# Patient Record
Sex: MALE | Race: WHITE | NOT HISPANIC OR LATINO | Employment: FULL TIME | URBAN - METROPOLITAN AREA
[De-identification: names, ages, dates, MRNs, and addresses within clinical notes are randomized per-mention and may not be internally consistent; named-entity substitution may affect disease eponyms.]

---

## 2020-09-04 ENCOUNTER — OFFICE VISIT (OUTPATIENT)
Dept: INTERNAL MEDICINE | Facility: CLINIC | Age: 50
End: 2020-09-04

## 2020-09-04 VITALS
RESPIRATION RATE: 20 BRPM | OXYGEN SATURATION: 97 % | BODY MASS INDEX: 60.35 KG/M2 | DIASTOLIC BLOOD PRESSURE: 90 MMHG | WEIGHT: 315 LBS | HEART RATE: 71 BPM | TEMPERATURE: 97.8 F | SYSTOLIC BLOOD PRESSURE: 120 MMHG

## 2020-09-04 DIAGNOSIS — Z23 NEED FOR TDAP VACCINATION: ICD-10-CM

## 2020-09-04 DIAGNOSIS — E66.01 CLASS 3 SEVERE OBESITY DUE TO EXCESS CALORIES WITH SERIOUS COMORBIDITY AND BODY MASS INDEX (BMI) OF 60.0 TO 69.9 IN ADULT (HCC): ICD-10-CM

## 2020-09-04 DIAGNOSIS — F33.0 MAJOR DEPRESSIVE DISORDER, RECURRENT, MILD (HCC): Primary | ICD-10-CM

## 2020-09-04 DIAGNOSIS — Z13.220 SCREENING, LIPID: ICD-10-CM

## 2020-09-04 DIAGNOSIS — I10 ESSENTIAL HYPERTENSION: ICD-10-CM

## 2020-09-04 DIAGNOSIS — K21.9 GASTROESOPHAGEAL REFLUX DISEASE, ESOPHAGITIS PRESENCE NOT SPECIFIED: ICD-10-CM

## 2020-09-04 DIAGNOSIS — F41.9 ANXIETY: ICD-10-CM

## 2020-09-04 DIAGNOSIS — Z13.29 THYROID DISORDER SCREEN: ICD-10-CM

## 2020-09-04 DIAGNOSIS — Z23 IMMUNIZATION DUE: ICD-10-CM

## 2020-09-04 DIAGNOSIS — E11.43 TYPE 2 DIABETES MELLITUS WITH DIABETIC AUTONOMIC NEUROPATHY, WITHOUT LONG-TERM CURRENT USE OF INSULIN (HCC): ICD-10-CM

## 2020-09-04 PROCEDURE — 99204 OFFICE O/P NEW MOD 45 MIN: CPT | Performed by: NURSE PRACTITIONER

## 2020-09-04 RX ORDER — FUROSEMIDE 40 MG/1
40 TABLET ORAL 2 TIMES DAILY
Qty: 60 TABLET | Refills: 0 | Status: SHIPPED | OUTPATIENT
Start: 2020-09-04 | End: 2020-10-07 | Stop reason: SDUPTHER

## 2020-09-04 RX ORDER — AMLODIPINE BESYLATE 5 MG/1
5 TABLET ORAL DAILY
Qty: 30 TABLET | Refills: 0 | Status: SHIPPED | OUTPATIENT
Start: 2020-09-04 | End: 2020-09-18 | Stop reason: SDUPTHER

## 2020-09-04 RX ORDER — LISINOPRIL AND HYDROCHLOROTHIAZIDE 20; 12.5 MG/1; MG/1
1 TABLET ORAL DAILY
Qty: 30 TABLET | Refills: 0 | Status: SHIPPED | OUTPATIENT
Start: 2020-09-04 | End: 2020-10-07 | Stop reason: SDUPTHER

## 2020-09-04 RX ORDER — PANTOPRAZOLE SODIUM 40 MG/1
40 TABLET, DELAYED RELEASE ORAL 2 TIMES DAILY
Qty: 60 TABLET | Refills: 0 | Status: SHIPPED | OUTPATIENT
Start: 2020-09-04 | End: 2020-10-15 | Stop reason: SDUPTHER

## 2020-09-04 RX ORDER — VENLAFAXINE HYDROCHLORIDE 75 MG/1
75 CAPSULE, EXTENDED RELEASE ORAL DAILY
Qty: 30 CAPSULE | Refills: 0 | Status: SHIPPED | OUTPATIENT
Start: 2020-09-04 | End: 2020-10-05 | Stop reason: SDUPTHER

## 2020-09-04 NOTE — PROGRESS NOTES
Chief Complaint   Patient presents with   • Anxiety     would like to dicscuss meds pantoprazole, amlodipine, folic acid   • Depression   • Obesity   • Hypertension   • Weakness - Generalized        Subjective     History of Present Illness   Patient is here today for follow-up of chronic problems.  Patient is new to me.  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     History sheet reviewed scanned to chart.    Patient following up on chronic depression with anxiety.  Patient has taken Lexapro and Prozac in the past which did not work.  He has had [facility] in the past plans to be going to [place] for counseling soon.  He would like to try Wellbutrin and Effexor as he has heard good things about both of these.  He has self-doubts no SI HI.  His anxiety seems worse over the last 5 years.  He does not like to leave his house.    Patient would like to consider phentermine for obesity.    Patient has hypertension denies chest pain shortness of breath swelling.  Takes amlodipine and Lasix for mild swelling.  None presently.  Blood pressure mildly elevated today.    Patient has GERD without exacerbation of symptoms.  Takes pantoprazole 30 minutes before a meal daily.    Patient has type 2 diabetes denies polydipsia polyuria polyphagia.  No hypoglycemia.  Checks feet daily.    Patient has severe obesity with serious comorbidities.  No specific diet or exercise.      The following portions of the patient's history were reviewed and updated as appropriate: allergies, current medications, past family history, past medical history, past social history, past surgical history and problem list.      Current Outpatient Medications:   •  amLODIPine (NORVASC) 5 MG tablet, Take 1 tablet by mouth Daily., Disp: 30 tablet, Rfl: 0  •  folic acid (FOLVITE) 1 MG tablet, Take 1 tablet by mouth Daily., Disp: 30 tablet, Rfl: 5  •  furosemide (LASIX) 40 MG tablet, Take 1 tablet by mouth 2 (Two) Times a Day., Disp: 60 tablet, Rfl: 0  •   lisinopril-hydrochlorothiazide (PRINZIDE,ZESTORETIC) 20-12.5 MG per tablet, Take 1 tablet by mouth Daily., Disp: 30 tablet, Rfl: 0  •  metFORMIN (GLUCOPHAGE) 500 MG tablet, Take 1 tablet by mouth 2 (Two) Times a Day With Meals., Disp: 60 tablet, Rfl: 0  •  pantoprazole (PROTONIX) 40 MG EC tablet, Take 1 tablet by mouth 2 (two) times a day., Disp: 60 tablet, Rfl: 0  •  venlafaxine XR (Effexor XR) 75 MG 24 hr capsule, Take 1 capsule by mouth Daily., Disp: 30 capsule, Rfl: 0    Vitals:    09/04/20 1039   BP: 120/90   Pulse: 71   Resp: 20   Temp: 97.8 °F (36.6 °C)   SpO2: 97%       Body mass index is 60.35 kg/m².        Review of Systems   Constitutional: Negative for activity change, appetite change, chills, fatigue and fever.   HENT: Negative for congestion, postnasal drip and sore throat.    Eyes: Negative for visual disturbance.   Respiratory: Negative for cough and shortness of breath.    Cardiovascular: Negative for chest pain, palpitations and leg swelling.   Gastrointestinal: Negative for abdominal pain, constipation, diarrhea, nausea and GERD.   Musculoskeletal: Negative for arthralgias and myalgias.   Skin: Negative for rash.   Allergic/Immunologic: Negative for environmental allergies.   Neurological: Negative for dizziness.   Psychiatric/Behavioral: Negative for sleep disturbance.   All other systems reviewed and are negative.      Objective   Physical Exam   Constitutional: He is oriented to person, place, and time. He appears well-developed and well-nourished.   HENT:   Head: Normocephalic and atraumatic.   Right Ear: External ear normal.   Left Ear: External ear normal.   Nose: Nose normal.   Mouth/Throat: Oropharynx is clear and moist.   Neck: No thyromegaly present.   Cardiovascular: Normal rate and regular rhythm.   Pulmonary/Chest: Effort normal and breath sounds normal.   Abdominal: Soft. Bowel sounds are normal. He exhibits no distension. There is no abdominal tenderness.   Lymphadenopathy:     He  has no cervical adenopathy.   Neurological: He is alert and oriented to person, place, and time.   Skin: Capillary refill takes 2 to 3 seconds.   Psychiatric: His behavior is normal.   Nursing note and vitals reviewed.          No results found for this or any previous visit.     Assessment/Plan   Carlos Eduardo was seen today for anxiety, depression, obesity, hypertension and weakness - generalized.    Diagnoses and all orders for this visit:    Major depressive disorder, recurrent, mild (CMS/HCC)    Anxiety    Essential hypertension  -     Comprehensive Metabolic Panel; Future  -     POC Urinalysis Dipstick, Automated; Future    Gastroesophageal reflux disease, esophagitis presence not specified  -     CBC & Differential; Future    Type 2 diabetes mellitus with diabetic autonomic neuropathy, without long-term current use of insulin (CMS/HCC)  -     Comprehensive Metabolic Panel; Future  -     POC Urinalysis Dipstick, Automated; Future  -     POC Microalbumin; Future  -     POC Glycosylated Hemoglobin (Hb A1C); Future    Screening, lipid  -     Lipid Panel; Future    Thyroid disorder screen  -     TSH; Future    Need for Tdap vaccination  -     Tdap Vaccine Greater Than or Equal To 6yo IM    Immunization due  -     Pneumococcal Polysaccharide Vaccine 23-Valent Greater Than or Equal To 3yo Subcutaneous / IM    Class 3 severe obesity due to excess calories with serious comorbidity and body mass index (BMI) of 60.0 to 69.9 in adult (CMS/HCC)    Other orders  -     metFORMIN (GLUCOPHAGE) 500 MG tablet; Take 1 tablet by mouth 2 (Two) Times a Day With Meals.  -     lisinopril-hydrochlorothiazide (PRINZIDE,ZESTORETIC) 20-12.5 MG per tablet; Take 1 tablet by mouth Daily.  -     pantoprazole (PROTONIX) 40 MG EC tablet; Take 1 tablet by mouth 2 (two) times a day.  -     amLODIPine (NORVASC) 5 MG tablet; Take 1 tablet by mouth Daily.  -     furosemide (LASIX) 40 MG tablet; Take 1 tablet by mouth 2 (Two) Times a Day.  -     venlafaxine  XR (Effexor XR) 75 MG 24 hr capsule; Take 1 capsule by mouth Daily.      The patient will let me know if he develops any SI HI.  Will start Effexor and in 6 weeks if he is doing well will consider adding Wellbutrin.  I have asked that he speak to weight loss center in regards to phentermine possibility.  I did review risk with him in regards to the phentermine use.      Discussed the patient's BMI with him. BMI is above normal parameters. Recommendations include: educational material.    Return in about 3 weeks (around 9/25/2020) for Annual.  RTC/call  If symptoms worsen  Meds MOA and SE's reviewed and pt v/u

## 2020-09-04 NOTE — PATIENT INSTRUCTIONS
MyPlate from USDA    MyPlate is an outline of a general healthy diet based on the 2010 Dietary Guidelines for Americans, from the U.S. Department of Agriculture (USDA). It sets guidelines for how much food you should eat from each food group based on your age, sex, and level of physical activity.  What are tips for following MyPlate?  To follow MyPlate recommendations:  · Eat a wide variety of fruits and vegetables, grains, and protein foods.  · Serve smaller portions and eat less food throughout the day.  · Limit portion sizes to avoid overeating.  · Enjoy your food.  · Get at least 150 minutes of exercise every week. This is about 30 minutes each day, 5 or more days per week.  It can be difficult to have every meal look like MyPlate. Think about MyPlate as eating guidelines for an entire day, rather than each individual meal.  Fruits and vegetables  · Make half of your plate fruits and vegetables.  · Eat many different colors of fruits and vegetables each day.  · For a 2,000 calorie daily food plan, eat:  ? 2½ cups of vegetables every day.  ? 2 cups of fruit every day.  · 1 cup is equal to:  ? 1 cup raw or cooked vegetables.  ? 1 cup raw fruit.  ? 1 medium-sized orange, apple, or banana.  ? 1 cup 100% fruit or vegetable juice.  ? 2 cups raw leafy greens, such as lettuce, spinach, or kale.  ? ½ cup dried fruit.  Grains  · One fourth of your plate should be grains.  · Make at least half of the grains you eat each day whole grains.  · For a 2,000 calorie daily food plan, eat 6 oz of grains every day.  · 1 oz is equal to:  ? 1 slice bread.  ? 1 cup cereal.  ? ½ cup cooked rice, cereal, or pasta.  Protein  · One fourth of your plate should be protein.  · Eat a wide variety of protein foods, including meat, poultry, fish, eggs, beans, nuts, and tofu.  · For a 2,000 calorie daily food plan, eat 5½ oz of protein every day.  · 1 oz is equal to:  ? 1 oz meat, poultry, or fish.  ? ¼ cup cooked beans.  ? 1 egg.  ? ½ oz nuts  or seeds.  ? 1 Tbsp peanut butter.  Dairy  · Drink fat-free or low-fat (1%) milk.  · Eat or drink dairy as a side to meals.  · For a 2,000 calorie daily food plan, eat or drink 3 cups of dairy every day.  · 1 cup is equal to:  ? 1 cup milk, yogurt, cottage cheese, or soy milk (soy beverage).  ? 2 oz processed cheese.  ? 1½ oz natural cheese.  Fats, oils, salt, and sugars  · Only small amounts of oils are recommended.  · Avoid foods that are high in calories and low in nutritional value (empty calories), like foods high in fat or added sugars.  · Choose foods that are low in salt (sodium). Choose foods that have less than 140 milligrams (mg) of sodium per serving.  · Drink water instead of sugary drinks. Drink enough water each day to keep your urine pale yellow.  Where to find support  · Work with your health care provider or a nutrition specialist (dietitian) to develop a customized eating plan that is right for you.  · Download an tina (mobile application) to help you track your daily food intake.  Where to find more information  · Go to ChooseMyPlate.gov for more information.  Summary  · MyPlate is a general guideline for healthy eating from the USDA. It is based on the 2010 Dietary Guidelines for Americans.  · In general, fruits and vegetables should take up ½ of your plate, grains should take up ¼ of your plate, and protein should take up ¼ of your plate.  This information is not intended to replace advice given to you by your health care provider. Make sure you discuss any questions you have with your health care provider.  Document Released: 01/06/2009 Document Revised: 05/21/2020 Document Reviewed: 03/19/2018  Elsevier Patient Education © 2020 Elsevier Inc.

## 2020-09-10 PROBLEM — Z99.89 OSA ON CPAP: Status: ACTIVE | Noted: 2020-09-10

## 2020-09-10 PROBLEM — G47.33 OSA ON CPAP: Status: ACTIVE | Noted: 2020-09-10

## 2020-09-16 ENCOUNTER — LAB (OUTPATIENT)
Dept: LAB | Facility: HOSPITAL | Age: 50
End: 2020-09-16

## 2020-09-16 DIAGNOSIS — E11.43 TYPE 2 DIABETES MELLITUS WITH DIABETIC AUTONOMIC NEUROPATHY, WITHOUT LONG-TERM CURRENT USE OF INSULIN (HCC): ICD-10-CM

## 2020-09-16 DIAGNOSIS — I10 ESSENTIAL HYPERTENSION: ICD-10-CM

## 2020-09-16 DIAGNOSIS — K21.9 GASTROESOPHAGEAL REFLUX DISEASE, ESOPHAGITIS PRESENCE NOT SPECIFIED: ICD-10-CM

## 2020-09-16 DIAGNOSIS — Z13.220 SCREENING, LIPID: ICD-10-CM

## 2020-09-16 DIAGNOSIS — Z13.29 THYROID DISORDER SCREEN: ICD-10-CM

## 2020-09-16 LAB
ALBUMIN SERPL-MCNC: 4.2 G/DL (ref 3.5–5.2)
ALBUMIN/GLOB SERPL: 1.6 G/DL
ALP SERPL-CCNC: 84 U/L (ref 39–117)
ALT SERPL W P-5'-P-CCNC: 39 U/L (ref 1–41)
ANION GAP SERPL CALCULATED.3IONS-SCNC: 13.3 MMOL/L (ref 5–15)
AST SERPL-CCNC: 26 U/L (ref 1–40)
BASOPHILS # BLD AUTO: 0.04 10*3/MM3 (ref 0–0.2)
BASOPHILS NFR BLD AUTO: 0.6 % (ref 0–1.5)
BILIRUB SERPL-MCNC: 0.9 MG/DL (ref 0–1.2)
BUN SERPL-MCNC: 12 MG/DL (ref 6–20)
BUN/CREAT SERPL: 12.6 (ref 7–25)
CALCIUM SPEC-SCNC: 9.4 MG/DL (ref 8.6–10.5)
CHLORIDE SERPL-SCNC: 97 MMOL/L (ref 98–107)
CHOLEST SERPL-MCNC: 202 MG/DL (ref 0–200)
CO2 SERPL-SCNC: 29.7 MMOL/L (ref 22–29)
CREAT SERPL-MCNC: 0.95 MG/DL (ref 0.76–1.27)
DEPRECATED RDW RBC AUTO: 42.7 FL (ref 37–54)
EOSINOPHIL # BLD AUTO: 0.16 10*3/MM3 (ref 0–0.4)
EOSINOPHIL NFR BLD AUTO: 2.5 % (ref 0.3–6.2)
ERYTHROCYTE [DISTWIDTH] IN BLOOD BY AUTOMATED COUNT: 14.5 % (ref 12.3–15.4)
GFR SERPL CREATININE-BSD FRML MDRD: 84 ML/MIN/1.73
GLOBULIN UR ELPH-MCNC: 2.7 GM/DL
GLUCOSE SERPL-MCNC: 157 MG/DL (ref 65–99)
HCT VFR BLD AUTO: 45.5 % (ref 37.5–51)
HDLC SERPL-MCNC: 32 MG/DL (ref 40–60)
HGB BLD-MCNC: 15.8 G/DL (ref 13–17.7)
IMM GRANULOCYTES # BLD AUTO: 0.04 10*3/MM3 (ref 0–0.05)
IMM GRANULOCYTES NFR BLD AUTO: 0.6 % (ref 0–0.5)
LDLC SERPL CALC-MCNC: 103 MG/DL (ref 0–100)
LDLC/HDLC SERPL: 3.21 {RATIO}
LYMPHOCYTES # BLD AUTO: 1.73 10*3/MM3 (ref 0.7–3.1)
LYMPHOCYTES NFR BLD AUTO: 26.9 % (ref 19.6–45.3)
MCH RBC QN AUTO: 28.7 PG (ref 26.6–33)
MCHC RBC AUTO-ENTMCNC: 34.7 G/DL (ref 31.5–35.7)
MCV RBC AUTO: 82.6 FL (ref 79–97)
MONOCYTES # BLD AUTO: 0.41 10*3/MM3 (ref 0.1–0.9)
MONOCYTES NFR BLD AUTO: 6.4 % (ref 5–12)
NEUTROPHILS NFR BLD AUTO: 4.06 10*3/MM3 (ref 1.7–7)
NEUTROPHILS NFR BLD AUTO: 63 % (ref 42.7–76)
NRBC BLD AUTO-RTO: 0 /100 WBC (ref 0–0.2)
PLATELET # BLD AUTO: 174 10*3/MM3 (ref 140–450)
PMV BLD AUTO: 12.3 FL (ref 6–12)
POTASSIUM SERPL-SCNC: 4.2 MMOL/L (ref 3.5–5.2)
PROT SERPL-MCNC: 6.9 G/DL (ref 6–8.5)
RBC # BLD AUTO: 5.51 10*6/MM3 (ref 4.14–5.8)
SODIUM SERPL-SCNC: 140 MMOL/L (ref 136–145)
TRIGL SERPL-MCNC: 336 MG/DL (ref 0–150)
TSH SERPL DL<=0.05 MIU/L-ACNC: 2.51 UIU/ML (ref 0.27–4.2)
VLDLC SERPL-MCNC: 67.2 MG/DL (ref 5–40)
WBC # BLD AUTO: 6.44 10*3/MM3 (ref 3.4–10.8)

## 2020-09-16 PROCEDURE — 80053 COMPREHEN METABOLIC PANEL: CPT | Performed by: NURSE PRACTITIONER

## 2020-09-16 PROCEDURE — 85025 COMPLETE CBC W/AUTO DIFF WBC: CPT | Performed by: NURSE PRACTITIONER

## 2020-09-16 PROCEDURE — 84443 ASSAY THYROID STIM HORMONE: CPT | Performed by: NURSE PRACTITIONER

## 2020-09-16 PROCEDURE — 80061 LIPID PANEL: CPT | Performed by: NURSE PRACTITIONER

## 2020-09-18 ENCOUNTER — OFFICE VISIT (OUTPATIENT)
Dept: INTERNAL MEDICINE | Facility: CLINIC | Age: 50
End: 2020-09-18

## 2020-09-18 VITALS
HEART RATE: 66 BPM | WEIGHT: 315 LBS | DIASTOLIC BLOOD PRESSURE: 90 MMHG | RESPIRATION RATE: 18 BRPM | BODY MASS INDEX: 44.1 KG/M2 | HEIGHT: 71 IN | SYSTOLIC BLOOD PRESSURE: 120 MMHG | TEMPERATURE: 98.2 F | OXYGEN SATURATION: 97 %

## 2020-09-18 DIAGNOSIS — E78.49 OTHER HYPERLIPIDEMIA: ICD-10-CM

## 2020-09-18 DIAGNOSIS — Z23 NEED FOR TDAP VACCINATION: ICD-10-CM

## 2020-09-18 DIAGNOSIS — Z11.59 NEED FOR HEPATITIS C SCREENING TEST: ICD-10-CM

## 2020-09-18 DIAGNOSIS — G47.33 OSA (OBSTRUCTIVE SLEEP APNEA): ICD-10-CM

## 2020-09-18 DIAGNOSIS — Z12.11 SCREEN FOR COLON CANCER: ICD-10-CM

## 2020-09-18 DIAGNOSIS — E11.9 ENCOUNTER FOR DIABETIC FOOT EXAM (HCC): ICD-10-CM

## 2020-09-18 DIAGNOSIS — Z00.00 ANNUAL PHYSICAL EXAM: Primary | ICD-10-CM

## 2020-09-18 DIAGNOSIS — Z23 IMMUNIZATION DUE: ICD-10-CM

## 2020-09-18 DIAGNOSIS — I10 ESSENTIAL HYPERTENSION: ICD-10-CM

## 2020-09-18 DIAGNOSIS — Z23 NEED FOR PNEUMOCOCCAL VACCINE: ICD-10-CM

## 2020-09-18 DIAGNOSIS — F41.9 ANXIETY: ICD-10-CM

## 2020-09-18 DIAGNOSIS — Z23 NEED FOR IMMUNIZATION AGAINST INFLUENZA: ICD-10-CM

## 2020-09-18 DIAGNOSIS — E11.43 TYPE 2 DIABETES MELLITUS WITH DIABETIC AUTONOMIC NEUROPATHY, WITHOUT LONG-TERM CURRENT USE OF INSULIN (HCC): ICD-10-CM

## 2020-09-18 DIAGNOSIS — Z23 NEED FOR HEPATITIS B BOOSTER VACCINATION: ICD-10-CM

## 2020-09-18 LAB
A/C: NORMAL
BILIRUB BLD-MCNC: NEGATIVE MG/DL
CLARITY, POC: CLEAR
COLOR UR: YELLOW
EXPIRATION DATE: NORMAL
GLUCOSE UR STRIP-MCNC: NEGATIVE MG/DL
HBA1C MFR BLD: 7 %
KETONES UR QL: NEGATIVE
LEUKOCYTE EST, POC: NEGATIVE
Lab: 4027
Lab: NORMAL
Lab: NORMAL
NITRITE UR-MCNC: NEGATIVE MG/ML
PH UR: 5 [PH] (ref 5–8)
POC CREATININE URINE: 300
POC MICROALBUMIN URINE: 30
PROT UR STRIP-MCNC: NEGATIVE MG/DL
RBC # UR STRIP: NEGATIVE /UL
SP GR UR: 1.01 (ref 1–1.03)
UROBILINOGEN UR QL: NORMAL

## 2020-09-18 PROCEDURE — 90732 PPSV23 VACC 2 YRS+ SUBQ/IM: CPT | Performed by: NURSE PRACTITIONER

## 2020-09-18 PROCEDURE — 86803 HEPATITIS C AB TEST: CPT | Performed by: NURSE PRACTITIONER

## 2020-09-18 PROCEDURE — 90715 TDAP VACCINE 7 YRS/> IM: CPT | Performed by: NURSE PRACTITIONER

## 2020-09-18 PROCEDURE — 90746 HEPB VACCINE 3 DOSE ADULT IM: CPT | Performed by: NURSE PRACTITIONER

## 2020-09-18 PROCEDURE — 83036 HEMOGLOBIN GLYCOSYLATED A1C: CPT | Performed by: NURSE PRACTITIONER

## 2020-09-18 PROCEDURE — 99396 PREV VISIT EST AGE 40-64: CPT | Performed by: NURSE PRACTITIONER

## 2020-09-18 PROCEDURE — 90472 IMMUNIZATION ADMIN EACH ADD: CPT | Performed by: NURSE PRACTITIONER

## 2020-09-18 PROCEDURE — 81003 URINALYSIS AUTO W/O SCOPE: CPT | Performed by: NURSE PRACTITIONER

## 2020-09-18 PROCEDURE — 93005 ELECTROCARDIOGRAM TRACING: CPT | Performed by: NURSE PRACTITIONER

## 2020-09-18 PROCEDURE — 82044 UR ALBUMIN SEMIQUANTITATIVE: CPT | Performed by: NURSE PRACTITIONER

## 2020-09-18 PROCEDURE — 82306 VITAMIN D 25 HYDROXY: CPT | Performed by: NURSE PRACTITIONER

## 2020-09-18 PROCEDURE — 90471 IMMUNIZATION ADMIN: CPT | Performed by: NURSE PRACTITIONER

## 2020-09-18 RX ORDER — ATORVASTATIN CALCIUM 20 MG/1
20 TABLET, FILM COATED ORAL DAILY
Qty: 30 TABLET | Refills: 2 | Status: SHIPPED | OUTPATIENT
Start: 2020-09-18 | End: 2020-10-20 | Stop reason: SDUPTHER

## 2020-09-18 RX ORDER — BUPROPION HYDROCHLORIDE 150 MG/1
150 TABLET ORAL DAILY
Qty: 30 TABLET | Refills: 2 | Status: SHIPPED | OUTPATIENT
Start: 2020-09-18 | End: 2020-10-26

## 2020-09-18 RX ORDER — AMLODIPINE BESYLATE 10 MG/1
10 TABLET ORAL DAILY
Qty: 20 TABLET | Refills: 2 | Status: SHIPPED | OUTPATIENT
Start: 2020-09-18 | End: 2020-12-21 | Stop reason: SDUPTHER

## 2020-09-19 LAB
25(OH)D3 SERPL-MCNC: 26 NG/ML (ref 30–100)
HCV AB SER DONR QL: NORMAL

## 2020-10-04 PROBLEM — E55.9 VITAMIN D INSUFFICIENCY: Status: ACTIVE | Noted: 2020-10-04

## 2020-10-04 RX ORDER — ERGOCALCIFEROL (VITAMIN D2) 10 MCG
1000 TABLET ORAL DAILY
COMMUNITY
End: 2021-02-23

## 2020-10-05 DIAGNOSIS — F41.9 ANXIETY: Primary | ICD-10-CM

## 2020-10-05 RX ORDER — VENLAFAXINE HYDROCHLORIDE 150 MG/1
150 CAPSULE, EXTENDED RELEASE ORAL DAILY
Qty: 90 CAPSULE | Refills: 1 | Status: SHIPPED | OUTPATIENT
Start: 2020-10-05 | End: 2020-10-06 | Stop reason: SDUPTHER

## 2020-10-06 DIAGNOSIS — F41.9 ANXIETY: ICD-10-CM

## 2020-10-06 RX ORDER — VENLAFAXINE HYDROCHLORIDE 150 MG/1
150 CAPSULE, EXTENDED RELEASE ORAL DAILY
Qty: 90 CAPSULE | Refills: 1 | Status: SHIPPED | OUTPATIENT
Start: 2020-10-06 | End: 2021-04-07 | Stop reason: SDUPTHER

## 2020-10-07 PROBLEM — E11.9 TYPE 2 DIABETES MELLITUS, WITHOUT LONG-TERM CURRENT USE OF INSULIN: Status: ACTIVE | Noted: 2020-10-07

## 2020-10-07 RX ORDER — LISINOPRIL AND HYDROCHLOROTHIAZIDE 20; 12.5 MG/1; MG/1
1 TABLET ORAL DAILY
Qty: 30 TABLET | Refills: 2 | Status: SHIPPED | OUTPATIENT
Start: 2020-10-07 | End: 2021-02-03 | Stop reason: SDUPTHER

## 2020-10-07 RX ORDER — FUROSEMIDE 40 MG/1
40 TABLET ORAL 2 TIMES DAILY
Qty: 60 TABLET | Refills: 2 | Status: SHIPPED | OUTPATIENT
Start: 2020-10-07 | End: 2021-02-03 | Stop reason: SDUPTHER

## 2020-10-07 NOTE — TELEPHONE ENCOUNTER
LOV 09/18/2020  NOV 12/18/20  LRX 09/04/2020 #60 0 refills furosemide  09/04/2020 #30 0 refills lisinopril  09/04/2020 #60 0 refills

## 2020-10-11 PROBLEM — I10 ESSENTIAL HYPERTENSION: Status: ACTIVE | Noted: 2020-10-11

## 2020-10-11 PROBLEM — E78.49 OTHER HYPERLIPIDEMIA: Status: ACTIVE | Noted: 2020-10-11

## 2020-10-11 PROBLEM — E29.1 HYPOGONADISM IN MALE: Status: ACTIVE | Noted: 2020-10-11

## 2020-10-11 PROBLEM — Z86.39 HISTORY OF NON ANEMIC VITAMIN B12 DEFICIENCY: Status: ACTIVE | Noted: 2020-10-11

## 2020-10-11 PROBLEM — F41.9 ANXIETY: Status: ACTIVE | Noted: 2020-10-11

## 2020-10-15 DIAGNOSIS — E78.49 OTHER HYPERLIPIDEMIA: ICD-10-CM

## 2020-10-20 RX ORDER — ATORVASTATIN CALCIUM 20 MG/1
20 TABLET, FILM COATED ORAL DAILY
Qty: 30 TABLET | Refills: 2 | Status: SHIPPED | OUTPATIENT
Start: 2020-10-20 | End: 2020-11-12 | Stop reason: SDUPTHER

## 2020-10-20 RX ORDER — PANTOPRAZOLE SODIUM 40 MG/1
40 TABLET, DELAYED RELEASE ORAL 2 TIMES DAILY
Qty: 60 TABLET | Refills: 5 | Status: ON HOLD | OUTPATIENT
Start: 2020-10-20 | End: 2021-07-08

## 2020-10-20 NOTE — TELEPHONE ENCOUNTER
Caller: Carlos Eduardo Danielle    Relationship: Self    Best call back number:   [phone]    Medication needed:   Requested Prescriptions     Pending Prescriptions Disp Refills   • pantoprazole (PROTONIX) 40 MG EC tablet 60 tablet 0     Sig: Take 1 tablet by mouth 2 (two) times a day.   • atorvastatin (Lipitor) 20 MG tablet 30 tablet 2     Sig: Take 1 tablet by mouth Daily.       When do you need the refill by:   AS SOON AS POSSIBLE    What details did the patient provide when requesting the medication:   PATIENT HAS A 3 DAY SUPPLY LEFT.     Does the patient have less than a 3 day supply:  [x] Yes  [] No    What is the patient's preferred pharmacy:     [facility] - [place]

## 2020-10-26 ENCOUNTER — TELEMEDICINE (OUTPATIENT)
Dept: INTERNAL MEDICINE | Facility: CLINIC | Age: 50
End: 2020-10-26

## 2020-10-26 ENCOUNTER — TELEPHONE (OUTPATIENT)
Dept: INTERNAL MEDICINE | Facility: CLINIC | Age: 50
End: 2020-10-26

## 2020-10-26 VITALS — TEMPERATURE: 96.9 F

## 2020-10-26 DIAGNOSIS — T88.7XXS NON-DOSE-RELATED ADVERSE REACTION TO MEDICATION, SEQUELA: ICD-10-CM

## 2020-10-26 DIAGNOSIS — R42 DIZZINESS: Primary | ICD-10-CM

## 2020-10-26 PROCEDURE — 99213 OFFICE O/P EST LOW 20 MIN: CPT | Performed by: INTERNAL MEDICINE

## 2020-10-26 RX ORDER — VENLAFAXINE HYDROCHLORIDE 150 MG/1
150 CAPSULE, EXTENDED RELEASE ORAL DAILY
Qty: 90 CAPSULE | Refills: 1 | Status: SHIPPED | OUTPATIENT
Start: 2020-10-26 | End: 2020-12-31 | Stop reason: SDUPTHER

## 2020-10-26 NOTE — PROGRESS NOTES
Subjective       Carlos Eduardo Danielle is a 50 y.o. male.     Chief Complaint   Patient presents with   • Dizziness       History obtained from the patient.    You have chosen to receive care through a telehealth visit.  Do you consent to use a video/audio connection for your medical care today? Yes    The patient has an Anxiety Disorder.  On 9/9/2020, Effexor  mg daily was started.  He states it did not seem to be working well enough, so on 9/18/2020 Wellbutrin  mg daily was added.  He states the medications have been helping with his anxiety.      Dizziness  This is a new problem. Episode onset: 3-4 weeks ago. The problem occurs intermittently. The problem has been unchanged. Associated symptoms include diaphoresis, fatigue (chronic), nausea and a visual change (Occasionally sees dark spots., but no blurred or double vision ). Pertinent negatives include no abdominal pain, arthralgias, chest pain, chills, congestion, coughing, fever, headaches, joint swelling, myalgias, neck pain, numbness, rash, sore throat, swollen glands, vertigo, vomiting (but dry heaves) or weakness. Associated symptoms comments: Gets tingling feeling in the back of the neck.  Occasionally sees dark spots.. Exacerbated by: He feels the symptoms started after Wellbutrin XL was added. Treatments tried: Aspirin. The treatment provided mild relief.        The following portions of the patient's history were reviewed and updated as appropriate: allergies, current medications, past family history, past medical history, past social history, past surgical history and problem list.      Review of Systems   Constitutional: Positive for diaphoresis and fatigue (chronic). Negative for chills and fever.   HENT: Negative for congestion, ear pain, postnasal drip, rhinorrhea, sinus pressure, sinus pain and sore throat.    Respiratory: Negative for cough, shortness of breath and wheezing.    Cardiovascular: Negative for chest pain.   Gastrointestinal:  Positive for nausea. Negative for abdominal pain and vomiting (but dry heaves).   Musculoskeletal: Negative for arthralgias, joint swelling, myalgias, neck pain and neck stiffness.   Skin: Negative for rash.   Neurological: Positive for dizziness. Negative for vertigo, speech difficulty, weakness, numbness and headaches.   Hematological: Negative for adenopathy.           Objective     Temperature 96.9 °F (36.1 °C), temperature source Temporal.    Physical Exam  Vitals signs reviewed.   Constitutional:       Appearance: He is obese.   Pulmonary:      Effort: Pulmonary effort is normal. No respiratory distress.   Neurological:      Mental Status: He is alert.   Psychiatric:         Mood and Affect: Mood normal.           Assessment/Plan   Diagnoses and all orders for this visit:    1. Dizziness (Primary)    2. Non-dose-related adverse reaction to medication, sequela     Continue Effexor XR at the current dose and discontinue Wellbutrin XL.      Return if symptoms worsen or fail to improve.

## 2020-10-26 NOTE — TELEPHONE ENCOUNTER
PATIENT HAS BEEN FEELING DIZZY AND HAS BEEN DRY HEAVING.HE BELIEVES IT HAS TO DO WITH venlafaxine XR (EFFEXOR-XR) 150 MG 24 hr capsule AND buPROPion XL (Wellbutrin XL) 150 MG 24 hr tablet. PATIENT STATES WHEN HE TAKES THE MEDICATION TOGETHER IT CAUSES HIM TO FEEL THIS WAY. HE WOULD LIKE CLINICAL ADVISE ON WHAT HE SHOULD DO.     Carlos Eduardo (Self) [phone]

## 2020-10-26 NOTE — TELEPHONE ENCOUNTER
Pt stated he's been getting dizzy a lot more with tingling at the back of the head. Pt stated he is taking medication in the morning with food or right after. Pt stated at night he gets really hot sweating.  Pt is scheduled a 1:30 pm SD with Dr. Varela 10/26/2020.  Please advise if additional information before/ by appointment.

## 2020-10-26 NOTE — TELEPHONE ENCOUNTER
Spoke to Carlos Eduardo. Pt stated at visit today Dr. Varela advised him to stop taking the wellbutrin XL 150MG.

## 2020-10-26 NOTE — TELEPHONE ENCOUNTER
Has he been checking his sugar to see what his blood sugar numbers are doing during this time? Please have the patient hold the Effexor make appointment to be seen if symptoms do not resolve.

## 2020-10-29 ENCOUNTER — TELEPHONE (OUTPATIENT)
Dept: INTERNAL MEDICINE | Facility: CLINIC | Age: 50
End: 2020-10-29

## 2020-11-03 ENCOUNTER — TELEPHONE (OUTPATIENT)
Dept: INTERNAL MEDICINE | Facility: CLINIC | Age: 50
End: 2020-11-03

## 2020-11-03 NOTE — TELEPHONE ENCOUNTER
LOV  09/18/2020  New patient to us and this will be the first time we fill test strips. Attempted to reach patient at [phone] to see how often he is testing lvm to call back. Gave office number

## 2020-11-03 NOTE — TELEPHONE ENCOUNTER
Caller: Carlos Eduardo Danielle    Relationship: Self    Best call back number:   [phone] (H)    Medication needed:   TEST STRIPS FOR ONE TOUCH ULTRA 2    When do you need the refill by:   AS SOON AS POSSIBLE    What details did the patient provide when requesting the medication:   PATIENT USED HIS LAST STRIP TODAY    Does the patient have less than a 3 day supply:  [x] Yes  [] No    What is the patient's preferred pharmacy:    [facility] [address] [phone] [facility] [phone]

## 2020-11-11 DIAGNOSIS — E78.49 OTHER HYPERLIPIDEMIA: ICD-10-CM

## 2020-11-11 DIAGNOSIS — F41.9 ANXIETY: ICD-10-CM

## 2020-11-12 RX ORDER — ATORVASTATIN CALCIUM 20 MG/1
20 TABLET, FILM COATED ORAL DAILY
Qty: 30 TABLET | Refills: 2 | Status: SHIPPED | OUTPATIENT
Start: 2020-11-12 | End: 2021-02-23

## 2020-11-12 RX ORDER — ATORVASTATIN CALCIUM 20 MG/1
20 TABLET, FILM COATED ORAL DAILY
Qty: 30 TABLET | Refills: 2 | OUTPATIENT
Start: 2020-11-12

## 2020-11-12 RX ORDER — BUPROPION HYDROCHLORIDE 150 MG/1
150 TABLET ORAL DAILY
Qty: 30 TABLET | Refills: 2 | OUTPATIENT
Start: 2020-11-12

## 2020-12-16 ENCOUNTER — TELEPHONE (OUTPATIENT)
Dept: INTERNAL MEDICINE | Facility: CLINIC | Age: 50
End: 2020-12-16

## 2020-12-21 DIAGNOSIS — I10 ESSENTIAL HYPERTENSION: ICD-10-CM

## 2020-12-21 RX ORDER — AMLODIPINE BESYLATE 10 MG/1
10 TABLET ORAL DAILY
Qty: 20 TABLET | Refills: 2 | Status: CANCELLED | OUTPATIENT
Start: 2020-12-21

## 2020-12-21 RX ORDER — AMLODIPINE BESYLATE 10 MG/1
10 TABLET ORAL DAILY
Qty: 30 TABLET | Refills: 2 | Status: SHIPPED | OUTPATIENT
Start: 2020-12-21 | End: 2021-04-07 | Stop reason: SDUPTHER

## 2020-12-31 ENCOUNTER — TELEMEDICINE (OUTPATIENT)
Dept: INTERNAL MEDICINE | Facility: CLINIC | Age: 50
End: 2020-12-31

## 2020-12-31 ENCOUNTER — TELEPHONE (OUTPATIENT)
Dept: INTERNAL MEDICINE | Facility: CLINIC | Age: 50
End: 2020-12-31

## 2020-12-31 VITALS — TEMPERATURE: 96.9 F | WEIGHT: 315 LBS | BODY MASS INDEX: 58.58 KG/M2

## 2020-12-31 DIAGNOSIS — M54.50 ACUTE RIGHT-SIDED LOW BACK PAIN WITHOUT SCIATICA: Primary | ICD-10-CM

## 2020-12-31 PROCEDURE — 99214 OFFICE O/P EST MOD 30 MIN: CPT | Performed by: INTERNAL MEDICINE

## 2020-12-31 RX ORDER — TIZANIDINE 4 MG/1
4 TABLET ORAL EVERY 8 HOURS PRN
Qty: 30 TABLET | Refills: 0 | Status: SHIPPED | OUTPATIENT
Start: 2020-12-31 | End: 2021-01-04

## 2020-12-31 RX ORDER — METHYLPREDNISOLONE 4 MG/1
TABLET ORAL
Qty: 21 EACH | Refills: 0 | Status: SHIPPED | OUTPATIENT
Start: 2020-12-31 | End: 2021-02-23

## 2020-12-31 NOTE — PATIENT INSTRUCTIONS
Continue NSAIDS (Diclofenac) for 2 full weeks, then as needed.  Recommend heat, 2-3 times daily.  Please call if no better in 2 weeks.

## 2020-12-31 NOTE — PROGRESS NOTES
Subjective       Carlos Eduardo Danielle is a 50 y.o. male.     Chief Complaint   Patient presents with   • Back Pain       History obtained from the patient.    You have chosen to receive care through a telehealth visit.  Do you consent to use a video/audio connection for your medical care today? Yes      Back Pain  This is a new problem. Episode onset: 4 days ago. The problem has been gradually worsening since onset. The pain is present in the lumbar spine (middle to right lower). The quality of the pain is described as aching and stabbing (Has spasms). The pain does not radiate. The pain is severe. The pain is the same all the time. The symptoms are aggravated by bending, sitting, lying down, standing and twisting. Stiffness is present: None. Pertinent negatives include no abdominal pain, bladder incontinence, bowel incontinence, chest pain, dysuria, fever, headaches, leg pain, numbness, tingling, weakness or weight loss. Risk factors include obesity (Started after moving things at home). Treatments tried: Lidocaine Patch and Icy/Hot. The treatment provided no relief.        The following portions of the patient's history were reviewed and updated as appropriate: allergies, current medications, past family history, past medical history, past social history, past surgical history and problem list.      Review of Systems   Constitutional: Positive for unexpected weight change (lost 20 pounds after starting diabetic medication). Negative for chills, fever and weight loss.   Respiratory: Negative for cough and shortness of breath.    Cardiovascular: Negative for chest pain.   Gastrointestinal: Negative for abdominal pain, blood in stool, bowel incontinence, constipation, diarrhea, nausea and vomiting.        Denies melena.   Genitourinary: Negative for bladder incontinence, dysuria, frequency, hematuria and urgency.   Musculoskeletal: Positive for back pain. Negative for arthralgias, joint swelling, myalgias, neck pain and  neck stiffness.   Skin: Negative for rash.   Neurological: Negative for tingling, weakness, numbness and headaches.   Hematological: Negative for adenopathy.           Objective     Temperature 96.9 °F (36.1 °C), temperature source Temporal, weight (!) 191 kg (420 lb).    Physical Exam  Vitals signs reviewed.   Constitutional:       Comments: Morbidly obese.   Pulmonary:      Effort: Pulmonary effort is normal. No respiratory distress.   Neurological:      Mental Status: He is alert.   Psychiatric:         Mood and Affect: Mood normal.           Assessment/Plan   Diagnoses and all orders for this visit:    1. Acute right-sided low back pain without sciatica (Primary)  -     methylPREDNISolone (MEDROL) 4 MG dose pack; Take as directed on package instructions.  Dispense: 21 each; Refill: 0  -     diclofenac (VOLTAREN) 50 MG EC tablet; Take 1 tablet by mouth 2 (Two) Times a Day As Needed for pain.  Dispense: 60 tablet; Refill: 0  -     tiZANidine (Zanaflex) 4 MG tablet; Take 1 tablet by mouth Every 8 (Eight) Hours As Needed for Muscle Spasms.  Dispense: 30 tablet; Refill: 0     The patient was instructed to continue NSAIDS (Diclofenac) for 2 full weeks, then as needed.  Recommended heat, 2-3 times daily. He agrees to call if no better in 2 weeks.    Return if symptoms worsen or fail to improve.

## 2020-12-31 NOTE — TELEPHONE ENCOUNTER
PT STATED THAT HE HAS LEFT MULTIPLE MESSAGES REGARDING THE PAIN IN BACK. IT FEELS LIKE HE IS HAVING MUSCLE SPASMS. PT STATED IT HURT TO SIT AND MAKE CERTAIN MOVEMENTS. PT STATED HE WOULD LIKE TO REQUEST A MEDICATION TO HELP WITH HIS SYMPTOMS.     CALL BACK:[phone]

## 2021-01-04 RX ORDER — CYCLOBENZAPRINE HCL 10 MG
10 TABLET ORAL 3 TIMES DAILY PRN
Qty: 30 TABLET | Refills: 0 | Status: SHIPPED | OUTPATIENT
Start: 2021-01-04 | End: 2021-02-23

## 2021-01-14 ENCOUNTER — IMMUNIZATION (OUTPATIENT)
Dept: VACCINE CLINIC | Facility: HOSPITAL | Age: 51
End: 2021-01-14

## 2021-01-14 PROCEDURE — 0001A: CPT

## 2021-01-14 PROCEDURE — 91300 HC SARSCOV02 VAC 30MCG/0.3ML IM: CPT

## 2021-02-04 ENCOUNTER — IMMUNIZATION (OUTPATIENT)
Dept: VACCINE CLINIC | Facility: HOSPITAL | Age: 51
End: 2021-02-04

## 2021-02-04 PROCEDURE — 0002A: CPT | Performed by: INTERNAL MEDICINE

## 2021-02-04 PROCEDURE — 91300 HC SARSCOV02 VAC 30MCG/0.3ML IM: CPT | Performed by: INTERNAL MEDICINE

## 2021-02-05 RX ORDER — LISINOPRIL AND HYDROCHLOROTHIAZIDE 20; 12.5 MG/1; MG/1
1 TABLET ORAL DAILY
Qty: 30 TABLET | Refills: 0 | Status: SHIPPED | OUTPATIENT
Start: 2021-02-05 | End: 2021-03-10 | Stop reason: SDUPTHER

## 2021-02-05 RX ORDER — FUROSEMIDE 40 MG/1
40 TABLET ORAL 2 TIMES DAILY
Qty: 60 TABLET | Refills: 0 | Status: SHIPPED | OUTPATIENT
Start: 2021-02-05 | End: 2021-03-10 | Stop reason: SDUPTHER

## 2021-02-23 ENCOUNTER — OFFICE VISIT (OUTPATIENT)
Dept: INTERNAL MEDICINE | Facility: CLINIC | Age: 51
End: 2021-02-23

## 2021-02-23 VITALS
DIASTOLIC BLOOD PRESSURE: 90 MMHG | HEIGHT: 71 IN | BODY MASS INDEX: 44.1 KG/M2 | OXYGEN SATURATION: 98 % | TEMPERATURE: 98.5 F | WEIGHT: 315 LBS | HEART RATE: 75 BPM | SYSTOLIC BLOOD PRESSURE: 120 MMHG | RESPIRATION RATE: 18 BRPM

## 2021-02-23 DIAGNOSIS — I10 ESSENTIAL HYPERTENSION: Primary | ICD-10-CM

## 2021-02-23 DIAGNOSIS — R53.83 FATIGUE, UNSPECIFIED TYPE: ICD-10-CM

## 2021-02-23 DIAGNOSIS — E66.01 MORBID (SEVERE) OBESITY DUE TO EXCESS CALORIES (HCC): ICD-10-CM

## 2021-02-23 DIAGNOSIS — G47.33 OSA (OBSTRUCTIVE SLEEP APNEA): ICD-10-CM

## 2021-02-23 DIAGNOSIS — K29.70 GASTRITIS WITHOUT BLEEDING, UNSPECIFIED CHRONICITY, UNSPECIFIED GASTRITIS TYPE: ICD-10-CM

## 2021-02-23 DIAGNOSIS — Z72.53 HIGH RISK BISEXUAL BEHAVIOR: ICD-10-CM

## 2021-02-23 PROCEDURE — 99214 OFFICE O/P EST MOD 30 MIN: CPT | Performed by: INTERNAL MEDICINE

## 2021-02-23 PROCEDURE — 90471 IMMUNIZATION ADMIN: CPT | Performed by: INTERNAL MEDICINE

## 2021-02-23 PROCEDURE — 90744 HEPB VACC 3 DOSE PED/ADOL IM: CPT | Performed by: INTERNAL MEDICINE

## 2021-02-23 RX ORDER — ORAL SEMAGLUTIDE 3 MG/1
3 TABLET ORAL DAILY
Qty: 30 TABLET | Refills: 11 | Status: SHIPPED | OUTPATIENT
Start: 2021-02-23 | End: 2021-03-23

## 2021-02-23 NOTE — PROGRESS NOTES
Subjective     Patient ID: Carlos Eduardo Danielle is a 50 y.o. male. Patient is here for management of multiple medical problems.     Chief Complaint   Patient presents with   • Hypertension     History of Present Illness       Lives in [place].     PCP Brent NP.    A lot of fatigue.  Davey. alvin't aford the machine.   Had for 35 year.    Wants to loss wt.  Diet he feels in 95% good.    Apple juice.  No coffee   Ice tea with out    Can of ginger ale a day 3 weeks.        Increase gastric acid.      ;lives alone        The following portions of the patient's history were reviewed and updated as appropriate: allergies, current medications, past family history, past medical history, past social history, past surgical history and problem list.    Review of Systems   Constitutional: Positive for fatigue.   Cardiovascular: Negative for chest pain and leg swelling.   Psychiatric/Behavioral: Positive for sleep disturbance.   All other systems reviewed and are negative.      Current Outpatient Medications:   •  amLODIPine (NORVASC) 10 MG tablet, Take 1 tablet by mouth Daily., Disp: 30 tablet, Rfl: 2  •  furosemide (LASIX) 40 MG tablet, Take 1 tablet by mouth 2 (Two) Times a Day., Disp: 60 tablet, Rfl: 0  •  lisinopril-hydrochlorothiazide (PRINZIDE,ZESTORETIC) 20-12.5 MG per tablet, Take 1 tablet by mouth Daily., Disp: 30 tablet, Rfl: 0  •  pantoprazole (PROTONIX) 40 MG EC tablet, Take 1 tablet by mouth 2 (two) times a day., Disp: 60 tablet, Rfl: 5  •  venlafaxine XR (EFFEXOR-XR) 150 MG 24 hr capsule, Take 1 capsule by mouth Daily., Disp: 90 capsule, Rfl: 1  •  Blood Glucose Monitoring Suppl (FreeStyle Freedom Lite) w/Device kit, Use to test blood sugar twice daily., Disp: 1 each, Rfl: 0  •  glucose blood test strip, Use as instructed to check sugars twice daily., Disp: 200 each, Rfl: 12  •  metFORMIN (GLUCOPHAGE) 500 MG tablet, Take 1 tablet by mouth 2 (Two) Times a Day With Meals., Disp: 60 tablet, Rfl: 2  •  Semaglutide  "(Rybelsus) 3 MG tablet, Take 3 mg by mouth Daily., Disp: 30 tablet, Rfl: 11    Objective      Blood pressure 120/90, pulse 75, temperature 98.5 °F (36.9 °C), resp. rate 18, height 180.3 cm (71\"), weight (!) 197 kg (435 lb), SpO2 98 %.    Physical Exam     General Appearance:    Morbid obesity  Alert, cooperative, no distress, appears stated age   Head:    Normocephalic, without obvious abnormality, atraumatic   Eyes:    PERRL, conjunctiva/corneas clear, EOM's intact   Ears:    Normal TM's and external ear canals, both ears   Nose:   Nares normal, septum midline, mucosa normal, no drainage   or sinus tenderness   Throat:   Lips, mucosa, and tongue normal; teeth and gums normal   Neck:   Supple, symmetrical, trachea midline, no adenopathy;        thyroid:  No enlargement/tenderness/nodules; no carotid    bruit or JVD   Back:     Symmetric, no curvature, ROM normal, no CVA tenderness   Lungs:     Clear to auscultation bilaterally, respirations unlabored   Chest wall:    No tenderness or deformity   Heart:    Regular rate and rhythm, S1 and S2 normal, no murmur,        rub or gallop   Abdomen:     Soft, non-tender, bowel sounds active all four quadrants,     no masses, no organomegaly   Extremities:   Extremities normal, atraumatic, no cyanosis, +2  Edema, choninc venous stasis.       Pulses:   2+ and symmetric all extremities   Skin:   tatoo on each wrist.   Skin color, texture, turgor normal, no rashes or lesions   Lymph nodes:   Cervical, supraclavicular, and axillary nodes normal   Neurologic:   CNII-XII intact. Normal strength, sensation and reflexes       throughout      Results for orders placed or performed in visit on 09/18/20   Vitamin D 25 Hydroxy    Specimen: Arm, Left; Blood   Result Value Ref Range    25 Hydroxy, Vitamin D 26.0 (L) 30.0 - 100.0 ng/ml   Hepatitis C Antibody    Specimen: Arm, Left; Blood   Result Value Ref Range    Hepatitis C Ab Non-Reactive Non-Reactive   POC Urinalysis Dipstick, Automated "    Specimen: Urine   Result Value Ref Range    Color Yellow Yellow, Straw, Dark Yellow, Joan    Clarity, UA Clear Clear    Specific Gravity  1.015 1.005 - 1.030    pH, Urine 5.0 5.0 - 8.0    Leukocytes Negative Negative    Nitrite, UA Negative Negative    Protein, POC Negative Negative mg/dL    Glucose, UA Negative Negative, 1000 mg/dL (3+) mg/dL    Ketones, UA Negative Negative    Urobilinogen, UA Normal Normal    Bilirubin Negative Negative    Blood, UA Negative Negative    Lot Number [id number]     Expiration Date 5-31-21    POC Microalbumin    Specimen: Urine   Result Value Ref Range    Microalbumin, Urine 30     Creatinine, Urine 300     A/C <30mg/g NORMAL     Lot Number [id number]     Expiration Date 10-31-21    POC Glycosylated Hemoglobin (Hb A1C)    Specimen: Blood   Result Value Ref Range    Hemoglobin A1C 7.0 %    Lot Number [id number]     Expiration Date 4-22-22          Assessment/Plan     Hx of copd.   Smoked a lot as a kid 8yo. Got worse by age 17    Pt with out a lot fiber. doesn't like veggies. hungry 1 hour after eating.      Will start raybelus    Pt thinks he had covid last year end of year.  Colonoscopy. Kenney Yanez in [place].    Davey.          Diagnoses and all orders for this visit:    1. Essential hypertension (Primary)  -     Lipid Panel  -     CBC & Differential  -     Vitamin B12  -     Comprehensive Metabolic Panel  -     TSH  -     T4, Free  -     Hemoglobin A1c  -     MicroAlbumin, Urine, Random - Urine, Clean Catch  -     Vitamin B6  -     H. Pylori Antibody, IgA  -     H. Pylori Antibody, IgG  -     HIV-1 / O / 2 Ag / Antibody 4th Generation  -     STI/STD PANEL URINE ([facility]) - Urine, Urine, Clean Catch; Future  -     [place] Spotted Fever, IgM  -     [place] Spotted Fever, IgG  -     Lyme Disease, PCR  -     Ehrlichia Antibody Panel    2. Morbid (severe) obesity due to excess calories (CMS/HCC)  -     Lipid Panel  -     CBC & Differential  -     Vitamin B12  -      Comprehensive Metabolic Panel  -     TSH  -     T4, Free  -     Hemoglobin A1c  -     MicroAlbumin, Urine, Random - Urine, Clean Catch  -     Vitamin B6  -     H. Pylori Antibody, IgA  -     H. Pylori Antibody, IgG  -     HIV-1 / O / 2 Ag / Antibody 4th Generation  -     STI[facility]STD PANEL URINE [facility]) - Urine, Urine, Clean Catch; Future  -     [place] Spotted Fever, IgM  -     [place] Spotted Fever, IgG  -     Lyme Disease, PCR  -     Ehrlichia Antibody Panel    3. Gastritis without bleeding, unspecified chronicity, unspecified gastritis type  -     Lipid Panel  -     CBC & Differential  -     Vitamin B12  -     Comprehensive Metabolic Panel  -     TSH  -     T4, Free  -     Hemoglobin A1c  -     MicroAlbumin, Urine, Random - Urine, Clean Catch  -     Vitamin B6  -     H. Pylori Antibody, IgA  -     H. Pylori Antibody, IgG  -     HIV-1 / O / 2 Ag / Antibody 4th Generation  -     STI/STD PANEL URINE ([facility]) - Urine, Urine, Clean Catch; Future  -     [place] Spotted Fever, IgM  -     [place] Spotted Fever, IgG  -     Lyme Disease, PCR  -     Ehrlichia Antibody Panel    4. Fatigue, unspecified type  -     [place] Spotted Fever, IgM  -     [place] Spotted Fever, IgG  -     Lyme Disease, PCR  -     Ehrlichia Antibody Panel    5. AJAY (obstructive sleep apnea)  -     Ambulatory Referral to Neurology    Other orders  -     Hepatitis B Vaccine Pediatric / Adolescent 3-dose IM  -     Semaglutide (Rybelsus) 3 MG tablet; Take 3 mg by mouth Daily.  Dispense: 30 tablet; Refill: 11      Return in about 4 weeks (around 3/23/2021).          There are no Patient Instructions on file for this visit.     Ruslan Issa MD    Assessment/Plan

## 2021-02-25 LAB
A PHAGOCYTOPH IGG TITR SER IF: NEGATIVE {TITER}
A PHAGOCYTOPH IGM TITR SER IF: NEGATIVE {TITER}
B BURGDOR DNA SPEC QL NAA+PROBE: NEGATIVE
E CHAFFEENSIS IGG TITR SER IF: NEGATIVE {TITER}
E CHAFFEENSIS IGM TITR SER IF: NEGATIVE {TITER}
R RICKETTSI IGG SER QL IA: NEGATIVE
R RICKETTSI IGM SER-ACNC: 0.38 INDEX (ref 0–0.89)

## 2021-02-26 LAB
ENDOMYSIAL ANTIBODY TITER IGA: NORMAL TITER
ENDOMYSIAL ANTIBODY TITER IGG: NORMAL TITER
GLIADIN PEPTIDE IGA SER-ACNC: 7 UNITS (ref 0–19)
GLIADIN PEPTIDE IGG SER-ACNC: 2 UNITS (ref 0–19)
TTG IGA SER-ACNC: <2 U/ML (ref 0–3)
TTG IGG SER-ACNC: 4 U/ML (ref 0–5)

## 2021-02-27 LAB
ALBUMIN SERPL-MCNC: 4.2 G/DL (ref 3.5–5.2)
ALBUMIN/GLOB SERPL: 1.6 G/DL
ALP SERPL-CCNC: 104 U/L (ref 39–117)
ALT SERPL-CCNC: 42 U/L (ref 1–41)
AST SERPL-CCNC: 35 U/L (ref 1–40)
BASOPHILS # BLD AUTO: 0.07 10*3/MM3 (ref 0–0.2)
BASOPHILS NFR BLD AUTO: 1 % (ref 0–1.5)
BILIRUB SERPL-MCNC: 1.2 MG/DL (ref 0–1.2)
BUN SERPL-MCNC: 18 MG/DL (ref 6–20)
BUN/CREAT SERPL: 18.8 (ref 7–25)
CALCIUM SERPL-MCNC: 9.9 MG/DL (ref 8.6–10.5)
CHLORIDE SERPL-SCNC: 93 MMOL/L (ref 98–107)
CHOLEST SERPL-MCNC: 253 MG/DL (ref 0–200)
CO2 SERPL-SCNC: 33 MMOL/L (ref 22–29)
CREAT SERPL-MCNC: 0.96 MG/DL (ref 0.76–1.27)
EOSINOPHIL # BLD AUTO: 0.14 10*3/MM3 (ref 0–0.4)
EOSINOPHIL NFR BLD AUTO: 1.9 % (ref 0.3–6.2)
ERYTHROCYTE [DISTWIDTH] IN BLOOD BY AUTOMATED COUNT: 14 % (ref 12.3–15.4)
GLOBULIN SER CALC-MCNC: 2.6 GM/DL
GLUCOSE SERPL-MCNC: 242 MG/DL (ref 65–99)
H PYLORI IGA SER-ACNC: <9 UNITS (ref 0–8.9)
H PYLORI IGG SER IA-ACNC: 0.26 INDEX VALUE (ref 0–0.79)
HBA1C MFR BLD: 8.8 % (ref 4.8–5.6)
HCT VFR BLD AUTO: 46.1 % (ref 37.5–51)
HDLC SERPL-MCNC: 34 MG/DL (ref 40–60)
HGB BLD-MCNC: 15.7 G/DL (ref 13–17.7)
HIV 1+2 AB+HIV1 P24 AG SERPL QL IA: NON REACTIVE
IMM GRANULOCYTES # BLD AUTO: 0.05 10*3/MM3 (ref 0–0.05)
IMM GRANULOCYTES NFR BLD AUTO: 0.7 % (ref 0–0.5)
LDLC SERPL CALC-MCNC: 136 MG/DL (ref 0–100)
LYMPHOCYTES # BLD AUTO: 1.61 10*3/MM3 (ref 0.7–3.1)
LYMPHOCYTES NFR BLD AUTO: 22.4 % (ref 19.6–45.3)
MCH RBC QN AUTO: 28.5 PG (ref 26.6–33)
MCHC RBC AUTO-ENTMCNC: 34.1 G/DL (ref 31.5–35.7)
MCV RBC AUTO: 83.8 FL (ref 79–97)
MICROALBUMIN UR-MCNC: 4.3 UG/ML
MONOCYTES # BLD AUTO: 0.48 10*3/MM3 (ref 0.1–0.9)
MONOCYTES NFR BLD AUTO: 6.7 % (ref 5–12)
NEUTROPHILS # BLD AUTO: 4.84 10*3/MM3 (ref 1.7–7)
NEUTROPHILS NFR BLD AUTO: 67.3 % (ref 42.7–76)
NRBC BLD AUTO-RTO: 0 /100 WBC (ref 0–0.2)
PLATELET # BLD AUTO: 192 10*3/MM3 (ref 140–450)
POTASSIUM SERPL-SCNC: 3.9 MMOL/L (ref 3.5–5.2)
PROT SERPL-MCNC: 6.8 G/DL (ref 6–8.5)
RBC # BLD AUTO: 5.5 10*6/MM3 (ref 4.14–5.8)
SODIUM SERPL-SCNC: 138 MMOL/L (ref 136–145)
T4 FREE SERPL-MCNC: 1.03 NG/DL (ref 0.93–1.7)
TRIGL SERPL-MCNC: 450 MG/DL (ref 0–150)
TSH SERPL DL<=0.005 MIU/L-ACNC: 2.26 UIU/ML (ref 0.27–4.2)
VIT B12 SERPL-MCNC: 510 PG/ML (ref 211–946)
VIT B6 SERPL-MCNC: 7.3 UG/L (ref 5.3–46.7)
VLDLC SERPL CALC-MCNC: 83 MG/DL (ref 5–40)
WBC # BLD AUTO: 7.19 10*3/MM3 (ref 3.4–10.8)

## 2021-03-02 LAB
C TRACH RRNA SPEC QL NAA+PROBE: NEGATIVE
M HOMINIS SPEC QL CULT: NEGATIVE
N GONORRHOEA RRNA SPEC QL NAA+PROBE: NEGATIVE
T VAGINALIS DNA SPEC QL NAA+PROBE: NEGATIVE
U UREALYTICUM SPEC QL CULT: NEGATIVE

## 2021-03-08 ENCOUNTER — TELEPHONE (OUTPATIENT)
Dept: INTERNAL MEDICINE | Facility: CLINIC | Age: 51
End: 2021-03-08

## 2021-03-08 RX ORDER — ORAL SEMAGLUTIDE 7 MG/1
7 TABLET ORAL DAILY
Qty: 30 TABLET | Refills: 11 | Status: SHIPPED | OUTPATIENT
Start: 2021-03-08 | End: 2021-03-23

## 2021-03-08 NOTE — TELEPHONE ENCOUNTER
Caller: Carlos Eduardo    Relationship: self    Best call back number: [phone]    What medication are you requesting: phentermine 37.5    What are your current symptoms: constant eating    How long have you been experiencing symptoms:long time     Have you had these symptoms before:    [x] Yes  [] No    Have you been treated for these symptoms before:   [x] Yes  [] No    If a prescription is needed, what is your preferred pharmacy and phone number:   [facility]  Additional notes:I'd like to be put on something for appetite control... I have taken it before and it has helped.

## 2021-03-08 NOTE — TELEPHONE ENCOUNTER
Called patient with no answer, left a voicemail for patient informing of new prescription dosage to start.

## 2021-03-10 RX ORDER — LISINOPRIL AND HYDROCHLOROTHIAZIDE 20; 12.5 MG/1; MG/1
1 TABLET ORAL DAILY
Qty: 90 TABLET | Refills: 3 | Status: SHIPPED | OUTPATIENT
Start: 2021-03-10 | End: 2021-03-10 | Stop reason: SDUPTHER

## 2021-03-10 RX ORDER — FUROSEMIDE 40 MG/1
40 TABLET ORAL 2 TIMES DAILY
Qty: 60 TABLET | Refills: 5 | Status: SHIPPED | OUTPATIENT
Start: 2021-03-10 | End: 2021-09-28 | Stop reason: SDUPTHER

## 2021-03-10 RX ORDER — FUROSEMIDE 40 MG/1
40 TABLET ORAL 2 TIMES DAILY
Qty: 60 TABLET | Refills: 0 | Status: CANCELLED | OUTPATIENT
Start: 2021-03-10

## 2021-03-10 NOTE — TELEPHONE ENCOUNTER
Caller: Alexanderdion Carlos Eduardo    Relationship: Self    Best call back number: [phone]    Medication needed:   Requested Prescriptions     Pending Prescriptions Disp Refills   • lisinopril-hydrochlorothiazide (PRINZIDE,ZESTORETIC) 20-12.5 MG per tablet 90 tablet 3     Sig: Take 1 tablet by mouth Daily.       When do you need the refill by: 03/13/21    What details did the patient provide when requesting the medication: patient has 4 days left    Does the patient have less than a 3 day supply:  [] Yes  [x] No    What is the patient's preferred pharmacy: [facility]

## 2021-03-11 RX ORDER — LISINOPRIL AND HYDROCHLOROTHIAZIDE 20; 12.5 MG/1; MG/1
1 TABLET ORAL DAILY
Qty: 90 TABLET | Refills: 3 | Status: SHIPPED | OUTPATIENT
Start: 2021-03-11 | End: 2021-09-14

## 2021-03-23 ENCOUNTER — OFFICE VISIT (OUTPATIENT)
Dept: NEUROLOGY | Facility: CLINIC | Age: 51
End: 2021-03-23

## 2021-03-23 ENCOUNTER — OFFICE VISIT (OUTPATIENT)
Dept: INTERNAL MEDICINE | Facility: CLINIC | Age: 51
End: 2021-03-23

## 2021-03-23 VITALS
BODY MASS INDEX: 42.66 KG/M2 | HEART RATE: 107 BPM | TEMPERATURE: 97.3 F | WEIGHT: 315 LBS | SYSTOLIC BLOOD PRESSURE: 160 MMHG | OXYGEN SATURATION: 97 % | DIASTOLIC BLOOD PRESSURE: 80 MMHG | HEIGHT: 72 IN

## 2021-03-23 VITALS
SYSTOLIC BLOOD PRESSURE: 142 MMHG | DIASTOLIC BLOOD PRESSURE: 92 MMHG | OXYGEN SATURATION: 96 % | HEIGHT: 72 IN | TEMPERATURE: 96.6 F | BODY MASS INDEX: 42.66 KG/M2 | WEIGHT: 315 LBS | RESPIRATION RATE: 16 BRPM | HEART RATE: 86 BPM

## 2021-03-23 DIAGNOSIS — G47.33 OSA (OBSTRUCTIVE SLEEP APNEA): ICD-10-CM

## 2021-03-23 DIAGNOSIS — I10 ESSENTIAL HYPERTENSION: ICD-10-CM

## 2021-03-23 DIAGNOSIS — G47.33 OSA (OBSTRUCTIVE SLEEP APNEA): Primary | ICD-10-CM

## 2021-03-23 DIAGNOSIS — E11.9 TYPE 2 DIABETES MELLITUS WITHOUT COMPLICATION, WITHOUT LONG-TERM CURRENT USE OF INSULIN (HCC): Primary | ICD-10-CM

## 2021-03-23 DIAGNOSIS — E11.9 TYPE 2 DIABETES MELLITUS WITHOUT COMPLICATION, WITHOUT LONG-TERM CURRENT USE OF INSULIN (HCC): ICD-10-CM

## 2021-03-23 DIAGNOSIS — E78.49 OTHER HYPERLIPIDEMIA: ICD-10-CM

## 2021-03-23 PROCEDURE — 99214 OFFICE O/P EST MOD 30 MIN: CPT | Performed by: NURSE PRACTITIONER

## 2021-03-23 PROCEDURE — 99213 OFFICE O/P EST LOW 20 MIN: CPT | Performed by: INTERNAL MEDICINE

## 2021-03-23 RX ORDER — ORAL SEMAGLUTIDE 14 MG/1
14 TABLET ORAL DAILY
Qty: 90 TABLET | Refills: 3 | Status: SHIPPED | OUTPATIENT
Start: 2021-03-23 | End: 2021-06-19

## 2021-03-23 NOTE — PROGRESS NOTES
"Subjective     Patient ID: Carlos Eduardo Danielle is a 50 y.o. male. Patient is here for management of multiple medical problems.     Chief Complaint   Patient presents with   • Hypertension   • Sleep Apnea     History of Present Illness     Davey eval pending.     HTN          The following portions of the patient's history were reviewed and updated as appropriate: allergies, current medications, past family history, past medical history, past social history, past surgical history and problem list.    Review of Systems    Current Outpatient Medications:   •  amLODIPine (NORVASC) 10 MG tablet, Take 1 tablet by mouth Daily., Disp: 30 tablet, Rfl: 2  •  Blood Glucose Monitoring Suppl (FreeStyle Freedom Lite) w/Device kit, Use to test blood sugar twice daily., Disp: 1 each, Rfl: 0  •  furosemide (LASIX) 40 MG tablet, Take 1 tablet by mouth 2 (Two) Times a Day., Disp: 60 tablet, Rfl: 5  •  glucose blood test strip, Use as instructed to check sugars twice daily., Disp: 200 each, Rfl: 12  •  lisinopril-hydrochlorothiazide (PRINZIDE,ZESTORETIC) 20-12.5 MG per tablet, Take 1 tablet by mouth Daily., Disp: 90 tablet, Rfl: 3  •  metFORMIN (GLUCOPHAGE) 500 MG tablet, Take 1 tablet by mouth 2 (Two) Times a Day With Meals., Disp: 60 tablet, Rfl: 2  •  pantoprazole (PROTONIX) 40 MG EC tablet, Take 1 tablet by mouth 2 (two) times a day., Disp: 60 tablet, Rfl: 5  •  venlafaxine XR (EFFEXOR-XR) 150 MG 24 hr capsule, Take 1 capsule by mouth Daily., Disp: 90 capsule, Rfl: 1  •  Semaglutide (Rybelsus) 14 MG tablet, Take 1 tablet by mouth Daily., Disp: 90 tablet, Rfl: 3    Objective      Blood pressure 142/92, pulse 86, temperature 96.6 °F (35.9 °C), resp. rate 16, height 182.9 cm (72\"), weight (!) 198 kg (436 lb), SpO2 96 %.    Physical Exam     General Appearance:    Alert, cooperative, no distress, appears stated age   Head:    Normocephalic, without obvious abnormality, atraumatic   Eyes:    PERRL, conjunctiva/corneas clear, EOM's intact "   Ears:    Normal TM's and external ear canals, both ears   Nose:   Nares normal, septum midline, mucosa normal, no drainage   or sinus tenderness   Throat:   Lips, mucosa, and tongue normal; teeth and gums normal   Neck:   Supple, symmetrical, trachea midline, no adenopathy;        thyroid:  No enlargement/tenderness/nodules; no carotid    bruit or JVD   Back:     Symmetric, no curvature, ROM normal, no CVA tenderness   Lungs:     Clear to auscultation bilaterally, respirations unlabored   Chest wall:    No tenderness or deformity   Heart:    Regular rate and rhythm, S1 and S2 normal, no murmur,        rub or gallop   Abdomen:     Soft, non-tender, bowel sounds active all four quadrants,     no masses, no organomegaly   Extremities:   Extremities normal, atraumatic, no cyanosis or edema   Pulses:   2+ and symmetric all extremities   Skin:   Skin color, texture, turgor normal, no rashes or lesions   Lymph nodes:   Cervical, supraclavicular, and axillary nodes normal   Neurologic:   CNII-XII intact. Normal strength, sensation and reflexes       throughout      Results for orders placed or performed in visit on 02/23/21   Chlamydia trachomatis, Neisseria gonorrhoeae, Trichomonas vaginalis, PCR - Urine, Urine, Clean Catch    Specimen: Urine, Clean Catch    UR     CD- [id number]   Result Value Ref Range    Chlamydia trachomatis, RICK Negative Negative    Gonococcus by RICK Negative Negative    Trichomonas vaginosis Negative Negative   Mycoplasma / Ureaplasma Culture - Urine, Urine, Random Void    Specimen: Urine, Random Void    UR     CD- [id number]   Result Value Ref Range    Ureaplasma urealyticum Negative Negative    Mycoplasma hominis Negative Negative   Lipid Panel    Specimen: Blood   Result Value Ref Range    Total Cholesterol 253 (H) 0 - 200 mg/dL    Triglycerides 450 (H) 0 - 150 mg/dL    HDL Cholesterol 34 (L) 40 - 60 mg/dL    VLDL Cholesterol Wil 83 (H) 5 - 40 mg/dL    LDL Chol Calc (NIH) 136 (H) 0 - 100 mg/dL    Vitamin B12    Specimen: Blood   Result Value Ref Range    Vitamin B-12 510 211 - 946 pg/mL   Comprehensive Metabolic Panel    Specimen: Blood   Result Value Ref Range    Glucose 242 (H) 65 - 99 mg/dL    BUN 18 6 - 20 mg/dL    Creatinine 0.96 0.76 - 1.27 mg/dL    eGFR Non African Am 83 >60 mL/min/1.73    eGFR African Am 101 >60 mL/min/1.73    BUN/Creatinine Ratio 18.8 7.0 - 25.0    Sodium 138 136 - 145 mmol/L    Potassium 3.9 3.5 - 5.2 mmol/L    Chloride 93 (L) 98 - 107 mmol/L    Total CO2 33.0 (H) 22.0 - 29.0 mmol/L    Calcium 9.9 8.6 - 10.5 mg/dL    Total Protein 6.8 6.0 - 8.5 g/dL    Albumin 4.20 3.50 - 5.20 g/dL    Globulin 2.6 gm/dL    A/G Ratio 1.6 g/dL    Total Bilirubin 1.2 0.0 - 1.2 mg/dL    Alkaline Phosphatase 104 39 - 117 U/L    AST (SGOT) 35 1 - 40 U/L    ALT (SGPT) 42 (H) 1 - 41 U/L   TSH    Specimen: Blood   Result Value Ref Range    TSH 2.260 0.270 - 4.200 uIU/mL   T4, Free    Specimen: Blood   Result Value Ref Range    Free T4 1.03 0.93 - 1.70 ng/dL   Hemoglobin A1c    Specimen: Blood   Result Value Ref Range    Hemoglobin A1C 8.80 (H) 4.80 - 5.60 %   MicroAlbumin, Urine, Random - Urine, Clean Catch    Specimen: Urine, Clean Catch   Result Value Ref Range    Microalbumin, Urine 4.3 Not Estab. ug/mL   Vitamin B6    Specimen: Blood   Result Value Ref Range    Vitamin B6 7.3 5.3 - 46.7 ug/L   H. Pylori Antibody, IgA    Specimen: Blood   Result Value Ref Range    H. pylori, IgA ABS <9.0 0.0 - 8.9 units   H. Pylori Antibody, IgG    Specimen: Blood   Result Value Ref Range    H. pylori IgG 0.26 0.00 - 0.79 Index Value   HIV-1 / O / 2 Ag / Antibody 4th Generation    Specimen: Blood   Result Value Ref Range    HIV Screen 4th Gen w/RFX (Reference) Non Reactive Non Reactive   [place] Spotted Fever, IgM    Specimen: Blood   Result Value Ref Range    RMSF IgM 0.38 0.00 - 0.89 index   [facility] Spotted Fever, IgG    Specimen: Blood   Result Value Ref Range    RMSF IgG Negative Negative   Lyme Disease,  PCR    Specimen: Lumbar Puncture; Cerebrospinal Fluid   Result Value Ref Range    Lyme Disease(B.burgdorferi)PCR Negative Negative   Ehrlichia Antibody Panel    Specimen: Blood   Result Value Ref Range    E. chaffeensis (HME) IgG Titer Negative Neg:<1:64    E. chaffeensis (HME) IgM Titer Negative Neg:<1:20    HGE IgG Titer Negative Neg:<1:64    HGE IgM Titer Negative Neg:<1:20   Glia(IgA / G) & TTG(IgA / G)    Specimen: Blood   Result Value Ref Range    Gliadin Deamidated Peptide Ab, IgA 7 0 - 19 units    Deaminated Gliadin Ab IgG 2 0 - 19 units    Tissue Transglutaminase IgA <2 0 - 3 U/mL    Tissue Transglutaminase IgG 4 0 - 5 U/mL   Endomysial Antibody, IgA / IGG Titer    Specimen: Blood   Result Value Ref Range    Endomysial Antibody Titer IgA <1:2.5 titer    Endomysial Antibody Titer IgG <1:2.5 titer   CBC & Differential    Specimen: Blood   Result Value Ref Range    WBC 7.19 3.40 - 10.80 10*3/mm3    RBC 5.50 4.14 - 5.80 10*6/mm3    Hemoglobin 15.7 13.0 - 17.7 g/dL    Hematocrit 46.1 37.5 - 51.0 %    MCV 83.8 79.0 - 97.0 fL    MCH 28.5 26.6 - 33.0 pg    MCHC 34.1 31.5 - 35.7 g/dL    RDW 14.0 12.3 - 15.4 %    Platelets 192 140 - 450 10*3/mm3    Neutrophil Rel % 67.3 42.7 - 76.0 %    Lymphocyte Rel % 22.4 19.6 - 45.3 %    Monocyte Rel % 6.7 5.0 - 12.0 %    Eosinophil Rel % 1.9 0.3 - 6.2 %    Basophil Rel % 1.0 0.0 - 1.5 %    Neutrophils Absolute 4.84 1.70 - 7.00 10*3/mm3    Lymphocytes Absolute 1.61 0.70 - 3.10 10*3/mm3    Monocytes Absolute 0.48 0.10 - 0.90 10*3/mm3    Eosinophils Absolute 0.14 0.00 - 0.40 10*3/mm3    Basophils Absolute 0.07 0.00 - 0.20 10*3/mm3    Immature Granulocyte Rel % 0.7 (H) 0.0 - 0.5 %    Immature Grans Absolute 0.05 0.00 - 0.05 10*3/mm3    nRBC 0.0 0.0 - 0.2 /100 WBC         Assessment/Plan   Increase rybelus to achieve wt loss.   Hold on BP med changes as garrett eval.    Elevated co2 levels.  Pt with hard time breating in mask. Need work now.t        Diagnoses and all orders for this  visit:    1. Type 2 diabetes mellitus without complication, without long-term current use of insulin (CMS/[facility]) (Primary)  -     Semaglutide (Rybelsus) 14 MG tablet; Take 1 tablet by mouth Daily.  Dispense: 90 tablet; Refill: 3  -     Hemoglobin A1c  -     Comprehensive Metabolic Panel    2. AJAY (obstructive sleep apnea)  -     Hemoglobin A1c  -     Comprehensive Metabolic Panel    3. Essential hypertension  -     Hemoglobin A1c  -     Comprehensive Metabolic Panel      Return in about 3 months (around 6/23/2021).          There are no Patient Instructions on file for this visit.     Ruslan Issa MD    Assessment/Plan

## 2021-03-23 NOTE — PATIENT INSTRUCTIONS
Sleep Apnea  Sleep apnea affects breathing during sleep. It causes breathing to stop for a short time or to become shallow. It can also increase the risk of:  · Heart attack.  · Stroke.  · Being very overweight (obese).  · Diabetes.  · Heart failure.  · Irregular heartbeat.  The goal of treatment is to help you breathe normally again.  What are the causes?  There are three kinds of sleep apnea:  · Obstructive sleep apnea. This is caused by a blocked or collapsed airway.  · Central sleep apnea. This happens when the brain does not send the right signals to the muscles that control breathing.  · Mixed sleep apnea. This is a combination of obstructive and central sleep apnea.  The most common cause of this condition is a collapsed or blocked airway. This can happen if:  · Your throat muscles are too relaxed.  · Your tongue and tonsils are too large.  · You are overweight.  · Your airway is too small.  What increases the risk?  · Being overweight.  · Smoking.  · Having a small airway.  · Being older.  · Being male.  · Drinking alcohol.  · Taking medicines to calm yourself (sedatives or tranquilizers).  · Having family members with the condition.  What are the signs or symptoms?  · Trouble staying asleep.  · Being sleepy or tired during the day.  · Getting angry a lot.  · Loud snoring.  · Headaches in the morning.  · Not being able to focus your mind (concentrate).  · Forgetting things.  · Less interest in sex.  · Mood swings.  · Personality changes.  · Feelings of sadness (depression).  · Waking up a lot during the night to pee (urinate).  · Dry mouth.  · Sore throat.  How is this diagnosed?  · Your medical history.  · A physical exam.  · A test that is done when you are sleeping (sleep study). The test is most often done in a sleep lab but may also be done at home.  How is this treated?    · Sleeping on your side.  · Using a medicine to get rid of mucus in your nose (decongestant).  · Avoiding the use of alcohol,  medicines to help you relax, or certain pain medicines (narcotics).  · Losing weight, if needed.  · Changing your diet.  · Not smoking.  · Using a machine to open your airway while you sleep, such as:  ? An oral appliance. This is a mouthpiece that shifts your lower jaw forward.  ? A CPAP device. This device blows air through a mask when you breathe out (exhale).  ? An EPAP device. This has valves that you put in each nostril.  ? A BPAP device. This device blows air through a mask when you breathe in (inhale) and breathe out.  · Having surgery if other treatments do not work.  It is important to get treatment for sleep apnea. Without treatment, it can lead to:  · High blood pressure.  · Coronary artery disease.  · In men, not being able to have an erection (impotence).  · Reduced thinking ability.  Follow these instructions at home:  Lifestyle  · Make changes that your doctor recommends.  · Eat a healthy diet.  · Lose weight if needed.  · Avoid alcohol, medicines to help you relax, and some pain medicines.  · Do not use any products that contain nicotine or tobacco, such as cigarettes, e-cigarettes, and chewing tobacco. If you need help quitting, ask your doctor.  General instructions  · Take over-the-counter and prescription medicines only as told by your doctor.  · If you were given a machine to use while you sleep, use it only as told by your doctor.  · If you are having surgery, make sure to tell your doctor you have sleep apnea. You may need to bring your device with you.  · Keep all follow-up visits as told by your doctor. This is important.  Contact a doctor if:  · The machine that you were given to use during sleep bothers you or does not seem to be working.  · You do not get better.  · You get worse.  Get help right away if:  · Your chest hurts.  · You have trouble breathing in enough air.  · You have an uncomfortable feeling in your back, arms, or stomach.  · You have trouble talking.  · One side of your  body feels weak.  · A part of your face is hanging down.  These symptoms may be an emergency. Do not wait to see if the symptoms will go away. Get medical help right away. Call your local emergency services (911 in the [place] Do not drive yourself to the hospital.  Summary  · This condition affects breathing during sleep.  · The most common cause is a collapsed or blocked airway.  · The goal of treatment is to help you breathe normally while you sleep.  This information is not intended to replace advice given to you by your health care provider. Make sure you discuss any questions you have with your health care provider.  Document Revised: 10/04/2019 Document Reviewed: 08/13/2019  [employer] Patient Education © 2021 [employer]

## 2021-03-23 NOTE — PROGRESS NOTES
New Sleep Patient Office Visit      Patient Name: Carlos Eduardo Danielle  : 1970   MRN: [id number]     Referring Physician: Ruslan Issa MD    Chief Complaint:    Chief Complaint   Patient presents with   • Consult     NP, In office to establish care for ajay. Patient c/o snoring.       History of Present Illness: Carlos Eduardo Danielle is a 50 y.o. male who is here today to establish care with Sleep Medicine.  Sleep questionnaire reviewed.  It takes him about 10 minutes to fall asleep, he does not wake up during the night, he sleeps 7 hours per night, he takes naps on 5 days during the week, he snores, he experiences memory loss/decreased concentration/decreased libido/daytime sleepiness, He says he had a sleep study and was diagnosed with AJAY a few years ago, at [facility].  He is not on PAP therapy but is willing to try therapy.  Additional risk factors- BMI 59, HTN, GERD, mood disorder, diabetes.     [place] Score: 3    Subjective      Review of Systems:   Review of Systems   Constitutional: Positive for fatigue. Negative for fever, unexpected weight gain and unexpected weight loss.   HENT: Negative for hearing loss, sore throat, swollen glands, tinnitus and trouble swallowing.    Eyes: Negative for blurred vision, double vision, photophobia and visual disturbance.   Respiratory: Negative for cough, chest tightness and shortness of breath.    Cardiovascular: Negative for chest pain, palpitations and leg swelling.   Gastrointestinal: Negative for constipation, diarrhea and nausea.   Endocrine: Negative for cold intolerance and heat intolerance.   Musculoskeletal: Negative for gait problem, neck pain and neck stiffness.   Skin: Negative for color change and rash.   Allergic/Immunologic: Negative for environmental allergies and food allergies.   Neurological: Negative for dizziness, syncope, facial asymmetry, speech difficulty, weakness, headache, memory problem and confusion.    Psychiatric/Behavioral: Negative for agitation, behavioral problems and depressed mood. The patient is not nervous/anxious.        Past Medical History:   Past Medical History:   Diagnosis Date   • Anxiety    • Borderline diabetes    • Colon polyp    • Depression    • Diabetes mellitus (CMS/HCC)    • GERD (gastroesophageal reflux disease)    • Hyperlipidemia    • Hypertension        Past Surgical History:   Past Surgical History:   Procedure Laterality Date   • EPIDIDYMAL CYST EXCISION     • TONSILLECTOMY         Family History: History reviewed. No pertinent family history.    Social History:   Social History     Socioeconomic History   • Marital status: Single     Spouse name: Not on file   • Number of children: Not on file   • Years of education: Not on file   • Highest education level: Not on file   Tobacco Use   • Smoking status: Former Smoker     Types: Cigarettes     Quit date: 2008     Years since quittin.6   • Smokeless tobacco: Never Used   Substance and Sexual Activity   • Alcohol use: Yes     Comment: occas   • Drug use: Never   • Sexual activity: Defer       Medications:     Current Outpatient Medications:   •  amLODIPine (NORVASC) 10 MG tablet, Take 1 tablet by mouth Daily., Disp: 30 tablet, Rfl: 2  •  furosemide (LASIX) 40 MG tablet, Take 1 tablet by mouth 2 (Two) Times a Day., Disp: 60 tablet, Rfl: 5  •  lisinopril-hydrochlorothiazide (PRINZIDE,ZESTORETIC) 20-12.5 MG per tablet, Take 1 tablet by mouth Daily., Disp: 90 tablet, Rfl: 3  •  pantoprazole (PROTONIX) 40 MG EC tablet, Take 1 tablet by mouth 2 (two) times a day., Disp: 60 tablet, Rfl: 5  •  Semaglutide (Rybelsus) 7 MG tablet, Take 1 tablet by mouth Daily., Disp: 30 tablet, Rfl: 11  •  venlafaxine XR (EFFEXOR-XR) 150 MG 24 hr capsule, Take 1 capsule by mouth Daily., Disp: 90 capsule, Rfl: 1  •  Blood Glucose Monitoring Suppl (FreeStyle Freedom Lite) w/Device kit, Use to test blood sugar twice daily., Disp: 1 each, Rfl: 0  •  glucose  "blood test strip, Use as instructed to check sugars twice daily., Disp: 200 each, Rfl: 12  •  metFORMIN (GLUCOPHAGE) 500 MG tablet, Take 1 tablet by mouth 2 (Two) Times a Day With Meals., Disp: 60 tablet, Rfl: 2    Allergies:   Allergies   Allergen Reactions   • Atorvastatin Headache     Weakness.     • Topamax [Topiramate] Other (See Comments)     Migraine         Objective     Physical Exam:  Vital Signs:   Vitals:    03/23/21 0946   BP: 160/80   BP Location: Left arm   Patient Position: Sitting   Cuff Size: Adult   Pulse: 107   Temp: 97.3 °F (36.3 °C)   SpO2: 97%   Weight: (!) 199 kg (439 lb 3.2 oz)   Height: 182.9 cm (72\")   PainSc:   4   PainLoc: Back     BMI: Body mass index is 59.57 kg/m².  Neck Circumference: 20 1/2    Physical Exam  Vitals and nursing note reviewed.   Constitutional:       General: He is not in acute distress.     Appearance: He is well-developed. He is obese. He is not diaphoretic.   HENT:      Head: Normocephalic and atraumatic.      Comments: Mallampati 4  Eyes:      Conjunctiva/sclera: Conjunctivae normal.      Pupils: Pupils are equal, round, and reactive to light.   Neck:      Thyroid: No thyroid mass or thyromegaly.      Vascular: Normal carotid pulses.      Trachea: Trachea normal.   Cardiovascular:      Rate and Rhythm: Normal rate and regular rhythm.      Heart sounds: Normal heart sounds. No murmur heard.   No friction rub. No gallop.    Pulmonary:      Effort: Pulmonary effort is normal. No respiratory distress.      Breath sounds: Normal breath sounds. No wheezing or rales.   Musculoskeletal:         General: Normal range of motion.   Skin:     General: Skin is warm and dry.      Findings: No rash.   Neurological:      Mental Status: He is alert and oriented to person, place, and time.   Psychiatric:         Mood and Affect: Mood normal.         Behavior: Behavior normal.         Thought Content: Thought content normal.         Judgment: Judgment normal.         Assessment / " Plan      Assessment/Plan:   Diagnoses and all orders for this visit:    1. AJAY (obstructive sleep apnea) (Primary)  -     Polysomnography 4 or More Parameters With CPAP; Future-patient may need Bipap.   - Printed patient information on AJAY provided today.   - Encouraged patient to avoid driving if drowsy.   - Encouraged weight loss with a BMI goal of 24.     2. Other hyperlipidemia  -     Polysomnography 4 or More Parameters With CPAP; Future    3. Essential hypertension  -     Polysomnography 4 or More Parameters With CPAP; Future    4. Type 2 diabetes mellitus without complication, without long-term current use of insulin (CMS/HCC)  -     Polysomnography 4 or More Parameters With CPAP; Future    5. BMI 50.0-59.9, adult (CMS/HCC)  -     Polysomnography 4 or More Parameters With CPAP; Future         Follow Up:   Return in about 3 months (around 6/23/2021) for Recheck.    I have advised the patient the need to continue the use of CPAP.  Gold standard for treatment of sleep apnea includes weight loss, use of cpap and avoidance of alcohol.  Untreated AJAY may increase the risk for development of hypertension, stroke, myocardial infarction, diabetes, cardiovascular disease, work-related issues and driving accidents. I have counseled and advised the patient to avoid driving or operating heavy/dangerous equipment if feeling drowsy.     AGUILAR Lopez, FNP-C  [facility]       Please note that portions of this note may have been completed with a voice recognition program. Efforts were made to edit the dictations, but occasionally words are mistranscribed.

## 2021-03-29 PROCEDURE — U0004 COV-19 TEST NON-CDC HGH THRU: HCPCS | Performed by: NURSE PRACTITIONER

## 2021-04-01 ENCOUNTER — TELEPHONE (OUTPATIENT)
Dept: INTERNAL MEDICINE | Facility: CLINIC | Age: 51
End: 2021-04-01

## 2021-04-01 NOTE — TELEPHONE ENCOUNTER
Called patient and informed I had been trying to fax over paperwork for patient to [facility] but have been unable to successfully send fax. Also sent supervisor Brittni Sanders an email regarding where to send the fax too for the patient. Patient states he will figure out a better number to fax it too and let me know via [employer].

## 2021-04-01 NOTE — TELEPHONE ENCOUNTER
Carlos Eduardo called back to let us know that HR is having fax issues. He is asking to see if possible to mail out the form to him directly and upload via [employer]. I can walk through attaching to message if needed. Thanks.

## 2021-04-02 RX ORDER — MULTIVITAMIN WITH IRON
1 TABLET ORAL DAILY
Qty: 30 TABLET | Refills: 11 | Status: SHIPPED | OUTPATIENT
Start: 2021-04-02 | End: 2022-05-05

## 2021-04-04 ENCOUNTER — APPOINTMENT (OUTPATIENT)
Dept: PREADMISSION TESTING | Facility: HOSPITAL | Age: 51
End: 2021-04-04

## 2021-04-07 ENCOUNTER — APPOINTMENT (OUTPATIENT)
Dept: SLEEP MEDICINE | Facility: HOSPITAL | Age: 51
End: 2021-04-07

## 2021-04-07 DIAGNOSIS — F41.9 ANXIETY: ICD-10-CM

## 2021-04-07 DIAGNOSIS — I10 ESSENTIAL HYPERTENSION: ICD-10-CM

## 2021-04-07 RX ORDER — VENLAFAXINE HYDROCHLORIDE 150 MG/1
150 CAPSULE, EXTENDED RELEASE ORAL DAILY
Qty: 90 CAPSULE | Refills: 3 | Status: SHIPPED | OUTPATIENT
Start: 2021-04-07 | End: 2022-04-18 | Stop reason: SDUPTHER

## 2021-04-07 RX ORDER — AMLODIPINE BESYLATE 10 MG/1
10 TABLET ORAL DAILY
Qty: 90 TABLET | Refills: 3 | Status: SHIPPED | OUTPATIENT
Start: 2021-04-07 | End: 2023-01-29

## 2021-04-16 ENCOUNTER — APPOINTMENT (OUTPATIENT)
Dept: PREADMISSION TESTING | Facility: HOSPITAL | Age: 51
End: 2021-04-16

## 2021-04-16 PROCEDURE — U0004 COV-19 TEST NON-CDC HGH THRU: HCPCS

## 2021-04-16 PROCEDURE — C9803 HOPD COVID-19 SPEC COLLECT: HCPCS

## 2021-04-17 LAB — SARS-COV-2 RNA NOSE QL NAA+PROBE: NOT DETECTED

## 2021-04-19 ENCOUNTER — HOSPITAL ENCOUNTER (OUTPATIENT)
Dept: SLEEP MEDICINE | Facility: HOSPITAL | Age: 51
Discharge: HOME | End: 2021-04-19
Admitting: NURSE PRACTITIONER

## 2021-04-19 DIAGNOSIS — E78.49 OTHER HYPERLIPIDEMIA: ICD-10-CM

## 2021-04-19 DIAGNOSIS — I10 ESSENTIAL HYPERTENSION: ICD-10-CM

## 2021-04-19 DIAGNOSIS — G47.33 OSA (OBSTRUCTIVE SLEEP APNEA): ICD-10-CM

## 2021-04-19 DIAGNOSIS — E11.9 TYPE 2 DIABETES MELLITUS WITHOUT COMPLICATION, WITHOUT LONG-TERM CURRENT USE OF INSULIN (HCC): ICD-10-CM

## 2021-04-19 PROCEDURE — 95811 POLYSOM 6/>YRS CPAP 4/> PARM: CPT

## 2021-04-19 PROCEDURE — 95811 POLYSOM 6/>YRS CPAP 4/> PARM: CPT | Performed by: INTERNAL MEDICINE

## 2021-04-22 ENCOUNTER — TELEPHONE (OUTPATIENT)
Dept: NEUROLOGY | Facility: CLINIC | Age: 51
End: 2021-04-22

## 2021-04-22 NOTE — TELEPHONE ENCOUNTER
Caller: JOEL SHERMAN    Relationship: SELF     Best call back number: [phone]    What is the best time to reach you: ANY TIME     Who are you requesting to speak with (clinical staff, provider,  specific staff member): SPECIFIC STAFF MEMBER    Do you know the name of the person who called: DONTAE    What was the call regarding: POSSIBLE TEST RESULTS FROM SLEEP STUDY    Do you require a callback: YES    THE PT STATED DONTAE CALLED YESTERDAY AT 4:50 PM. I DID NOT SEE WHERE SHE CALLED AT THIS TIME. PLEASE GIVE HIM A CALL BACK WHEN POSSIBLE.

## 2021-04-22 NOTE — TELEPHONE ENCOUNTER
Attempted to contact patient, no answer when calling patient, left voicemail for patient to return call.

## 2021-04-27 ENCOUNTER — TELEPHONE (OUTPATIENT)
Dept: NEUROLOGY | Facility: CLINIC | Age: 51
End: 2021-04-27

## 2021-04-27 NOTE — TELEPHONE ENCOUNTER
"Called and spoke with the patient at his request. He says he does not believe the results of his recent PSG and states \"it seems very unlikely that I could stop breathing every 30 seconds during sleep\". I have advised patient a PSG is very accurate and the severity of his AJAY is why he had a titration done the night of his PSG. He expresses frustration about the [employer] company, [employer], not listening to him about the days he is available for a set up and states \"I am just done at this point\". While on the phone call I received notification that Rojas with [employer] is willing to go to the patient's home to set him up on his Bipap machine today but the patient refuses at this time. He says he now has plans and things to get done today. I have offered to send the Bipap order to another [employer] company and patient is not agreeable to that either. I have advised patient to take some time to calm down, since he is very frustrated at this point, and send me a message letting me know what he wants to do from here. I have advised patient his AJAY is severe and he really needs treatment for it to help prevent a negative cardiovascular event-he states \"I am aware I need treatment but at this point I don't care if something does happen\". I believe once the patient is less frustrated he will contact me to let me know what he wants to do from here. Patient expresses appreciation for the phone call from me.   "

## 2021-04-27 NOTE — TELEPHONE ENCOUNTER
Provider: CLIFF  Caller: PT  Relationship to Patient: SELF  Phone Number: [phone]  Reason for Call: PT WOULD LIKE TO SPEAK W/PROVIDER RE: SLEEP STUDY & BI-PAP; PT DOESN'T BELIEVE HE STOPPED BREATHING 129 TIMES AN HOUR; FEELS THIS CAN'T BE RIGHT; UPSET HE ARRIVED FOR THE APPT AND SAT FOR 2.5 HOURS BEFORE THE STUDY BEGAN. THE COMPANY FOR BI-PAP NEEDS TO SET UP DURING THE WEEK AND PT CANNOT DO DURING THE WEEK; HE IS DONE WITH THE WHOLE THING. A GREAT BIG MISTAKE AND HE FEELS HE SHOULD HAVE NEVER AGREED TO IT.    PLEASE CALL TO DISCUSS.    THANK YOU.

## 2021-04-28 RX ORDER — BUPROPION HYDROCHLORIDE 100 MG/1
100 TABLET, EXTENDED RELEASE ORAL 2 TIMES DAILY
Qty: 60 TABLET | Refills: 11 | Status: SHIPPED | OUTPATIENT
Start: 2021-04-28 | End: 2021-07-12 | Stop reason: HOSPADM

## 2021-04-29 ENCOUNTER — TELEPHONE (OUTPATIENT)
Dept: NEUROLOGY | Facility: CLINIC | Age: 51
End: 2021-04-29

## 2021-04-29 NOTE — TELEPHONE ENCOUNTER
----- Message from AGUILAR Seymour sent at 4/29/2021  4:44 PM EDT -----  Well, Carlos Eduardo has decided he is agreeable to set up on AutoPap. Can you please call Rojas and have him reach out to Carlos Eduardo? Thanks.

## 2021-04-29 NOTE — TELEPHONE ENCOUNTER
Called and spoke with Gin at [facility]. Noah was not in the office at the time I called. She took a message and will have noah contact the patient.

## 2021-05-19 DIAGNOSIS — E66.01 MORBID (SEVERE) OBESITY DUE TO EXCESS CALORIES (HCC): ICD-10-CM

## 2021-05-19 DIAGNOSIS — E11.9 TYPE 2 DIABETES MELLITUS WITHOUT COMPLICATION, WITHOUT LONG-TERM CURRENT USE OF INSULIN (HCC): Primary | ICD-10-CM

## 2021-06-10 ENCOUNTER — TELEPHONE (OUTPATIENT)
Dept: INTERNAL MEDICINE | Facility: CLINIC | Age: 51
End: 2021-06-10

## 2021-06-10 NOTE — TELEPHONE ENCOUNTER
Patient has appt on June 19th. He needs to get lab orders placed so he can have these done prior to appt.

## 2021-06-10 NOTE — TELEPHONE ENCOUNTER
Patient does have a CMP and A1c active in his chart, do you want patient to have any other labs done or just these before his appointment?

## 2021-06-16 ENCOUNTER — LAB (OUTPATIENT)
Dept: LAB | Facility: HOSPITAL | Age: 51
End: 2021-06-16

## 2021-06-16 DIAGNOSIS — I10 ESSENTIAL HYPERTENSION: Primary | ICD-10-CM

## 2021-06-16 DIAGNOSIS — Z13.220 SCREENING, LIPID: ICD-10-CM

## 2021-06-16 LAB
ALBUMIN SERPL-MCNC: 4.2 G/DL (ref 3.5–5.2)
ALBUMIN/GLOB SERPL: 1.6 G/DL
ALP SERPL-CCNC: 93 U/L (ref 39–117)
ALT SERPL W P-5'-P-CCNC: 29 U/L (ref 1–41)
ANION GAP SERPL CALCULATED.3IONS-SCNC: 12.1 MMOL/L (ref 5–15)
AST SERPL-CCNC: 25 U/L (ref 1–40)
BILIRUB SERPL-MCNC: 0.8 MG/DL (ref 0–1.2)
BUN SERPL-MCNC: 14 MG/DL (ref 6–20)
BUN/CREAT SERPL: 13.7 (ref 7–25)
CALCIUM SPEC-SCNC: 9.2 MG/DL (ref 8.6–10.5)
CHLORIDE SERPL-SCNC: 96 MMOL/L (ref 98–107)
CO2 SERPL-SCNC: 30.9 MMOL/L (ref 22–29)
CREAT SERPL-MCNC: 1.02 MG/DL (ref 0.76–1.27)
GFR SERPL CREATININE-BSD FRML MDRD: 77 ML/MIN/1.73
GLOBULIN UR ELPH-MCNC: 2.6 GM/DL
GLUCOSE SERPL-MCNC: 208 MG/DL (ref 65–99)
HBA1C MFR BLD: 8.3 % (ref 4.8–5.6)
POTASSIUM SERPL-SCNC: 4.1 MMOL/L (ref 3.5–5.2)
PROT SERPL-MCNC: 6.8 G/DL (ref 6–8.5)
SODIUM SERPL-SCNC: 139 MMOL/L (ref 136–145)

## 2021-06-16 PROCEDURE — 83036 HEMOGLOBIN GLYCOSYLATED A1C: CPT

## 2021-06-16 PROCEDURE — 36415 COLL VENOUS BLD VENIPUNCTURE: CPT

## 2021-06-16 PROCEDURE — 80053 COMPREHEN METABOLIC PANEL: CPT

## 2021-06-19 ENCOUNTER — TELEMEDICINE (OUTPATIENT)
Dept: INTERNAL MEDICINE | Facility: CLINIC | Age: 51
End: 2021-06-19

## 2021-06-19 DIAGNOSIS — E11.9 TYPE 2 DIABETES MELLITUS WITHOUT COMPLICATION, WITHOUT LONG-TERM CURRENT USE OF INSULIN (HCC): Primary | ICD-10-CM

## 2021-06-19 DIAGNOSIS — I10 ESSENTIAL HYPERTENSION: ICD-10-CM

## 2021-06-19 DIAGNOSIS — E78.49 OTHER HYPERLIPIDEMIA: ICD-10-CM

## 2021-06-19 PROCEDURE — 99442 PR PHYS/QHP TELEPHONE EVALUATION 11-20 MIN: CPT | Performed by: INTERNAL MEDICINE

## 2021-06-19 RX ORDER — ORAL SEMAGLUTIDE 3 MG/1
3 TABLET ORAL DAILY
Qty: 30 TABLET | Refills: 11 | Status: ON HOLD | OUTPATIENT
Start: 2021-06-19 | End: 2021-07-08

## 2021-06-19 NOTE — PROGRESS NOTES
Subjective     Patient ID: Carlos Eduardo Danielle is a 51 y.o. male. Patient is here for management of multiple medical problems.     Chief Complaint   Patient presents with   • Diabetes     Diabetes  He presents for his follow-up diabetic visit. He has type 2 diabetes mellitus. His disease course has been stable. Pertinent negatives for hypoglycemia include no confusion or dizziness. Pertinent negatives for diabetes include no blurred vision and no chest pain. Pertinent negatives for hypoglycemia complications include no blackouts and no hospitalization. Symptoms are stable. Pertinent negatives for diabetic complications include no autonomic neuropathy or CVA.          DM  Pt was constantly sick on Rybelus 3, 7, 14.              The following portions of the patient's history were reviewed and updated as appropriate: allergies, current medications, past family history, past medical history, past social history, past surgical history and problem list.    Review of Systems   Eyes: Negative for blurred vision.   Cardiovascular: Negative for chest pain.   Neurological: Negative for dizziness.   Psychiatric/Behavioral: Negative for confusion.       Current Outpatient Medications:   •  amLODIPine (NORVASC) 10 MG tablet, Take 1 tablet by mouth Daily., Disp: 90 tablet, Rfl: 3  •  B Complex-C (B-complex with vitamin C) tablet, Take 1 tablet by mouth Daily., Disp: 30 tablet, Rfl: 11  •  Blood Glucose Monitoring Suppl (FreeStyle Freedom Lite) w/Device kit, Use to test blood sugar twice daily., Disp: 1 each, Rfl: 0  •  buPROPion SR (Wellbutrin SR) 100 MG 12 hr tablet, Take 1 tablet by mouth 2 (Two) Times a Day., Disp: 60 tablet, Rfl: 11  •  furosemide (LASIX) 40 MG tablet, Take 1 tablet by mouth 2 (Two) Times a Day., Disp: 60 tablet, Rfl: 5  •  glucose blood test strip, Use as instructed to check sugars twice daily., Disp: 200 each, Rfl: 12  •  lisinopril-hydrochlorothiazide (PRINZIDE,ZESTORETIC) 20-12.5 MG per tablet, Take 1 tablet  by mouth Daily., Disp: 90 tablet, Rfl: 3  •  metFORMIN (GLUCOPHAGE) 500 MG tablet, Take 1 tablet by mouth 2 (Two) Times a Day With Meals., Disp: 60 tablet, Rfl: 2  •  ondansetron ODT (ZOFRAN-ODT) 4 MG disintegrating tablet, Place 1 tablet on the tongue Every 8 (Eight) Hours As Needed for Nausea or Vomiting., Disp: 5 tablet, Rfl: 0  •  pantoprazole (PROTONIX) 40 MG EC tablet, Take 1 tablet by mouth 2 (two) times a day., Disp: 60 tablet, Rfl: 5  •  venlafaxine XR (EFFEXOR-XR) 150 MG 24 hr capsule, Take 1 capsule by mouth Daily., Disp: 90 capsule, Rfl: 3  •  Semaglutide (Rybelsus) 3 MG tablet, Take 3 mg by mouth Daily., Disp: 30 tablet, Rfl: 11    Objective      There were no vitals taken for this visit.    Physical Exam     General Appearance:     Head:     Eyes:     Ears:     Nose:    Throat:    Neck:    Back:      Lungs:      Chest wall:     Heart:     Abdomen:      Extremities:    Pulses:    Skin:    Lymph nodes:    Neurologic:       Results for orders placed or performed in visit on 06/16/21   Comprehensive Metabolic Panel    Specimen: Blood   Result Value Ref Range    Glucose 208 (H) 65 - 99 mg/dL    BUN 14 6 - 20 mg/dL    Creatinine 1.02 0.76 - 1.27 mg/dL    Sodium 139 136 - 145 mmol/L    Potassium 4.1 3.5 - 5.2 mmol/L    Chloride 96 (L) 98 - 107 mmol/L    CO2 30.9 (H) 22.0 - 29.0 mmol/L    Calcium 9.2 8.6 - 10.5 mg/dL    Total Protein 6.8 6.0 - 8.5 g/dL    Albumin 4.20 3.50 - 5.20 g/dL    ALT (SGPT) 29 1 - 41 U/L    AST (SGOT) 25 1 - 40 U/L    Alkaline Phosphatase 93 39 - 117 U/L    Total Bilirubin 0.8 0.0 - 1.2 mg/dL    eGFR Non African Amer 77 >60 mL/min/1.73    Globulin 2.6 gm/dL    A/G Ratio 1.6 g/dL    BUN/Creatinine Ratio 13.7 7.0 - 25.0    Anion Gap 12.1 5.0 - 15.0 mmol/L   Hemoglobin A1c    Specimen: Blood   Result Value Ref Range    Hemoglobin A1C 8.30 (H) 4.80 - 5.60 %         Assessment/Plan   Telephone visit 12 min    Will try ryblus 3 mg.          Diagnoses and all orders for this visit:    1. Type  2 diabetes mellitus without complication, without long-term current use of insulin (CMS/[facility]) (Primary)  -     Hemoglobin A1c  -     Comprehensive Metabolic Panel    2. Essential hypertension  -     Hemoglobin A1c  -     Comprehensive Metabolic Panel    3. Other hyperlipidemia    Other orders  -     Semaglutide (Rybelsus) 3 MG tablet; Take 3 mg by mouth Daily.  Dispense: 30 tablet; Refill: 11      No follow-ups on file.        3 month f/u    There are no Patient Instructions on file for this visit.     Ruslan Issa MD    Assessment/Plan       Answers for HPI/ROS submitted by the patient on 6/17/2021  What is the primary reason for your visit?: Other  Please describe your symptoms.: weight loss  Have you had these symptoms before?: Yes  How long have you been having these symptoms?: Greater than 2 weeks

## 2021-06-19 NOTE — PROGRESS NOTES
You have chosen to receive care through a telephone visit. Do you consent to use a telephone visit for your medical care today? Yes  People present for this telephone visit:  Carlos Eduardo Danielle (Patient)  BOLIVAR Espinoza MD  Answers for HPI/ROS submitted by the patient on 6/17/2021  What is the primary reason for your visit?: Other  Please describe your symptoms.: weight loss  Have you had these symptoms before?: Yes  How long have you been having these symptoms?: Greater than 2 weeks

## 2021-06-29 ENCOUNTER — OFFICE VISIT (OUTPATIENT)
Dept: BEHAVIORAL HEALTH | Facility: CLINIC | Age: 51
End: 2021-06-29

## 2021-06-29 VITALS
BODY MASS INDEX: 42.66 KG/M2 | WEIGHT: 315 LBS | HEIGHT: 72 IN | SYSTOLIC BLOOD PRESSURE: 150 MMHG | DIASTOLIC BLOOD PRESSURE: 92 MMHG

## 2021-06-29 DIAGNOSIS — F33.1 MAJOR DEPRESSIVE DISORDER, RECURRENT EPISODE, MODERATE (HCC): Primary | ICD-10-CM

## 2021-06-29 DIAGNOSIS — F41.1 GENERALIZED ANXIETY DISORDER: ICD-10-CM

## 2021-06-29 PROCEDURE — 90792 PSYCH DIAG EVAL W/MED SRVCS: CPT | Performed by: NURSE PRACTITIONER

## 2021-06-29 RX ORDER — LITHIUM CARBONATE 300 MG/1
300 TABLET, FILM COATED, EXTENDED RELEASE ORAL NIGHTLY
Qty: 30 TABLET | Refills: 3 | Status: SHIPPED | OUTPATIENT
Start: 2021-06-29 | End: 2021-07-12 | Stop reason: HOSPADM

## 2021-06-29 RX ORDER — VENLAFAXINE HYDROCHLORIDE 75 MG/1
75 CAPSULE, EXTENDED RELEASE ORAL DAILY
Qty: 30 CAPSULE | Refills: 3 | Status: SHIPPED | OUTPATIENT
Start: 2021-06-29 | End: 2021-07-12 | Stop reason: HOSPADM
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
## 2021-07-01 ENCOUNTER — TELEPHONE (OUTPATIENT)
Dept: INTERNAL MEDICINE | Facility: CLINIC | Age: 51
End: 2021-07-01

## 2021-07-01 NOTE — TELEPHONE ENCOUNTER
Caller: AlexanderCarlos Eduardo ashley    Relationship: Self    Date of last appointment: 6/19/2021    Issues/Supplies requested: N/A    Type of mask (fill or nasal): N/A    Does equipment use a modem or chip: NO    Ordering physician's name: JOSIAH    Patient contact information: [phone]     Fax number where the order should be sent:  PATIENT IS UNSURE.     PATIENT WAS INFORMED OF THE RECENT RECALL [employer] DEVICE FOR CPAP AND BI-LEVEL PAP DEVICES. HE WOULD LIKE TO KNOW IF HE SHOULD BE CONCERNED, WHAT HE SHOULD DO WITH HIS MACHINE, AND HOW HE SHOULD PROCEED WITH THIS INFORMATION.

## 2021-07-07 ENCOUNTER — APPOINTMENT (OUTPATIENT)
Dept: NUTRITION | Facility: HOSPITAL | Age: 51
End: 2021-07-07

## 2021-07-08 ENCOUNTER — HOSPITAL ENCOUNTER (EMERGENCY)
Facility: HOSPITAL | Age: 51
Discharge: PSYCHIATRIC OR SUBSTANCE USE FACILITY | End: 2021-07-08
Attending: EMERGENCY MEDICINE | Admitting: EMERGENCY MEDICINE

## 2021-07-08 ENCOUNTER — NURSE TRIAGE (OUTPATIENT)
Dept: CALL CENTER | Facility: HOSPITAL | Age: 51
End: 2021-07-08

## 2021-07-08 ENCOUNTER — HOSPITAL ENCOUNTER (INPATIENT)
Facility: HOSPITAL | Age: 51
LOS: 4 days | Discharge: HOME | End: 2021-07-12
Attending: PSYCHIATRY & NEUROLOGY | Admitting: PSYCHIATRY & NEUROLOGY

## 2021-07-08 VITALS
HEART RATE: 66 BPM | TEMPERATURE: 98.2 F | SYSTOLIC BLOOD PRESSURE: 151 MMHG | HEIGHT: 72 IN | WEIGHT: 315 LBS | OXYGEN SATURATION: 94 % | DIASTOLIC BLOOD PRESSURE: 78 MMHG | BODY MASS INDEX: 42.66 KG/M2 | RESPIRATION RATE: 20 BRPM

## 2021-07-08 DIAGNOSIS — R45.851 SUICIDAL IDEATION: Primary | ICD-10-CM

## 2021-07-08 DIAGNOSIS — F33.2 SEVERE EPISODE OF RECURRENT MAJOR DEPRESSIVE DISORDER, WITHOUT PSYCHOTIC FEATURES (HCC): ICD-10-CM

## 2021-07-08 LAB
ALBUMIN SERPL-MCNC: 4.3 G/DL (ref 3.5–5.2)
ALBUMIN/GLOB SERPL: 1.5 G/DL
ALP SERPL-CCNC: 103 U/L (ref 39–117)
ALT SERPL W P-5'-P-CCNC: 31 U/L (ref 1–41)
AMPHET+METHAMPHET UR QL: NEGATIVE
AMPHETAMINES UR QL: NEGATIVE
ANION GAP SERPL CALCULATED.3IONS-SCNC: 15 MMOL/L (ref 5–15)
APAP SERPL-MCNC: <5 MCG/ML (ref 0–30)
AST SERPL-CCNC: 23 U/L (ref 1–40)
BARBITURATES UR QL SCN: NEGATIVE
BASOPHILS # BLD AUTO: 0.05 10*3/MM3 (ref 0–0.2)
BASOPHILS NFR BLD AUTO: 0.7 % (ref 0–1.5)
BENZODIAZ UR QL SCN: NEGATIVE
BILIRUB SERPL-MCNC: 0.5 MG/DL (ref 0–1.2)
BILIRUB UR QL STRIP: NEGATIVE
BUN SERPL-MCNC: 17 MG/DL (ref 6–20)
BUN/CREAT SERPL: 19.3 (ref 7–25)
BUPRENORPHINE SERPL-MCNC: NEGATIVE NG/ML
CALCIUM SPEC-SCNC: 9.2 MG/DL (ref 8.6–10.5)
CANNABINOIDS SERPL QL: NEGATIVE
CHLORIDE SERPL-SCNC: 98 MMOL/L (ref 98–107)
CLARITY UR: CLEAR
CO2 SERPL-SCNC: 28 MMOL/L (ref 22–29)
COCAINE UR QL: NEGATIVE
COLOR UR: YELLOW
CREAT SERPL-MCNC: 0.88 MG/DL (ref 0.76–1.27)
DEPRECATED RDW RBC AUTO: 42.3 FL (ref 37–54)
EOSINOPHIL # BLD AUTO: 0.15 10*3/MM3 (ref 0–0.4)
EOSINOPHIL NFR BLD AUTO: 2.1 % (ref 0.3–6.2)
ERYTHROCYTE [DISTWIDTH] IN BLOOD BY AUTOMATED COUNT: 14 % (ref 12.3–15.4)
ETHANOL BLD-MCNC: <10 MG/DL (ref 0–10)
FLUAV RNA RESP QL NAA+PROBE: NOT DETECTED
FLUBV RNA RESP QL NAA+PROBE: NOT DETECTED
GFR SERPL CREATININE-BSD FRML MDRD: 92 ML/MIN/1.73
GLOBULIN UR ELPH-MCNC: 2.8 GM/DL
GLUCOSE SERPL-MCNC: 160 MG/DL (ref 65–99)
GLUCOSE UR STRIP-MCNC: NEGATIVE MG/DL
HBA1C MFR BLD: 8.3 % (ref 4.8–5.6)
HCT VFR BLD AUTO: 45.9 % (ref 37.5–51)
HGB BLD-MCNC: 15.5 G/DL (ref 13–17.7)
HGB UR QL STRIP.AUTO: NEGATIVE
IMM GRANULOCYTES # BLD AUTO: 0.04 10*3/MM3 (ref 0–0.05)
IMM GRANULOCYTES NFR BLD AUTO: 0.6 % (ref 0–0.5)
KETONES UR QL STRIP: NEGATIVE
LEUKOCYTE ESTERASE UR QL STRIP.AUTO: NEGATIVE
LITHIUM SERPL-SCNC: 0.1 MMOL/L (ref 0.6–1.2)
LYMPHOCYTES # BLD AUTO: 1.48 10*3/MM3 (ref 0.7–3.1)
LYMPHOCYTES NFR BLD AUTO: 20.4 % (ref 19.6–45.3)
MCH RBC QN AUTO: 28.5 PG (ref 26.6–33)
MCHC RBC AUTO-ENTMCNC: 33.8 G/DL (ref 31.5–35.7)
MCV RBC AUTO: 84.4 FL (ref 79–97)
METHADONE UR QL SCN: NEGATIVE
MONOCYTES # BLD AUTO: 0.42 10*3/MM3 (ref 0.1–0.9)
MONOCYTES NFR BLD AUTO: 5.8 % (ref 5–12)
NEUTROPHILS NFR BLD AUTO: 5.11 10*3/MM3 (ref 1.7–7)
NEUTROPHILS NFR BLD AUTO: 70.4 % (ref 42.7–76)
NITRITE UR QL STRIP: NEGATIVE
NRBC BLD AUTO-RTO: 0 /100 WBC (ref 0–0.2)
OPIATES UR QL: NEGATIVE
OXYCODONE UR QL SCN: NEGATIVE
PCP UR QL SCN: NEGATIVE
PH UR STRIP.AUTO: 6 [PH] (ref 5–8)
PLATELET # BLD AUTO: 208 10*3/MM3 (ref 140–450)
PMV BLD AUTO: 11.4 FL (ref 6–12)
POTASSIUM SERPL-SCNC: 3.4 MMOL/L (ref 3.5–5.2)
PROPOXYPH UR QL: NEGATIVE
PROT SERPL-MCNC: 7.1 G/DL (ref 6–8.5)
PROT UR QL STRIP: NEGATIVE
RBC # BLD AUTO: 5.44 10*6/MM3 (ref 4.14–5.8)
SALICYLATES SERPL-MCNC: <0.3 MG/DL
SARS-COV-2 RDRP RESP QL NAA+PROBE: NORMAL
SARS-COV-2 RNA RESP QL NAA+PROBE: NOT DETECTED
SODIUM SERPL-SCNC: 141 MMOL/L (ref 136–145)
SP GR UR STRIP: 1.01 (ref 1–1.03)
T4 FREE SERPL-MCNC: 0.91 NG/DL (ref 0.93–1.7)
TRICYCLICS UR QL SCN: NEGATIVE
TSH SERPL DL<=0.05 MIU/L-ACNC: 2.91 UIU/ML (ref 0.27–4.2)
UROBILINOGEN UR QL STRIP: NORMAL
WBC # BLD AUTO: 7.25 10*3/MM3 (ref 3.4–10.8)

## 2021-07-08 PROCEDURE — 80179 DRUG ASSAY SALICYLATE: CPT | Performed by: PHYSICIAN ASSISTANT

## 2021-07-08 PROCEDURE — 93005 ELECTROCARDIOGRAM TRACING: CPT | Performed by: PHYSICIAN ASSISTANT

## 2021-07-08 PROCEDURE — 80053 COMPREHEN METABOLIC PANEL: CPT | Performed by: PHYSICIAN ASSISTANT

## 2021-07-08 PROCEDURE — 83036 HEMOGLOBIN GLYCOSYLATED A1C: CPT | Performed by: PHYSICIAN ASSISTANT

## 2021-07-08 PROCEDURE — 99284 EMERGENCY DEPT VISIT MOD MDM: CPT

## 2021-07-08 PROCEDURE — 80306 DRUG TEST PRSMV INSTRMNT: CPT | Performed by: PHYSICIAN ASSISTANT

## 2021-07-08 PROCEDURE — 81003 URINALYSIS AUTO W/O SCOPE: CPT | Performed by: PHYSICIAN ASSISTANT

## 2021-07-08 PROCEDURE — 80178 ASSAY OF LITHIUM: CPT | Performed by: PHYSICIAN ASSISTANT

## 2021-07-08 PROCEDURE — C9803 HOPD COVID-19 SPEC COLLECT: HCPCS

## 2021-07-08 PROCEDURE — 80143 DRUG ASSAY ACETAMINOPHEN: CPT | Performed by: PHYSICIAN ASSISTANT

## 2021-07-08 PROCEDURE — 87635 SARS-COV-2 COVID-19 AMP PRB: CPT | Performed by: PHYSICIAN ASSISTANT

## 2021-07-08 PROCEDURE — 84443 ASSAY THYROID STIM HORMONE: CPT | Performed by: PHYSICIAN ASSISTANT

## 2021-07-08 PROCEDURE — 85025 COMPLETE CBC W/AUTO DIFF WBC: CPT | Performed by: PHYSICIAN ASSISTANT

## 2021-07-08 PROCEDURE — 82077 ASSAY SPEC XCP UR&BREATH IA: CPT | Performed by: PHYSICIAN ASSISTANT

## 2021-07-08 PROCEDURE — 84439 ASSAY OF FREE THYROXINE: CPT | Performed by: PHYSICIAN ASSISTANT

## 2021-07-08 PROCEDURE — 87636 SARSCOV2 & INF A&B AMP PRB: CPT | Performed by: PSYCHIATRY & NEUROLOGY

## 2021-07-08 RX ORDER — NICOTINE 21 MG/24HR
1 PATCH, TRANSDERMAL 24 HOURS TRANSDERMAL
Status: DISCONTINUED | OUTPATIENT
Start: 2021-07-09 | End: 2021-07-09

## 2021-07-08 RX ORDER — IBUPROFEN 400 MG/1
400 TABLET ORAL EVERY 6 HOURS PRN
Status: DISCONTINUED | OUTPATIENT
Start: 2021-07-08 | End: 2021-07-12 | Stop reason: HOSPADM

## 2021-07-08 RX ORDER — ECHINACEA PURPUREA EXTRACT 125 MG
2 TABLET ORAL AS NEEDED
Status: DISCONTINUED | OUTPATIENT
Start: 2021-07-08 | End: 2021-07-12 | Stop reason: HOSPADM

## 2021-07-08 RX ORDER — HYDROXYZINE 50 MG/1
50 TABLET, FILM COATED ORAL EVERY 6 HOURS PRN
Status: DISCONTINUED | OUTPATIENT
Start: 2021-07-08 | End: 2021-07-09

## 2021-07-08 RX ORDER — ACETAMINOPHEN 325 MG/1
650 TABLET ORAL EVERY 6 HOURS PRN
Status: DISCONTINUED | OUTPATIENT
Start: 2021-07-08 | End: 2021-07-12 | Stop reason: HOSPADM

## 2021-07-08 RX ORDER — LITHIUM CARBONATE 300 MG/1
300 TABLET, FILM COATED, EXTENDED RELEASE ORAL NIGHTLY
Status: DISCONTINUED | OUTPATIENT
Start: 2021-07-09 | End: 2021-07-09

## 2021-07-08 RX ORDER — ONDANSETRON 4 MG/1
4 TABLET, FILM COATED ORAL EVERY 6 HOURS PRN
Status: DISCONTINUED | OUTPATIENT
Start: 2021-07-08 | End: 2021-07-09

## 2021-07-08 RX ORDER — TRAZODONE HYDROCHLORIDE 50 MG/1
50 TABLET ORAL NIGHTLY PRN
Status: DISCONTINUED | OUTPATIENT
Start: 2021-07-08 | End: 2021-07-09

## 2021-07-08 RX ORDER — ALUMINA, MAGNESIA, AND SIMETHICONE 2400; 2400; 240 MG/30ML; MG/30ML; MG/30ML
15 SUSPENSION ORAL EVERY 6 HOURS PRN
Status: DISCONTINUED | OUTPATIENT
Start: 2021-07-08 | End: 2021-07-12 | Stop reason: HOSPADM

## 2021-07-08 RX ORDER — NICOTINE POLACRILEX 4 MG
15 LOZENGE BUCCAL
Status: DISCONTINUED | OUTPATIENT
Start: 2021-07-08 | End: 2021-07-12 | Stop reason: HOSPADM

## 2021-07-08 RX ORDER — FAMOTIDINE 20 MG/1
20 TABLET, FILM COATED ORAL 2 TIMES DAILY PRN
Status: DISCONTINUED | OUTPATIENT
Start: 2021-07-08 | End: 2021-07-12 | Stop reason: HOSPADM

## 2021-07-08 RX ORDER — MULTIVITAMIN WITH IRON
1 TABLET ORAL DAILY
Status: DISCONTINUED | OUTPATIENT
Start: 2021-07-09 | End: 2021-07-12 | Stop reason: HOSPADM

## 2021-07-08 RX ORDER — BUPROPION HYDROCHLORIDE 100 MG/1
100 TABLET, EXTENDED RELEASE ORAL 2 TIMES DAILY
Status: DISCONTINUED | OUTPATIENT
Start: 2021-07-09 | End: 2021-07-09

## 2021-07-08 RX ORDER — DEXTROSE MONOHYDRATE 25 G/50ML
25 INJECTION, SOLUTION INTRAVENOUS
Status: DISCONTINUED | OUTPATIENT
Start: 2021-07-08 | End: 2021-07-12 | Stop reason: HOSPADM

## 2021-07-08 RX ORDER — BENZONATATE 100 MG/1
100 CAPSULE ORAL 3 TIMES DAILY PRN
Status: DISCONTINUED | OUTPATIENT
Start: 2021-07-08 | End: 2021-07-12 | Stop reason: HOSPADM

## 2021-07-08 RX ORDER — FUROSEMIDE 40 MG/1
40 TABLET ORAL 2 TIMES DAILY
Status: DISCONTINUED | OUTPATIENT
Start: 2021-07-09 | End: 2021-07-12 | Stop reason: HOSPADM

## 2021-07-08 RX ORDER — BENZTROPINE MESYLATE 1 MG/1
2 TABLET ORAL ONCE AS NEEDED
Status: DISCONTINUED | OUTPATIENT
Start: 2021-07-08 | End: 2021-07-12 | Stop reason: HOSPADM

## 2021-07-08 RX ORDER — LOPERAMIDE HYDROCHLORIDE 2 MG/1
2 CAPSULE ORAL
Status: DISCONTINUED | OUTPATIENT
Start: 2021-07-08 | End: 2021-07-12 | Stop reason: HOSPADM

## 2021-07-08 RX ORDER — VENLAFAXINE HYDROCHLORIDE 75 MG/1
75 CAPSULE, EXTENDED RELEASE ORAL DAILY
Status: DISCONTINUED | OUTPATIENT
Start: 2021-07-09 | End: 2021-07-09

## 2021-07-08 RX ORDER — BENZTROPINE MESYLATE 1 MG/ML
1 INJECTION INTRAMUSCULAR; INTRAVENOUS ONCE AS NEEDED
Status: DISCONTINUED | OUTPATIENT
Start: 2021-07-08 | End: 2021-07-12 | Stop reason: HOSPADM

## 2021-07-08 RX ORDER — VENLAFAXINE HYDROCHLORIDE 150 MG/1
150 CAPSULE, EXTENDED RELEASE ORAL DAILY
Status: DISCONTINUED | OUTPATIENT
Start: 2021-07-09 | End: 2021-07-12 | Stop reason: HOSPADM

## 2021-07-08 RX ORDER — AMLODIPINE BESYLATE 10 MG/1
10 TABLET ORAL DAILY
Status: DISCONTINUED | OUTPATIENT
Start: 2021-07-09 | End: 2021-07-12 | Stop reason: HOSPADM

## 2021-07-08 NOTE — TELEPHONE ENCOUNTER
"HUB call, Supervisor, Michael , Supervisor of [facility],says this Employee is having  Personal issues and needs to speak to a nurse. He is having suicidal Thoughts, He states he wants to get himself together, but is having ideas of suicide and does have a plan just not the means at this time. Triage  Done and he is willing to go to ER to be treated, he wants to take the first step for treatment of this situation.\" I will  go to ER and be treated\", is his statement, \"if that is the first step\", I do have a counselor Elizabeth Santoyo and new one Joan, not sure of her name, he stated.\"  Triage Nurse told him, he does not need to be alone, someone needs to be with him to ER, he does not want to go by ambulance. Michael, his supervisor was spoken with, told her someone needs to accompany him there, she is in agreement to supervise him.. He does not want to call EMS. He states the thoughts keeping popping into his head. He even stays at home by himself all the time if not working,cancels appts. and functions to stay by himself. He stays away from a friend of his that has firearms,because of the thoughts in his head, he states.     Reason for Disposition  • Patient is threatening suicide now    Additional Information  • Negative: Patient attempted suicide  • Negative: Violent behavior, or threatening to physically hurt or kill someone  • Negative: [1] Patient is very confused (disoriented, slurred speech) AND [2] no other adult (e.g., friend or family member) available  • Negative: [1] Difficult to awaken or acting very confused (disoriented, slurred speech) AND [2] new-onset  • Negative: Sounds like a life-threatening emergency to the triager  • Negative: Depression is main symptom and is not threatening suicide  • Negative: [1] Depression symptoms (sadness, hopelessness, decreased energy) AND [2] unable to do any normal activities (e.g., self care, school, work; in comparison to baseline).    Answer Assessment - Initial " "Assessment Questions  1. MAIN CONCERN: \"What happened that made you call today?\"     Past few days ideas popping in an out I need help, Michael Supervisor  sent to Nurse  2. RISK OF HARM - SUICIDAL IDEATION:  \"Do you ever have thoughts of hurting or killing yourself?\"  (e.g., yes, no, no but preoccupation with thoughts about death) yes    - WISH TO BE DEAD:  \"Have you wished you were dead or wished you could go to sleep and not wake up?\"    - INTENT:  \"Have you had any thoughts of hurting or killing yourself?\" (e.g., yes, no, N/A) If Yes, ask: \"Are you having these thoughts about killing yourself right NOW?\" yes has thoughts,    - PLAN: \"Have you thought about how you might do this?\" \"Do you have a specific plan for how you would do this?\" (e.g., gun, knife, overdose, no plan, N/A) Plan made he said, friend has firearms, he does not he says    - ACCESS: If yes to PLAN, \"Do you have access to  Planned item?\" (e.g., pills, gun in house, knife in kitchen)      He is at work now  3. RISK OF HARM - SUICIDE ATTEMPT: \"Have you tried to harm yourself recently?\" If Yes, ask: When was this?\"        Has not tried to  hurt himself since he was in his 20's  4. RISK OF HARM - SUICIDAL BEHAVIOR: \"Have you ever done anything, started to do anything, or prepared to do anything to end your life?\" (e.g., collected pills, bought a gun, wrote a suicide note, cut yourself, started but changed your mind)      In my 20' tried, not since then but I do have a plan in my head  5. EVENTS AND STRESSORS: \"Has there been any new stress or recent changes in your life?\" (e.g., recent loss of loved one, negative event, homelessness)      Turned 51 this year, lost a child had custodyof 2 years ago  6. FUNCTIONAL IMPAIRMENT: \"How have things been going for you overall? Have you had more difficulty than usual doing your normal daily activities?\"  (e.g., better, same, worse; self-care, school, work, interactions)      Jobs is going ok, likes my job, " "but he isolates himself to home if not working  7. SUPPORT: \"Who is with you now?\" \"Who do you live with?\" \"Do you have family or friends who you can talk to?\"       Lives by himself,he is at work now, Michael is close by his supervisor  8. THERAPIST: \"Do you have a counselor or therapist? Name?\"      Elizabeth Santoyo and Joan   9. ALCOHOL USE OR SUBSTANCE USE (DRUG USE): \"Do you drink alcohol or use any illegal drugs?\"      no  10. OTHER: \"Do you have any other physical symptoms right now?\" (e.g., fever)        no  11. PREGNANCY or POSTPARTUM: \"Is there any chance you are pregnant?\" \"When was your last menstrual period?\" \"Were you recently pregnant?\" \"When did you give birth?\"        no    Protocols used: SUICIDE CONCERNS-ADULT-AH      "
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
## 2021-07-09 PROBLEM — F33.2 SEVERE RECURRENT MAJOR DEPRESSION WITHOUT PSYCHOTIC FEATURES: Status: ACTIVE | Noted: 2021-06-29

## 2021-07-09 LAB
GLUCOSE BLDC GLUCOMTR-MCNC: 152 MG/DL (ref 70–130)
GLUCOSE BLDC GLUCOMTR-MCNC: 159 MG/DL (ref 70–130)
GLUCOSE BLDC GLUCOMTR-MCNC: 167 MG/DL (ref 70–130)
GLUCOSE BLDC GLUCOMTR-MCNC: 186 MG/DL (ref 70–130)
QT INTERVAL: 466 MS
QTC INTERVAL: 495 MS

## 2021-07-09 PROCEDURE — 82962 GLUCOSE BLOOD TEST: CPT

## 2021-07-09 PROCEDURE — 63710000001 INSULIN ASPART PER 5 UNITS: Performed by: PSYCHIATRY & NEUROLOGY

## 2021-07-09 PROCEDURE — 99223 1ST HOSP IP/OBS HIGH 75: CPT | Performed by: PSYCHIATRY & NEUROLOGY

## 2021-07-09 RX ADMIN — BUPROPION HYDROCHLORIDE 100 MG: 100 TABLET, EXTENDED RELEASE ORAL at 01:16

## 2021-07-09 RX ADMIN — FUROSEMIDE 40 MG: 40 TABLET ORAL at 20:42

## 2021-07-09 RX ADMIN — METFORMIN HYDROCHLORIDE 500 MG: 500 TABLET ORAL at 17:05

## 2021-07-09 RX ADMIN — TRAZODONE HYDROCHLORIDE 50 MG: 50 TABLET ORAL at 01:16

## 2021-07-09 RX ADMIN — Medication 1 TABLET: at 09:14

## 2021-07-09 RX ADMIN — BUPROPION HYDROCHLORIDE 100 MG: 100 TABLET, EXTENDED RELEASE ORAL at 09:14

## 2021-07-09 RX ADMIN — METFORMIN HYDROCHLORIDE 500 MG: 500 TABLET ORAL at 09:14

## 2021-07-09 RX ADMIN — LITHIUM CARBONATE 300 MG: 300 TABLET, FILM COATED, EXTENDED RELEASE ORAL at 01:16

## 2021-07-09 RX ADMIN — AMLODIPINE BESYLATE 10 MG: 10 TABLET ORAL at 09:14

## 2021-07-09 RX ADMIN — VENLAFAXINE HYDROCHLORIDE 150 MG: 150 CAPSULE, EXTENDED RELEASE ORAL at 09:14

## 2021-07-09 RX ADMIN — FUROSEMIDE 40 MG: 40 TABLET ORAL at 09:14

## 2021-07-09 RX ADMIN — INSULIN ASPART 2 UNITS: 100 INJECTION, SOLUTION INTRAVENOUS; SUBCUTANEOUS at 11:04

## 2021-07-09 RX ADMIN — HYDROCHLOROTHIAZIDE: 12.5 TABLET ORAL at 09:14

## 2021-07-09 RX ADMIN — FUROSEMIDE 40 MG: 40 TABLET ORAL at 01:16

## 2021-07-09 RX ADMIN — VENLAFAXINE HYDROCHLORIDE 75 MG: 75 CAPSULE, EXTENDED RELEASE ORAL at 09:13
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
## 2021-07-09 NOTE — PLAN OF CARE
Goal Outcome Evaluation:  Plan of Care Reviewed With: patient  Patient Agreement with Plan of Care: agrees     Progress: no change  Outcome Summary: Patient was new admit this shift, oriented to unit and unit rules, he has rested well since arrival, no issues noted.

## 2021-07-09 NOTE — PLAN OF CARE
Goal Outcome Evaluation:  Plan of Care Reviewed With: patient  Patient Agreement with Plan of Care: agrees     Progress: no change  Outcome Summary: Patient calm and cooperative. Patient is withdrawn to room and avoids social interaction. Rates anxiety and depression both a 9. Denies SI/HI or AVH.
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
## 2021-07-10 LAB
GLUCOSE BLDC GLUCOMTR-MCNC: 135 MG/DL (ref 70–130)
GLUCOSE BLDC GLUCOMTR-MCNC: 168 MG/DL (ref 70–130)
GLUCOSE BLDC GLUCOMTR-MCNC: 172 MG/DL (ref 70–130)
GLUCOSE BLDC GLUCOMTR-MCNC: 181 MG/DL (ref 70–130)

## 2021-07-10 PROCEDURE — 82962 GLUCOSE BLOOD TEST: CPT

## 2021-07-10 PROCEDURE — 93005 ELECTROCARDIOGRAM TRACING: CPT | Performed by: PSYCHIATRY & NEUROLOGY

## 2021-07-10 PROCEDURE — 99232 SBSQ HOSP IP/OBS MODERATE 35: CPT | Performed by: PSYCHIATRY & NEUROLOGY

## 2021-07-10 PROCEDURE — 93010 ELECTROCARDIOGRAM REPORT: CPT | Performed by: INTERNAL MEDICINE

## 2021-07-10 PROCEDURE — 63710000001 INSULIN ASPART PER 5 UNITS: Performed by: PSYCHIATRY & NEUROLOGY

## 2021-07-10 RX ADMIN — Medication 1 TABLET: at 08:34

## 2021-07-10 RX ADMIN — VENLAFAXINE HYDROCHLORIDE 150 MG: 150 CAPSULE, EXTENDED RELEASE ORAL at 08:35

## 2021-07-10 RX ADMIN — HYDROCHLOROTHIAZIDE: 12.5 TABLET ORAL at 08:34

## 2021-07-10 RX ADMIN — FUROSEMIDE 40 MG: 40 TABLET ORAL at 20:27

## 2021-07-10 RX ADMIN — FUROSEMIDE 40 MG: 40 TABLET ORAL at 08:34

## 2021-07-10 RX ADMIN — METFORMIN HYDROCHLORIDE 500 MG: 500 TABLET ORAL at 17:50

## 2021-07-10 RX ADMIN — INSULIN ASPART 2 UNITS: 100 INJECTION, SOLUTION INTRAVENOUS; SUBCUTANEOUS at 07:18

## 2021-07-10 RX ADMIN — METFORMIN HYDROCHLORIDE 500 MG: 500 TABLET ORAL at 07:18

## 2021-07-10 RX ADMIN — AMLODIPINE BESYLATE 10 MG: 10 TABLET ORAL at 08:34

## 2021-07-10 RX ADMIN — INSULIN ASPART 2 UNITS: 100 INJECTION, SOLUTION INTRAVENOUS; SUBCUTANEOUS at 11:53

## 2021-07-10 NOTE — PLAN OF CARE
Goal Outcome Evaluation:  Plan of Care Reviewed With: patient  Patient Agreement with Plan of Care: agrees     Progress: improving  Outcome Summary: Patient calm and cooperative. Patient reports feeling much better today. Denies anxiety, depression, SI/HI or AVH.

## 2021-07-10 NOTE — PROGRESS NOTES
"INPATIENT PSYCHIATRIC PROGRESS NOTE    Name:  Carlos Eduardo Danielle  :  1970  MRN:  [id number]  Visit Number:  [id number]  Length of stay:  2    SUBJECTIVE  CC/Focus of Exam: depression    INTERVAL HISTORY:  The patient reports he has been able to think about his expectations and is trying to bring them down to a more realistic level, and is trying to let go and is realizing it is not his responsibility to fix everything around.  Depression rating 1/10  Anxiety rating 1/10  Sleep: good  Withdrawal sx: denies  Cravin/10    Review of Systems   Respiratory: Negative.    Cardiovascular: Negative.    Gastrointestinal: Negative.        OBJECTIVE    Temp:  [97.6 °F (36.4 °C)-97.9 °F (36.6 °C)] 97.7 °F (36.5 °C)  Heart Rate:  [62-89] 89  Resp:  [17-20] 17  BP: (132-167)/(77-93) 167/93    MENTAL STATUS EXAM:  Appearance:Casually dressed, good hygeine.   Cooperation:Cooperative  Psychomotor: No psychomotor agitation/retardation, No EPS, No motor tics  Speech-normal rate, amount.  Mood \"better\"   Affect-congruent, appropriate, stable  Thought Content-goal directed, no delusional material present  Thought process-linear, organized.  Suicidality: No SI  Homicidality: No HI  Perception: No AH/VH  Insight-fair   Judgement-fair    Lab Results (last 24 hours)     Procedure Component Value Units Date/Time    POC Glucose Once [[id number]]  (Abnormal) Collected: 07/10/21 1134    Specimen: Blood Updated: 07/10/21 1144     Glucose 168 mg/dL      Comment: Meter: [id number] : [id number] Dylan Carpenter       POC Glucose Once [[id number]]  (Abnormal) Collected: 07/10/21 0629    Specimen: Blood Updated: 07/10/21 0636     Glucose 172 mg/dL      Comment: Meter: [id number] : [id number] gil blunt       POC Glucose Once [[id number]]  (Abnormal) Collected: 21 204    Specimen: Blood Updated: 21 2053     Glucose 167 mg/dL      Comment: Meter: [id number] : [id number] gil blunt                Imaging Results (Last 24 " Hours)     ** No results found for the last 24 hours. **             ECG/EMG Results (most recent)     Procedure Component Value Units Date/Time    ECG 12 Lead [[id number]] Collected: 07/10/21 0759     Updated: 07/10/21 0808     QT Interval 474 ms      QTC Interval 465 ms     Narrative:      Test Reason : QTc prolongation  Blood Pressure :   */*   mmHG  Vent. Rate :  58 BPM     Atrial Rate :  58 BPM     P-R Int : 170 ms          QRS Dur : 112 ms      QT Int : 474 ms       P-R-T Axes :  64  22 108 degrees     QTc Int : 465 ms    Sinus bradycardia  Cannot rule out Anterior infarct , age undetermined  Abnormal ECG  When compared with ECG of 08-JUL-2021 17:29,  No significant change was found    Referred By: KIMBERLYN           Confirmed By:            ALLERGIES: Atorvastatin and Topamax [topiramate]      Current Facility-Administered Medications:   •  acetaminophen (TYLENOL) tablet 650 mg, 650 mg, Oral, Q6H PRN, Parker Mccann MD  •  aluminum-magnesium hydroxide-simethicone (MAALOX MAX) 400-400-40 MG/5ML suspension 15 mL, 15 mL, Oral, Q6H PRN, Parker Mccann MD  •  amLODIPine (NORVASC) tablet 10 mg, 10 mg, Oral, Daily, Parker Mccann MD, 10 mg at 07/10/21 0834  •  B-complex with vitamin C tablet 1 tablet, 1 tablet, Oral, Daily, Parker Mccann MD, 1 tablet at 07/10/21 0834  •  benzonatate (TESSALON) capsule 100 mg, 100 mg, Oral, TID PRN, Parker Mccann MD  •  benztropine (COGENTIN) tablet 2 mg, 2 mg, Oral, Once PRN **OR** benztropine (COGENTIN) injection 1 mg, 1 mg, Intramuscular, Once PRN, Parker Mccann MD  •  dextrose (D50W) 25 g/ 50mL Intravenous Solution 25 g, 25 g, Intravenous, Q15 Min PRN, Parker Mccann MD  •  dextrose (GLUTOSE) oral gel 15 g, 15 g, Oral, Q15 Min PRN, Parker Mccann MD  •  famotidine (PEPCID) tablet 20 mg, 20 mg, Oral, BID PRN, Parker Mccann MD  •  furosemide (LASIX) tablet 40 mg, 40 mg, Oral, BID, Parker Mccann MD, 40 mg at 07/10/21 0834  •  glucagon (human  recombinant) (GLUCAGEN DIAGNOSTIC) injection 1 mg, 1 mg, Subcutaneous, Q15 Min PRN, Parker Mccann MD  •  ibuprofen (ADVIL,MOTRIN) tablet 400 mg, 400 mg, Oral, Q6H PRN, Parker Mccann MD  •  insulin aspart (novoLOG) injection 0-7 Units, 0-7 Units, Subcutaneous, TID AC, Parker Mccann MD, 2 Units at 07/10/21 1153  •  lisinopril (PRINIVIL,ZESTRIL) 20 mg, hydroCHLOROthiazide (HYDRODIURIL) 12.5 mg for ZESTORETIC 20-12.5, , Oral, Q24H, Parker Mccann MD, Given at 07/10/21 0834  •  loperamide (IMODIUM) capsule 2 mg, 2 mg, Oral, Q2H PRN, Parker Mccann MD  •  magnesium hydroxide (MILK OF MAGNESIA) suspension 2400 mg/10mL 10 mL, 10 mL, Oral, Daily PRN, Parker Mccann MD  •  metFORMIN (GLUCOPHAGE) tablet 500 mg, 500 mg, Oral, BID With Meals, Parker Mccann MD, 500 mg at 07/10/21 0718  •  sodium chloride nasal spray 2 spray, 2 spray, Each Nare, PRN, Parker Mccann MD  •  venlafaxine XR (EFFEXOR-XR) 24 hr capsule 150 mg, 150 mg, Oral, Daily, Parker Mccann MD, 150 mg at 07/10/21 0835    ASSESSMENT & PLAN:      Severe recurrent major depression without psychotic features (CMS/HCC)  - Continue Effexor  mg       Generalized anxiety disorder  - Effexor XR       Suicidal ideation  - SP3       Type 2 diabetes mellitus, without long-term current use of insulin (CMS/HCC)  - Glucophage  - Sliding scale coverage       Essential hypertension  - Zestoretic  - Norvasc  - Lasix       QTc prolongation  - Improved    Special precautions: Special Precautions Level 3 (q15 min checks) .    Behavioral Health Treatment Plan and Problem List: I have reviewed and approved the Behavioral Health Treatment Plan and Problem list.  The patient has had a chance to review and agrees with the treatment plan.     Clinician:  Lindy Christensen MD  07/10/21  13:13 EDT

## 2021-07-10 NOTE — PLAN OF CARE
Goal Outcome Evaluation:  Plan of Care Reviewed With: patient  Patient Agreement with Plan of Care: agrees     Progress: improving  Outcome Summary: Rates anxiety and depression as 3. Denies S.I., paranoia, hallucinations. Out of room during evening shift, but remained quiet and without complaints.

## 2021-07-11 LAB
GLUCOSE BLDC GLUCOMTR-MCNC: 153 MG/DL (ref 70–130)
GLUCOSE BLDC GLUCOMTR-MCNC: 158 MG/DL (ref 70–130)
GLUCOSE BLDC GLUCOMTR-MCNC: 161 MG/DL (ref 70–130)
GLUCOSE BLDC GLUCOMTR-MCNC: 203 MG/DL (ref 70–130)
QT INTERVAL: 474 MS
QTC INTERVAL: 465 MS

## 2021-07-11 PROCEDURE — 82962 GLUCOSE BLOOD TEST: CPT

## 2021-07-11 PROCEDURE — 99232 SBSQ HOSP IP/OBS MODERATE 35: CPT | Performed by: PSYCHIATRY & NEUROLOGY

## 2021-07-11 PROCEDURE — 63710000001 INSULIN ASPART PER 5 UNITS: Performed by: PSYCHIATRY & NEUROLOGY

## 2021-07-11 RX ADMIN — INSULIN ASPART 3 UNITS: 100 INJECTION, SOLUTION INTRAVENOUS; SUBCUTANEOUS at 11:52

## 2021-07-11 RX ADMIN — VENLAFAXINE HYDROCHLORIDE 150 MG: 150 CAPSULE, EXTENDED RELEASE ORAL at 09:27

## 2021-07-11 RX ADMIN — HYDROCHLOROTHIAZIDE: 12.5 TABLET ORAL at 09:27

## 2021-07-11 RX ADMIN — AMLODIPINE BESYLATE 10 MG: 10 TABLET ORAL at 09:27

## 2021-07-11 RX ADMIN — Medication 1 TABLET: at 09:27

## 2021-07-11 RX ADMIN — INSULIN ASPART 2 UNITS: 100 INJECTION, SOLUTION INTRAVENOUS; SUBCUTANEOUS at 16:53

## 2021-07-11 RX ADMIN — METFORMIN HYDROCHLORIDE 500 MG: 500 TABLET ORAL at 09:27

## 2021-07-11 RX ADMIN — METFORMIN HYDROCHLORIDE 500 MG: 500 TABLET ORAL at 17:29

## 2021-07-11 RX ADMIN — FUROSEMIDE 40 MG: 40 TABLET ORAL at 09:27

## 2021-07-11 RX ADMIN — FUROSEMIDE 40 MG: 40 TABLET ORAL at 20:25

## 2021-07-11 RX ADMIN — INSULIN ASPART 2 UNITS: 100 INJECTION, SOLUTION INTRAVENOUS; SUBCUTANEOUS at 07:31

## 2021-07-11 NOTE — PROGRESS NOTES
"INPATIENT PSYCHIATRIC PROGRESS NOTE    Name:  Carlos Eduardo Danielle  :  1970  MRN:  [id number]  Visit Number:  [id number]  Length of stay:  3    SUBJECTIVE  CC/Focus of Exam: depression    INTERVAL HISTORY:  The patient states he is feeling better and mood has been good. Feels ready to go home and agreed to go tomorrow.   Depression rating 1/10  Anxiety rating 1/10  Sleep: good  Withdrawal sx: denies  Cravin/10    Review of Systems   Respiratory: Negative.    Cardiovascular: Negative.    Gastrointestinal: Negative.        OBJECTIVE    Temp:  [97.1 °F (36.2 °C)-97.4 °F (36.3 °C)] 97.4 °F (36.3 °C)  Heart Rate:  [65-73] 65  Resp:  [18] 18  BP: (150-168)/(89-94) 168/94    MENTAL STATUS EXAM:  Appearance:Casually dressed, good hygeine.   Cooperation:Cooperative  Psychomotor: No psychomotor agitation/retardation, No EPS, No motor tics  Speech-normal rate, amount.  Mood \"better\"   Affect-congruent, appropriate, stable  Thought Content-goal directed, no delusional material present  Thought process-linear, organized.  Suicidality: No SI  Homicidality: No HI  Perception: No AH/VH  Insight-fair   Judgement-fair    Lab Results (last 24 hours)     Procedure Component Value Units Date/Time    POC Glucose Once [[id number]]  (Abnormal) Collected: 21 1057    Specimen: Blood Updated: 21 1104     Glucose 203 mg/dL      Comment: Meter: [id number] : [id number] SELINA RAHMAN       POC Glucose Once [[id number]]  (Abnormal) Collected: 21 0625    Specimen: Blood Updated: 21 0631     Glucose 161 mg/dL      Comment: Meter: [id number] : [id number] gil blunt                Imaging Results (Last 24 Hours)     ** No results found for the last 24 hours. **             ECG/EMG Results (most recent)     Procedure Component Value Units Date/Time    ECG 12 Lead [[id number]] Collected: 07/10/21 0759     Updated: 21 1530     QT Interval 474 ms      QTC Interval 465 ms     Narrative:      Test Reason : QTc " prolongation  Blood Pressure :   */*   mmHG  Vent. Rate :  58 BPM     Atrial Rate :  58 BPM     P-R Int : 170 ms          QRS Dur : 112 ms      QT Int : 474 ms       P-R-T Axes :  64  22 108 degrees     QTc Int : 465 ms    Sinus bradycardia  Cannot rule out Anterior infarct , age undetermined  Abnormal ECG  When compared with ECG of 08-JUL-2021 17:29,  No significant change was found  Confirmed by Ruben Bustamante ([id number]) on 7/11/2021 3:29:55 PM    Referred By: KIMBERLYN           Confirmed By: Ruben Bustamante           ALLERGIES: Atorvastatin and Topamax [topiramate]      Current Facility-Administered Medications:   •  acetaminophen (TYLENOL) tablet 650 mg, 650 mg, Oral, Q6H PRN, Parker Mccann MD  •  aluminum-magnesium hydroxide-simethicone (MAALOX MAX) 400-400-40 MG/5ML suspension 15 mL, 15 mL, Oral, Q6H PRN, Parker Mccann MD  •  amLODIPine (NORVASC) tablet 10 mg, 10 mg, Oral, Daily, Parker Mccann MD, 10 mg at 07/11/21 0927  •  B-complex with vitamin C tablet 1 tablet, 1 tablet, Oral, Daily, Parker Mccann MD, 1 tablet at 07/11/21 0927  •  benzonatate (TESSALON) capsule 100 mg, 100 mg, Oral, TID PRN, Parker Mccann MD  •  benztropine (COGENTIN) tablet 2 mg, 2 mg, Oral, Once PRN **OR** benztropine (COGENTIN) injection 1 mg, 1 mg, Intramuscular, Once PRN, Parker Mccann MD  •  dextrose (D50W) 25 g/ 50mL Intravenous Solution 25 g, 25 g, Intravenous, Q15 Min PRN, Parker Mccann MD  •  dextrose (GLUTOSE) oral gel 15 g, 15 g, Oral, Q15 Min PRN, Parker Mccann MD  •  famotidine (PEPCID) tablet 20 mg, 20 mg, Oral, BID PRN, Parker Mccann MD  •  furosemide (LASIX) tablet 40 mg, 40 mg, Oral, BID, Parker Mccann MD, 40 mg at 07/11/21 0927  •  glucagon (human recombinant) (GLUCAGEN DIAGNOSTIC) injection 1 mg, 1 mg, Subcutaneous, Q15 Min PRN, Parker Mccann MD  •  ibuprofen (ADVIL,MOTRIN) tablet 400 mg, 400 mg, Oral, Q6H PRN, Parker Mccann MD  •  insulin aspart (novoLOG) injection 0-7 Units, 0-7  Units, Subcutaneous, TID AC, Parker Mccann MD, 3 Units at 07/11/21 1152  •  lisinopril (PRINIVIL,ZESTRIL) 20 mg, hydroCHLOROthiazide (HYDRODIURIL) 12.5 mg for ZESTORETIC 20-12.5, , Oral, Q24H, Parker Mccann MD, Given at 07/11/21 0927  •  loperamide (IMODIUM) capsule 2 mg, 2 mg, Oral, Q2H PRN, Parker Mccann MD  •  magnesium hydroxide (MILK OF MAGNESIA) suspension 2400 mg/10mL 10 mL, 10 mL, Oral, Daily PRN, Parker Mccann MD  •  metFORMIN (GLUCOPHAGE) tablet 500 mg, 500 mg, Oral, BID With Meals, Parker Mccann MD, 500 mg at 07/11/21 0927  •  sodium chloride nasal spray 2 spray, 2 spray, Each Nare, PRN, Parker Mccann MD  •  venlafaxine XR (EFFEXOR-XR) 24 hr capsule 150 mg, 150 mg, Oral, Daily, Parker Mccann MD, 150 mg at 07/11/21 0927    ASSESSMENT & PLAN:      Severe recurrent major depression without psychotic features (CMS/HCC)  - Continue Effexor  mg       Generalized anxiety disorder  - Effexor XR       Suicidal ideation  - SP3       Type 2 diabetes mellitus, without long-term current use of insulin (CMS/HCC)  - Glucophage  - Sliding scale coverage       Essential hypertension  - Zestoretic  - Norvasc  - Lasix       QTc prolongation  - Improved    Special precautions: Special Precautions Level 3 (q15 min checks) .    Behavioral Health Treatment Plan and Problem List: I have reviewed and approved the Behavioral Health Treatment Plan and Problem list.  The patient has had a chance to review and agrees with the treatment plan.     Clinician:  Lindy Christensen MD  07/11/21  16:16 EDT

## 2021-07-11 NOTE — PLAN OF CARE
Goal Outcome Evaluation:  Plan of Care Reviewed With: patient  Patient Agreement with Plan of Care: agrees     Progress: improving  Outcome Summary: Patient calm and cooperative this shift. Denies any anxiety, depression, SI/HI or AVH. Patient is being discharged tomorrow.

## 2021-07-11 NOTE — PLAN OF CARE
Goal Outcome Evaluation:  Plan of Care Reviewed With: patient  Patient Agreement with Plan of Care: agrees     Progress: improving  Outcome Summary: Denies anxiety, depression, S.I. Out of room during evening shift and socialized with his peers. Plans to ask for discharge today.

## 2021-07-12 VITALS
HEART RATE: 81 BPM | TEMPERATURE: 98.6 F | SYSTOLIC BLOOD PRESSURE: 157 MMHG | WEIGHT: 315 LBS | HEIGHT: 72 IN | BODY MASS INDEX: 42.66 KG/M2 | DIASTOLIC BLOOD PRESSURE: 93 MMHG | RESPIRATION RATE: 20 BRPM | OXYGEN SATURATION: 95 %

## 2021-07-12 LAB
GLUCOSE BLDC GLUCOMTR-MCNC: 149 MG/DL (ref 70–130)
GLUCOSE BLDC GLUCOMTR-MCNC: 200 MG/DL (ref 70–130)

## 2021-07-12 PROCEDURE — 99238 HOSP IP/OBS DSCHRG MGMT 30/<: CPT | Performed by: PSYCHIATRY & NEUROLOGY

## 2021-07-12 PROCEDURE — 82962 GLUCOSE BLOOD TEST: CPT

## 2021-07-12 PROCEDURE — 63710000001 INSULIN ASPART PER 5 UNITS: Performed by: PSYCHIATRY & NEUROLOGY

## 2021-07-12 RX ADMIN — VENLAFAXINE HYDROCHLORIDE 150 MG: 150 CAPSULE, EXTENDED RELEASE ORAL at 08:48

## 2021-07-12 RX ADMIN — FUROSEMIDE 40 MG: 40 TABLET ORAL at 08:48

## 2021-07-12 RX ADMIN — AMLODIPINE BESYLATE 10 MG: 10 TABLET ORAL at 08:48

## 2021-07-12 RX ADMIN — METFORMIN HYDROCHLORIDE 500 MG: 500 TABLET ORAL at 08:48

## 2021-07-12 RX ADMIN — INSULIN ASPART 3 UNITS: 100 INJECTION, SOLUTION INTRAVENOUS; SUBCUTANEOUS at 11:47

## 2021-07-12 RX ADMIN — Medication 1 TABLET: at 08:48

## 2021-07-12 RX ADMIN — HYDROCHLOROTHIAZIDE: 12.5 TABLET ORAL at 10:41

## 2021-07-12 NOTE — PLAN OF CARE
Goal Outcome Evaluation:  Plan of Care Reviewed With: patient  Patient Agreement with Plan of Care: agrees     Progress: improving  Outcome Summary: Denied anxiety, depression, S.I., hallucinations and paranoia. Out of room during evening shift and socialized with peers. Anticipates being discharged today.

## 2021-07-12 NOTE — DISCHARGE SUMMARY
:  1970  MRN:  [id number]  Visit Number:  [id number]      Date of Admission:2021   Date of Discharge:  2021    Discharge Diagnosis:  Principal Problem:    Severe recurrent major depression without psychotic features (CMS/HCC)  Active Problems:    Type 2 diabetes mellitus, without long-term current use of insulin (CMS/HCC)    Essential hypertension    Generalized anxiety disorder    Suicidal ideation        Admission Diagnosis:  Suicidal ideation [R45.851]     REJI Danielle is a 51 y.o. male who was admitted on 2021 with complaints of worsening depression and suicidal ideations. For details please see H&P dated 21      Hospital Course  Patient is a 51 y.o. male presented with worsening depression and suicidal ideations. The patient was admitted directly from [facility] ED to the [facility] AE1 unit for safety, further evaluation and treatment.  The patient reported ongoing struggles with feelings of inadequacy and no matter what he tried, people around him didn't seem to care, and he felt overwhelmed and exhausted and suicide would be the only way out. The patient medications doses were steadily increased but without much effect. His QTc was prolonged and Wellbutrin was stopped and Effexor dose was reduced. Lithium was also discontinued to avoid weight gain as a side effect. The patient was provided CBT and supportive therapy and was encouraged to challenge his assumptions and expectations from people and situations. He was able to process some of his feelings and was receptive to the idea that he was not responsible for everything bad around him. He appeared visibly relieved to realize that he could live without constant fear of something going wrong or trying to fix real or perceived wrongs around him.   The patient was also able to take part in individual and group counseling sessions and work on appropriate coping skills.  The patient made steady improvement in his mood and  "expressed feeling more positive and hopeful about future. Sleep and appetite were improved.  The day of discharge the patient was calm, cooperative and pleasant. Mood was reported to be good, and denied SI/HI/AVH. Also reported no medication side effects.  .      Mental Status Exam upon discharge:   Mood \"good\"   Affect-congruent, appropriate, stable  Thought Content-goal directed, no delusional material present  Thought process-linear, organized.  Suicidality: No SI  Homicidality: No HI  Perception: No AH/VH    Procedures Performed         Consults:   Consults     No orders found from 6/9/2021 to 7/9/2021.          Pertinent Test Results:   Admission on 07/08/2021   Component Date Value Ref Range Status   • COVID19 07/08/2021 Not Detected  Not Detected - Ref. Range Final   • Influenza A PCR 07/08/2021 Not Detected  Not Detected Final   • Influenza B PCR 07/08/2021 Not Detected  Not Detected Final   • Glucose 07/09/2021 159* 70 - 130 mg/dL Final    Meter: [id number] : [id number] blunt   • Glucose 07/09/2021 186* 70 - 130 mg/dL Final    Meter: [id number] : [id number] Ward   • Glucose 07/09/2021 152* 70 - 130 mg/dL Final    Meter: [id number] : [id number] Ward   • Glucose 07/09/2021 167* 70 - 130 mg/dL Final    Meter: [id number] : [id number] blunt   • QT Interval 07/10/2021 474  ms Final   • QTC Interval 07/10/2021 465  ms Final   • Glucose 07/10/2021 172* 70 - 130 mg/dL Final    Meter: [id number] : [id number] blunt   • Glucose 07/10/2021 168* 70 - 130 mg/dL Final    Meter: [id number] : [id number] Dylan Carpenter   • Glucose 07/10/2021 135* 70 - 130 mg/dL Final    Meter: [id number] : [id number] Dylan Carpenter   • Glucose 07/10/2021 181* 70 - 130 mg/dL Final    Meter: [id number] : [id number] blunt   • Glucose 07/11/2021 161* 70 - 130 mg/dL Final    Meter: [id number] : [id number] gil blunt   • Glucose 07/11/2021 203* 70 - 130 mg/dL " Final    Meter: [id number] : [id number] SELINA RAHMAN   • Glucose 07/11/2021 158* 70 - 130 mg/dL Final    Meter: [id number] : [id number] SELINA LACEY   • Glucose 07/11/2021 153* 70 - 130 mg/dL Final    Meter: [id number] : [id number] gil blunt   • Glucose 07/12/2021 149* 70 - 130 mg/dL Final    Meter: [id number] : [id number] gil blunt   • Glucose 07/12/2021 200* 70 - 130 mg/dL Final    Meter: [id number] : [id number] Brent Ivonne   Admission on 07/08/2021, Discharged on 07/08/2021   Component Date Value Ref Range Status   • Glucose 07/08/2021 160* 65 - 99 mg/dL Final   • BUN 07/08/2021 17  6 - 20 mg/dL Final   • Creatinine 07/08/2021 0.88  0.76 - 1.27 mg/dL Final   • Sodium 07/08/2021 141  136 - 145 mmol/L Final   • Potassium 07/08/2021 3.4* 3.5 - 5.2 mmol/L Final    Slight hemolysis detected by analyzer. Results may be affected.   • Chloride 07/08/2021 98  98 - 107 mmol/L Final   • CO2 07/08/2021 28.0  22.0 - 29.0 mmol/L Final   • Calcium 07/08/2021 9.2  8.6 - 10.5 mg/dL Final   • Total Protein 07/08/2021 7.1  6.0 - 8.5 g/dL Final   • Albumin 07/08/2021 4.30  3.50 - 5.20 g/dL Final   • ALT (SGPT) 07/08/2021 31  1 - 41 U/L Final   • AST (SGOT) 07/08/2021 23  1 - 40 U/L Final   • Alkaline Phosphatase 07/08/2021 103  39 - 117 U/L Final   • Total Bilirubin 07/08/2021 0.5  0.0 - 1.2 mg/dL Final   • eGFR Non  Amer 07/08/2021 92  >60 mL/min/1.73 Final   • Globulin 07/08/2021 2.8  gm/dL Final   • A/G Ratio 07/08/2021 1.5  g/dL Final   • BUN/Creatinine Ratio 07/08/2021 19.3  7.0 - 25.0 Final   • Anion Gap 07/08/2021 15.0  5.0 - 15.0 mmol/L Final   • Lithium 07/08/2021 0.1* 0.6 - 1.2 mmol/L Final   • TSH 07/08/2021 2.910  0.270 - 4.200 uIU/mL Final   • Free T4 07/08/2021 0.91* 0.93 - 1.70 ng/dL Final   • Color, UA 07/08/2021 Yellow  Yellow, Straw Final   • Appearance, UA 07/08/2021 Clear  Clear Final   • pH, UA 07/08/2021 6.0  5.0 - 8.0 Final   • Specific Gravity, UA 07/08/2021 1.014  1.001 -  1.030 Final   • Glucose, UA 07/08/2021 Negative  Negative Final   • Ketones, UA 07/08/2021 Negative  Negative Final   • Bilirubin, UA 07/08/2021 Negative  Negative Final   • Blood, UA 07/08/2021 Negative  Negative Final   • Protein, UA 07/08/2021 Negative  Negative Final   • Leuk Esterase, UA 07/08/2021 Negative  Negative Final   • Nitrite, UA 07/08/2021 Negative  Negative Final   • Urobilinogen, UA 07/08/2021 0.2 E.U./dL  0.2 - 1.0 E.U./dL Final   • THC, Screen, Urine 07/08/2021 Negative  Negative Final   • Phencyclidine (PCP), Urine 07/08/2021 Negative  Negative Final   • Cocaine Screen, Urine 07/08/2021 Negative  Negative Final   • Methamphetamine, Ur 07/08/2021 Negative  Negative Final   • Opiate Screen 07/08/2021 Negative  Negative Final   • Amphetamine Screen, Urine 07/08/2021 Negative  Negative Final   • Benzodiazepine Screen, Urine 07/08/2021 Negative  Negative Final   • Tricyclic Antidepressants Screen 07/08/2021 Negative  Negative Final   • Methadone Screen, Urine 07/08/2021 Negative  Negative Final   • Barbiturates Screen, Urine 07/08/2021 Negative  Negative Final   • Oxycodone Screen, Urine 07/08/2021 Negative  Negative Final   • Propoxyphene Screen 07/08/2021 Negative  Negative Final   • Buprenorphine, Screen, Urine 07/08/2021 Negative  Negative Final   • Ethanol 07/08/2021 <10  0 - 10 mg/dL Final   • Acetaminophen 07/08/2021 <5.0  0.0 - 30.0 mcg/mL Final   • Salicylate 07/08/2021 <0.3  <=30.0 mg/dL Final   • WBC 07/08/2021 7.25  3.40 - 10.80 10*3/mm3 Final   • RBC 07/08/2021 5.44  4.14 - 5.80 10*6/mm3 Final   • Hemoglobin 07/08/2021 15.5  13.0 - 17.7 g/dL Final   • Hematocrit 07/08/2021 45.9  37.5 - 51.0 % Final   • MCV 07/08/2021 84.4  79.0 - 97.0 fL Final   • MCH 07/08/2021 28.5  26.6 - 33.0 pg Final   • MCHC 07/08/2021 33.8  31.5 - 35.7 g/dL Final   • RDW 07/08/2021 14.0  12.3 - 15.4 % Final   • RDW-SD 07/08/2021 42.3  37.0 - 54.0 fl Final   • MPV 07/08/2021 11.4  6.0 - 12.0 fL Final   • Platelets  07/08/2021 208  140 - 450 10*3/mm3 Final   • Neutrophil % 07/08/2021 70.4  42.7 - 76.0 % Final   • Lymphocyte % 07/08/2021 20.4  19.6 - 45.3 % Final   • Monocyte % 07/08/2021 5.8  5.0 - 12.0 % Final   • Eosinophil % 07/08/2021 2.1  0.3 - 6.2 % Final   • Basophil % 07/08/2021 0.7  0.0 - 1.5 % Final   • Immature Grans % 07/08/2021 0.6* 0.0 - 0.5 % Final   • Neutrophils, Absolute 07/08/2021 5.11  1.70 - 7.00 10*3/mm3 Final   • Lymphocytes, Absolute 07/08/2021 1.48  0.70 - 3.10 10*3/mm3 Final   • Monocytes, Absolute 07/08/2021 0.42  0.10 - 0.90 10*3/mm3 Final   • Eosinophils, Absolute 07/08/2021 0.15  0.00 - 0.40 10*3/mm3 Final   • Basophils, Absolute 07/08/2021 0.05  0.00 - 0.20 10*3/mm3 Final   • Immature Grans, Absolute 07/08/2021 0.04  0.00 - 0.05 10*3/mm3 Final   • nRBC 07/08/2021 0.0  0.0 - 0.2 /100 WBC Final   • Hemoglobin A1C 07/08/2021 8.30* 4.80 - 5.60 % Final   • COVID19 07/08/2021 Presumptive Negative  Presumptive Negative - Ref. Range Final   • QT Interval 07/08/2021 466  ms Final   • QTC Interval 07/08/2021 495  ms Final   Lab on 06/16/2021   Component Date Value Ref Range Status   • Glucose 06/16/2021 208* 65 - 99 mg/dL Final   • BUN 06/16/2021 14  6 - 20 mg/dL Final   • Creatinine 06/16/2021 1.02  0.76 - 1.27 mg/dL Final   • Sodium 06/16/2021 139  136 - 145 mmol/L Final   • Potassium 06/16/2021 4.1  3.5 - 5.2 mmol/L Final   • Chloride 06/16/2021 96* 98 - 107 mmol/L Final   • CO2 06/16/2021 30.9* 22.0 - 29.0 mmol/L Final   • Calcium 06/16/2021 9.2  8.6 - 10.5 mg/dL Final   • Total Protein 06/16/2021 6.8  6.0 - 8.5 g/dL Final   • Albumin 06/16/2021 4.20  3.50 - 5.20 g/dL Final   • ALT (SGPT) 06/16/2021 29  1 - 41 U/L Final   • AST (SGOT) 06/16/2021 25  1 - 40 U/L Final   • Alkaline Phosphatase 06/16/2021 93  39 - 117 U/L Final   • Total Bilirubin 06/16/2021 0.8  0.0 - 1.2 mg/dL Final   • eGFR Non  Amer 06/16/2021 77  >60 mL/min/1.73 Final   • Globulin 06/16/2021 2.6  gm/dL Final   • A/G Ratio  06/16/2021 1.6  g/dL Final   • BUN/Creatinine Ratio 06/16/2021 13.7  7.0 - 25.0 Final   • Anion Gap 06/16/2021 12.1  5.0 - 15.0 mmol/L Final   • Hemoglobin A1C 06/16/2021 8.30* 4.80 - 5.60 % Final        Condition on Discharge:  improved    Vital Signs  Temp:  [97.2 °F (36.2 °C)-98.6 °F (37 °C)] 97.2 °F (36.2 °C)  Heart Rate:  [68-70] 68  Resp:  [18] 18  BP: (167-182)/() 182/100      Discharge Disposition:  Home or Self Care    Discharge Medications:     Discharge Medications      Changes to Medications      Instructions Start Date   venlafaxine  MG 24 hr capsule  Commonly known as: EFFEXOR-XR  What changed: Another medication with the same name was removed. Continue taking this medication, and follow the directions you see here.   150 mg, Oral, Daily         Continue These Medications      Instructions Start Date   amLODIPine 10 MG tablet  Commonly known as: NORVASC   10 mg, Oral, Daily      B-complex with vitamin C tablet   1 tablet, Oral, Daily      furosemide 40 MG tablet  Commonly known as: LASIX   40 mg, Oral, 2 Times Daily      lisinopril-hydrochlorothiazide 20-12.5 MG per tablet  Commonly known as: PRINZIDE,ZESTORETIC   1 tablet, Oral, Daily      metFORMIN 500 MG tablet  Commonly known as: GLUCOPHAGE   500 mg, Oral, 2 Times Daily With Meals         Stop These Medications    buPROPion  MG 12 hr tablet  Commonly known as: Wellbutrin SR     lithium 300 MG CR tablet  Commonly known as: LITHOBID            Discharge Diet: Consistent carbohydrate     Activity at Discharge: As tolerated     Follow-up Appointments  Future Appointments   Date Time Provider Department Center   8/5/2021 12:30 PM Joan Gaspar LCSW [facility]   8/5/2021  3:45 PM Elizabeth Santoyo APRN [facility]   8/18/2021  9:00 AM [facility] NUTR WALK IN SCHEDULE [facility]   9/1/2021  8:30 AM Ruslan Issa MD [facility]   9/7/2021  2:00 PM [facility]         Test  Results Pending at Discharge      Time spent in discharge: < 30 min    Clinician:   Lindy Christensen MD  07/12/21  12:21 EDT

## 2021-07-22 ENCOUNTER — TELEPHONE (OUTPATIENT)
Dept: INTERNAL MEDICINE | Facility: CLINIC | Age: 51
End: 2021-07-22

## 2021-07-22 DIAGNOSIS — F33.1 MAJOR DEPRESSIVE DISORDER, RECURRENT EPISODE, MODERATE (HCC): ICD-10-CM

## 2021-07-22 RX ORDER — VENLAFAXINE HYDROCHLORIDE 75 MG/1
75 CAPSULE, EXTENDED RELEASE ORAL DAILY
Qty: 30 CAPSULE | Refills: 3 | Status: CANCELLED | OUTPATIENT
Start: 2021-07-22

## 2021-07-22 NOTE — TELEPHONE ENCOUNTER
Patient called and states he has been taking rybelsus 3mg and patient is still having nausea issues and he has taken this for 30 days now. Patient has taken two containers of shamir to help with nausea. He wants to stop taking this medication and if he can get phentermine 37.5mg to help with appetite.

## 2021-07-26 NOTE — TELEPHONE ENCOUNTER
Pt explains nothing is helping his nausea, pt is requesting to be taken off rybelsus and is willing to try anything else.

## 2021-07-26 NOTE — TELEPHONE ENCOUNTER
Pt has appointment 09/14/21 and cant make an earlier appt. Unless he can get squeezed in around 2pm on Aug 5th.

## 2021-08-05 ENCOUNTER — OFFICE VISIT (OUTPATIENT)
Dept: PSYCHIATRY | Facility: CLINIC | Age: 51
End: 2021-08-05

## 2021-08-05 DIAGNOSIS — F33.2 SEVERE EPISODE OF RECURRENT MAJOR DEPRESSIVE DISORDER, WITHOUT PSYCHOTIC FEATURES (HCC): Primary | ICD-10-CM

## 2021-08-05 PROCEDURE — 90791 PSYCH DIAGNOSTIC EVALUATION: CPT | Performed by: SOCIAL WORKER

## 2021-08-17 DIAGNOSIS — E11.9 TYPE 2 DIABETES MELLITUS WITHOUT COMPLICATION, WITHOUT LONG-TERM CURRENT USE OF INSULIN (HCC): Primary | ICD-10-CM

## 2021-08-18 ENCOUNTER — APPOINTMENT (OUTPATIENT)
Dept: NUTRITION | Facility: HOSPITAL | Age: 51
End: 2021-08-18

## 2021-08-20 ENCOUNTER — TELEPHONE (OUTPATIENT)
Dept: INTERNAL MEDICINE | Facility: CLINIC | Age: 51
End: 2021-08-20

## 2021-08-20 NOTE — TELEPHONE ENCOUNTER
Patient is needing a note from Dr. Issa that states with his BP medication it may cause frequent trips to the restroom.  He states that can be sent to his [employer].  Please advise.  Phone number verified.

## 2021-08-24 NOTE — TELEPHONE ENCOUNTER
Attempted to contact pt and was unable to talk to him or leave a vm. [employer] message sent

## 2021-09-08 ENCOUNTER — HOSPITAL ENCOUNTER (OUTPATIENT)
Facility: HOSPITAL | Age: 51
Discharge: HOME | End: 2021-09-09
Attending: EMERGENCY MEDICINE | Admitting: SURGERY

## 2021-09-08 ENCOUNTER — TELEPHONE (OUTPATIENT)
Dept: INTERNAL MEDICINE | Facility: CLINIC | Age: 51
End: 2021-09-08

## 2021-09-08 ENCOUNTER — ANESTHESIA (OUTPATIENT)
Dept: PERIOP | Facility: HOSPITAL | Age: 51
End: 2021-09-08

## 2021-09-08 ENCOUNTER — ANESTHESIA EVENT (OUTPATIENT)
Dept: PERIOP | Facility: HOSPITAL | Age: 51
End: 2021-09-08

## 2021-09-08 ENCOUNTER — APPOINTMENT (OUTPATIENT)
Dept: CT IMAGING | Facility: HOSPITAL | Age: 51
End: 2021-09-08

## 2021-09-08 DIAGNOSIS — E66.9 DIABETES MELLITUS TYPE 2 IN OBESE (HCC): ICD-10-CM

## 2021-09-08 DIAGNOSIS — I10 ELEVATED BLOOD PRESSURE READING WITH DIAGNOSIS OF HYPERTENSION: ICD-10-CM

## 2021-09-08 DIAGNOSIS — E11.69 DIABETES MELLITUS TYPE 2 IN OBESE (HCC): ICD-10-CM

## 2021-09-08 DIAGNOSIS — K35.30 ACUTE APPENDICITIS WITH LOCALIZED PERITONITIS, WITHOUT PERFORATION, ABSCESS, OR GANGRENE: Primary | ICD-10-CM

## 2021-09-08 DIAGNOSIS — K35.80 APPENDICITIS, ACUTE: ICD-10-CM

## 2021-09-08 DIAGNOSIS — E66.01 CLASS 3 SEVERE OBESITY DUE TO EXCESS CALORIES WITH BODY MASS INDEX (BMI) OF 50.0 TO 59.9 IN ADULT, UNSPECIFIED WHETHER SERIOUS COMORBIDITY PRESENT (HCC): ICD-10-CM

## 2021-09-08 LAB
ALBUMIN SERPL-MCNC: 4.5 G/DL (ref 3.5–5.2)
ALBUMIN/GLOB SERPL: 1.8 G/DL
ALP SERPL-CCNC: 102 U/L (ref 39–117)
ALT SERPL W P-5'-P-CCNC: 37 U/L (ref 1–41)
ANION GAP SERPL CALCULATED.3IONS-SCNC: 16 MMOL/L (ref 5–15)
AST SERPL-CCNC: 26 U/L (ref 1–40)
BASOPHILS # BLD AUTO: 0.05 10*3/MM3 (ref 0–0.2)
BASOPHILS NFR BLD AUTO: 0.6 % (ref 0–1.5)
BILIRUB SERPL-MCNC: 0.8 MG/DL (ref 0–1.2)
BILIRUB UR QL STRIP: NEGATIVE
BUN SERPL-MCNC: 14 MG/DL (ref 6–20)
BUN/CREAT SERPL: 16.5 (ref 7–25)
CALCIUM SPEC-SCNC: 9.6 MG/DL (ref 8.6–10.5)
CHLORIDE SERPL-SCNC: 95 MMOL/L (ref 98–107)
CLARITY UR: CLEAR
CO2 SERPL-SCNC: 26 MMOL/L (ref 22–29)
COLOR UR: YELLOW
CREAT SERPL-MCNC: 0.85 MG/DL (ref 0.76–1.27)
D-LACTATE SERPL-SCNC: 2 MMOL/L (ref 0.5–2)
D-LACTATE SERPL-SCNC: 2.1 MMOL/L (ref 0.5–2)
DEPRECATED RDW RBC AUTO: 42.2 FL (ref 37–54)
EOSINOPHIL # BLD AUTO: 0.11 10*3/MM3 (ref 0–0.4)
EOSINOPHIL NFR BLD AUTO: 1.2 % (ref 0.3–6.2)
ERYTHROCYTE [DISTWIDTH] IN BLOOD BY AUTOMATED COUNT: 14.1 % (ref 12.3–15.4)
GFR SERPL CREATININE-BSD FRML MDRD: 95 ML/MIN/1.73
GLOBULIN UR ELPH-MCNC: 2.5 GM/DL
GLUCOSE BLDC GLUCOMTR-MCNC: 177 MG/DL (ref 70–130)
GLUCOSE BLDC GLUCOMTR-MCNC: 262 MG/DL (ref 70–130)
GLUCOSE SERPL-MCNC: 221 MG/DL (ref 65–99)
GLUCOSE UR STRIP-MCNC: ABNORMAL MG/DL
HBA1C MFR BLD: 8.7 % (ref 4.8–5.6)
HCT VFR BLD AUTO: 44.9 % (ref 37.5–51)
HGB BLD-MCNC: 15.6 G/DL (ref 13–17.7)
HGB UR QL STRIP.AUTO: NEGATIVE
HOLD SPECIMEN: NORMAL
IMM GRANULOCYTES # BLD AUTO: 0.04 10*3/MM3 (ref 0–0.05)
IMM GRANULOCYTES NFR BLD AUTO: 0.4 % (ref 0–0.5)
KETONES UR QL STRIP: NEGATIVE
LEUKOCYTE ESTERASE UR QL STRIP.AUTO: NEGATIVE
LIPASE SERPL-CCNC: 31 U/L (ref 13–60)
LYMPHOCYTES # BLD AUTO: 1.92 10*3/MM3 (ref 0.7–3.1)
LYMPHOCYTES NFR BLD AUTO: 21.2 % (ref 19.6–45.3)
MCH RBC QN AUTO: 28.9 PG (ref 26.6–33)
MCHC RBC AUTO-ENTMCNC: 34.7 G/DL (ref 31.5–35.7)
MCV RBC AUTO: 83.1 FL (ref 79–97)
MONOCYTES # BLD AUTO: 0.52 10*3/MM3 (ref 0.1–0.9)
MONOCYTES NFR BLD AUTO: 5.8 % (ref 5–12)
NEUTROPHILS NFR BLD AUTO: 6.4 10*3/MM3 (ref 1.7–7)
NEUTROPHILS NFR BLD AUTO: 70.8 % (ref 42.7–76)
NITRITE UR QL STRIP: NEGATIVE
NRBC BLD AUTO-RTO: 0 /100 WBC (ref 0–0.2)
PH UR STRIP.AUTO: 6.5 [PH] (ref 5–8)
PLAT MORPH BLD: NORMAL
PLATELET # BLD AUTO: 184 10*3/MM3 (ref 140–450)
PMV BLD AUTO: 11.8 FL (ref 6–12)
POTASSIUM SERPL-SCNC: 3.6 MMOL/L (ref 3.5–5.2)
PROT SERPL-MCNC: 7 G/DL (ref 6–8.5)
PROT UR QL STRIP: NEGATIVE
RBC # BLD AUTO: 5.4 10*6/MM3 (ref 4.14–5.8)
RBC MORPH BLD: NORMAL
SARS-COV-2 RDRP RESP QL NAA+PROBE: NORMAL
SODIUM SERPL-SCNC: 137 MMOL/L (ref 136–145)
SP GR UR STRIP: 1.01 (ref 1–1.03)
UROBILINOGEN UR QL STRIP: ABNORMAL
WBC # BLD AUTO: 9.04 10*3/MM3 (ref 3.4–10.8)
WBC MORPH BLD: NORMAL
WHOLE BLOOD HOLD SPECIMEN: NORMAL
WHOLE BLOOD HOLD SPECIMEN: NORMAL

## 2021-09-08 PROCEDURE — 25010000002 PROPOFOL 10 MG/ML EMULSION: Performed by: NURSE ANESTHETIST, CERTIFIED REGISTERED

## 2021-09-08 PROCEDURE — 25010000002 ONDANSETRON PER 1 MG: Performed by: EMERGENCY MEDICINE

## 2021-09-08 PROCEDURE — 25010000002 DROPERIDOL PER 5 MG: Performed by: NURSE ANESTHETIST, CERTIFIED REGISTERED

## 2021-09-08 PROCEDURE — 85007 BL SMEAR W/DIFF WBC COUNT: CPT | Performed by: EMERGENCY MEDICINE

## 2021-09-08 PROCEDURE — 85025 COMPLETE CBC W/AUTO DIFF WBC: CPT | Performed by: EMERGENCY MEDICINE

## 2021-09-08 PROCEDURE — 87635 SARS-COV-2 COVID-19 AMP PRB: CPT | Performed by: NURSE PRACTITIONER

## 2021-09-08 PROCEDURE — 83036 HEMOGLOBIN GLYCOSYLATED A1C: CPT | Performed by: SURGERY

## 2021-09-08 PROCEDURE — 25010000002 DEXAMETHASONE PER 1 MG: Performed by: NURSE ANESTHETIST, CERTIFIED REGISTERED

## 2021-09-08 PROCEDURE — 96374 THER/PROPH/DIAG INJ IV PUSH: CPT

## 2021-09-08 PROCEDURE — 88304 TISSUE EXAM BY PATHOLOGIST: CPT | Performed by: SURGERY

## 2021-09-08 PROCEDURE — 25010000002 HEPARIN (PORCINE) PER 1000 UNITS: Performed by: SURGERY

## 2021-09-08 PROCEDURE — G0378 HOSPITAL OBSERVATION PER HR: HCPCS

## 2021-09-08 PROCEDURE — 96375 TX/PRO/DX INJ NEW DRUG ADDON: CPT

## 2021-09-08 PROCEDURE — 82962 GLUCOSE BLOOD TEST: CPT

## 2021-09-08 PROCEDURE — 74176 CT ABD & PELVIS W/O CONTRAST: CPT

## 2021-09-08 PROCEDURE — 25010000002 ONDANSETRON PER 1 MG: Performed by: NURSE ANESTHETIST, CERTIFIED REGISTERED

## 2021-09-08 PROCEDURE — 25010000002 SUCCINYLCHOLINE PER 20 MG: Performed by: NURSE ANESTHETIST, CERTIFIED REGISTERED

## 2021-09-08 PROCEDURE — 63710000001 INSULIN REGULAR HUMAN PER 5 UNITS: Performed by: SURGERY

## 2021-09-08 PROCEDURE — 25010000002 PIPERACILLIN SOD-TAZOBACTAM PER 1 G: Performed by: NURSE ANESTHETIST, CERTIFIED REGISTERED

## 2021-09-08 PROCEDURE — 83605 ASSAY OF LACTIC ACID: CPT | Performed by: EMERGENCY MEDICINE

## 2021-09-08 PROCEDURE — 83690 ASSAY OF LIPASE: CPT | Performed by: EMERGENCY MEDICINE

## 2021-09-08 PROCEDURE — 25010000002 FENTANYL CITRATE (PF) 50 MCG/ML SOLUTION: Performed by: NURSE ANESTHETIST, CERTIFIED REGISTERED

## 2021-09-08 PROCEDURE — 25010000002 KETOROLAC TROMETHAMINE PER 15 MG: Performed by: EMERGENCY MEDICINE

## 2021-09-08 PROCEDURE — 80053 COMPREHEN METABOLIC PANEL: CPT | Performed by: EMERGENCY MEDICINE

## 2021-09-08 PROCEDURE — 99283 EMERGENCY DEPT VISIT LOW MDM: CPT

## 2021-09-08 PROCEDURE — 81003 URINALYSIS AUTO W/O SCOPE: CPT | Performed by: EMERGENCY MEDICINE

## 2021-09-08 PROCEDURE — C1889 IMPLANT/INSERT DEVICE, NOC: HCPCS | Performed by: SURGERY

## 2021-09-08 PROCEDURE — C9803 HOPD COVID-19 SPEC COLLECT: HCPCS

## 2021-09-08 DEVICE — THE ECHELON, ECHELON ENDOPATH™ AND ECHELON FLEX™ FAMILIES OF ENDOSCOPIC LINEAR CUTTERS AND RELOADS ARE STERILE, SINGLE PATIENT USE INSTRUMENTS THAT SIMULTANEOUSLY CUT AND STAPLE TISSUE. THERE ARE SIX STAGGERED ROWS OF STAPLES, THREE ON EITHER SIDE OF THE CUT LINE. THE 45 MM INSTRUMENTS HAVE A STAPLE LINE THATIS APPROXIMATELY 45 MM LONG AND A CUT LINE THAT IS APPROXIMATELY 42 MM LONG. THE SHAFT CAN ROTATE FREELY IN BOTH DIRECTIONS AND AN ARTICULATION MECHANISM ON ARTICULATING INSTRUMENTS ENABLES BENDING THE DISTAL PORTIONOF THE SHAFT TO FACILITATE LATERAL ACCESS OF THE OPERATIVE SITE.THE INSTRUMENTS ARE SHIPPED WITHOUT A RELOAD AND MUST BE LOADED PRIOR TO USE. A STAPLE RETAINING CAP ON THE RELOAD PROTECTS THE STAPLE LEG POINTS DURING SHIPPING AND TRANSPORTATION. THE INSTRUMENTS’ LOCK-OUT FEATURE IS DESIGNED TO PREVENT A USED RELOAD FROM BEING REFIRED.
Type: IMPLANTABLE DEVICE | Site: ABDOMEN | Status: FUNCTIONAL
Brand: ECHELON ENDOPATH

## 2021-09-08 RX ORDER — HEPARIN SODIUM 5000 [USP'U]/ML
INJECTION, SOLUTION INTRAVENOUS; SUBCUTANEOUS AS NEEDED
Status: DISCONTINUED | OUTPATIENT
Start: 2021-09-08 | End: 2021-09-08 | Stop reason: HOSPADM

## 2021-09-08 RX ORDER — CARISOPRODOL 350 MG/1
350 TABLET ORAL EVERY 8 HOURS PRN
Status: DISCONTINUED | OUTPATIENT
Start: 2021-09-08 | End: 2021-09-09 | Stop reason: HOSPADM

## 2021-09-08 RX ORDER — BUPIVACAINE HYDROCHLORIDE 5 MG/ML
INJECTION, SOLUTION PERINEURAL AS NEEDED
Status: DISCONTINUED | OUTPATIENT
Start: 2021-09-08 | End: 2021-09-08 | Stop reason: HOSPADM

## 2021-09-08 RX ORDER — DEXTROSE MONOHYDRATE 25 G/50ML
25 INJECTION, SOLUTION INTRAVENOUS
Status: DISCONTINUED | OUTPATIENT
Start: 2021-09-08 | End: 2021-09-09 | Stop reason: HOSPADM

## 2021-09-08 RX ORDER — SODIUM CHLORIDE 9 MG/ML
10 INJECTION INTRAVENOUS AS NEEDED
Status: DISCONTINUED | OUTPATIENT
Start: 2021-09-08 | End: 2021-09-08 | Stop reason: HOSPADM

## 2021-09-08 RX ORDER — KETOROLAC TROMETHAMINE 15 MG/ML
15 INJECTION, SOLUTION INTRAMUSCULAR; INTRAVENOUS ONCE
Status: COMPLETED | OUTPATIENT
Start: 2021-09-08 | End: 2021-09-08

## 2021-09-08 RX ORDER — MAGNESIUM HYDROXIDE 1200 MG/15ML
LIQUID ORAL AS NEEDED
Status: DISCONTINUED | OUTPATIENT
Start: 2021-09-08 | End: 2021-09-08 | Stop reason: HOSPADM

## 2021-09-08 RX ORDER — FAMOTIDINE 20 MG/1
20 TABLET, FILM COATED ORAL 2 TIMES DAILY
Status: DISCONTINUED | OUTPATIENT
Start: 2021-09-08 | End: 2021-09-09 | Stop reason: HOSPADM

## 2021-09-08 RX ORDER — FENTANYL CITRATE 50 UG/ML
50 INJECTION, SOLUTION INTRAMUSCULAR; INTRAVENOUS
Status: DISCONTINUED | OUTPATIENT
Start: 2021-09-08 | End: 2021-09-08

## 2021-09-08 RX ORDER — ONDANSETRON 2 MG/ML
4 INJECTION INTRAMUSCULAR; INTRAVENOUS EVERY 6 HOURS PRN
Status: DISCONTINUED | OUTPATIENT
Start: 2021-09-08 | End: 2021-09-09

## 2021-09-08 RX ORDER — ONDANSETRON 2 MG/ML
INJECTION INTRAMUSCULAR; INTRAVENOUS AS NEEDED
Status: DISCONTINUED | OUTPATIENT
Start: 2021-09-08 | End: 2021-09-08 | Stop reason: SURG

## 2021-09-08 RX ORDER — LIDOCAINE HYDROCHLORIDE 10 MG/ML
INJECTION, SOLUTION EPIDURAL; INFILTRATION; INTRACAUDAL; PERINEURAL AS NEEDED
Status: DISCONTINUED | OUTPATIENT
Start: 2021-09-08 | End: 2021-09-08 | Stop reason: SURG

## 2021-09-08 RX ORDER — MORPHINE SULFATE 4 MG/ML
4 INJECTION, SOLUTION INTRAMUSCULAR; INTRAVENOUS
Status: DISCONTINUED | OUTPATIENT
Start: 2021-09-08 | End: 2021-09-09

## 2021-09-08 RX ORDER — HYDROCODONE BITARTRATE AND ACETAMINOPHEN 5; 325 MG/1; MG/1
1 TABLET ORAL EVERY 4 HOURS PRN
Status: DISCONTINUED | OUTPATIENT
Start: 2021-09-08 | End: 2021-09-09 | Stop reason: HOSPADM

## 2021-09-08 RX ORDER — IBUPROFEN 600 MG/1
600 TABLET ORAL EVERY 6 HOURS PRN
Status: DISCONTINUED | OUTPATIENT
Start: 2021-09-08 | End: 2021-09-09 | Stop reason: HOSPADM

## 2021-09-08 RX ORDER — DROPERIDOL 2.5 MG/ML
0.62 INJECTION, SOLUTION INTRAMUSCULAR; INTRAVENOUS ONCE AS NEEDED
Status: COMPLETED | OUTPATIENT
Start: 2021-09-08 | End: 2021-09-08

## 2021-09-08 RX ORDER — AMLODIPINE BESYLATE 10 MG/1
10 TABLET ORAL DAILY
Status: DISCONTINUED | OUTPATIENT
Start: 2021-09-09 | End: 2021-09-09 | Stop reason: HOSPADM

## 2021-09-08 RX ORDER — SODIUM CHLORIDE, SODIUM LACTATE, POTASSIUM CHLORIDE, CALCIUM CHLORIDE 600; 310; 30; 20 MG/100ML; MG/100ML; MG/100ML; MG/100ML
9 INJECTION, SOLUTION INTRAVENOUS CONTINUOUS
Status: DISCONTINUED | OUTPATIENT
Start: 2021-09-08 | End: 2021-09-08 | Stop reason: HOSPADM

## 2021-09-08 RX ORDER — HEPARIN SODIUM 5000 [USP'U]/ML
5000 INJECTION, SOLUTION INTRAVENOUS; SUBCUTANEOUS EVERY 8 HOURS SCHEDULED
Status: DISCONTINUED | OUTPATIENT
Start: 2021-09-09 | End: 2021-09-09 | Stop reason: HOSPADM

## 2021-09-08 RX ORDER — ACETAMINOPHEN 650 MG/1
650 SUPPOSITORY RECTAL EVERY 4 HOURS PRN
Status: DISCONTINUED | OUTPATIENT
Start: 2021-09-08 | End: 2021-09-09

## 2021-09-08 RX ORDER — FAMOTIDINE 10 MG/ML
20 INJECTION, SOLUTION INTRAVENOUS ONCE
Status: CANCELLED | OUTPATIENT
Start: 2021-09-08 | End: 2021-09-08

## 2021-09-08 RX ORDER — LIDOCAINE HYDROCHLORIDE 10 MG/ML
0.5 INJECTION, SOLUTION EPIDURAL; INFILTRATION; INTRACAUDAL; PERINEURAL ONCE AS NEEDED
Status: DISCONTINUED | OUTPATIENT
Start: 2021-09-08 | End: 2021-09-08

## 2021-09-08 RX ORDER — VENLAFAXINE HYDROCHLORIDE 75 MG/1
150 CAPSULE, EXTENDED RELEASE ORAL DAILY
Status: DISCONTINUED | OUTPATIENT
Start: 2021-09-09 | End: 2021-09-09 | Stop reason: HOSPADM

## 2021-09-08 RX ORDER — PROMETHAZINE HYDROCHLORIDE 12.5 MG/1
12.5 SUPPOSITORY RECTAL EVERY 6 HOURS PRN
Status: DISCONTINUED | OUTPATIENT
Start: 2021-09-08 | End: 2021-09-09

## 2021-09-08 RX ORDER — ONDANSETRON 2 MG/ML
4 INJECTION INTRAMUSCULAR; INTRAVENOUS ONCE AS NEEDED
Status: DISCONTINUED | OUTPATIENT
Start: 2021-09-08 | End: 2021-09-08

## 2021-09-08 RX ORDER — PROPOFOL 10 MG/ML
VIAL (ML) INTRAVENOUS AS NEEDED
Status: DISCONTINUED | OUTPATIENT
Start: 2021-09-08 | End: 2021-09-08 | Stop reason: SURG

## 2021-09-08 RX ORDER — ONDANSETRON 2 MG/ML
4 INJECTION INTRAMUSCULAR; INTRAVENOUS ONCE
Status: COMPLETED | OUTPATIENT
Start: 2021-09-08 | End: 2021-09-08

## 2021-09-08 RX ORDER — SUCCINYLCHOLINE CHLORIDE 20 MG/ML
INJECTION INTRAMUSCULAR; INTRAVENOUS AS NEEDED
Status: DISCONTINUED | OUTPATIENT
Start: 2021-09-08 | End: 2021-09-08 | Stop reason: SURG

## 2021-09-08 RX ORDER — HYDROMORPHONE HYDROCHLORIDE 1 MG/ML
0.5 INJECTION, SOLUTION INTRAMUSCULAR; INTRAVENOUS; SUBCUTANEOUS
Status: DISCONTINUED | OUTPATIENT
Start: 2021-09-08 | End: 2021-09-08

## 2021-09-08 RX ORDER — SODIUM CHLORIDE 9 MG/ML
INJECTION, SOLUTION INTRAVENOUS AS NEEDED
Status: DISCONTINUED | OUTPATIENT
Start: 2021-09-08 | End: 2021-09-08 | Stop reason: HOSPADM

## 2021-09-08 RX ORDER — SODIUM CHLORIDE 0.9 % (FLUSH) 0.9 %
10 SYRINGE (ML) INJECTION EVERY 12 HOURS SCHEDULED
Status: DISCONTINUED | OUTPATIENT
Start: 2021-09-08 | End: 2021-09-08

## 2021-09-08 RX ORDER — ACETAMINOPHEN 325 MG/1
650 TABLET ORAL EVERY 4 HOURS PRN
Status: DISCONTINUED | OUTPATIENT
Start: 2021-09-08 | End: 2021-09-09 | Stop reason: HOSPADM

## 2021-09-08 RX ORDER — GLYCOPYRROLATE 0.2 MG/ML
INJECTION INTRAMUSCULAR; INTRAVENOUS AS NEEDED
Status: DISCONTINUED | OUTPATIENT
Start: 2021-09-08 | End: 2021-09-08 | Stop reason: SURG

## 2021-09-08 RX ORDER — NICOTINE POLACRILEX 4 MG
15 LOZENGE BUCCAL
Status: DISCONTINUED | OUTPATIENT
Start: 2021-09-08 | End: 2021-09-09 | Stop reason: HOSPADM

## 2021-09-08 RX ORDER — NALOXONE HCL 0.4 MG/ML
0.4 VIAL (ML) INJECTION
Status: DISCONTINUED | OUTPATIENT
Start: 2021-09-08 | End: 2021-09-09 | Stop reason: HOSPADM

## 2021-09-08 RX ORDER — DOCUSATE SODIUM 100 MG/1
100 CAPSULE, LIQUID FILLED ORAL 2 TIMES DAILY
Status: DISCONTINUED | OUTPATIENT
Start: 2021-09-08 | End: 2021-09-09 | Stop reason: HOSPADM

## 2021-09-08 RX ORDER — SODIUM CHLORIDE, SODIUM LACTATE, POTASSIUM CHLORIDE, CALCIUM CHLORIDE 600; 310; 30; 20 MG/100ML; MG/100ML; MG/100ML; MG/100ML
50 INJECTION, SOLUTION INTRAVENOUS CONTINUOUS
Status: DISCONTINUED | OUTPATIENT
Start: 2021-09-08 | End: 2021-09-09 | Stop reason: HOSPADM

## 2021-09-08 RX ORDER — HEPARIN SODIUM 5000 [USP'U]/ML
5000 INJECTION, SOLUTION INTRAVENOUS; SUBCUTANEOUS EVERY 8 HOURS SCHEDULED
Status: DISCONTINUED | OUTPATIENT
Start: 2021-09-08 | End: 2021-09-08 | Stop reason: HOSPADM

## 2021-09-08 RX ORDER — EPHEDRINE SULFATE 50 MG/ML
INJECTION, SOLUTION INTRAVENOUS AS NEEDED
Status: DISCONTINUED | OUTPATIENT
Start: 2021-09-08 | End: 2021-09-08 | Stop reason: SURG

## 2021-09-08 RX ORDER — MIDAZOLAM HYDROCHLORIDE 1 MG/ML
1 INJECTION INTRAMUSCULAR; INTRAVENOUS
Status: DISCONTINUED | OUTPATIENT
Start: 2021-09-08 | End: 2021-09-08 | Stop reason: HOSPADM

## 2021-09-08 RX ORDER — SODIUM CHLORIDE 0.9 % (FLUSH) 0.9 %
10 SYRINGE (ML) INJECTION AS NEEDED
Status: DISCONTINUED | OUTPATIENT
Start: 2021-09-08 | End: 2021-09-08

## 2021-09-08 RX ORDER — DEXAMETHASONE SODIUM PHOSPHATE 4 MG/ML
INJECTION, SOLUTION INTRA-ARTICULAR; INTRALESIONAL; INTRAMUSCULAR; INTRAVENOUS; SOFT TISSUE AS NEEDED
Status: DISCONTINUED | OUTPATIENT
Start: 2021-09-08 | End: 2021-09-08 | Stop reason: SURG

## 2021-09-08 RX ORDER — ROCURONIUM BROMIDE 10 MG/ML
INJECTION, SOLUTION INTRAVENOUS AS NEEDED
Status: DISCONTINUED | OUTPATIENT
Start: 2021-09-08 | End: 2021-09-08 | Stop reason: SURG

## 2021-09-08 RX ORDER — ONDANSETRON 4 MG/1
4 TABLET, FILM COATED ORAL EVERY 6 HOURS PRN
Status: DISCONTINUED | OUTPATIENT
Start: 2021-09-08 | End: 2021-09-09 | Stop reason: HOSPADM

## 2021-09-08 RX ORDER — FAMOTIDINE 20 MG/1
20 TABLET, FILM COATED ORAL ONCE
Status: COMPLETED | OUTPATIENT
Start: 2021-09-08 | End: 2021-09-08

## 2021-09-08 RX ORDER — FENTANYL CITRATE 50 UG/ML
INJECTION, SOLUTION INTRAMUSCULAR; INTRAVENOUS AS NEEDED
Status: DISCONTINUED | OUTPATIENT
Start: 2021-09-08 | End: 2021-09-08 | Stop reason: SURG

## 2021-09-08 RX ORDER — PROMETHAZINE HYDROCHLORIDE 12.5 MG/1
12.5 TABLET ORAL EVERY 6 HOURS PRN
Status: DISCONTINUED | OUTPATIENT
Start: 2021-09-08 | End: 2021-09-09 | Stop reason: HOSPADM

## 2021-09-08 RX ADMIN — SODIUM CHLORIDE, POTASSIUM CHLORIDE, SODIUM LACTATE AND CALCIUM CHLORIDE 125 ML/HR: 600; 310; 30; 20 INJECTION, SOLUTION INTRAVENOUS at 20:04

## 2021-09-08 RX ADMIN — TAZOBACTAM SODIUM AND PIPERACILLIN SODIUM 4.5 G: 375; 3 INJECTION, SOLUTION INTRAVENOUS at 15:20

## 2021-09-08 RX ADMIN — SUCCINYLCHOLINE CHLORIDE 200 MG: 20 INJECTION, SOLUTION INTRAMUSCULAR; INTRAVENOUS at 15:17

## 2021-09-08 RX ADMIN — FAMOTIDINE 20 MG: 20 TABLET, FILM COATED ORAL at 20:05

## 2021-09-08 RX ADMIN — TAZOBACTAM SODIUM AND PIPERACILLIN SODIUM 3.38 G: 375; 3 INJECTION, SOLUTION INTRAVENOUS at 23:55

## 2021-09-08 RX ADMIN — ROCURONIUM BROMIDE 40 MG: 10 INJECTION, SOLUTION INTRAVENOUS at 15:25

## 2021-09-08 RX ADMIN — GLYCOPYRROLATE 0.2 MG: 0.2 INJECTION INTRAMUSCULAR; INTRAVENOUS at 15:31

## 2021-09-08 RX ADMIN — SODIUM CHLORIDE, POTASSIUM CHLORIDE, SODIUM LACTATE AND CALCIUM CHLORIDE 9 ML/HR: 600; 310; 30; 20 INJECTION, SOLUTION INTRAVENOUS at 15:04

## 2021-09-08 RX ADMIN — ROCURONIUM BROMIDE 30 MG: 10 INJECTION, SOLUTION INTRAVENOUS at 15:28

## 2021-09-08 RX ADMIN — ONDANSETRON 4 MG: 2 INJECTION INTRAMUSCULAR; INTRAVENOUS at 16:07

## 2021-09-08 RX ADMIN — FENTANYL CITRATE 100 MCG: 50 INJECTION, SOLUTION INTRAMUSCULAR; INTRAVENOUS at 15:17

## 2021-09-08 RX ADMIN — INSULIN HUMAN 6 UNITS: 100 INJECTION, SOLUTION PARENTERAL at 21:51

## 2021-09-08 RX ADMIN — DOCUSATE SODIUM 100 MG: 100 CAPSULE, LIQUID FILLED ORAL at 20:05

## 2021-09-08 RX ADMIN — KETOROLAC TROMETHAMINE 15 MG: 15 INJECTION, SOLUTION INTRAMUSCULAR; INTRAVENOUS at 11:52

## 2021-09-08 RX ADMIN — DEXAMETHASONE SODIUM PHOSPHATE 8 MG: 4 INJECTION, SOLUTION INTRA-ARTICULAR; INTRALESIONAL; INTRAMUSCULAR; INTRAVENOUS; SOFT TISSUE at 15:20

## 2021-09-08 RX ADMIN — EPHEDRINE SULFATE 10 MG: 50 INJECTION INTRAVENOUS at 15:35

## 2021-09-08 RX ADMIN — ROCURONIUM BROMIDE 10 MG: 10 INJECTION, SOLUTION INTRAVENOUS at 15:17

## 2021-09-08 RX ADMIN — SODIUM CHLORIDE, POTASSIUM CHLORIDE, SODIUM LACTATE AND CALCIUM CHLORIDE 1000 ML: 600; 310; 30; 20 INJECTION, SOLUTION INTRAVENOUS at 11:51

## 2021-09-08 RX ADMIN — DROPERIDOL 0.62 MG: 2.5 INJECTION, SOLUTION INTRAMUSCULAR; INTRAVENOUS at 16:49

## 2021-09-08 RX ADMIN — ONDANSETRON 4 MG: 2 INJECTION INTRAMUSCULAR; INTRAVENOUS at 11:51

## 2021-09-08 RX ADMIN — FAMOTIDINE 20 MG: 20 TABLET ORAL at 15:04

## 2021-09-08 RX ADMIN — PROPOFOL 300 MG: 10 INJECTION, EMULSION INTRAVENOUS at 15:17

## 2021-09-08 RX ADMIN — METFORMIN HYDROCHLORIDE 500 MG: 500 TABLET ORAL at 20:05

## 2021-09-08 RX ADMIN — LIDOCAINE HYDROCHLORIDE 100 MG: 10 INJECTION, SOLUTION EPIDURAL; INFILTRATION; INTRACAUDAL; PERINEURAL at 15:17

## 2021-09-08 RX ADMIN — SUGAMMADEX 500 MG: 100 INJECTION, SOLUTION INTRAVENOUS at 16:25

## 2021-09-08 NOTE — ED PROVIDER NOTES
Subjective   The patient presents to the emergency department with onset of right lower quadrant abdominal pain yesterday which is now radiating across the lower abdomen down into the left lower quadrant.  Patient also reports bilateral flank pain.  He describes it as a stabbing pain.  Pain is currently a 4 out of 10.  Symptoms began last night around 9:45 PM.  He advises he did have an episode of vomiting at this time.  He denies any diarrhea.  Reports his last bowel movement was at 645 this morning.  No fever or chills.  Patient denies any chest pain or shortness of breath.   Patient has a past history of diabetes, hyperlipidemia, hypertension, and obesity.      History provided by:  Patient   used: No    Abdominal Pain  Pain location:  LLQ, RLQ, R flank and L flank  Pain quality: sharp and stabbing    Duration:  1 day  Timing:  Constant  Associated symptoms: chills and nausea    Associated symptoms: no chest pain, no cough, no diarrhea, no fever, no shortness of breath and no vomiting        Review of Systems   Constitutional: Positive for chills. Negative for fever.   Respiratory: Negative for cough and shortness of breath.    Cardiovascular: Negative for chest pain.   Gastrointestinal: Positive for abdominal pain and nausea. Negative for diarrhea and vomiting.   Genitourinary: Positive for flank pain.   Neurological: Negative for dizziness and weakness.   All other systems reviewed and are negative.      Past Medical History:   Diagnosis Date   • Anxiety    • Borderline diabetes    • Colon polyp    • Depression    • Diabetes mellitus (CMS/[facility])    • GERD (gastroesophageal reflux disease)    • Hyperlipidemia    • Hypertension    • Sleep apnea    • Suicide attempt (CMS/[facility])     reports hx of suicide attempts three times in 30 years ago   • Venous stasis     bilateral lower ext       Allergies   Allergen Reactions   • Atorvastatin Headache     Weakness.     • Topamax [Topiramate] Other (See  Comments)     Migraine         Past Surgical History:   Procedure Laterality Date   • EPIDIDYMAL CYST EXCISION     • TONSILLECTOMY         Family History   Problem Relation Age of Onset   • Diabetes Mother    • Hypertension Mother    • Schizophrenia Mother    • Depression Mother        Social History     Socioeconomic History   • Marital status: Single     Spouse name: Not on file   • Number of children: Not on file   • Years of education: Not on file   • Highest education level: Not on file   Tobacco Use   • Smoking status: Former Smoker     Types: Cigarettes     Quit date: 2008     Years since quittin.1   • Smokeless tobacco: Never Used   Vaping Use   • Vaping Use: Never used   Substance and Sexual Activity   • Alcohol use: Yes     Comment: occas   • Drug use: Never   • Sexual activity: Not Currently           Objective   Physical Exam  Vitals and nursing note reviewed.   Constitutional:       General: He is not in acute distress.     Appearance: He is well-developed. He is obese. He is not toxic-appearing.   HENT:      Head: Normocephalic and atraumatic.      Mouth/Throat:      Mouth: Mucous membranes are moist.   Eyes:      Extraocular Movements: Extraocular movements intact.   Cardiovascular:      Rate and Rhythm: Normal rate and regular rhythm.      Heart sounds: Normal heart sounds.   Pulmonary:      Effort: Pulmonary effort is normal. No respiratory distress.      Breath sounds: Normal breath sounds.   Abdominal:      General: Bowel sounds are normal.      Tenderness: There is abdominal tenderness. There is right CVA tenderness and left CVA tenderness.   Skin:     General: Skin is warm and dry.   Neurological:      General: No focal deficit present.      Mental Status: He is alert and oriented to person, place, and time.   Psychiatric:         Mood and Affect: Mood normal.         Behavior: Behavior normal.         Procedures           ED Course  ED Course as of Sep 08 1337   Wed Sep 08, 2021   3618  Glucose(!): 221 [KG]   1159 Lactate(!!): 2.1 [KG]   1319 I reviewed the CT scan images as well as the report with the radiologist.  Findings consistent with acute appendicitis.   CT Abdomen Pelvis Without Contrast [RS]   1321 General surgery paged.    [RS]   1321 Last food or drink was 8 AM this morning.    [RS]   1324 Case discussed with Dr. Arauz who will arrange for surgical intervention.    [RS]   1325 I personally updated the patient on the findings and the plan for surgical intervention.  He voiced understanding and agree.    [RS]      ED Course User Index  [KG] Shanita Herrera, AGUILAR  [RS] Hood Dickerson MD           Recent Results (from the past 24 hour(s))   Urinalysis With Microscopic If Indicated (No Culture) - Urine, Clean Catch    Collection Time: 09/08/21 11:40 AM    Specimen: Urine, Clean Catch   Result Value Ref Range    Color, UA Yellow Yellow, Straw    Appearance, UA Clear Clear    pH, UA 6.5 5.0 - 8.0    Specific Gravity, UA 1.008 1.001 - 1.030    Glucose,  mg/dL (Trace) (A) Negative    Ketones, UA Negative Negative    Bilirubin, UA Negative Negative    Blood, UA Negative Negative    Protein, UA Negative Negative    Leuk Esterase, UA Negative Negative    Nitrite, UA Negative Negative    Urobilinogen, UA 0.2 E.U./dL 0.2 - 1.0 E.U./dL   Comprehensive Metabolic Panel    Collection Time: 09/08/21 11:46 AM    Specimen: Blood   Result Value Ref Range    Glucose 221 (H) 65 - 99 mg/dL    BUN 14 6 - 20 mg/dL    Creatinine 0.85 0.76 - 1.27 mg/dL    Sodium 137 136 - 145 mmol/L    Potassium 3.6 3.5 - 5.2 mmol/L    Chloride 95 (L) 98 - 107 mmol/L    CO2 26.0 22.0 - 29.0 mmol/L    Calcium 9.6 8.6 - 10.5 mg/dL    Total Protein 7.0 6.0 - 8.5 g/dL    Albumin 4.50 3.50 - 5.20 g/dL    ALT (SGPT) 37 1 - 41 U/L    AST (SGOT) 26 1 - 40 U/L    Alkaline Phosphatase 102 39 - 117 U/L    Total Bilirubin 0.8 0.0 - 1.2 mg/dL    eGFR Non African Amer 95 >60 mL/min/1.73    Globulin 2.5 gm/dL    A/G Ratio 1.8  g/dL    BUN/Creatinine Ratio 16.5 7.0 - 25.0    Anion Gap 16.0 (H) 5.0 - 15.0 mmol/L   Lipase    Collection Time: 09/08/21 11:46 AM    Specimen: Blood   Result Value Ref Range    Lipase 31 13 - 60 U/L   Lactic Acid, Plasma    Collection Time: 09/08/21 11:46 AM    Specimen: Blood   Result Value Ref Range    Lactate 2.1 (C) 0.5 - 2.0 mmol/L   Green Top (Gel)    Collection Time: 09/08/21 11:46 AM   Result Value Ref Range    Extra Tube Hold for add-ons.    Lavender Top    Collection Time: 09/08/21 11:46 AM   Result Value Ref Range    Extra Tube hold for add-on    Gold Top - SST    Collection Time: 09/08/21 11:46 AM   Result Value Ref Range    Extra Tube Hold for add-ons.    Light Blue Top    Collection Time: 09/08/21 11:46 AM   Result Value Ref Range    Extra Tube hold for add-on    CBC Auto Differential    Collection Time: 09/08/21 11:46 AM    Specimen: Blood   Result Value Ref Range    WBC 9.04 3.40 - 10.80 10*3/mm3    RBC 5.40 4.14 - 5.80 10*6/mm3    Hemoglobin 15.6 13.0 - 17.7 g/dL    Hematocrit 44.9 37.5 - 51.0 %    MCV 83.1 79.0 - 97.0 fL    MCH 28.9 26.6 - 33.0 pg    MCHC 34.7 31.5 - 35.7 g/dL    RDW 14.1 12.3 - 15.4 %    RDW-SD 42.2 37.0 - 54.0 fl    MPV 11.8 6.0 - 12.0 fL    Platelets 184 140 - 450 10*3/mm3    Neutrophil % 70.8 42.7 - 76.0 %    Lymphocyte % 21.2 19.6 - 45.3 %    Monocyte % 5.8 5.0 - 12.0 %    Eosinophil % 1.2 0.3 - 6.2 %    Basophil % 0.6 0.0 - 1.5 %    Immature Grans % 0.4 0.0 - 0.5 %    Neutrophils, Absolute 6.40 1.70 - 7.00 10*3/mm3    Lymphocytes, Absolute 1.92 0.70 - 3.10 10*3/mm3    Monocytes, Absolute 0.52 0.10 - 0.90 10*3/mm3    Eosinophils, Absolute 0.11 0.00 - 0.40 10*3/mm3    Basophils, Absolute 0.05 0.00 - 0.20 10*3/mm3    Immature Grans, Absolute 0.04 0.00 - 0.05 10*3/mm3    nRBC 0.0 0.0 - 0.2 /100 WBC   Scan Slide    Collection Time: 09/08/21 11:46 AM    Specimen: Blood   Result Value Ref Range    RBC Morphology Normal Normal    WBC Morphology Normal Normal    Platelet Morphology  "Normal Normal     Note: In addition to lab results from this visit, the labs listed above may include labs taken at another facility or during a different encounter within the last 24 hours. Please correlate lab times with ED admission and discharge times for further clarification of the services performed during this visit.    CT Abdomen Pelvis Without Contrast   Final Result   15 mm dilated and edematous appendix compatible with acute   appendicitis. No evidence of perforation or abscess.       Findings discussed with Dr. Dickerson of the emergency Department by   Kael Palm via phone at 1:21 PM 9/8/2021       This report was finalized on 9/8/2021 1:17 PM by Kael Palm.            Vitals:    09/08/21 1032   BP: 172/92   BP Location: Left arm   Patient Position: Sitting   Pulse: 72   Resp: 20   Temp: 97.7 °F (36.5 °C)   TempSrc: Oral   SpO2: 95%   Weight: (!) 197 kg (434 lb)   Height: 182.9 cm (72\")     Medications   Sodium Chloride (PF) 0.9 % 10 mL (has no administration in time range)   piperacillin-tazobactam (ZOSYN) 4.5 g in iso-osmotic dextrose 100 mL IVPB (premix) (has no administration in time range)   ketorolac (TORADOL) injection 15 mg (15 mg Intravenous Given 9/8/21 1152)   ondansetron (ZOFRAN) injection 4 mg (4 mg Intravenous Given 9/8/21 1151)   lactated ringers bolus 1,000 mL (1,000 mL Intravenous New Bag 9/8/21 1151)     ECG/EMG Results (last 24 hours)     ** No results found for the last 24 hours. **        No orders to display                                       MDM    Final diagnoses:   Acute appendicitis with localized peritonitis, without perforation, abscess, or gangrene   Class 3 severe obesity due to excess calories with body mass index (BMI) of 50.0 to 59.9 in adult, unspecified whether serious comorbidity present (CMS/[facility])   Diabetes mellitus type 2 in obese (CMS/[facility])   Elevated blood pressure reading with diagnosis of hypertension       ED Disposition  ED Disposition     ED " Disposition Condition Comment    Send to OR            No follow-up provider specified.       Medication List      No changes were made to your prescriptions during this visit.          Shanita Herrera, APRN  09/08/21 2408

## 2021-09-08 NOTE — TELEPHONE ENCOUNTER
Patient called stating that he was having right side stabbing pain and threw up bile yesterday evening. He was up all night with pain and awake every 20 minutes or so. Spoke with Valentina and she advised me to tell patient to go to either the [facility] or ED with those symptoms. Informed patient and he voiced understanding.

## 2021-09-08 NOTE — ANESTHESIA POSTPROCEDURE EVALUATION
Patient: Carlos Eduadro Danielle    Procedure Summary     Date: 09/08/21 Room / Location:  [facility] / [facility]    Anesthesia Start: 1512 Anesthesia Stop: 1637    Procedure: APPENDECTOMY LAPAROSCOPIC (N/A Abdomen) Diagnosis:     Surgeons: Radames Arauz MD Provider: Erika Jerry DO    Anesthesia Type: general ASA Status: 3          Anesthesia Type: general    Vitals  No vitals data found for the desired time range.          Post Anesthesia Care and Evaluation    Patient location during evaluation: PACU  Patient participation: complete - patient participated  Level of consciousness: awake and alert  Pain management: adequate  Airway patency: patent  Anesthetic complications: No anesthetic complications  PONV Status: none  Cardiovascular status: hemodynamically stable and acceptable  Respiratory status: nonlabored ventilation, acceptable and nasal cannula  Hydration status: acceptable

## 2021-09-08 NOTE — OP NOTE
General Surgery Operative Note    Carlos Eduardo Danielle  [id number]  1970    Date of Surgery:  9/8/2021 16:28 EDT    Pre-op Diagnosis: Acute appendicitis    Post-op Diagnosis: Acute appendicitis, nonruptured       Procedure: Laparoscopic appendectomy    Surgeon: Radames Arauz MD    Anesthesia: General    Fluids: 1 L of crystalloid    Estimated Blood Loss: Less than 25 mL    Urine Voided: Not recorded    Specimens:  Appendix                  Drains: None    Findings: Acute purulent appendicitis without rupture    Complications: None apparent    History: 51-year-old gentleman presents with right lower quadrant abdominal pain.  Normal white blood cell count.  CT evidence consistent with his history of acute appendicitis.       I rehashed the risks and benefits of appendectomy, including but not limited to: bleeding, infection, injury to adjacent viscera (small bowel, large bowel, the right ureter, the bladder, etc.), cecal stump leak, postoperative abscess formation, an open operation in general, need for re-intervention, and medical issues from a cardiopulmonary and deep venous thrombosis standpoint.  They understood these risks and wished to proceed.    Procedure:      After informed consent the patient was taken to the operating room.   They were placed in the supine position on the operating table, general anesthesia administered, a Villalba catheter was placed, and the abdomen was then prepped and draped in the standard sterile fashion.  The patient had received IV Zosyn in the emergency room.  He was given 5000 units of subcutaneous heparin for VTE prophylaxis.  A timeout was performed.       The abdomen was entered, 10 cm from the xiphoid along the left costal margin, with a 5 mm Optiview trocar.  The abdomen was insufflated and the patient positioned in the head down position and rolled slightly onto the left-hand side. Suprapubic and left lower quadrant 5 mm trocars were placed under direct visualization.   I then placed an 11 trocar at the level of the umbilicus through the right rectus muscle under direct visualization.  The cecum was medially rotated demonstrating an acutely inflamed appendix without rupture or abscess.  A window in the mesoappendix was created with the [place] dissector.  A 45 vascular load was taken across the appendiceal base and subsequently the mesoappendix.  The appendix was then placed in an Endo Catch bag and withdrawn from the abdomen.  I then reinspected the cecal stump staple line and mesoappendiceal staple line, they were both in good order.  All trocars were removed under direct visualization.  The 12 mm fascial defect was closed with 0 Vicryl with use of the Johnnie Mustafa.  All skin incisions were closed with 4-0 Monocryl, Mastisol, Steri-Strips, Telfa, and Tegaderm.       All lap and needle counts were correct at the end of the procedure ×2.  The patient was then transferred to the recovery room in stable condition.    Radames Arauz MD     Date: 9/8/2021  Time: 16:28 EDT

## 2021-09-08 NOTE — CONSULTS
General Surgery Consultation Note    Date of Service: 9/8/2021  Carlos Eduardo Danielle  [id number]  1970      Referring Provider: Hood Dickerson MD    Location of Consult: ER      Reason for Consultation: Acute appendicitis       History of Present Illness:  I am seeing, Carlos Eduardo Alexanderdion, in consultation for Hood Dickerson MD regarding acute appendicitis.  51-year-old gentleman with past medical history significant for morbid obesity, diabetes mellitus, hypertension, obstructive sleep apnea presents with approximately 24 hours worth of abdominal pain.  This began last evening prior to leaving work and has worsened ultimately bringing him to the emergency room.  His pain is in the right lower quadrant and is sharp and persistent.  This is been associated with nausea and vomitus though his vomitus has been nonbloody and nonbilious.  He denies any significant change in bowel habits.  He has had no fevers or chills or recent sick contacts.   Otherwise there are no other associated symptoms or modifying factors    Problems Addressed this Visit     None      Visit Diagnoses     Acute appendicitis with localized peritonitis, without perforation, abscess, or gangrene    -  Primary    Class 3 severe obesity due to excess calories with body mass index (BMI) of 50.0 to 59.9 in adult, unspecified whether serious comorbidity present (CMS/[facility])        Diabetes mellitus type 2 in obese (CMS/[facility])        Elevated blood pressure reading with diagnosis of hypertension          Diagnoses       Codes Comments    Acute appendicitis with localized peritonitis, without perforation, abscess, or gangrene    -  Primary ICD-10-CM: K35.30  ICD-9-CM: 540.9     Class 3 severe obesity due to excess calories with body mass index (BMI) of 50.0 to 59.9 in adult, unspecified whether serious comorbidity present (CMS/[facility])     ICD-10-CM: E66.01, Z68.43  ICD-9-CM: 278.01, V85.43     Diabetes mellitus type 2 in obese (CMS/[facility])     ICD-10-CM:  E11.69, E66.9  ICD-9-CM: 250.00, 278.00     Elevated blood pressure reading with diagnosis of hypertension     ICD-10-CM: I10  ICD-9-CM: 401.9           Past Medical History:   Diagnosis Date   • Anxiety    • Borderline diabetes    • Colon polyp    • Depression    • Diabetes mellitus (CMS/[facility])    • GERD (gastroesophageal reflux disease)    • Hyperlipidemia    • Hypertension    • Sleep apnea    • Suicide attempt (CMS/[facility])     reports hx of suicide attempts three times in 30 years ago   • Venous stasis     bilateral lower ext       Past Surgical History:   • EPIDIDYMAL CYST EXCISION   • TONSILLECTOMY       Allergies   Allergen Reactions   • Atorvastatin Headache     Weakness.     • Topamax [Topiramate] Other (See Comments)     Migraine         No current facility-administered medications on file prior to encounter.     Current Outpatient Medications on File Prior to Encounter   Medication Sig Dispense Refill   • amLODIPine (NORVASC) 10 MG tablet Take 1 tablet by mouth Daily. 90 tablet 3   • B Complex-C (B-complex with vitamin C) tablet Take 1 tablet by mouth Daily. 30 tablet 11   • furosemide (LASIX) 40 MG tablet Take 1 tablet by mouth 2 (Two) Times a Day. 60 tablet 5   • lisinopril-hydrochlorothiazide (PRINZIDE,ZESTORETIC) 20-12.5 MG per tablet Take 1 tablet by mouth Daily. 90 tablet 3   • metFORMIN (GLUCOPHAGE) 500 MG tablet Take 1 tablet by mouth 2 (Two) Times a Day With Meals. 60 tablet 2   • venlafaxine XR (EFFEXOR-XR) 150 MG 24 hr capsule Take 1 capsule by mouth Daily with 75 mg capsule 90 capsule 3         Current Facility-Administered Medications:   •  piperacillin-tazobactam (ZOSYN) 4.5 g in iso-osmotic dextrose 100 mL IVPB (premix), 4.5 g, Intravenous, Once, Hood Dickerson MD  •  Sodium Chloride (PF) 0.9 % 10 mL, 10 mL, Intravenous, PRN, Hood Dickerson MD    Current Outpatient Medications:   •  amLODIPine (NORVASC) 10 MG tablet, Take 1 tablet by mouth Daily., Disp: 90 tablet, Rfl: 3  •  B  Complex-C (B-complex with vitamin C) tablet, Take 1 tablet by mouth Daily., Disp: 30 tablet, Rfl: 11  •  furosemide (LASIX) 40 MG tablet, Take 1 tablet by mouth 2 (Two) Times a Day., Disp: 60 tablet, Rfl: 5  •  lisinopril-hydrochlorothiazide (PRINZIDE,ZESTORETIC) 20-12.5 MG per tablet, Take 1 tablet by mouth Daily., Disp: 90 tablet, Rfl: 3  •  metFORMIN (GLUCOPHAGE) 500 MG tablet, Take 1 tablet by mouth 2 (Two) Times a Day With Meals., Disp: 60 tablet, Rfl: 2  •  venlafaxine XR (EFFEXOR-XR) 150 MG 24 hr capsule, Take 1 capsule by mouth Daily with 75 mg capsule, Disp: 90 capsule, Rfl: 3    Family History   Problem Relation Age of Onset   • Diabetes Mother    • Hypertension Mother    • Schizophrenia Mother    • Depression Mother      Social History     Socioeconomic History   • Marital status: Single     Spouse name: Not on file   • Number of children: Not on file   • Years of education: Not on file   • Highest education level: Not on file   Tobacco Use   • Smoking status: Former Smoker     Types: Cigarettes     Quit date: 2008     Years since quittin.1   • Smokeless tobacco: Never Used   Vaping Use   • Vaping Use: Never used   Substance and Sexual Activity   • Alcohol use: Yes     Comment: occas   • Drug use: Never   • Sexual activity: Not Currently       Review of Systems:  Review of Systems   Constitutional: Negative for chills and fatigue.   HENT: Negative for dental problem, ear pain and nosebleeds.    Eyes: Negative for photophobia and visual disturbance.   Respiratory: Negative for cough, chest tightness and stridor.    Cardiovascular: Positive for leg swelling. Negative for chest pain.   Gastrointestinal: Positive for abdominal pain, nausea and vomiting. Negative for diarrhea.   Endocrine: Negative for polyphagia and polyuria.   Genitourinary: Negative for dysuria, genital sores and penile pain.   Musculoskeletal: Negative for arthralgias, gait problem and myalgias.   Skin: Negative for pallor and  "rash.   Allergic/Immunologic: Negative for immunocompromised state.   Neurological: Negative for tremors, seizures and headaches.   Hematological: Negative for adenopathy.   Psychiatric/Behavioral: Negative for agitation, hallucinations and sleep disturbance.     Otherwise the 12 point review of systems is negative.    /92 (BP Location: Left arm, Patient Position: Sitting)   Pulse 72   Temp 97.7 °F (36.5 °C) (Oral)   Resp 20   Ht 182.9 cm (72\")   Wt (!) 197 kg (434 lb)   SpO2 95%   BMI 58.86 kg/m²   Body mass index is 58.86 kg/m².    General: Obese, moderate distress   HEENT: PER, no icterus, normal sclerae  Cardiac: regular rhythm,  no audible rubs  Pulmonary: bilateral breath sounds, non labored  Abdominal: obese, tender in the right lower abdomen  Neurologic: awake, alert, no obvious focal deficits  Extremities: warm, + edema  Skin: no obvious rashes nor worrisome lesions seen    CBC  Results from last 7 days   Lab Units 09/08/21  1146   WBC 10*3/mm3 9.04   HEMOGLOBIN g/dL 15.6   HEMATOCRIT % 44.9   PLATELETS 10*3/mm3 184       CMP  Results from last 7 days   Lab Units 09/08/21  1146   SODIUM mmol/L 137   POTASSIUM mmol/L 3.6   CHLORIDE mmol/L 95*   CO2 mmol/L 26.0   BUN mg/dL 14   CREATININE mg/dL 0.85   CALCIUM mg/dL 9.6   BILIRUBIN mg/dL 0.8   ALK PHOS U/L 102   ALT (SGPT) U/L 37   AST (SGOT) U/L 26   GLUCOSE mg/dL 221*       Radiology  Imaging Results (Last 72 Hours)     Procedure Component Value Units Date/Time    CT Abdomen Pelvis Without Contrast [[id number]] Collected: 09/08/21 1313     Updated: 09/08/21 1320    Narrative:      EXAMINATION: CT ABDOMEN PELVIS WO CONTRAST-      INDICATION: RLQ abdominal pain     TECHNIQUE: Axial noncontrast CT of the abdomen and pelvis with  multiplanar reconstruction     The radiation dose reduction device was turned on for each scan per the  ALARA (As Low as Reasonably Achievable) protocol.     COMPARISON: NONE     FINDINGS: The lung bases are grossly clear. " Evaluation of the body wall  soft tissues demonstrates no acute finding. The osseous structures  demonstrate no evidence of acute fracture or aggressive osseous lesion.  The liver, spleen, pancreas and bilateral adrenal glands demonstrate  homogeneous attenuation without evidence of suspicious focal lesion or  the gallbladder is normal. Small and large bowel loops are nondilated.  There is no suspicious focal bowel wall thickening. No free fluid or  pneumoperitoneum. The appendix is edematous and dilated, with some  adjacent right lower quadrant mesenteric inflammatory change, measuring  up to 15 mm. There is no pneumoperitoneum or focal fluid collection  concerning for abscess. Nonaneurysmal abdominal aorta. No bulky  retroperitoneal adenopathy. The pelvic viscera are unremarkable.       Impression:      15 mm dilated and edematous appendix compatible with acute  appendicitis. No evidence of perforation or abscess.     Findings discussed with Dr. Dickerson of the emergency Department by  Kael Palm via phone at 1:21 PM 9/8/2021     This report was finalized on 9/8/2021 1:17 PM by Kael Palm.               Assessment:  Acute appendicitis  Morbid obesity, BMI 57  Diabetes mellitus type 2  Hypertension  Obstructive sleep apnea    Plan:  NPO, IV abx (zosyn), IV fluid, and appendectomy.  I reviewed the CT a/p along with his laboratory examinations. The risks and benefits of appendectomy were discussed with the patient. Our discussion included, but was not limited to: bleeding, infection, injury to adjacent viscera (colon, small bowel, right ureter), chronic pain, cecal/appendiceal stump leak, intra-abdominal abscess formation, need for percutaneous drainage, need for an open operation, and medical issues from a cardiopulmonary and deep venous thrombosis standpoint.  They understand and wish to proceed with surgical intervention.    Radames Arauz MD  09/08/21  14:29 EDT

## 2021-09-08 NOTE — ANESTHESIA PROCEDURE NOTES
Airway  Urgency: elective    Date/Time: 9/8/2021 3:18 PM  Airway not difficult    General Information and Staff    Patient location during procedure: OR  CRNA: Teja Leroy CRNA    Indications and Patient Condition  Indications for airway management: airway protection    Preoxygenated: yes  MILS not maintained throughout  Mask difficulty assessment: 0 - not attempted    Final Airway Details  Final airway type: endotracheal airway      Successful airway: ETT  Cuffed: yes   Successful intubation technique: RSI and video laryngoscopy  Facilitating devices/methods: cricoid pressure and intubating stylet  Endotracheal tube insertion site: oral  Blade: Glidescope  Blade size: 4  ETT size (mm): 8.0  Cormack-Lehane Classification: grade I - full view of glottis  Placement verified by: chest auscultation and capnometry   Cuff volume (mL): 10  Measured from: lips  ETT/EBT  to lips (cm): 23  Number of attempts at approach: 1  Assessment: lips, teeth, and gum same as pre-op and atraumatic intubation    Additional Comments  Negative epigastric sounds, Breath sound equal bilaterally with symmetric chest rise and fall. RSI with cricoid pressure until tube confirmation

## 2021-09-08 NOTE — ANESTHESIA PREPROCEDURE EVALUATION
Anesthesia Evaluation     Patient summary reviewed and Nursing notes reviewed                Airway   Mallampati: III  TM distance: >3 FB  Neck ROM: full  Possible difficult intubation  Dental - normal exam     Pulmonary - normal exam   (+) a smoker Former, sleep apnea,   Cardiovascular - normal exam    (+) hypertension, hyperlipidemia,       Neuro/Psych- negative ROS  GI/Hepatic/Renal/Endo    (+) morbid obesity, GERD,  diabetes mellitus,     Musculoskeletal (-) negative ROS    Abdominal  - normal exam    Bowel sounds: normal.   Substance History - negative use     OB/GYN negative ob/gyn ROS         Other                        Anesthesia Plan    ASA 3     general   Rapid sequence(Glidescope)  intravenous induction     Anesthetic plan, all risks, benefits, and alternatives have been provided, discussed and informed consent has been obtained with: patient.    Plan discussed with CRNA.

## 2021-09-09 ENCOUNTER — READMISSION MANAGEMENT (OUTPATIENT)
Dept: CALL CENTER | Facility: HOSPITAL | Age: 51
End: 2021-09-09

## 2021-09-09 VITALS
RESPIRATION RATE: 16 BRPM | TEMPERATURE: 97.9 F | DIASTOLIC BLOOD PRESSURE: 87 MMHG | HEART RATE: 63 BPM | WEIGHT: 315 LBS | HEIGHT: 72 IN | BODY MASS INDEX: 42.66 KG/M2 | OXYGEN SATURATION: 96 % | SYSTOLIC BLOOD PRESSURE: 167 MMHG

## 2021-09-09 LAB
ALBUMIN SERPL-MCNC: 3.8 G/DL (ref 3.5–5.2)
ALBUMIN/GLOB SERPL: 1.6 G/DL
ALP SERPL-CCNC: 81 U/L (ref 39–117)
ALT SERPL W P-5'-P-CCNC: 30 U/L (ref 1–41)
ANION GAP SERPL CALCULATED.3IONS-SCNC: 13 MMOL/L (ref 5–15)
AST SERPL-CCNC: 20 U/L (ref 1–40)
BASOPHILS # BLD AUTO: 0.01 10*3/MM3 (ref 0–0.2)
BASOPHILS NFR BLD AUTO: 0.2 % (ref 0–1.5)
BILIRUB SERPL-MCNC: 0.7 MG/DL (ref 0–1.2)
BUN SERPL-MCNC: 12 MG/DL (ref 6–20)
BUN/CREAT SERPL: 15.2 (ref 7–25)
CALCIUM SPEC-SCNC: 8.5 MG/DL (ref 8.6–10.5)
CHLORIDE SERPL-SCNC: 97 MMOL/L (ref 98–107)
CO2 SERPL-SCNC: 26 MMOL/L (ref 22–29)
CREAT SERPL-MCNC: 0.79 MG/DL (ref 0.76–1.27)
DEPRECATED RDW RBC AUTO: 41.2 FL (ref 37–54)
EOSINOPHIL # BLD AUTO: 0.01 10*3/MM3 (ref 0–0.4)
EOSINOPHIL NFR BLD AUTO: 0.2 % (ref 0.3–6.2)
ERYTHROCYTE [DISTWIDTH] IN BLOOD BY AUTOMATED COUNT: 13.7 % (ref 12.3–15.4)
GFR SERPL CREATININE-BSD FRML MDRD: 104 ML/MIN/1.73
GLOBULIN UR ELPH-MCNC: 2.4 GM/DL
GLUCOSE BLDC GLUCOMTR-MCNC: 234 MG/DL (ref 70–130)
GLUCOSE BLDC GLUCOMTR-MCNC: 247 MG/DL (ref 70–130)
GLUCOSE SERPL-MCNC: 255 MG/DL (ref 65–99)
HCT VFR BLD AUTO: 39.6 % (ref 37.5–51)
HGB BLD-MCNC: 13.6 G/DL (ref 13–17.7)
IMM GRANULOCYTES # BLD AUTO: 0.03 10*3/MM3 (ref 0–0.05)
IMM GRANULOCYTES NFR BLD AUTO: 0.5 % (ref 0–0.5)
LYMPHOCYTES # BLD AUTO: 0.86 10*3/MM3 (ref 0.7–3.1)
LYMPHOCYTES NFR BLD AUTO: 14.9 % (ref 19.6–45.3)
MAGNESIUM SERPL-MCNC: 1.6 MG/DL (ref 1.6–2.6)
MCH RBC QN AUTO: 28.6 PG (ref 26.6–33)
MCHC RBC AUTO-ENTMCNC: 34.3 G/DL (ref 31.5–35.7)
MCV RBC AUTO: 83.4 FL (ref 79–97)
MONOCYTES # BLD AUTO: 0.25 10*3/MM3 (ref 0.1–0.9)
MONOCYTES NFR BLD AUTO: 4.3 % (ref 5–12)
NEUTROPHILS NFR BLD AUTO: 4.62 10*3/MM3 (ref 1.7–7)
NEUTROPHILS NFR BLD AUTO: 79.9 % (ref 42.7–76)
NRBC BLD AUTO-RTO: 0 /100 WBC (ref 0–0.2)
PHOSPHATE SERPL-MCNC: 3.5 MG/DL (ref 2.5–4.5)
PLATELET # BLD AUTO: 149 10*3/MM3 (ref 140–450)
PMV BLD AUTO: 11.6 FL (ref 6–12)
POTASSIUM SERPL-SCNC: 3.6 MMOL/L (ref 3.5–5.2)
PROT SERPL-MCNC: 6.2 G/DL (ref 6–8.5)
RBC # BLD AUTO: 4.75 10*6/MM3 (ref 4.14–5.8)
SODIUM SERPL-SCNC: 136 MMOL/L (ref 136–145)
WBC # BLD AUTO: 5.78 10*3/MM3 (ref 3.4–10.8)

## 2021-09-09 PROCEDURE — 83735 ASSAY OF MAGNESIUM: CPT | Performed by: SURGERY

## 2021-09-09 PROCEDURE — 84100 ASSAY OF PHOSPHORUS: CPT | Performed by: SURGERY

## 2021-09-09 PROCEDURE — 25010000002 HEPARIN (PORCINE) PER 1000 UNITS: Performed by: SURGERY

## 2021-09-09 PROCEDURE — 82962 GLUCOSE BLOOD TEST: CPT

## 2021-09-09 PROCEDURE — G0378 HOSPITAL OBSERVATION PER HR: HCPCS

## 2021-09-09 PROCEDURE — 25010000002 PIPERACILLIN SOD-TAZOBACTAM PER 1 G: Performed by: SURGERY

## 2021-09-09 PROCEDURE — 80053 COMPREHEN METABOLIC PANEL: CPT | Performed by: SURGERY

## 2021-09-09 PROCEDURE — 63710000001 INSULIN REGULAR HUMAN PER 5 UNITS: Performed by: SURGERY

## 2021-09-09 PROCEDURE — 85025 COMPLETE CBC W/AUTO DIFF WBC: CPT | Performed by: SURGERY

## 2021-09-09 RX ORDER — HYDROCODONE BITARTRATE AND ACETAMINOPHEN 5; 325 MG/1; MG/1
1 TABLET ORAL EVERY 6 HOURS PRN
Qty: 10 TABLET | Refills: 0 | Status: SHIPPED | OUTPATIENT
Start: 2021-09-09 | End: 2021-10-26

## 2021-09-09 RX ADMIN — VENLAFAXINE HYDROCHLORIDE 150 MG: 75 CAPSULE, EXTENDED RELEASE ORAL at 09:04

## 2021-09-09 RX ADMIN — DOCUSATE SODIUM 100 MG: 100 CAPSULE, LIQUID FILLED ORAL at 09:05

## 2021-09-09 RX ADMIN — HYDROCHLOROTHIAZIDE: 12.5 CAPSULE ORAL at 09:05

## 2021-09-09 RX ADMIN — TAZOBACTAM SODIUM AND PIPERACILLIN SODIUM 3.38 G: 375; 3 INJECTION, SOLUTION INTRAVENOUS at 09:07

## 2021-09-09 RX ADMIN — FAMOTIDINE 20 MG: 20 TABLET, FILM COATED ORAL at 09:04

## 2021-09-09 RX ADMIN — HEPARIN SODIUM 5000 UNITS: 5000 INJECTION, SOLUTION INTRAVENOUS; SUBCUTANEOUS at 14:05

## 2021-09-09 RX ADMIN — AMLODIPINE BESYLATE 10 MG: 10 TABLET ORAL at 09:06

## 2021-09-09 RX ADMIN — METFORMIN HYDROCHLORIDE 500 MG: 500 TABLET ORAL at 09:04

## 2021-09-09 RX ADMIN — INSULIN HUMAN 4 UNITS: 100 INJECTION, SOLUTION PARENTERAL at 12:17

## 2021-09-09 RX ADMIN — HEPARIN SODIUM 5000 UNITS: 5000 INJECTION, SOLUTION INTRAVENOUS; SUBCUTANEOUS at 06:14

## 2021-09-09 NOTE — CASE MANAGEMENT/SOCIAL WORK
Discharge Planning Assessment  [facility]     Patient Name: Carlos Eduardo Danielle  MRN: [id number]  Today's Date: 9/9/2021    Admit Date: 9/8/2021    Discharge Needs Assessment     Row Name 09/09/21 1132       Living Environment    Lives With  alone    Current Living Arrangements  home/apartment/condo    Primary Care Provided by  self    Provides Primary Care For  no one    Family Caregiver if Needed  none    Quality of Family Relationships  unable to assess    Able to Return to Prior Arrangements  yes       Resource/Environmental Concerns    Resource/Environmental Concerns  none       Transition Planning    Patient/Family Anticipates Transition to  home    Patient/Family Anticipated Services at Transition      Transportation Anticipated  car, drives self       Discharge Needs Assessment    Readmission Within the Last 30 Days  no previous admission in last 30 days    Equipment Currently Used at Home  bipap    Concerns to be Addressed  discharge planning    Anticipated Changes Related to Illness  none    Equipment Needed After Discharge  none        Discharge Plan     Row Name 09/09/21 1133       Plan    Plan  Home    Patient/Family in Agreement with Plan  yes    Plan Comments  Spoke with patient in room to initiate discharge planning.  He lives alone in [place].  He is independent with ADL's.  He has a BiPAP at home.  He is not current with home health.  His PCP is Ruslan Issa.  He does not have an advanced directive.  Mr. Danielle has RX coverage and has his scripts filled at [facility].  His plan is to return home at discharge.  He denies any needs at this time.  CM will continue to follow.  Roxanna Mcmahon RN [phone]    Final Discharge Disposition Code  01 - home or self-care        Continued Care and Services - Admitted Since 9/8/2021    Coordination has not been started for this encounter.     Selected Continued Care - Episodes Includes selections from active Coordinated Care  Management episodes    Rising Risk Care Management Episode start date: 9/9/2021   There are no active outsourced providers for this episode.               Expected Discharge Date and Time     Expected Discharge Date Expected Discharge Time    Sep 11, 2021         Demographic Summary     Row Name 09/09/21 1132       General Information    Admission Type  observation    Arrived From  emergency department    Referral Source  admission list    Reason for Consult  discharge planning    Preferred Language  English     Used During This Interaction  no        Functional Status     Row Name 09/09/21 1132       Functional Status    Usual Activity Tolerance  good    Current Activity Tolerance  good       Functional Status, IADL    Medications  independent    Meal Preparation  independent    Housekeeping  independent    Laundry  independent    Shopping  independent        Psychosocial    No documentation.       Abuse/Neglect    No documentation.       Legal    No documentation.       Substance Abuse    No documentation.       Patient Forms    No documentation.           Roxanna Mcmahon RN

## 2021-09-09 NOTE — PLAN OF CARE
Goal Outcome Evaluation:           Progress: improving  Outcome Summary: Patient has had a decent shift, sleeping on and off tonight. VSS, and patient has been alert and oriented times four. No complaints of pain tonight. Nursing staff will continue to monitor and assess the patient.

## 2021-09-09 NOTE — OUTREACH NOTE
Prep Survey      Responses   [facility] facility patient discharged from?  [place]   Is LACE score < 7 ?  Yes   Emergency Room discharge w/ pulse ox?  No   Eligibility  [facility]   Date of Admission  09/08/21   Date of Discharge  09/09/21   Discharge Disposition  Home or Self Care   Discharge diagnosis  lap appy for appendicitis with peritonitis   Does the patient have one of the following disease processes/diagnoses(primary or secondary)?  General Surgery   Does the patient have Home health ordered?  No   Is there a DME ordered?  No   Prep survey completed?  Yes          Pascale Natarajan RN

## 2021-09-09 NOTE — H&P
Radames Arauz MD   Physician   Surgery   Consults      Signed   Date of Service:  09/08/21 1429   Creation Time:  09/08/21 1429            Signed        Expand AllCollapse All      Show:Clear all  [x]Manual[x]Template[]Copied    Added by:  [x]Radames Arauz MD    []Freeman for details  General Surgery H & P Note     Date of Service: 9/8/2021  Carlos Eduardo Danielle  [id number]  1970      Referring Provider: oHod Dickerson MD    Chief Complaint: Acute appendicitis        History of Present Illness:  I am seeing, Carlos Eduardo Lolis, in consultation for Hood Dickerson MD regarding acute appendicitis.  51-year-old gentleman with past medical history significant for morbid obesity, diabetes mellitus, hypertension, obstructive sleep apnea presents with approximately 24 hours worth of abdominal pain.  This began last evening prior to leaving work and has worsened ultimately bringing him to the emergency room.  His pain is in the right lower quadrant and is sharp and persistent.  This is been associated with nausea and vomitus though his vomitus has been nonbloody and nonbilious.  He denies any significant change in bowel habits.  He has had no fevers or chills or recent sick contacts.   Otherwise there are no other associated symptoms or modifying factors            Problems Addressed this Visit             None              Visit Diagnoses             Acute appendicitis with localized peritonitis, without perforation, abscess, or gangrene    -  Primary     Class 3 severe obesity due to excess calories with body mass index (BMI) of 50.0 to 59.9 in adult, unspecified whether serious comorbidity present (CMS/[facility])         Diabetes mellitus type 2 in obese (CMS/[facility])         Elevated blood pressure reading with diagnosis of hypertension                         Diagnoses        Codes Comments     Acute appendicitis with localized peritonitis, without perforation, abscess, or gangrene    -  Primary  ICD-10-CM: K35.30  ICD-9-CM: 540.9       Class 3 severe obesity due to excess calories with body mass index (BMI) of 50.0 to 59.9 in adult, unspecified whether serious comorbidity present (CMS/[facility])     ICD-10-CM: E66.01, Z68.43  ICD-9-CM: 278.01, V85.43       Diabetes mellitus type 2 in obese (CMS/[facility])     ICD-10-CM: E11.69, E66.9  ICD-9-CM: 250.00, 278.00       Elevated blood pressure reading with diagnosis of hypertension     ICD-10-CM: I10  ICD-9-CM: 401.9               Medical History        Past Medical History:   Diagnosis Date   • Anxiety     • Borderline diabetes     • Colon polyp     • Depression     • Diabetes mellitus (CMS/[facility])     • GERD (gastroesophageal reflux disease)     • Hyperlipidemia     • Hypertension     • Sleep apnea     • Suicide attempt (CMS/[facility])       reports hx of suicide attempts three times in 30 years ago   • Venous stasis       bilateral lower ext            Past Surgical History       Past Surgical History:   • EPIDIDYMAL CYST EXCISION   • TONSILLECTOMY                  Allergies   Allergen Reactions   • Atorvastatin Headache       Weakness.      • Topamax [Topiramate] Other (See Comments)       Migraine            No current facility-administered medications on file prior to encounter.             Current Outpatient Medications on File Prior to Encounter   Medication Sig Dispense Refill   • amLODIPine (NORVASC) 10 MG tablet Take 1 tablet by mouth Daily. 90 tablet 3   • B Complex-C (B-complex with vitamin C) tablet Take 1 tablet by mouth Daily. 30 tablet 11   • furosemide (LASIX) 40 MG tablet Take 1 tablet by mouth 2 (Two) Times a Day. 60 tablet 5   • lisinopril-hydrochlorothiazide (PRINZIDE,ZESTORETIC) 20-12.5 MG per tablet Take 1 tablet by mouth Daily. 90 tablet 3   • metFORMIN (GLUCOPHAGE) 500 MG tablet Take 1 tablet by mouth 2 (Two) Times a Day With Meals. 60 tablet 2   • venlafaxine XR (EFFEXOR-XR) 150 MG 24 hr capsule Take 1 capsule by mouth Daily with 75 mg capsule 90 capsule 3             Current Facility-Administered Medications:   •  piperacillin-tazobactam (ZOSYN) 4.5 g in iso-osmotic dextrose 100 mL IVPB (premix), 4.5 g, Intravenous, Once, Hood Dickerson MD  •  Sodium Chloride (PF) 0.9 % 10 mL, 10 mL, Intravenous, PRN, Hood Dickerson MD     Current Outpatient Medications:   •  amLODIPine (NORVASC) 10 MG tablet, Take 1 tablet by mouth Daily., Disp: 90 tablet, Rfl: 3  •  B Complex-C (B-complex with vitamin C) tablet, Take 1 tablet by mouth Daily., Disp: 30 tablet, Rfl: 11  •  furosemide (LASIX) 40 MG tablet, Take 1 tablet by mouth 2 (Two) Times a Day., Disp: 60 tablet, Rfl: 5  •  lisinopril-hydrochlorothiazide (PRINZIDE,ZESTORETIC) 20-12.5 MG per tablet, Take 1 tablet by mouth Daily., Disp: 90 tablet, Rfl: 3  •  metFORMIN (GLUCOPHAGE) 500 MG tablet, Take 1 tablet by mouth 2 (Two) Times a Day With Meals., Disp: 60 tablet, Rfl: 2  •  venlafaxine XR (EFFEXOR-XR) 150 MG 24 hr capsule, Take 1 capsule by mouth Daily with 75 mg capsule, Disp: 90 capsule, Rfl: 3           Family History   Problem Relation Age of Onset   • Diabetes Mother     • Hypertension Mother     • Schizophrenia Mother     • Depression Mother        Social History   Social History            Socioeconomic History   • Marital status: Single       Spouse name: Not on file   • Number of children: Not on file   • Years of education: Not on file   • Highest education level: Not on file   Tobacco Use   • Smoking status: Former Smoker       Types: Cigarettes       Quit date: 2008       Years since quittin.1   • Smokeless tobacco: Never Used   Vaping Use   • Vaping Use: Never used   Substance and Sexual Activity   • Alcohol use: Yes       Comment: occas   • Drug use: Never   • Sexual activity: Not Currently            Review of Systems:  Review of Systems   Constitutional: Negative for chills and fatigue.   HENT: Negative for dental problem, ear pain and nosebleeds.    Eyes: Negative for photophobia and visual  "disturbance.   Respiratory: Negative for cough, chest tightness and stridor.    Cardiovascular: Positive for leg swelling. Negative for chest pain.   Gastrointestinal: Positive for abdominal pain, nausea and vomiting. Negative for diarrhea.   Endocrine: Negative for polyphagia and polyuria.   Genitourinary: Negative for dysuria, genital sores and penile pain.   Musculoskeletal: Negative for arthralgias, gait problem and myalgias.   Skin: Negative for pallor and rash.   Allergic/Immunologic: Negative for immunocompromised state.   Neurological: Negative for tremors, seizures and headaches.   Hematological: Negative for adenopathy.   Psychiatric/Behavioral: Negative for agitation, hallucinations and sleep disturbance.      Otherwise the 12 point review of systems is negative.     /92 (BP Location: Left arm, Patient Position: Sitting)   Pulse 72   Temp 97.7 °F (36.5 °C) (Oral)   Resp 20   Ht 182.9 cm (72\")   Wt (!) 197 kg (434 lb)   SpO2 95%   BMI 58.86 kg/m²   Body mass index is 58.86 kg/m².     General: Obese, moderate distress   HEENT: PER, no icterus, normal sclerae  Cardiac: regular rhythm,  no audible rubs  Pulmonary: bilateral breath sounds, non labored  Abdominal: obese, tender in the right lower abdomen  Neurologic: awake, alert, no obvious focal deficits  Extremities: warm, + edema  Skin: no obvious rashes nor worrisome lesions seen     CBC       Results from last 7 days   Lab Units 09/08/21  1146   WBC 10*3/mm3 9.04   HEMOGLOBIN g/dL 15.6   HEMATOCRIT % 44.9   PLATELETS 10*3/mm3 184         CMP       Results from last 7 days   Lab Units 09/08/21  1146   SODIUM mmol/L 137   POTASSIUM mmol/L 3.6   CHLORIDE mmol/L 95*   CO2 mmol/L 26.0   BUN mg/dL 14   CREATININE mg/dL 0.85   CALCIUM mg/dL 9.6   BILIRUBIN mg/dL 0.8   ALK PHOS U/L 102   ALT (SGPT) U/L 37   AST (SGOT) U/L 26   GLUCOSE mg/dL 221*         Radiology           Imaging Results (Last 72 Hours)      Procedure Component Value Units Date/Time "     CT Abdomen Pelvis Without Contrast [[id number]] Collected: 09/08/21 1313       Updated: 09/08/21 1320     Narrative:       EXAMINATION: CT ABDOMEN PELVIS WO CONTRAST-      INDICATION: RLQ abdominal pain     TECHNIQUE: Axial noncontrast CT of the abdomen and pelvis with  multiplanar reconstruction     The radiation dose reduction device was turned on for each scan per the  ALARA (As Low as Reasonably Achievable) protocol.     COMPARISON: NONE     FINDINGS: The lung bases are grossly clear. Evaluation of the body wall  soft tissues demonstrates no acute finding. The osseous structures  demonstrate no evidence of acute fracture or aggressive osseous lesion.  The liver, spleen, pancreas and bilateral adrenal glands demonstrate  homogeneous attenuation without evidence of suspicious focal lesion or  the gallbladder is normal. Small and large bowel loops are nondilated.  There is no suspicious focal bowel wall thickening. No free fluid or  pneumoperitoneum. The appendix is edematous and dilated, with some  adjacent right lower quadrant mesenteric inflammatory change, measuring  up to 15 mm. There is no pneumoperitoneum or focal fluid collection  concerning for abscess. Nonaneurysmal abdominal aorta. No bulky  retroperitoneal adenopathy. The pelvic viscera are unremarkable.        Impression:       15 mm dilated and edematous appendix compatible with acute  appendicitis. No evidence of perforation or abscess.     Findings discussed with Dr. Dickerson of the emergency Department by  Kael Palm via phone at 1:21 PM 9/8/2021     This report was finalized on 9/8/2021 1:17 PM by Kael Palm.                   Assessment:  Acute appendicitis  Morbid obesity, BMI 57  Diabetes mellitus type 2  Hypertension  Obstructive sleep apnea     Plan:  NPO, IV abx (zosyn), IV fluid, and appendectomy.  I reviewed the CT a/p along with his laboratory examinations. The risks and benefits of appendectomy were discussed with the  patient. Our discussion included, but was not limited to: bleeding, infection, injury to adjacent viscera (colon, small bowel, right ureter), chronic pain, cecal/appendiceal stump leak, intra-abdominal abscess formation, need for percutaneous drainage, need for an open operation, and medical issues from a cardiopulmonary and deep venous thrombosis standpoint.  They understand and wish to proceed with surgical intervention.     Radames Arauz MD  09/08/21  14:29 EDT

## 2021-09-09 NOTE — NURSING NOTE
notified that pt. Wants to drive himself home and hasn't had narcotics today. Dr Arauz okay with donna at this time.

## 2021-09-09 NOTE — PROGRESS NOTES
"General Surgery Daily Progress Note    Subjective:  Feeling much better.    Objective:  /100 (BP Location: Left arm, Patient Position: Lying)   Pulse 61   Temp 97.9 °F (36.6 °C) (Axillary)   Resp 15   Ht 182.9 cm (72\")   Wt (!) 197 kg (434 lb)   SpO2 96%   BMI 58.86 kg/m²       General Appearance: No apparent distress  Eyes: Anicteric  Neck: Trachea midline   Cardiovascular:  RRR without murmur nor rub  Lungs:  Bilateral respirations unlabored   Abdomen:  Soft, appropriately tender, incisions dressed  Extremities:  No cyanosis or edema   Skin:  No obvious rashes   Neurologic: awake and conversant       Imaging Results (Last 24 Hours)     Procedure Component Value Units Date/Time    CT Abdomen Pelvis Without Contrast [[id number]] Collected: 09/08/21 1313     Updated: 09/08/21 1320    Narrative:      EXAMINATION: CT ABDOMEN PELVIS WO CONTRAST-      INDICATION: RLQ abdominal pain     TECHNIQUE: Axial noncontrast CT of the abdomen and pelvis with  multiplanar reconstruction     The radiation dose reduction device was turned on for each scan per the  ALARA (As Low as Reasonably Achievable) protocol.     COMPARISON: NONE     FINDINGS: The lung bases are grossly clear. Evaluation of the body wall  soft tissues demonstrates no acute finding. The osseous structures  demonstrate no evidence of acute fracture or aggressive osseous lesion.  The liver, spleen, pancreas and bilateral adrenal glands demonstrate  homogeneous attenuation without evidence of suspicious focal lesion or  the gallbladder is normal. Small and large bowel loops are nondilated.  There is no suspicious focal bowel wall thickening. No free fluid or  pneumoperitoneum. The appendix is edematous and dilated, with some  adjacent right lower quadrant mesenteric inflammatory change, measuring  up to 15 mm. There is no pneumoperitoneum or focal fluid collection  concerning for abscess. Nonaneurysmal abdominal aorta. No bulky  retroperitoneal adenopathy. " The pelvic viscera are unremarkable.       Impression:      15 mm dilated and edematous appendix compatible with acute  appendicitis. No evidence of perforation or abscess.     Findings discussed with Dr. Dickerson of the emergency Department by  Kael Palm via phone at 1:21 PM 9/8/2021     This report was finalized on 9/8/2021 1:17 PM by Kael Palm.             CBC:  Results from last 7 days   Lab Units 09/09/21  0513   WBC 10*3/mm3 5.78   HEMOGLOBIN g/dL 13.6   HEMATOCRIT % 39.6   PLATELETS 10*3/mm3 149       CMP:  Results from last 7 days   Lab Units 09/09/21  0513   SODIUM mmol/L 136   POTASSIUM mmol/L 3.6   CHLORIDE mmol/L 97*   CO2 mmol/L 26.0   BUN mg/dL 12   CREATININE mg/dL 0.79   CALCIUM mg/dL 8.5*   BILIRUBIN mg/dL 0.7   ALK PHOS U/L 81   ALT (SGPT) U/L 30   AST (SGOT) U/L 20   GLUCOSE mg/dL 255*         Assessment/Plan:  Status post laparoscopic appendectomy, acute uncomplicated appendicitis    Advance to a regular diabetic diet.  Oral pain medications.  Ambulate.    Radaems Arauz MD - 9/9/2021, 08:33 EDT

## 2021-09-10 ENCOUNTER — TRANSITIONAL CARE MANAGEMENT TELEPHONE ENCOUNTER (OUTPATIENT)
Dept: CALL CENTER | Facility: HOSPITAL | Age: 51
End: 2021-09-10

## 2021-09-10 LAB
CYTO UR: NORMAL
LAB AP CASE REPORT: NORMAL
LAB AP CLINICAL INFORMATION: NORMAL
PATH REPORT.FINAL DX SPEC: NORMAL
PATH REPORT.GROSS SPEC: NORMAL

## 2021-09-10 NOTE — OUTREACH NOTE
Call Center TCM Note      Responses   [facility], [place], operated by [facility] patient discharged from?  [place]   Does the patient have one of the following disease processes/diagnoses(primary or secondary)?  General Surgery   TCM attempt successful?  Yes   Call start time  1000   Call end time  1003   Discharge diagnosis  lap appy for appendicitis with peritonitis   Meds reviewed with patient/caregiver?  Yes   Is the patient having any side effects they believe may be caused by any medication additions or changes?  No   Does the patient have all medications related to this admission filled (includes all antibiotics, pain medications, etc.)  Yes   Is the patient taking all medications as directed (includes completed medication regime)?  Yes   Does the patient have a follow up appointment scheduled with their surgeon?  Yes   Has the patient kept scheduled appointments due by today?  Yes   Comments  Has a hospital followup scheduled with PCP office on Sept 14 2021   Has home health visited the patient within 72 hours of discharge?  N/A   Psychosocial issues?  No   Did the patient receive a copy of their discharge instructions?  Yes   Nursing interventions  Reviewed instructions with patient   What is the patient's perception of their health status since discharge?  Improving   Nursing interventions  Nurse provided patient education   Is the patient /caregiver able to teach back basic post-op care?  Keep incision areas clean,dry and protected, No tub bath, swimming, or hot tub until instructed by MD, Take showers only when approved by MD-sponge bathe until then, Drive as instructed by MD in discharge instructions   Is the patient/caregiver able to teach back signs and symptoms of incisional infection?  Increased redness, swelling or pain at the incisonal site, Incisional warmth, Pus or odor from incision, Fever, Increased drainage or bleeding   Is the patient/caregiver able to teach back steps to recovery at home?  Set small, achievable  goals for return to baseline health, Rest and rebuild strength, gradually increase activity, Make a list of questions for surgeon's appointment   If the patient is a current smoker, are they able to teach back resources for cessation?  Not a smoker   Is the patient/caregiver able to teach back the hierarchy of who to call/visit for symptoms/problems? PCP, Specialist, Home health nurse, Urgent Care, ED, 911  Yes   TCM call completed?  Yes          Ralph Addison RN    9/10/2021, 10:03 EDT

## 2021-09-13 NOTE — DISCHARGE SUMMARY
General Surgery Discharge Note (Dr. BELKIS Arauz)    Ruslan Issa MD    Patient Name:  Carlos Eduardo Danielle  Admission Date:  9/8/2021  Discharge Date:  9/9/2021    Diagnosis/Problems:  Problems Addressed this Visit        Gastrointestinal Abdominal     Acute appendicitis with localized peritonitis, without perforation, abscess, or gangrene - Primary    Relevant Medications    HYDROcodone-acetaminophen (NORCO) 5-325 MG per tablet      Other Visit Diagnoses     Class 3 severe obesity due to excess calories with body mass index (BMI) of 50.0 to 59.9 in adult, unspecified whether serious comorbidity present (CMS/[facility])        Diabetes mellitus type 2 in obese (CMS/[facility])        Elevated blood pressure reading with diagnosis of hypertension        Appendicitis, acute        Relevant Orders    Tissue Pathology Exam (Completed)      Diagnoses       Codes Comments    Acute appendicitis with localized peritonitis, without perforation, abscess, or gangrene    -  Primary ICD-10-CM: K35.30  ICD-9-CM: 540.9     Class 3 severe obesity due to excess calories with body mass index (BMI) of 50.0 to 59.9 in adult, unspecified whether serious comorbidity present (CMS/[facility])     ICD-10-CM: E66.01, Z68.43  ICD-9-CM: 278.01, V85.43     Diabetes mellitus type 2 in obese (CMS/[facility])     ICD-10-CM: E11.69, E66.9  ICD-9-CM: 250.00, 278.00     Elevated blood pressure reading with diagnosis of hypertension     ICD-10-CM: I10  ICD-9-CM: 401.9     Appendicitis, acute     ICD-10-CM: K35.80  ICD-9-CM: 540.9           History & Hospital Course: 51-year-old gentleman presented to the emergency room with right lower quadrant abdominal pain.  His work-up was consistent with acute appendicitis.  He underwent an uneventful laparoscopic appendectomy on 9/8/2021.  His postoperative convalescence has been uneventful. The patient is tolerating an appropriate diet and is ready to be discharged.  According to the nurse she has not received any narcotic pain  medicine today, thus I will allow him to drive himself home.      Physical: appropriate post operative examination.      Assessment:  Doing well may DC home.    Plan:    DC Home  Diet: Regular diet as tolerated  Restrictions: No heavy lifting greater than 10 lbs.  May shower as needed, no tub bathes.  Do not submerge the incisions underwater.  No driving on narcotic pain medicine within 24 hours.    Prescriptions: Norco 5 mg tablets #10..  May resume all prior home medications.  No current facility-administered medications on file prior to encounter.     Current Outpatient Medications on File Prior to Encounter   Medication Sig Dispense Refill   • amLODIPine (NORVASC) 10 MG tablet Take 1 tablet by mouth Daily. 90 tablet 3   • B Complex-C (B-complex with vitamin C) tablet Take 1 tablet by mouth Daily. 30 tablet 11   • furosemide (LASIX) 40 MG tablet Take 1 tablet by mouth 2 (Two) Times a Day. 60 tablet 5   • lisinopril-hydrochlorothiazide (PRINZIDE,ZESTORETIC) 20-12.5 MG per tablet Take 1 tablet by mouth Daily. 90 tablet 3   • metFORMIN (GLUCOPHAGE) 500 MG tablet Take 1 tablet by mouth 2 (Two) Times a Day With Meals. 60 tablet 2   • venlafaxine XR (EFFEXOR-XR) 150 MG 24 hr capsule Take 1 capsule by mouth Daily with 75 mg capsule 90 capsule 3        Follow up in 2 weeks at [facility], [phone].    Radames Arauz MD,  9/13/2021 - 13:37 EDT

## 2021-09-14 ENCOUNTER — OFFICE VISIT (OUTPATIENT)
Dept: INTERNAL MEDICINE | Facility: CLINIC | Age: 51
End: 2021-09-14

## 2021-09-14 VITALS
BODY MASS INDEX: 42.66 KG/M2 | DIASTOLIC BLOOD PRESSURE: 93 MMHG | HEIGHT: 72 IN | SYSTOLIC BLOOD PRESSURE: 158 MMHG | WEIGHT: 315 LBS | HEART RATE: 68 BPM | OXYGEN SATURATION: 96 % | RESPIRATION RATE: 16 BRPM | TEMPERATURE: 97.8 F

## 2021-09-14 DIAGNOSIS — Z90.49 S/P APPENDECTOMY: ICD-10-CM

## 2021-09-14 DIAGNOSIS — I10 ESSENTIAL HYPERTENSION: ICD-10-CM

## 2021-09-14 DIAGNOSIS — K35.30 ACUTE APPENDICITIS WITH LOCALIZED PERITONITIS, WITHOUT PERFORATION, ABSCESS, OR GANGRENE: Primary | ICD-10-CM

## 2021-09-14 PROCEDURE — 99496 TRANSJ CARE MGMT HIGH F2F 7D: CPT | Performed by: NURSE PRACTITIONER

## 2021-09-14 RX ORDER — LISINOPRIL 40 MG/1
40 TABLET ORAL DAILY
Qty: 90 TABLET | Refills: 1 | Status: SHIPPED | OUTPATIENT
Start: 2021-09-14 | End: 2022-03-21 | Stop reason: SDUPTHER

## 2021-09-14 RX ORDER — HYDROCHLOROTHIAZIDE 12.5 MG/1
12.5 TABLET ORAL DAILY
Qty: 90 TABLET | Refills: 1 | Status: SHIPPED | OUTPATIENT
Start: 2021-09-14 | End: 2022-03-21 | Stop reason: SDUPTHER

## 2021-09-14 NOTE — PROGRESS NOTES
Transitional Care Follow Up Visit  Subjective     Carlos Eduardo Danielle is a 51 y.o. male who presents for a transitional care management visit.    Within 48 business hours after discharge our office contacted him via telephone to coordinate his care and needs.      I reviewed and discussed the details of that call along with the discharge summary, hospital problems, inpatient lab results, inpatient diagnostic studies, and consultation reports with Carlos Eduardo.     Current outpatient and discharge medications have been reconciled for the patient.  Reviewed by: AGUILAR Jennings      Date of TCM Phone Call 9/9/2021   [facility]   Date of Admission 9/8/2021   Date of Discharge 9/9/2021   Discharge Disposition Home or Self Care     Risk for Readmission (LACE) Score: 5 (9/9/2021  6:01 AM)      History of Present Illness   Course During Hospital Stay: Presented to [facility] with acute complaints of RLQ pain on 8/9. Workup was consistent with acute appendicitis. He was taken to the OR by Dr. Arauz for laparoscopic appendectomy without complication. He had an uneventful postoperative course and pain was controlled without narcotic analgesia. He was able to tolerate a diet and discharged home in stable condition on 9/9. He was given instruction to avoid heavy lifting x 6 weeks and follow-up with general surgery in 2 weeks.     He has been doing well at home post hospitalization. He has not needed any narcotic pain medication in 2 days, prior needed it at night to help with sleep due to discomfort. 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 He is tolerating a diet well.   Denies fever, nausea, vomiting, abdominal pain, and diarrhea.   His blood pressure is elevated during today's visit, after review has been uncontrolled. He does not monitor his blood pressure at home. Current medication regimen includes lasix 40mg, lisinopril 20-HCTZ 12.5, and amlodipine 10mg. He states  compliance with his medication. He is morbidly obese and has difficulty losing weight. He has thought about bariatric surgery in the past but after this episode he does not want anymore surgeries. Tries to follow DASH diet. No structured exercise and denies ETOH and tobacco use.     The following portions of the patient's history were reviewed and updated as appropriate: allergies, current medications, past family history, past medical history, past social history, past surgical history and problem list.    Review of Systems   Constitutional: Positive for fatigue. Negative for appetite change and fever.   HENT: Negative for sore throat and trouble swallowing.    Eyes: Negative for visual disturbance.   Respiratory: Negative for cough, shortness of breath and wheezing.    Cardiovascular: Negative for chest pain, palpitations and leg swelling.   Gastrointestinal: Negative for abdominal pain, blood in stool, constipation, diarrhea, nausea and vomiting.   Endocrine: Negative for polydipsia, polyphagia and polyuria.   Genitourinary: Negative for dysuria.   Musculoskeletal: Negative for arthralgias, back pain and myalgias.   Skin: Negative for rash.   Neurological: Negative for dizziness, weakness and headaches.   Psychiatric/Behavioral: Negative for sleep disturbance and suicidal ideas. The patient is not nervous/anxious.        Objective   Physical Exam  Vitals and nursing note reviewed.   Constitutional:       General: He is not in acute distress.     Appearance: Normal appearance. He is not toxic-appearing.   Eyes:      Pupils: Pupils are equal, round, and reactive to light.   Neck:      Vascular: No carotid bruit.   Cardiovascular:      Rate and Rhythm: Normal rate and regular rhythm.      Heart sounds: Normal heart sounds. No murmur heard.     Pulmonary:      Effort: Pulmonary effort is normal. No respiratory distress.      Breath sounds: Normal breath sounds. No wheezing.   Abdominal:      General: Bowel sounds are  normal. There is no distension.      Palpations: Abdomen is soft.      Tenderness: There is no abdominal tenderness.   Musculoskeletal:         General: Normal range of motion.      Cervical back: Neck supple. No tenderness.   Skin:     General: Skin is warm and dry.      Findings: No rash.   Neurological:      General: No focal deficit present.      Mental Status: He is alert.   Psychiatric:         Mood and Affect: Mood normal.         Behavior: Behavior normal.         Assessment/Plan   Diagnoses and all orders for this visit:    1. Acute appendicitis with localized peritonitis, without perforation, abscess, or gangrene (Primary)  2. S/P appendectomy  Doing well post operatively. Pain managed with non-narcotic medications. He can return to his desk job with restrictions on lifting (no more than 10 pounds). Advised to follow-up with general surgery in 2 weeks. Labs reviewed.   Admission on 09/08/2021, Discharged on 09/09/2021   Component Date Value Ref Range Status   • Glucose 09/08/2021 221* 65 - 99 mg/dL Final   • BUN 09/08/2021 14  6 - 20 mg/dL Final   • Creatinine 09/08/2021 0.85  0.76 - 1.27 mg/dL Final   • Sodium 09/08/2021 137  136 - 145 mmol/L Final   • Potassium 09/08/2021 3.6  3.5 - 5.2 mmol/L Final    Slight hemolysis detected by analyzer. Results may be affected.   • Chloride 09/08/2021 95* 98 - 107 mmol/L Final   • CO2 09/08/2021 26.0  22.0 - 29.0 mmol/L Final   • Calcium 09/08/2021 9.6  8.6 - 10.5 mg/dL Final   • Total Protein 09/08/2021 7.0  6.0 - 8.5 g/dL Final   • Albumin 09/08/2021 4.50  3.50 - 5.20 g/dL Final   • ALT (SGPT) 09/08/2021 37  1 - 41 U/L Final   • AST (SGOT) 09/08/2021 26  1 - 40 U/L Final   • Alkaline Phosphatase 09/08/2021 102  39 - 117 U/L Final   • Total Bilirubin 09/08/2021 0.8  0.0 - 1.2 mg/dL Final   • eGFR Non African Amer 09/08/2021 95  >60 mL/min/1.73 Final   • Globulin 09/08/2021 2.5  gm/dL Final   • A/G Ratio 09/08/2021 1.8  g/dL Final   • BUN/Creatinine Ratio 09/08/2021  16.5  7.0 - 25.0 Final   • Anion Gap 09/08/2021 16.0* 5.0 - 15.0 mmol/L Final   • Lipase 09/08/2021 31  13 - 60 U/L Final   • Color, UA 09/08/2021 Yellow  Yellow, Straw Final   • Appearance, UA 09/08/2021 Clear  Clear Final   • pH, UA 09/08/2021 6.5  5.0 - 8.0 Final   • Specific Gravity, UA 09/08/2021 1.008  1.001 - 1.030 Final   • Glucose, UA 09/08/2021 100 mg/dL (Trace)* Negative Final   • Ketones, UA 09/08/2021 Negative  Negative Final   • Bilirubin, UA 09/08/2021 Negative  Negative Final   • Blood, UA 09/08/2021 Negative  Negative Final   • Protein, UA 09/08/2021 Negative  Negative Final   • Leuk Esterase, UA 09/08/2021 Negative  Negative Final   • Nitrite, UA 09/08/2021 Negative  Negative Final   • Urobilinogen, UA 09/08/2021 0.2 E.U./dL  0.2 - 1.0 E.U./dL Final   • Lactate 09/08/2021 2.1* 0.5 - 2.0 mmol/L Final    Falsely depressed results may occur on samples drawn from patients receiving N-Acetylcysteine (NAC) or Metamizole.   • Extra Tube 09/08/2021 Hold for add-ons.   Final    Auto resulted.   • Extra Tube 09/08/2021 hold for add-on   Final    Auto resulted   • Extra Tube 09/08/2021 Hold for add-ons.   Final    Auto resulted.   • Extra Tube 09/08/2021 Hold for add-ons.   Final    Auto resulted.   • Extra Tube 09/08/2021 hold for add-on   Final    Auto resulted   • WBC 09/08/2021 9.04  3.40 - 10.80 10*3/mm3 Final   • RBC 09/08/2021 5.40  4.14 - 5.80 10*6/mm3 Final   • Hemoglobin 09/08/2021 15.6  13.0 - 17.7 g/dL Final   • Hematocrit 09/08/2021 44.9  37.5 - 51.0 % Final   • MCV 09/08/2021 83.1  79.0 - 97.0 fL Final   • MCH 09/08/2021 28.9  26.6 - 33.0 pg Final   • MCHC 09/08/2021 34.7  31.5 - 35.7 g/dL Final   • RDW 09/08/2021 14.1  12.3 - 15.4 % Final   • RDW-SD 09/08/2021 42.2  37.0 - 54.0 fl Final   • MPV 09/08/2021 11.8  6.0 - 12.0 fL Final   • Platelets 09/08/2021 184  140 - 450 10*3/mm3 Final   • Neutrophil % 09/08/2021 70.8  42.7 - 76.0 % Final   • Lymphocyte % 09/08/2021 21.2  19.6 - 45.3 % Final   •  Monocyte % 09/08/2021 5.8  5.0 - 12.0 % Final   • Eosinophil % 09/08/2021 1.2  0.3 - 6.2 % Final   • Basophil % 09/08/2021 0.6  0.0 - 1.5 % Final   • Immature Grans % 09/08/2021 0.4  0.0 - 0.5 % Final   • Neutrophils, Absolute 09/08/2021 6.40  1.70 - 7.00 10*3/mm3 Final   • Lymphocytes, Absolute 09/08/2021 1.92  0.70 - 3.10 10*3/mm3 Final   • Monocytes, Absolute 09/08/2021 0.52  0.10 - 0.90 10*3/mm3 Final   • Eosinophils, Absolute 09/08/2021 0.11  0.00 - 0.40 10*3/mm3 Final   • Basophils, Absolute 09/08/2021 0.05  0.00 - 0.20 10*3/mm3 Final   • Immature Grans, Absolute 09/08/2021 0.04  0.00 - 0.05 10*3/mm3 Final   • nRBC 09/08/2021 0.0  0.0 - 0.2 /100 WBC Final   • RBC Morphology 09/08/2021 Normal  Normal Final   • WBC Morphology 09/08/2021 Normal  Normal Final   • Platelet Morphology 09/08/2021 Normal  Normal Final   • Lactate 09/08/2021 2.0  0.5 - 2.0 mmol/L Final    Falsely depressed results may occur on samples drawn from patients receiving N-Acetylcysteine (NAC) or Metamizole.   • COVID19 09/08/2021 Presumptive Negative  Presumptive Negative - Ref. Range Final   • Case Report 09/08/2021    Final                    Value:Surgical Pathology Report                         Case: [id number]                                  Authorizing Provider:  Radames Arauz MD Collected:           09/08/2021 04:04 PM          Ordering Location:     [facility]   Received:            09/09/2021 07:30 AM                                 OR                                                                           Pathologist:           Jaun Mae MD                                                        Specimen:    Large Intestine, Appendix, appendix only                                                  • Clinical Information 09/08/2021    Final                    Value:This result contains rich text formatting which cannot be displayed here.   • Final Diagnosis 09/08/2021    Final                     Value:This result contains rich text formatting which cannot be displayed here.   • Gross Description 09/08/2021    Final                    Value:This result contains rich text formatting which cannot be displayed here.   • Microscopic Description 09/08/2021    Final                    Value:This result contains rich text formatting which cannot be displayed here.   • Hemoglobin A1C 09/08/2021 8.70* 4.80 - 5.60 % Final   • Glucose 09/08/2021 177* 70 - 130 mg/dL Final    Meter: [id number] : [id number] Nafisa SIMMONS   • Glucose 09/08/2021 262* 70 - 130 mg/dL Final    Meter: [id number] : [id number] Bishop Aburto   • Glucose 09/09/2021 255* 65 - 99 mg/dL Final   • BUN 09/09/2021 12  6 - 20 mg/dL Final   • Creatinine 09/09/2021 0.79  0.76 - 1.27 mg/dL Final   • Sodium 09/09/2021 136  136 - 145 mmol/L Final   • Potassium 09/09/2021 3.6  3.5 - 5.2 mmol/L Final   • Chloride 09/09/2021 97* 98 - 107 mmol/L Final   • CO2 09/09/2021 26.0  22.0 - 29.0 mmol/L Final   • Calcium 09/09/2021 8.5* 8.6 - 10.5 mg/dL Final   • Total Protein 09/09/2021 6.2  6.0 - 8.5 g/dL Final   • Albumin 09/09/2021 3.80  3.50 - 5.20 g/dL Final   • ALT (SGPT) 09/09/2021 30  1 - 41 U/L Final   • AST (SGOT) 09/09/2021 20  1 - 40 U/L Final   • Alkaline Phosphatase 09/09/2021 81  39 - 117 U/L Final   • Total Bilirubin 09/09/2021 0.7  0.0 - 1.2 mg/dL Final   • eGFR Non  Amer 09/09/2021 104  >60 mL/min/1.73 Final   • Globulin 09/09/2021 2.4  gm/dL Final   • A/G Ratio 09/09/2021 1.6  g/dL Final   • BUN/Creatinine Ratio 09/09/2021 15.2  7.0 - 25.0 Final   • Anion Gap 09/09/2021 13.0  5.0 - 15.0 mmol/L Final   • Magnesium 09/09/2021 1.6  1.6 - 2.6 mg/dL Final   • Phosphorus 09/09/2021 3.5  2.5 - 4.5 mg/dL Final   • WBC 09/09/2021 5.78  3.40 - 10.80 10*3/mm3 Final   • RBC 09/09/2021 4.75  4.14 - 5.80 10*6/mm3 Final   • Hemoglobin 09/09/2021 13.6  13.0 - 17.7 g/dL Final   • Hematocrit 09/09/2021 39.6  37.5 - 51.0 % Final   • MCV 09/09/2021 83.4   79.0 - 97.0 fL Final   • MCH 09/09/2021 28.6  26.6 - 33.0 pg Final   • MCHC 09/09/2021 34.3  31.5 - 35.7 g/dL Final   • RDW 09/09/2021 13.7  12.3 - 15.4 % Final   • RDW-SD 09/09/2021 41.2  37.0 - 54.0 fl Final   • MPV 09/09/2021 11.6  6.0 - 12.0 fL Final   • Platelets 09/09/2021 149  140 - 450 10*3/mm3 Final   • Neutrophil % 09/09/2021 79.9* 42.7 - 76.0 % Final   • Lymphocyte % 09/09/2021 14.9* 19.6 - 45.3 % Final   • Monocyte % 09/09/2021 4.3* 5.0 - 12.0 % Final   • Eosinophil % 09/09/2021 0.2* 0.3 - 6.2 % Final   • Basophil % 09/09/2021 0.2  0.0 - 1.5 % Final   • Immature Grans % 09/09/2021 0.5  0.0 - 0.5 % Final   • Neutrophils, Absolute 09/09/2021 4.62  1.70 - 7.00 10*3/mm3 Final   • Lymphocytes, Absolute 09/09/2021 0.86  0.70 - 3.10 10*3/mm3 Final   • Monocytes, Absolute 09/09/2021 0.25  0.10 - 0.90 10*3/mm3 Final   • Eosinophils, Absolute 09/09/2021 0.01  0.00 - 0.40 10*3/mm3 Final   • Basophils, Absolute 09/09/2021 0.01  0.00 - 0.20 10*3/mm3 Final   • Immature Grans, Absolute 09/09/2021 0.03  0.00 - 0.05 10*3/mm3 Final   • nRBC 09/09/2021 0.0  0.0 - 0.2 /100 WBC Final   • Glucose 09/09/2021 234* 70 - 130 mg/dL Final    Meter: [id number] : [id number] Anuja Singh   • Glucose 09/09/2021 247* 70 - 130 mg/dL Final    Meter: [id number] : [id number] Anuja Singh       3. Essential hypertension  Uncontrolled. Increase lisinopril to 40mg and will individually order HCTZ. Continue other medications as prescribed. Advised to monitor blood pressure at home and bring readings to next office visit. DASH diet, moderate-intensity exercise 4-5 times per week, and medication compliance urged. Discussed risks of uncontrolled blood pressure. Follow-up in 1 month.   Other orders  -     lisinopril (PRINIVIL,ZESTRIL) 40 MG tablet; Take 1 tablet by mouth Daily.  Dispense: 90 tablet; Refill: 1  -     hydroCHLOROthiazide (HYDRODIURIL) 12.5 MG tablet; Take 1 tablet by mouth Daily.  Dispense: 90 tablet; Refill: 1

## 2021-09-27 DIAGNOSIS — F32.A DEPRESSION, UNSPECIFIED DEPRESSION TYPE: Primary | ICD-10-CM

## 2021-09-28 DIAGNOSIS — E11.9 TYPE 2 DIABETES MELLITUS WITHOUT COMPLICATION, WITHOUT LONG-TERM CURRENT USE OF INSULIN (HCC): Primary | ICD-10-CM

## 2021-09-28 RX ORDER — FUROSEMIDE 40 MG/1
40 TABLET ORAL 2 TIMES DAILY
Qty: 60 TABLET | Refills: 5 | Status: SHIPPED | OUTPATIENT
Start: 2021-09-28 | End: 2022-02-02 | Stop reason: SDUPTHER

## 2021-09-28 NOTE — TELEPHONE ENCOUNTER
Rx Refill Note  Requested Prescriptions     Pending Prescriptions Disp Refills   • furosemide (LASIX) 40 MG tablet 60 tablet 5     Sig: Take 1 tablet by mouth 2 (Two) Times a Day.      Last office visit with prescribing clinician: 6/19/2021 via telemedicine  Next office visit with prescribing clinician: 10/26/2021            ANGELA VILLEDA MA  09/28/21, 13:54 EDT

## 2021-09-28 NOTE — TELEPHONE ENCOUNTER
Rx Refill Note  Requested Prescriptions     Pending Prescriptions Disp Refills   • metFORMIN (GLUCOPHAGE) 500 MG tablet 60 tablet 2     Sig: Take 1 tablet by mouth 2 (Two) Times a Day With Meals.      Last office visit with prescribing clinician: 6/19/2021 via telemedicine      Next office visit with prescribing clinician: 10/26/2021            ANGELA VILLEDA MA  09/28/21, 13:52 EDT

## 2021-10-04 ENCOUNTER — TELEPHONE (OUTPATIENT)
Dept: INTERNAL MEDICINE | Facility: CLINIC | Age: 51
End: 2021-10-04

## 2021-10-04 RX ORDER — VENLAFAXINE 75 MG/1
75 TABLET ORAL DAILY
Qty: 90 TABLET | Refills: 3 | Status: SHIPPED | OUTPATIENT
Start: 2021-10-04 | End: 2022-07-21

## 2021-10-04 NOTE — TELEPHONE ENCOUNTER
Patient called in regards to his (EFFEXOR-XR) 150 MG 24 hr capsule.     He states that per the instructions, it says he should be taking 1 capsule with an additional 75 mg capsule. Patient does not have a 75 mg prescription but wondering if he is supposed to have one. Please advise and contact patient to discuss.

## 2021-10-25 ENCOUNTER — TELEPHONE (OUTPATIENT)
Dept: INTERNAL MEDICINE | Facility: CLINIC | Age: 51
End: 2021-10-25

## 2021-10-25 DIAGNOSIS — E11.9 TYPE 2 DIABETES MELLITUS WITHOUT COMPLICATION, WITHOUT LONG-TERM CURRENT USE OF INSULIN (HCC): Primary | ICD-10-CM

## 2021-10-25 DIAGNOSIS — F41.9 ANXIETY: ICD-10-CM

## 2021-10-25 DIAGNOSIS — R53.83 FATIGUE, UNSPECIFIED TYPE: ICD-10-CM

## 2021-10-25 NOTE — TELEPHONE ENCOUNTER
PT CALLED AND ASKED IF DR RAHMAN WAS GOING TO DO LABWORK FOR HIS APPOINTMENT TOMORROW AND IF HE WOULD NEED TO FAST. PT ASKED FOR A CALL BACK SO HE WOULD KNOW TO FAST

## 2021-10-26 ENCOUNTER — TELEPHONE (OUTPATIENT)
Dept: INTERNAL MEDICINE | Facility: CLINIC | Age: 51
End: 2021-10-26

## 2021-10-26 ENCOUNTER — OFFICE VISIT (OUTPATIENT)
Dept: INTERNAL MEDICINE | Facility: CLINIC | Age: 51
End: 2021-10-26

## 2021-10-26 VITALS
DIASTOLIC BLOOD PRESSURE: 108 MMHG | TEMPERATURE: 97.1 F | OXYGEN SATURATION: 98 % | RESPIRATION RATE: 16 BRPM | HEART RATE: 78 BPM | WEIGHT: 315 LBS | HEIGHT: 72 IN | BODY MASS INDEX: 42.66 KG/M2 | SYSTOLIC BLOOD PRESSURE: 168 MMHG

## 2021-10-26 DIAGNOSIS — Z99.89 BIPAP (BIPHASIC POSITIVE AIRWAY PRESSURE) DEPENDENCE: ICD-10-CM

## 2021-10-26 DIAGNOSIS — F32.4 MAJOR DEPRESSIVE DISORDER IN PARTIAL REMISSION, UNSPECIFIED WHETHER RECURRENT (HCC): ICD-10-CM

## 2021-10-26 DIAGNOSIS — I10 PRIMARY HYPERTENSION: ICD-10-CM

## 2021-10-26 DIAGNOSIS — E66.01 MORBID (SEVERE) OBESITY DUE TO EXCESS CALORIES (HCC): ICD-10-CM

## 2021-10-26 DIAGNOSIS — E11.65 TYPE 2 DIABETES MELLITUS WITH HYPERGLYCEMIA, WITHOUT LONG-TERM CURRENT USE OF INSULIN (HCC): Primary | ICD-10-CM

## 2021-10-26 PROCEDURE — 99214 OFFICE O/P EST MOD 30 MIN: CPT | Performed by: INTERNAL MEDICINE

## 2021-10-26 RX ORDER — CARVEDILOL 3.12 MG/1
3.12 TABLET ORAL 2 TIMES DAILY WITH MEALS
Qty: 60 TABLET | Refills: 5 | Status: SHIPPED | OUTPATIENT
Start: 2021-10-26 | End: 2021-11-23

## 2021-10-26 NOTE — PROGRESS NOTES
Subjective     Patient ID: Carlos Eduardo Danielle is a 51 y.o. male. Patient is here for management of multiple medical problems.     Chief Complaint   Patient presents with   • Diabetes     History of Present Illness     DM  BS running ok    Checking daily. Qid          Tried and failed neutra symptoms.   Pt eating a very low kiel tid diet.               The following portions of the patient's history were reviewed and updated as appropriate: allergies, current medications, past family history, past medical history, past social history, past surgical history and problem list.    Review of Systems    Current Outpatient Medications:   •  amLODIPine (NORVASC) 10 MG tablet, Take 1 tablet by mouth Daily., Disp: 90 tablet, Rfl: 3  •  B Complex-C (B-complex with vitamin C) tablet, Take 1 tablet by mouth Daily., Disp: 30 tablet, Rfl: 11  •  furosemide (LASIX) 40 MG tablet, Take 1 tablet by mouth 2 (Two) Times a Day., Disp: 60 tablet, Rfl: 5  •  hydroCHLOROthiazide (HYDRODIURIL) 12.5 MG tablet, Take 1 tablet by mouth Daily., Disp: 90 tablet, Rfl: 1  •  lisinopril (PRINIVIL,ZESTRIL) 40 MG tablet, Take 1 tablet by mouth Daily., Disp: 90 tablet, Rfl: 1  •  metFORMIN (GLUCOPHAGE) 500 MG tablet, Take 1 tablet by mouth 2 (Two) Times a Day With Meals., Disp: 60 tablet, Rfl: 2  •  venlafaxine (EFFEXOR) 75 MG tablet, Take 1 tablet by mouth Daily. For a total of 225 mg, Disp: 90 tablet, Rfl: 3  •  venlafaxine XR (EFFEXOR-XR) 150 MG 24 hr capsule, Take 1 capsule by mouth Daily with 75 mg capsule, Disp: 90 capsule, Rfl: 3  •  carvedilol (Coreg) 3.125 MG tablet, Take 1 tablet by mouth 2 (Two) Times a Day With Meals., Disp: 60 tablet, Rfl: 5  •  Liraglutide (SAXENDA) 18 MG/3ML injection pen, Inject 0.6mg under skin daily for week one, THEN 1.2mg daily for week two, THEN 1.8mg daily for week three, then 2.4mg daily for week four., Disp: 3 pen, Rfl: 0  •  Liraglutide (SAXENDA) 18 MG/3ML injection pen, Inject 3 mg under the skin into the  "appropriate area as directed Daily., Disp: 5 pen, Rfl: 0    Objective      Blood pressure (!) 168/108, pulse 78, temperature 97.1 °F (36.2 °C), resp. rate 16, height 182.9 cm (72\"), weight (!) 200 kg (442 lb), SpO2 98 %.    Physical Exam     General Appearance:    Alert, cooperative, no distress, appears stated age   Head:    Normocephalic, without obvious abnormality, atraumatic   Eyes:    PERRL, conjunctiva/corneas clear, EOM's intact   Ears:    Normal TM's and external ear canals, both ears   Nose:   Nares normal, septum midline, mucosa normal, no drainage   or sinus tenderness   Throat:   Lips, mucosa, and tongue normal; teeth and gums normal   Neck:   Supple, symmetrical, trachea midline, no adenopathy;        thyroid:  No enlargement/tenderness/nodules; no carotid    bruit or JVD   Back:     Symmetric, no curvature, ROM normal, no CVA tenderness   Lungs:     Clear to auscultation bilaterally, respirations unlabored   Chest wall:    No tenderness or deformity   Heart:    Regular rate and rhythm, S1 and S2 normal, no murmur,        rub or gallop   Abdomen:     Soft, non-tender, bowel sounds active all four quadrants,     no masses, no organomegaly   Extremities:   Extremities normal, atraumatic, no cyanosis or edema   Pulses:   2+ and symmetric all extremities   Skin:   Skin color, texture, turgor normal, no rashes or lesions   Lymph nodes:   Cervical, supraclavicular, and axillary nodes normal   Neurologic:   CNII-XII intact. Normal strength, sensation and reflexes       throughout      Results for orders placed or performed during the hospital encounter of 09/08/21   COVID-19, [facility] Swab (NO TRANSPORT MEDIA) 2 HR TAT - Swab, Nasopharynx    Specimen: Nasopharynx; Swab   Result Value Ref Range    COVID19 Presumptive Negative Presumptive Negative - Ref. Range   Comprehensive Metabolic Panel    Specimen: Blood   Result Value Ref Range    Glucose 221 (H) 65 - 99 mg/dL    BUN 14 6 - 20 mg/dL    " Creatinine 0.85 0.76 - 1.27 mg/dL    Sodium 137 136 - 145 mmol/L    Potassium 3.6 3.5 - 5.2 mmol/L    Chloride 95 (L) 98 - 107 mmol/L    CO2 26.0 22.0 - 29.0 mmol/L    Calcium 9.6 8.6 - 10.5 mg/dL    Total Protein 7.0 6.0 - 8.5 g/dL    Albumin 4.50 3.50 - 5.20 g/dL    ALT (SGPT) 37 1 - 41 U/L    AST (SGOT) 26 1 - 40 U/L    Alkaline Phosphatase 102 39 - 117 U/L    Total Bilirubin 0.8 0.0 - 1.2 mg/dL    eGFR Non African Amer 95 >60 mL/min/1.73    Globulin 2.5 gm/dL    A/G Ratio 1.8 g/dL    BUN/Creatinine Ratio 16.5 7.0 - 25.0    Anion Gap 16.0 (H) 5.0 - 15.0 mmol/L   Lipase    Specimen: Blood   Result Value Ref Range    Lipase 31 13 - 60 U/L   Urinalysis With Microscopic If Indicated (No Culture) - Urine, Clean Catch    Specimen: Urine, Clean Catch   Result Value Ref Range    Color, UA Yellow Yellow, Straw    Appearance, UA Clear Clear    pH, UA 6.5 5.0 - 8.0    Specific Gravity, UA 1.008 1.001 - 1.030    Glucose,  mg/dL (Trace) (A) Negative    Ketones, UA Negative Negative    Bilirubin, UA Negative Negative    Blood, UA Negative Negative    Protein, UA Negative Negative    Leuk Esterase, UA Negative Negative    Nitrite, UA Negative Negative    Urobilinogen, UA 0.2 E.U./dL 0.2 - 1.0 E.U./dL   Lactic Acid, Plasma    Specimen: Blood   Result Value Ref Range    Lactate 2.1 (C) 0.5 - 2.0 mmol/L   CBC Auto Differential    Specimen: Blood   Result Value Ref Range    WBC 9.04 3.40 - 10.80 10*3/mm3    RBC 5.40 4.14 - 5.80 10*6/mm3    Hemoglobin 15.6 13.0 - 17.7 g/dL    Hematocrit 44.9 37.5 - 51.0 %    MCV 83.1 79.0 - 97.0 fL    MCH 28.9 26.6 - 33.0 pg    MCHC 34.7 31.5 - 35.7 g/dL    RDW 14.1 12.3 - 15.4 %    RDW-SD 42.2 37.0 - 54.0 fl    MPV 11.8 6.0 - 12.0 fL    Platelets 184 140 - 450 10*3/mm3    Neutrophil % 70.8 42.7 - 76.0 %    Lymphocyte % 21.2 19.6 - 45.3 %    Monocyte % 5.8 5.0 - 12.0 %    Eosinophil % 1.2 0.3 - 6.2 %    Basophil % 0.6 0.0 - 1.5 %    Immature Grans % 0.4 0.0 - 0.5 %    Neutrophils, Absolute  6.40 1.70 - 7.00 10*3/mm3    Lymphocytes, Absolute 1.92 0.70 - 3.10 10*3/mm3    Monocytes, Absolute 0.52 0.10 - 0.90 10*3/mm3    Eosinophils, Absolute 0.11 0.00 - 0.40 10*3/mm3    Basophils, Absolute 0.05 0.00 - 0.20 10*3/mm3    Immature Grans, Absolute 0.04 0.00 - 0.05 10*3/mm3    nRBC 0.0 0.0 - 0.2 /100 WBC   Scan Slide    Specimen: Blood   Result Value Ref Range    RBC Morphology Normal Normal    WBC Morphology Normal Normal    Platelet Morphology Normal Normal   STAT Lactic Acid, Reflex    Specimen: Blood   Result Value Ref Range    Lactate 2.0 0.5 - 2.0 mmol/L   Hemoglobin A1c    Specimen: Blood   Result Value Ref Range    Hemoglobin A1C 8.70 (H) 4.80 - 5.60 %   Comprehensive Metabolic Panel    Specimen: Blood   Result Value Ref Range    Glucose 255 (H) 65 - 99 mg/dL    BUN 12 6 - 20 mg/dL    Creatinine 0.79 0.76 - 1.27 mg/dL    Sodium 136 136 - 145 mmol/L    Potassium 3.6 3.5 - 5.2 mmol/L    Chloride 97 (L) 98 - 107 mmol/L    CO2 26.0 22.0 - 29.0 mmol/L    Calcium 8.5 (L) 8.6 - 10.5 mg/dL    Total Protein 6.2 6.0 - 8.5 g/dL    Albumin 3.80 3.50 - 5.20 g/dL    ALT (SGPT) 30 1 - 41 U/L    AST (SGOT) 20 1 - 40 U/L    Alkaline Phosphatase 81 39 - 117 U/L    Total Bilirubin 0.7 0.0 - 1.2 mg/dL    eGFR Non African Amer 104 >60 mL/min/1.73    Globulin 2.4 gm/dL    A/G Ratio 1.6 g/dL    BUN/Creatinine Ratio 15.2 7.0 - 25.0    Anion Gap 13.0 5.0 - 15.0 mmol/L   Magnesium    Specimen: Blood   Result Value Ref Range    Magnesium 1.6 1.6 - 2.6 mg/dL   Phosphorus    Specimen: Blood   Result Value Ref Range    Phosphorus 3.5 2.5 - 4.5 mg/dL   CBC Auto Differential    Specimen: Blood   Result Value Ref Range    WBC 5.78 3.40 - 10.80 10*3/mm3    RBC 4.75 4.14 - 5.80 10*6/mm3    Hemoglobin 13.6 13.0 - 17.7 g/dL    Hematocrit 39.6 37.5 - 51.0 %    MCV 83.4 79.0 - 97.0 fL    MCH 28.6 26.6 - 33.0 pg    MCHC 34.3 31.5 - 35.7 g/dL    RDW 13.7 12.3 - 15.4 %    RDW-SD 41.2 37.0 - 54.0 fl    MPV 11.6 6.0 - 12.0 fL    Platelets 149 140  - 450 10*3/mm3    Neutrophil % 79.9 (H) 42.7 - 76.0 %    Lymphocyte % 14.9 (L) 19.6 - 45.3 %    Monocyte % 4.3 (L) 5.0 - 12.0 %    Eosinophil % 0.2 (L) 0.3 - 6.2 %    Basophil % 0.2 0.0 - 1.5 %    Immature Grans % 0.5 0.0 - 0.5 %    Neutrophils, Absolute 4.62 1.70 - 7.00 10*3/mm3    Lymphocytes, Absolute 0.86 0.70 - 3.10 10*3/mm3    Monocytes, Absolute 0.25 0.10 - 0.90 10*3/mm3    Eosinophils, Absolute 0.01 0.00 - 0.40 10*3/mm3    Basophils, Absolute 0.01 0.00 - 0.20 10*3/mm3    Immature Grans, Absolute 0.03 0.00 - 0.05 10*3/mm3    nRBC 0.0 0.0 - 0.2 /100 WBC   POC Glucose Once    Specimen: Blood   Result Value Ref Range    Glucose 177 (H) 70 - 130 mg/dL   POC Glucose Once    Specimen: Blood   Result Value Ref Range    Glucose 262 (H) 70 - 130 mg/dL   POC Glucose Once    Specimen: Blood   Result Value Ref Range    Glucose 234 (H) 70 - 130 mg/dL   POC Glucose Once    Specimen: Blood   Result Value Ref Range    Glucose 247 (H) 70 - 130 mg/dL   Tissue Pathology Exam    Specimen: Large Intestine, Appendix; Tissue   Result Value Ref Range    Case Report       Surgical Pathology Report                         Case: [id number]                                  Authorizing Provider:  Radames Arauz MD Collected:           09/08/2021 04:04 PM          Ordering Location:     [facility]   Received:            09/09/2021 07:30 AM                                 OR                                                                           Pathologist:           Jaun Mae MD                                                        Specimen:    Large Intestine, Appendix, appendix only                                                   Clinical Information       Acute appendicitis with localized peritonitis, without perforation, abscess, or gangrene.      Final Diagnosis       APPENDIX, APPENDECTOMY:  Acute appendicitis and serositis.      Gross Description       Specimen 1 is received in formalin labeled  appendix and consists of a 6.5 cm in length by 1.1 cm in diameter grossly intact appendix with a moderate amount of attached mesoappendix.  The serosa is tan, finely vascularized, and displays moderate hemorrhagic and fibrinopurulent adhesions scattered throughout the specimen.  No perforations are identified and the proximal margin is inked blue.  Sectioning reveals an average wall thickness of 0.3 cm, an average luminal diameter of 0.4 cm, and a lumen containing a moderate amount of slightly hemorrhagic fecal material.  The mucosa is tan, glistening and minimally hemorrhagic without mass forming lesions or fecaliths.  Representative sections to include the en face proximal margin, half of the bivalved tip, and central cross-sections are submitted in 1A.  CLB      Microscopic Description       The slides are reviewed and demonstrate histopathologic features supporting the above rendered diagnosis.       Green Top (Gel)   Result Value Ref Range    Extra Tube Hold for add-ons.    Lavender Top   Result Value Ref Range    Extra Tube hold for add-on    Gold Top - SST   Result Value Ref Range    Extra Tube Hold for add-ons.    Gray Top   Result Value Ref Range    Extra Tube Hold for add-ons.    Light Blue Top   Result Value Ref Range    Extra Tube hold for add-on          Assessment/Plan   HTN. On Effexor    Wants an emotional support animal.   Had depression and sig problems with this.       Pt report a minimal amount of callories a day. 1300 kiel a day. With his current wt the daily calorie intake to just maintain would be 3500 to 4000 kiel just to maintain current wt.    Pt will use my fitness pal to help recognized the calories.      Pt on bipap.            Diagnoses and all orders for this visit:    1. Type 2 diabetes mellitus with hyperglycemia, without long-term current use of insulin (HCC) (Primary)  -     Liraglutide (SAXENDA) 18 MG/3ML injection pen; Inject 0.6mg under skin daily for week one, THEN 1.2mg daily  for week two, THEN 1.8mg daily for week three, then 2.4mg daily for week four.  Dispense: 3 pen; Refill: 0  -     Liraglutide (SAXENDA) 18 MG/3ML injection pen; Inject 3 mg under the skin into the appropriate area as directed Daily.  Dispense: 5 pen; Refill: 0    2. Major depressive disorder in partial remission, unspecified whether recurrent (HCC)    3. Adult BMI 50.0-59.9 kg/sq m (HCC)  -     Liraglutide (SAXENDA) 18 MG/3ML injection pen; Inject 0.6mg under skin daily for week one, THEN 1.2mg daily for week two, THEN 1.8mg daily for week three, then 2.4mg daily for week four.  Dispense: 3 pen; Refill: 0  -     Liraglutide (SAXENDA) 18 MG/3ML injection pen; Inject 3 mg under the skin into the appropriate area as directed Daily.  Dispense: 5 pen; Refill: 0    4. Morbid (severe) obesity due to excess calories (HCC)  -     Liraglutide (SAXENDA) 18 MG/3ML injection pen; Inject 0.6mg under skin daily for week one, THEN 1.2mg daily for week two, THEN 1.8mg daily for week three, then 2.4mg daily for week four.  Dispense: 3 pen; Refill: 0  -     Liraglutide (SAXENDA) 18 MG/3ML injection pen; Inject 3 mg under the skin into the appropriate area as directed Daily.  Dispense: 5 pen; Refill: 0    5. BiPAP (biphasic positive airway pressure) dependence    6. Primary hypertension  -     carvedilol (Coreg) 3.125 MG tablet; Take 1 tablet by mouth 2 (Two) Times a Day With Meals.  Dispense: 60 tablet; Refill: 5      Return in about 6 weeks (around 12/7/2021).          There are no Patient Instructions on file for this visit.     Ruslan Issa MD    Assessment/Plan

## 2021-10-29 ENCOUNTER — LAB (OUTPATIENT)
Dept: LAB | Facility: HOSPITAL | Age: 51
End: 2021-10-29

## 2021-10-29 DIAGNOSIS — R53.83 FATIGUE, UNSPECIFIED TYPE: ICD-10-CM

## 2021-10-29 DIAGNOSIS — F41.9 ANXIETY: ICD-10-CM

## 2021-10-29 DIAGNOSIS — E11.9 TYPE 2 DIABETES MELLITUS WITHOUT COMPLICATION, WITHOUT LONG-TERM CURRENT USE OF INSULIN (HCC): Primary | ICD-10-CM

## 2021-10-29 LAB
ALBUMIN SERPL-MCNC: 4.3 G/DL (ref 3.5–5.2)
ALBUMIN/GLOB SERPL: 2.2 G/DL
ALP SERPL-CCNC: 101 U/L (ref 39–117)
ALT SERPL W P-5'-P-CCNC: 40 U/L (ref 1–41)
ANION GAP SERPL CALCULATED.3IONS-SCNC: 12 MMOL/L (ref 5–15)
AST SERPL-CCNC: 25 U/L (ref 1–40)
BASOPHILS # BLD AUTO: 0.04 10*3/MM3 (ref 0–0.2)
BASOPHILS NFR BLD AUTO: 0.7 % (ref 0–1.5)
BILIRUB SERPL-MCNC: 0.5 MG/DL (ref 0–1.2)
BUN SERPL-MCNC: 14 MG/DL (ref 6–20)
BUN/CREAT SERPL: 14.4 (ref 7–25)
CALCIUM SPEC-SCNC: 8.2 MG/DL (ref 8.6–10.5)
CHLORIDE SERPL-SCNC: 97 MMOL/L (ref 98–107)
CO2 SERPL-SCNC: 31 MMOL/L (ref 22–29)
CREAT SERPL-MCNC: 0.97 MG/DL (ref 0.76–1.27)
DEPRECATED RDW RBC AUTO: 42.6 FL (ref 37–54)
EOSINOPHIL # BLD AUTO: 0.18 10*3/MM3 (ref 0–0.4)
EOSINOPHIL NFR BLD AUTO: 3 % (ref 0.3–6.2)
ERYTHROCYTE [DISTWIDTH] IN BLOOD BY AUTOMATED COUNT: 14 % (ref 12.3–15.4)
GFR SERPL CREATININE-BSD FRML MDRD: 82 ML/MIN/1.73
GLOBULIN UR ELPH-MCNC: 2 GM/DL
GLUCOSE SERPL-MCNC: 249 MG/DL (ref 65–99)
HBA1C MFR BLD: 9 % (ref 4.8–5.6)
HCT VFR BLD AUTO: 44.1 % (ref 37.5–51)
HGB BLD-MCNC: 15.3 G/DL (ref 13–17.7)
IMM GRANULOCYTES # BLD AUTO: 0.03 10*3/MM3 (ref 0–0.05)
IMM GRANULOCYTES NFR BLD AUTO: 0.5 % (ref 0–0.5)
LYMPHOCYTES # BLD AUTO: 1.66 10*3/MM3 (ref 0.7–3.1)
LYMPHOCYTES NFR BLD AUTO: 28 % (ref 19.6–45.3)
MCH RBC QN AUTO: 29.1 PG (ref 26.6–33)
MCHC RBC AUTO-ENTMCNC: 34.7 G/DL (ref 31.5–35.7)
MCV RBC AUTO: 83.8 FL (ref 79–97)
MONOCYTES # BLD AUTO: 0.4 10*3/MM3 (ref 0.1–0.9)
MONOCYTES NFR BLD AUTO: 6.7 % (ref 5–12)
NEUTROPHILS NFR BLD AUTO: 3.62 10*3/MM3 (ref 1.7–7)
NEUTROPHILS NFR BLD AUTO: 61.1 % (ref 42.7–76)
NRBC BLD AUTO-RTO: 0 /100 WBC (ref 0–0.2)
PLATELET # BLD AUTO: 211 10*3/MM3 (ref 140–450)
PMV BLD AUTO: 12.6 FL (ref 6–12)
POTASSIUM SERPL-SCNC: 3.8 MMOL/L (ref 3.5–5.2)
PROT SERPL-MCNC: 6.3 G/DL (ref 6–8.5)
RBC # BLD AUTO: 5.26 10*6/MM3 (ref 4.14–5.8)
SODIUM SERPL-SCNC: 140 MMOL/L (ref 136–145)
T4 FREE SERPL-MCNC: 1.06 NG/DL (ref 0.93–1.7)
TSH SERPL DL<=0.05 MIU/L-ACNC: 2.61 UIU/ML (ref 0.27–4.2)
VIT B12 BLD-MCNC: 497 PG/ML (ref 211–946)
WBC # BLD AUTO: 5.93 10*3/MM3 (ref 3.4–10.8)

## 2021-10-29 PROCEDURE — 84207 ASSAY OF VITAMIN B-6: CPT | Performed by: INTERNAL MEDICINE

## 2021-10-29 PROCEDURE — 82607 VITAMIN B-12: CPT

## 2021-10-29 PROCEDURE — 84439 ASSAY OF FREE THYROXINE: CPT

## 2021-10-29 PROCEDURE — 36415 COLL VENOUS BLD VENIPUNCTURE: CPT | Performed by: INTERNAL MEDICINE

## 2021-10-29 PROCEDURE — 80053 COMPREHEN METABOLIC PANEL: CPT

## 2021-10-29 PROCEDURE — 85025 COMPLETE CBC W/AUTO DIFF WBC: CPT

## 2021-10-29 PROCEDURE — 83036 HEMOGLOBIN GLYCOSYLATED A1C: CPT

## 2021-10-29 PROCEDURE — 84443 ASSAY THYROID STIM HORMONE: CPT

## 2021-11-03 ENCOUNTER — TELEPHONE (OUTPATIENT)
Dept: INTERNAL MEDICINE | Facility: CLINIC | Age: 51
End: 2021-11-03

## 2021-11-03 LAB — VIT B6 SERPL-MCNC: NORMAL NG/ML

## 2021-11-03 NOTE — TELEPHONE ENCOUNTER
Caller: [facility]    Relationship: Pharmacy    Best call back number: [phone]    What medication are you requesting: NEEDLES TO APPLY INSULIN    What are your current symptoms: N/A    How long have you been experiencing symptoms:   Have you had these symptoms before:    [x] Yes  [] No    Have you been treated for these symptoms before:   [x] Yes  [] No    If a prescription is needed, what is your preferred pharmacy and phone number: [facility]     Additional notes: PATIENT WOULD NEED NEEDLES TO APPLY MEDICATION

## 2021-11-12 ENCOUNTER — TELEPHONE (OUTPATIENT)
Dept: INTERNAL MEDICINE | Facility: CLINIC | Age: 51
End: 2021-11-12

## 2021-11-12 PROCEDURE — U0004 COV-19 TEST NON-CDC HGH THRU: HCPCS | Performed by: FAMILY MEDICINE

## 2021-11-12 NOTE — TELEPHONE ENCOUNTER
"Patient contacted office and reported having a dizzy spell today and a \"hot\" sensation. Patient stated he is experiencing excessive sweating since \"episode\" and he reports 3 new medications (Saxenda and Carvedilol).    I advised patient to go to [facility] for further evaluation and to rule out any hypoglycemic/cardiac events; patient expressed understanding and stated he would follow up with clinic as directed.   "

## 2021-11-23 ENCOUNTER — OFFICE VISIT (OUTPATIENT)
Dept: ENDOCRINOLOGY | Facility: CLINIC | Age: 51
End: 2021-11-23

## 2021-11-23 VITALS
DIASTOLIC BLOOD PRESSURE: 86 MMHG | SYSTOLIC BLOOD PRESSURE: 140 MMHG | OXYGEN SATURATION: 98 % | HEIGHT: 72 IN | HEART RATE: 73 BPM | BODY MASS INDEX: 42.66 KG/M2 | WEIGHT: 315 LBS

## 2021-11-23 DIAGNOSIS — E78.2 MIXED HYPERLIPIDEMIA: ICD-10-CM

## 2021-11-23 DIAGNOSIS — E11.65 TYPE 2 DIABETES MELLITUS WITH HYPERGLYCEMIA, WITHOUT LONG-TERM CURRENT USE OF INSULIN (HCC): Primary | ICD-10-CM

## 2021-11-23 DIAGNOSIS — E66.01 MORBID (SEVERE) OBESITY DUE TO EXCESS CALORIES (HCC): ICD-10-CM

## 2021-11-23 LAB
EXPIRATION DATE: NORMAL
GLUCOSE BLDC GLUCOMTR-MCNC: 123 MG/DL (ref 70–130)
HBA1C MFR BLD: 8.4 %
Lab: NORMAL

## 2021-11-23 PROCEDURE — 83036 HEMOGLOBIN GLYCOSYLATED A1C: CPT | Performed by: INTERNAL MEDICINE

## 2021-11-23 PROCEDURE — 99204 OFFICE O/P NEW MOD 45 MIN: CPT | Performed by: INTERNAL MEDICINE

## 2021-11-23 PROCEDURE — 82947 ASSAY GLUCOSE BLOOD QUANT: CPT | Performed by: INTERNAL MEDICINE

## 2021-11-23 RX ORDER — METFORMIN HYDROCHLORIDE 500 MG/1
1000 TABLET, EXTENDED RELEASE ORAL 2 TIMES DAILY WITH MEALS
Qty: 120 TABLET | Refills: 5 | Status: SHIPPED | OUTPATIENT
Start: 2021-11-23 | End: 2022-06-14 | Stop reason: SDUPTHER

## 2021-11-23 RX ORDER — METFORMIN HYDROCHLORIDE 500 MG/1
500 TABLET, EXTENDED RELEASE ORAL 2 TIMES DAILY WITH MEALS
Qty: 180 TABLET | Refills: 1 | Status: SHIPPED | OUTPATIENT
Start: 2021-11-23 | End: 2021-11-23 | Stop reason: SDUPTHER

## 2021-11-30 ENCOUNTER — OFFICE VISIT (OUTPATIENT)
Dept: PSYCHIATRY | Facility: CLINIC | Age: 51
End: 2021-11-30

## 2021-11-30 DIAGNOSIS — F33.2 SEVERE RECURRENT MAJOR DEPRESSION WITHOUT PSYCHOTIC FEATURES (HCC): Primary | ICD-10-CM

## 2021-11-30 PROCEDURE — 90834 PSYTX W PT 45 MINUTES: CPT | Performed by: SOCIAL WORKER

## 2021-12-22 RX ORDER — PANTOPRAZOLE SODIUM 40 MG/1
40 TABLET, DELAYED RELEASE ORAL DAILY
Qty: 90 TABLET | Refills: 3 | Status: SHIPPED | OUTPATIENT
Start: 2021-12-22 | End: 2023-03-10 | Stop reason: SDUPTHER

## 2021-12-26 PROBLEM — E78.2 MIXED HYPERLIPIDEMIA: Status: ACTIVE | Noted: 2021-12-26

## 2022-01-06 RX ORDER — DOXYCYCLINE 100 MG/1
100 CAPSULE ORAL EVERY 12 HOURS SCHEDULED
Qty: 20 CAPSULE | Refills: 0 | Status: SHIPPED | OUTPATIENT
Start: 2022-01-06 | End: 2022-02-08

## 2022-01-10 NOTE — TELEPHONE ENCOUNTER
Faxed form today and received a fax back stating PA not required. See scanned fax. Called [facility] and they stated no PA required.

## 2022-02-02 ENCOUNTER — TELEPHONE (OUTPATIENT)
Dept: INTERNAL MEDICINE | Facility: CLINIC | Age: 52
End: 2022-02-02

## 2022-02-02 RX ORDER — FUROSEMIDE 40 MG/1
40 TABLET ORAL 2 TIMES DAILY
Qty: 60 TABLET | Refills: 11 | Status: SHIPPED | OUTPATIENT
Start: 2022-02-02 | End: 2023-03-10 | Stop reason: SDUPTHER

## 2022-02-02 RX ORDER — FUROSEMIDE 40 MG/1
40 TABLET ORAL 2 TIMES DAILY
Qty: 60 TABLET | Refills: 0 | Status: SHIPPED | OUTPATIENT
Start: 2022-02-02 | End: 2022-02-02 | Stop reason: SDUPTHER

## 2022-02-02 RX ORDER — FUROSEMIDE 40 MG/1
40 TABLET ORAL 2 TIMES DAILY
Qty: 60 TABLET | Refills: 5 | Status: CANCELLED | OUTPATIENT
Start: 2022-02-02

## 2022-02-02 NOTE — TELEPHONE ENCOUNTER
PATIENT HAS CALLED AND STATED DUE TO A DEATH IN THE FAMILY HE IS NEEDING A REFILL ON HIS furosemide (LASIX) 40 MG tablet.  PATIENT HAS LOST PREVIOUS REFILL AND IS REQUESTING IF HE CAN GET A NEW REFILL CALLED INTO [facility].    PATIENT USES [facility]    CALL BACK NUMBER [phone]

## 2022-02-08 ENCOUNTER — OFFICE VISIT (OUTPATIENT)
Dept: INTERNAL MEDICINE | Facility: CLINIC | Age: 52
End: 2022-02-08

## 2022-02-08 VITALS
SYSTOLIC BLOOD PRESSURE: 136 MMHG | OXYGEN SATURATION: 96 % | TEMPERATURE: 98 F | DIASTOLIC BLOOD PRESSURE: 86 MMHG | RESPIRATION RATE: 16 BRPM | HEIGHT: 72 IN | WEIGHT: 315 LBS | BODY MASS INDEX: 42.66 KG/M2 | HEART RATE: 74 BPM

## 2022-02-08 DIAGNOSIS — R11.0 NAUSEA: Primary | ICD-10-CM

## 2022-02-08 DIAGNOSIS — K52.9 GASTROENTERITIS: ICD-10-CM

## 2022-02-08 DIAGNOSIS — R19.7 DIARRHEA, UNSPECIFIED TYPE: ICD-10-CM

## 2022-02-08 PROCEDURE — 99214 OFFICE O/P EST MOD 30 MIN: CPT | Performed by: INTERNAL MEDICINE

## 2022-02-08 PROCEDURE — U0004 COV-19 TEST NON-CDC HGH THRU: HCPCS | Performed by: INTERNAL MEDICINE

## 2022-02-08 NOTE — PROGRESS NOTES
Subjective     Patient ID: Carlos Eduardo Danielle is a 51 y.o. male. Patient is here for management of multiple medical problems.     Chief Complaint   Patient presents with   • Diarrhea   • Nausea   • Chills     History of Present Illness       Diarrhea, Nausesa and chills.      Walking more. Wt down some.     3 x a day. 1/2 mile a trip.        The following portions of the patient's history were reviewed and updated as appropriate: allergies, current medications, past family history, past medical history, past social history, past surgical history and problem list.    Review of Systems   Constitutional: Negative for diaphoresis and fatigue.   Psychiatric/Behavioral: Negative for self-injury and sleep disturbance. The patient is not nervous/anxious.        Current Outpatient Medications:   •  amLODIPine (NORVASC) 10 MG tablet, Take 1 tablet by mouth Daily., Disp: 90 tablet, Rfl: 3  •  B Complex-C (B-complex with vitamin C) tablet, Take 1 tablet by mouth Daily., Disp: 30 tablet, Rfl: 11  •  furosemide (LASIX) 40 MG tablet, Take 1 tablet by mouth 2 (Two) Times a Day., Disp: 60 tablet, Rfl: 11  •  hydroCHLOROthiazide (HYDRODIURIL) 12.5 MG tablet, Take 1 tablet by mouth Daily., Disp: 90 tablet, Rfl: 1  •  Insulin Pen Needle 32G X 6 MM misc, Use as directed to inject saxenda, Disp: 100 each, Rfl: 3  •  Liraglutide (SAXENDA) 18 MG/3ML injection pen, Inject 3 mg under the skin into the appropriate area as directed Daily., Disp: 15 mL, Rfl: 5  •  lisinopril (PRINIVIL,ZESTRIL) 40 MG tablet, Take 1 tablet by mouth Daily., Disp: 90 tablet, Rfl: 1  •  metFORMIN ER (GLUCOPHAGE-XR) 500 MG 24 hr tablet, Take 2 tablets by mouth 2 (Two) Times a Day With Meals., Disp: 120 tablet, Rfl: 5  •  pantoprazole (PROTONIX) 40 MG EC tablet, Take 1 tablet by mouth Daily., Disp: 90 tablet, Rfl: 3  •  venlafaxine (EFFEXOR) 75 MG tablet, Take 1 tablet by mouth Daily. For a total of 225 mg, Disp: 90 tablet, Rfl: 3  •  venlafaxine XR (EFFEXOR-XR) 150 MG  "24 hr capsule, Take 1 capsule by mouth Daily with 75 mg capsule, Disp: 90 capsule, Rfl: 3    Objective      Blood pressure 136/86, pulse 74, temperature 98 °F (36.7 °C), resp. rate 16, height 182.9 cm (72\"), weight (!) 194 kg (427 lb), SpO2 96 %.    Physical Exam     General Appearance:    Alert, cooperative, no distress, appears stated age   Head:    Normocephalic, without obvious abnormality, atraumatic   Eyes:    PERRL, conjunctiva/corneas clear, EOM's intact   Ears:    Normal TM's and external ear canals, both ears   Nose:   Nares normal, septum midline, mucosa normal, no drainage   or sinus tenderness   Throat:   Lips, mucosa, and tongue normal; teeth and gums normal   Neck:   Supple, symmetrical, trachea midline, no adenopathy;        thyroid:  No enlargement/tenderness/nodules; no carotid    bruit or JVD   Back:     Symmetric, no curvature, ROM normal, no CVA tenderness   Lungs:     Clear to auscultation bilaterally, respirations unlabored   Chest wall:    No tenderness or deformity   Heart:    Regular rate and rhythm, S1 and S2 normal, no murmur,        rub or gallop   Abdomen:     Soft, non-tender, bowel sounds active all four quadrants,     no masses, no organomegaly   Extremities:   Extremities normal, atraumatic, no cyanosis or edema   Pulses:   2+ and symmetric all extremities   Skin:   Skin color, texture, turgor normal, no rashes or lesions   Lymph nodes:   Cervical, supraclavicular, and axillary nodes normal   Neurologic:   CNII-XII intact. Normal strength, sensation and reflexes       throughout      Results for orders placed or performed in visit on 11/23/21   POC Glucose, Blood    Specimen: Blood   Result Value Ref Range    Glucose 123 70 - 130 mg/dL   POC Glycosylated Hemoglobin (Hb A1C)    Specimen: Blood   Result Value Ref Range    Hemoglobin A1C 8.4 %    Lot Number [id number]     Expiration Date 6/28/23          Assessment/Plan   Chills nausea, sweating.     Now food since " yesterday.        Diagnoses and all orders for this visit:    1. Nausea (Primary)  -     COVID-19 PCR, [facility], NP SWAB IN [facility] TRANSPORT MEDIA/ORAL SWISH 24-30 HR TAT - Swab, Nasopharynx; Future  -     Lipid Panel  -     CBC & Differential  -     Vitamin B12  -     Comprehensive Metabolic Panel  -     TSH  -     T4, Free  -     Hemoglobin A1c  -     Vitamin B6  -     Lipase    2. Diarrhea, unspecified type  -     COVID-19 PCR, [facility], NP SWAB IN [facility] TRANSPORT MEDIA/ORAL SWISH 24-30 HR TAT - Swab, Nasopharynx; Future  -     Lipid Panel  -     CBC & Differential  -     Vitamin B12  -     Comprehensive Metabolic Panel  -     TSH  -     T4, Free  -     Hemoglobin A1c  -     Vitamin B6  -     Lipase    3. Gastroenteritis      Return in about 3 months (around 5/8/2022).     diet going better.         There are no Patient Instructions on file for this visit.     Ruslan Issa MD    Assessment/Plan

## 2022-02-09 ENCOUNTER — TELEPHONE (OUTPATIENT)
Dept: INTERNAL MEDICINE | Facility: CLINIC | Age: 52
End: 2022-02-09

## 2022-02-09 DIAGNOSIS — E11.65 TYPE 2 DIABETES MELLITUS WITH HYPERGLYCEMIA, WITHOUT LONG-TERM CURRENT USE OF INSULIN: Primary | ICD-10-CM

## 2022-02-09 LAB — SARS-COV-2 RNA NOSE QL NAA+PROBE: NOT DETECTED

## 2022-02-09 NOTE — TELEPHONE ENCOUNTER
Caller: Carlos Eduardo Danielle    Relationship: Self    Best call back number: [phone]    What is the best time to reach you: ANY    Who are you requesting to speak with (clinical staff, provider,  specific staff member): CLINICAL STAFF    Do you know the name of the person who called: PATIENT    What was the call regarding: PATIENT IS CALLING RE-GUARDING HIS COVID TEST RESULT THAT HE WAS TO HEAR BACK ABOUT ALREADY    Do you require a callback: PLEASE CALL BACK

## 2022-02-09 NOTE — PROGRESS NOTES
Please let them know the ha1c and lft elevated. Wt loss as already discussed. Please repeat labs prior to rtc.orders in.

## 2022-02-11 DIAGNOSIS — E11.65 TYPE 2 DIABETES MELLITUS WITH HYPERGLYCEMIA, WITHOUT LONG-TERM CURRENT USE OF INSULIN: Primary | ICD-10-CM

## 2022-02-11 RX ORDER — SEMAGLUTIDE 1.34 MG/ML
1 INJECTION, SOLUTION SUBCUTANEOUS WEEKLY
Qty: 3 ML | Refills: 0
Start: 2022-02-11 | End: 2022-05-05 | Stop reason: SDUPTHER

## 2022-02-16 LAB
ALBUMIN SERPL-MCNC: 4.3 G/DL (ref 3.5–5.2)
ALBUMIN/GLOB SERPL: 1.8 G/DL
ALP SERPL-CCNC: 99 U/L (ref 39–117)
ALT SERPL-CCNC: 59 U/L (ref 1–41)
AST SERPL-CCNC: 45 U/L (ref 1–40)
BASOPHILS # BLD AUTO: 0.06 10*3/MM3 (ref 0–0.2)
BASOPHILS NFR BLD AUTO: 0.8 % (ref 0–1.5)
BILIRUB SERPL-MCNC: 1.2 MG/DL (ref 0–1.2)
BUN SERPL-MCNC: 10 MG/DL (ref 6–20)
BUN/CREAT SERPL: 10.6 (ref 7–25)
CALCIUM SERPL-MCNC: 9.3 MG/DL (ref 8.6–10.5)
CHLORIDE SERPL-SCNC: 92 MMOL/L (ref 98–107)
CHOLEST SERPL-MCNC: 228 MG/DL (ref 0–200)
CO2 SERPL-SCNC: 34.5 MMOL/L (ref 22–29)
CREAT SERPL-MCNC: 0.94 MG/DL (ref 0.76–1.27)
EOSINOPHIL # BLD AUTO: 0.16 10*3/MM3 (ref 0–0.4)
EOSINOPHIL NFR BLD AUTO: 2.2 % (ref 0.3–6.2)
ERYTHROCYTE [DISTWIDTH] IN BLOOD BY AUTOMATED COUNT: 14.7 % (ref 12.3–15.4)
GLOBULIN SER CALC-MCNC: 2.4 GM/DL
GLUCOSE SERPL-MCNC: 219 MG/DL (ref 65–99)
HBA1C MFR BLD: 9.4 % (ref 4.8–5.6)
HCT VFR BLD AUTO: 49 % (ref 37.5–51)
HDLC SERPL-MCNC: 30 MG/DL (ref 40–60)
HGB BLD-MCNC: 16.4 G/DL (ref 13–17.7)
IMM GRANULOCYTES # BLD AUTO: 0.02 10*3/MM3 (ref 0–0.05)
IMM GRANULOCYTES NFR BLD AUTO: 0.3 % (ref 0–0.5)
LDLC SERPL CALC-MCNC: 112 MG/DL (ref 0–100)
LIPASE SERPL-CCNC: 29 U/L (ref 13–60)
LYMPHOCYTES # BLD AUTO: 1.37 10*3/MM3 (ref 0.7–3.1)
LYMPHOCYTES NFR BLD AUTO: 18.5 % (ref 19.6–45.3)
MCH RBC QN AUTO: 28.6 PG (ref 26.6–33)
MCHC RBC AUTO-ENTMCNC: 33.5 G/DL (ref 31.5–35.7)
MCV RBC AUTO: 85.5 FL (ref 79–97)
MONOCYTES # BLD AUTO: 0.44 10*3/MM3 (ref 0.1–0.9)
MONOCYTES NFR BLD AUTO: 5.9 % (ref 5–12)
NEUTROPHILS # BLD AUTO: 5.37 10*3/MM3 (ref 1.7–7)
NEUTROPHILS NFR BLD AUTO: 72.3 % (ref 42.7–76)
NRBC BLD AUTO-RTO: 0 /100 WBC (ref 0–0.2)
PLATELET # BLD AUTO: 229 10*3/MM3 (ref 140–450)
POTASSIUM SERPL-SCNC: 3.6 MMOL/L (ref 3.5–5.2)
PROT SERPL-MCNC: 6.7 G/DL (ref 6–8.5)
RBC # BLD AUTO: 5.73 10*6/MM3 (ref 4.14–5.8)
SODIUM SERPL-SCNC: 138 MMOL/L (ref 136–145)
T4 FREE SERPL-MCNC: 1.16 NG/DL (ref 0.93–1.7)
TRIGL SERPL-MCNC: 494 MG/DL (ref 0–150)
TSH SERPL DL<=0.005 MIU/L-ACNC: 2.18 UIU/ML (ref 0.27–4.2)
VIT B12 SERPL-MCNC: 598 PG/ML (ref 211–946)
VIT B6 SERPL-MCNC: 3.3 UG/L (ref 5.3–46.7)
VLDLC SERPL CALC-MCNC: 86 MG/DL (ref 5–40)
WBC # BLD AUTO: 7.42 10*3/MM3 (ref 3.4–10.8)

## 2022-02-16 NOTE — PROGRESS NOTES
Please let them know the vit b6 is low see if taking b complex with c daily. If not please restart. This will cause anxiety and depression.

## 2022-02-22 ENCOUNTER — TELEMEDICINE (OUTPATIENT)
Dept: INTERNAL MEDICINE | Facility: CLINIC | Age: 52
End: 2022-02-22

## 2022-02-22 ENCOUNTER — TELEPHONE (OUTPATIENT)
Dept: INTERNAL MEDICINE | Facility: CLINIC | Age: 52
End: 2022-02-22

## 2022-02-22 DIAGNOSIS — K52.9 GASTROENTERITIS: Primary | ICD-10-CM

## 2022-02-22 PROCEDURE — 99213 OFFICE O/P EST LOW 20 MIN: CPT | Performed by: FAMILY MEDICINE

## 2022-02-22 RX ORDER — ONDANSETRON HYDROCHLORIDE 8 MG/1
8 TABLET, FILM COATED ORAL EVERY 8 HOURS PRN
Qty: 12 TABLET | Refills: 0 | Status: SHIPPED | OUTPATIENT
Start: 2022-02-22 | End: 2022-03-16 | Stop reason: SDUPTHER

## 2022-02-22 RX ORDER — FAMOTIDINE 20 MG/1
20 TABLET, FILM COATED ORAL 2 TIMES DAILY
Qty: 60 TABLET | Refills: 0 | Status: SHIPPED | OUTPATIENT
Start: 2022-02-22 | End: 2022-03-16 | Stop reason: SDUPTHER

## 2022-02-22 NOTE — PROGRESS NOTES
Carlos Eduardo Danielle is a 51 y.o. male.    Chief Complaint   Patient presents with   • Vomiting   • Nausea     During today's visit, I reviewed the documented allergies, medications, chief complaint, and pertinent vitals.  I have confirmed with the patient that there have been no changes since this information was discussed with my clinical team member.    HPI   Patient reports nausea and vomiting for the past 4 days.  Tried clear liquids and saltines without any improvement.  He reports yesterday he was not able to keep anything down.  Denies any diarrhea.  Admits to abdominal pain around the umbilicus.  He denies eating anything out of the ordinary and has not eaten out.  He has taken a home COVID test and was negative.      The following portions of the patient's history were reviewed and updated as appropriate: allergies, current medications, past family history, past medical history, past social history, past surgical history and problem list.     Allergies   Allergen Reactions   • Atorvastatin Headache     Weakness.     • Topamax [Topiramate] Other (See Comments)     Migraine           Current Outpatient Medications:   •  amLODIPine (NORVASC) 10 MG tablet, Take 1 tablet by mouth Daily., Disp: 90 tablet, Rfl: 3  •  B Complex-C (B-complex with vitamin C) tablet, Take 1 tablet by mouth Daily., Disp: 30 tablet, Rfl: 11  •  furosemide (LASIX) 40 MG tablet, Take 1 tablet by mouth 2 (Two) Times a Day., Disp: 60 tablet, Rfl: 11  •  hydroCHLOROthiazide (HYDRODIURIL) 12.5 MG tablet, Take 1 tablet by mouth Daily., Disp: 90 tablet, Rfl: 1  •  Insulin Pen Needle 32G X 6 MM misc, Use as directed to inject saxenda, Disp: 100 each, Rfl: 3  •  lisinopril (PRINIVIL,ZESTRIL) 40 MG tablet, Take 1 tablet by mouth Daily., Disp: 90 tablet, Rfl: 1  •  metFORMIN ER (GLUCOPHAGE-XR) 500 MG 24 hr tablet, Take 2 tablets by mouth 2 (Two) Times a Day With Meals., Disp: 120 tablet, Rfl: 5  •  pantoprazole (PROTONIX) 40 MG EC tablet, Take 1 tablet  by mouth Daily., Disp: 90 tablet, Rfl: 3  •  Semaglutide, 1 MG/DOSE, (Ozempic, 1 MG/DOSE,) 4 MG/3ML solution pen-injector, Inject 1 mg under the skin into the appropriate area as directed 1 (One) Time Per Week., Disp: 3 mL, Rfl: 0  •  venlafaxine (EFFEXOR) 75 MG tablet, Take 1 tablet by mouth Daily. For a total of 225 mg, Disp: 90 tablet, Rfl: 3  •  venlafaxine XR (EFFEXOR-XR) 150 MG 24 hr capsule, Take 1 capsule by mouth Daily with 75 mg capsule, Disp: 90 capsule, Rfl: 3  •  famotidine (Pepcid) 20 MG tablet, Take 1 tablet by mouth 2 (Two) Times a Day., Disp: 60 tablet, Rfl: 0  •  ondansetron (Zofran) 8 MG tablet, Take 1 tablet by mouth Every 8 (Eight) Hours As Needed for Nausea or Vomiting., Disp: 12 tablet, Rfl: 0    ROS    Review of Systems   Constitutional: Negative for fever.   Gastrointestinal: Positive for abdominal pain, nausea and vomiting. Negative for diarrhea.       There were no vitals filed for this visit.  There is no height or weight on file to calculate BMI.    Physical Exam     Physical Exam  Constitutional:       General: He is not in acute distress.     Appearance: He is well-developed.      Comments: Patient appears to be laying in bed   HENT:      Head: Normocephalic and atraumatic.   Eyes:      Conjunctiva/sclera: Conjunctivae normal.   Pulmonary:      Effort: Pulmonary effort is normal. No respiratory distress.   Skin:     Coloration: Skin is not pale.   Neurological:      Mental Status: He is alert and oriented to person, place, and time.      Cranial Nerves: No cranial nerve deficit.   Psychiatric:         Mood and Affect: Mood normal.         Behavior: Behavior normal.         Assessment/Plan    Problems Addressed this Visit     None      Visit Diagnoses     Gastroenteritis    -  Primary    Relevant Medications    famotidine (Pepcid) 20 MG tablet        Will send in zofran and pepcid for symptomatic relief.  Encouraged clear liquids: popsicles, jello, gatorade, etc.  May advance to soft,  bland diet if tolerating clear liquids.  If no improvement, patient advised to proceed to ER due to concern for dehydration.       New Medications Ordered This Visit   Medications   • ondansetron (Zofran) 8 MG tablet     Sig: Take 1 tablet by mouth Every 8 (Eight) Hours As Needed for Nausea or Vomiting.     Dispense:  12 tablet     Refill:  0   • famotidine (Pepcid) 20 MG tablet     Sig: Take 1 tablet by mouth 2 (Two) Times a Day.     Dispense:  60 tablet     Refill:  0       No orders of the defined types were placed in this encounter.      Return if symptoms worsen or fail to improve.    Alyson Littlejohn, DO

## 2022-02-22 NOTE — PROGRESS NOTES
You have chosen to receive care through a telehealth visit.  Do you consent to use a video/audio connection for your medical care today? YES

## 2022-03-03 ENCOUNTER — DOCUMENTATION (OUTPATIENT)
Dept: NUTRITION | Facility: HOSPITAL | Age: 52
End: 2022-03-03

## 2022-03-03 NOTE — PROGRESS NOTES
RD previously attempted to contact pt regarding nutrition appt. RD to close referral at this time. Thank you for the initial referral. RD available PRN.

## 2022-03-11 ENCOUNTER — OFFICE VISIT (OUTPATIENT)
Dept: PSYCHIATRY | Facility: CLINIC | Age: 52
End: 2022-03-11

## 2022-03-11 ENCOUNTER — IMMUNIZATION (OUTPATIENT)
Dept: INTERNAL MEDICINE | Facility: CLINIC | Age: 52
End: 2022-03-11

## 2022-03-11 DIAGNOSIS — F33.2 SEVERE EPISODE OF RECURRENT MAJOR DEPRESSIVE DISORDER, WITHOUT PSYCHOTIC FEATURES: Primary | ICD-10-CM

## 2022-03-11 DIAGNOSIS — Z23 IMMUNIZATION DUE: Primary | ICD-10-CM

## 2022-03-11 PROCEDURE — 90837 PSYTX W PT 60 MINUTES: CPT | Performed by: SOCIAL WORKER

## 2022-03-11 PROCEDURE — 0054A COVID-19 (PFIZER) 12+ YRS: CPT | Performed by: INTERNAL MEDICINE

## 2022-03-11 PROCEDURE — 91305 COVID-19 (PFIZER) 12+ YRS: CPT | Performed by: INTERNAL MEDICINE

## 2022-03-11 NOTE — PROGRESS NOTES
"Date: 03/11/2022   Time In: 800  Time Out: 856    PROGRESS NOTE  Data:  Carlos Eduardo Danielle is a 51 y.o. male who presents today for individual therapy session at [facility].     ICD-10-CM ICD-9-CM   1. Severe episode of recurrent major depressive disorder, without psychotic features ([facility])  F33.2 296.33     Patient discussed the death of his mother and reports it was difficult. Patient discussed making amends towards younger brother after his mother's passing. Patient discussed getting his dog and reports she is helping with mood. Patient discussed feeling proud and reports she has helped him to socialize while at the dog park. Patient reports having a good visit with friends. Patient reports having a \"couple\" passive suicidal thoughts since his previous visit with no plan or intent. Patient reports dog has been helpful for managing negative thoughts.  Patient denies suicidal ideations.      Clinical Maneuvering/Intervention:    (Scales based on 0 - 10 with 10 being the worst)  Depression:   na Anxiety:  na       Assisted patient in processing above session content; acknowledged and normalized patient’s thoughts, feelings, and concerns.   Discussed triggers associated with patient's depression.  Also discussed coping skills for patient to implement such as participating in activities and spending time with friends.    Allowed patient to freely discuss issues without interruption or judgment. Provided safe, confidential environment to facilitate the development of positive therapeutic relationship and encourage open, honest communication. Assisted patient in identifying risk factors which would indicate the need for higher level of care including thoughts to harm self or others and/or self-harming behavior and encouraged patient to contact this office, call 911, or present to the nearest emergency room should any of these events occur. Discussed crisis intervention services and means to access. " Patient adamantly and convincingly denies current suicidal or homicidal ideation or perceptual disturbance.    Assessment   Patient appears to maintain relative stability as compared to their baseline.  However, patient continues to struggle with depression which continues to cause impairment in important areas of functioning.  As a result, they can be reasonably expected to continue to benefit from treatment and would likely be at increased risk for decompensation otherwise.    Mental Status Exam:   Hygiene:   good  Cooperation:  Cooperative  Eye Contact:  Good  Psychomotor Behavior:  Appropriate  Affect:  Appropriate  Mood: normal  Speech:  Normal  Thought Process:  Goal directed  Thought Content:  Normal  Suicidal:  None  Homicidal:  None  Hallucinations:  None  Delusion:  None  Memory:  Intact  Orientation:  Grossly intact  Reliability:  good  Insight:  Good  Judgement:  Good  Impulse Control:  Good  Physical/Medical Issues:  No      Patient's Support Network Includes:  friends    Functional Status: Moderate impairment     Progress toward goal: Not at goal    Prognosis: Good with Ongoing Treatment          Plan     Patient will continue in individual outpatient therapy with focus on improved functioning and coping skills, maintaining stability, and avoiding decompensation and the need for higher level of care.    Patient will adhere to medication regimen as prescribed and report any side effects. Patient will contact this office, call 911 or present to the nearest emergency room should suicidal or homicidal ideations occur. Provide Cognitive Behavioral Therapy and Solution Focused Therapy to improve functioning, maintain stability, and avoid decompensation and the need for higher level of care.     Return in about Return in about 2 weeks (around 3/25/2022). or earlier if symptoms worsen or fail to improve.            This document has been electronically signed by Joan Gaspar LCSW  March 11, 2022 07:58  EST      Part of this note may be an electronic transcription/translation of spoken language to printed text using the Dragon Dictation System.

## 2022-03-16 RX ORDER — ONDANSETRON HYDROCHLORIDE 8 MG/1
8 TABLET, FILM COATED ORAL EVERY 8 HOURS PRN
Qty: 12 TABLET | Refills: 0 | Status: SHIPPED | OUTPATIENT
Start: 2022-03-16 | End: 2022-05-13 | Stop reason: SDUPTHER

## 2022-03-16 RX ORDER — FAMOTIDINE 20 MG/1
20 TABLET, FILM COATED ORAL 2 TIMES DAILY
Qty: 60 TABLET | Refills: 0 | Status: SHIPPED | OUTPATIENT
Start: 2022-03-16 | End: 2022-06-08 | Stop reason: SDUPTHER

## 2022-03-16 NOTE — TELEPHONE ENCOUNTER
Rx Refill Note  Requested Prescriptions     Pending Prescriptions Disp Refills   • ondansetron (Zofran) 8 MG tablet 12 tablet 0     Sig: Take 1 tablet by mouth Every 8 (Eight) Hours As Needed for Nausea or Vomiting.   • famotidine (Pepcid) 20 MG tablet 60 tablet 0     Sig: Take 1 tablet by mouth 2 (Two) Times a Day.      Last office visit with prescribing clinician: Visit date not found      Next office visit with prescribing clinician: Visit date not found            ANGELA VILLEDA MA  03/16/22, 11:24 EDT

## 2022-03-21 RX ORDER — LISINOPRIL 40 MG/1
40 TABLET ORAL DAILY
Qty: 90 TABLET | Refills: 1 | Status: SHIPPED | OUTPATIENT
Start: 2022-03-21 | End: 2022-12-10 | Stop reason: SDUPTHER

## 2022-03-21 RX ORDER — HYDROCHLOROTHIAZIDE 12.5 MG/1
12.5 TABLET ORAL DAILY
Qty: 90 TABLET | Refills: 1 | Status: SHIPPED | OUTPATIENT
Start: 2022-03-21 | End: 2023-02-22 | Stop reason: ALTCHOICE

## 2022-03-29 ENCOUNTER — OFFICE VISIT (OUTPATIENT)
Dept: PSYCHIATRY | Facility: CLINIC | Age: 52
End: 2022-03-29

## 2022-03-29 DIAGNOSIS — F33.2 SEVERE RECURRENT MAJOR DEPRESSION WITHOUT PSYCHOTIC FEATURES: Primary | ICD-10-CM

## 2022-03-29 PROCEDURE — 90837 PSYTX W PT 60 MINUTES: CPT | Performed by: SOCIAL WORKER

## 2022-03-29 NOTE — PROGRESS NOTES
"Date: 2022   Time In: 1000  Time Out: 1057    PROGRESS NOTE  Data:  Carlos Eduardo Danielle is a 51 y.o. male who presents today for individual therapy session at [facility].     ICD-10-CM ICD-9-CM   1. Severe recurrent major depression without psychotic features ([facility])  F33.2 296.33     Patient discussed having thoughts of \"id be better off if I wasn't around\" since his last session. Patient discussed concerns about the burial of his mother  in May and worries about what family will think about the decisions he made for the . Patient reports feeling like \"I don't belong there with them or here\". Patient reports he is not doing anything to cope with these thoughts. Patient discussed being in a depressive episode at least once per week where he stays in bed and is not productive. Patient reports he continues to struggle with making dietary changes and reports engaging in binge eating. Patient discussed using food as a coping skill.  Patient goal for next session\" challenging negative thought of worthlessness\". Patient denies suicidal ideations and self harm.          Clinical Maneuvering/Intervention:    (Scales based on 0 - 10 with 10 being the worst)  Depression:   na Anxiety:  na       Assisted patient in processing above session content; acknowledged and normalized patient’s thoughts, feelings, and concerns. Discussed automatic negative thoughts and how they negatively impact our mood. Provided questions for patient to consider when challenging negative thoughts such as \"is this logical? Is there evidence? And does it matter?\" Discussed negative core beliefs and how they can influence how we view ourselves and the world around us. Prompted patient to identify 3 pieces of evidence that contradict the negative belief or \"worthlessness\".  Discussed triggers associated with patient's depression.  Also discussed coping skills for patient to implement such as participating in activities and " "spending time with friends. Provided patient with copy of \"Stomping the ANTS\" worksheet from survival packet workbook.    Allowed patient to freely discuss issues without interruption or judgment. Provided safe, confidential environment to facilitate the development of positive therapeutic relationship and encourage open, honest communication. Assisted patient in identifying risk factors which would indicate the need for higher level of care including thoughts to harm self or others and/or self-harming behavior and encouraged patient to contact this office, call 911, or present to the nearest emergency room should any of these events occur. Discussed crisis intervention services and means to access. Patient adamantly and convincingly denies current suicidal or homicidal ideation or perceptual disturbance.    Assessment   Patient appears to maintain relative stability as compared to their baseline.  However, patient continues to struggle with depression which continues to cause impairment in important areas of functioning.  As a result, they can be reasonably expected to continue to benefit from treatment and would likely be at increased risk for decompensation otherwise.    Mental Status Exam:   Hygiene:   good  Cooperation:  Cooperative  Eye Contact:  Good  Psychomotor Behavior:  Appropriate  Affect:  Appropriate  Mood: sad  Speech:  Normal  Thought Process:  Goal directed  Thought Content:  Normal  Suicidal:  None  Homicidal:  None  Hallucinations:  None  Delusion:  None  Memory:  Intact  Orientation:  Grossly intact  Reliability:  good  Insight:  Good  Judgement:  Good  Impulse Control:  Good  Physical/Medical Issues:  No      Patient's Support Network Includes:  friends    Functional Status: Moderate impairment     Progress toward goal: Not at goal    Prognosis: Good with Ongoing Treatment          Plan     Patient will continue in individual outpatient therapy with focus on improved functioning and coping " skills, maintaining stability, and avoiding decompensation and the need for higher level of care.    Patient will adhere to medication regimen as prescribed and report any side effects. Patient will contact this office, call 911 or present to the nearest emergency room should suicidal or homicidal ideations occur. Provide Cognitive Behavioral Therapy and Solution Focused Therapy to improve functioning, maintain stability, and avoid decompensation and the need for higher level of care.     Return in about Return in about 2 weeks (around 4/12/2022). or earlier if symptoms worsen or fail to improve.            This document has been electronically signed by Joan Gaspar LCSW  March 29, 2022 10:01 EDT      Part of this note may be an electronic transcription/translation of spoken language to printed text using the Dragon Dictation System.

## 2022-04-05 ENCOUNTER — TELEPHONE (OUTPATIENT)
Dept: INTERNAL MEDICINE | Facility: CLINIC | Age: 52
End: 2022-04-05

## 2022-04-05 NOTE — TELEPHONE ENCOUNTER
Patient states he needs something else along with the EFFEXOR.  He states he is at the max dose but it's not helping the way it should.  Please advise.  Phone number verified.

## 2022-04-06 NOTE — TELEPHONE ENCOUNTER
Left a message informing patient that Dr. Issa is out of the office this week and to get another prescription he would have to see someone in the office.

## 2022-04-18 ENCOUNTER — TELEPHONE (OUTPATIENT)
Dept: INTERNAL MEDICINE | Facility: CLINIC | Age: 52
End: 2022-04-18

## 2022-04-18 DIAGNOSIS — F41.9 ANXIETY: ICD-10-CM

## 2022-04-18 NOTE — TELEPHONE ENCOUNTER
Caller: Carlos Eduardo Danielle    Relationship: Self    Best call back number: [phone] (H)    What medication are you requesting: PROVIDER PREFERENCE     What are your current symptoms: COUGHING UP PHLEGM, PAIN IN CHEST DUE TO COUGHING, VOMITTING, DIARRHEA, AND SOME LIGHTHEADEDNESS     How long have you been experiencing symptoms: 2 DAYS, ONSET SINCE 4.16.2022      If a prescription is needed, what is your preferred pharmacy and phone number:    [facility]  P: [phone]  F: [phone]      Additional notes:  PATIENT CALLED TO REQUEST MEDICATION BE SENT TO [facility] IN [place] DUE TO COUGHING UP PHLEGM, PAIN IN CHEST DUE TO COUGHING, VOMITTING, DIARRHEA, AND SOME LIGHTHEADEDNESS     PATIENT HAS BEEN TRANSFERRED TO OFFICE FOR CLINICAL DIRECTION AND/OR SCHEDULING NEEDS

## 2022-04-19 ENCOUNTER — TELEMEDICINE (OUTPATIENT)
Dept: INTERNAL MEDICINE | Facility: CLINIC | Age: 52
End: 2022-04-19

## 2022-04-19 ENCOUNTER — TELEPHONE (OUTPATIENT)
Dept: INTERNAL MEDICINE | Facility: CLINIC | Age: 52
End: 2022-04-19

## 2022-04-19 ENCOUNTER — CLINICAL SUPPORT (OUTPATIENT)
Dept: INTERNAL MEDICINE | Facility: CLINIC | Age: 52
End: 2022-04-19

## 2022-04-19 VITALS — HEIGHT: 72 IN | BODY MASS INDEX: 42.66 KG/M2 | WEIGHT: 315 LBS

## 2022-04-19 DIAGNOSIS — R05.9 COUGH: Primary | ICD-10-CM

## 2022-04-19 DIAGNOSIS — J40 BRONCHITIS: ICD-10-CM

## 2022-04-19 LAB
EXPIRATION DATE: NORMAL
FLUAV AG UPPER RESP QL IA.RAPID: NOT DETECTED
FLUBV AG UPPER RESP QL IA.RAPID: NOT DETECTED
INTERNAL CONTROL: NORMAL
Lab: NORMAL
SARS-COV-2 AG UPPER RESP QL IA.RAPID: NOT DETECTED

## 2022-04-19 PROCEDURE — 87428 SARSCOV & INF VIR A&B AG IA: CPT | Performed by: INTERNAL MEDICINE

## 2022-04-19 PROCEDURE — 99213 OFFICE O/P EST LOW 20 MIN: CPT | Performed by: INTERNAL MEDICINE

## 2022-04-19 RX ORDER — VENLAFAXINE HYDROCHLORIDE 150 MG/1
150 CAPSULE, EXTENDED RELEASE ORAL DAILY
Qty: 90 CAPSULE | Refills: 3 | Status: SHIPPED | OUTPATIENT
Start: 2022-04-19 | End: 2022-07-21 | Stop reason: SDUPTHER

## 2022-04-19 RX ORDER — DOXYCYCLINE HYCLATE 100 MG/1
100 CAPSULE ORAL 2 TIMES DAILY WITH MEALS
Qty: 20 CAPSULE | Refills: 0 | Status: SHIPPED | OUTPATIENT
Start: 2022-04-19 | End: 2022-05-06

## 2022-04-19 NOTE — PROGRESS NOTES
"Chief Complaint   Patient presents with   • Abstract     Pt's had cough, yellowish phlegm, head congestion, chest congestion, headaches, nausea, vomiting, diarrhea, light headedness, and fatigue X Friday.      Subjective   Carlos Eduardo Danielle is a 51 y.o. male.     Telemedicine video visit with patient from his home today.  All COVID vaccines and booster up-to-date.  Patient states he had seasonal flu vaccine at [facility] while working there last fall although it is not documented in chart.  No ill contacts at home or work.      Cough  This is a new problem. The current episode started in the past 7 days. The problem has been waxing and waning. The problem occurs every few minutes. The cough is productive of sputum. Associated symptoms include chills, headaches, nasal congestion and postnasal drip. Pertinent negatives include no ear pain, fever, myalgias, rhinorrhea, sore throat, shortness of breath or wheezing. Associated symptoms comments: Nausea, vomiting, diarrhea  Lightheadedness and fatigue. The symptoms are aggravated by lying down. He has tried rest (Increased fluids) for the symptoms. The treatment provided no relief.        The following portions of the patient's history were reviewed and updated as appropriate: allergies, current medications, past family history, past medical history, past social history, past surgical history and problem list.    Review of Systems   Constitutional: Positive for chills. Negative for appetite change and fever.   HENT: Positive for congestion and postnasal drip. Negative for ear pain, rhinorrhea, sinus pressure and sore throat.         Fullness of ears   Respiratory: Positive for cough. Negative for chest tightness, shortness of breath and wheezing.    Gastrointestinal: Positive for diarrhea, nausea and vomiting.   Musculoskeletal: Negative for myalgias.   Neurological: Positive for headaches.       Objective   Ht 182.9 cm (72\")   Wt (!) 194 kg (428 lb)   BMI 58.05 kg/m²   Body " mass index is 58.05 kg/m².  Patient did not obtain any vital signs for this visit other than weight  Physical Exam  Nursing note reviewed.   Constitutional:       General: He is not in acute distress.     Appearance: Normal appearance. He is obese. He is not ill-appearing.      Comments: Kind and pleasant man, lying in bed, speaks in full sentences and is in no respiratory distress   HENT:      Right Ear: External ear normal.      Left Ear: External ear normal.   Eyes:      General:         Right eye: No discharge.         Left eye: No discharge.      Extraocular Movements: Extraocular movements intact.   Pulmonary:      Effort: Pulmonary effort is normal. No respiratory distress.      Comments: Patient is able to talk in full sentences, no respiratory distress throughout interview, coughed only once throughout our interview, sounds slightly congested  Neurological:      Mental Status: He is alert and oriented to person, place, and time.   Psychiatric:         Mood and Affect: Mood normal.         Behavior: Behavior normal.         Thought Content: Thought content normal.         Judgment: Judgment normal.         Assessment/Plan   Carlos Eduardo Danielle is here today and the following problems have been addressed:      Diagnoses and all orders for this visit:    1. Cough (Primary)  -     POCT SARS-CoV-2 Antigen HAFSA + Flu    2. Bronchitis    COVID, flu A and B are negative  Recommend Mucinex expectorant twice daily  Doxycycline 100 mg twice daily for 10 days provided for bronchitis  Increase fluids and rest    Time devoted to visit: 16 minutes including time to review previous record, with 75% of time devoted to direct discussion.  Patient presents during COVID-19 pandemic/federally declared state of public health emergency.  This service was conducted via telemedicine video visit via [employer]    Please note that portions of this note were completed with a voice recognition program.  Efforts were made to edit dictation,  but occasionally words are mistranscribed.You have chosen to receive care through a telehealth visit.  Do you consent to use a video/audio connection for your medical care today? Yes  Active Parties: Marilyn Vermeesch (PCP), Marium Flannery (CMA), and Pt.  Video visit done via [email or web address]

## 2022-04-19 NOTE — TELEPHONE ENCOUNTER
Caller: Carlos Eduardo Danielle    Relationship to patient: Self    Best call back number: [phone]    Patient is needing: PATIENT WAS RETURNING A PHONE CALL

## 2022-05-04 ENCOUNTER — TELEPHONE (OUTPATIENT)
Dept: INTERNAL MEDICINE | Facility: CLINIC | Age: 52
End: 2022-05-04

## 2022-05-04 NOTE — TELEPHONE ENCOUNTER
Patient called and states he has been having issues with profuse sweating on the back of his neck and forehead for the past 3 days. He states this happens even when he has been sitting for a long time and cant seem to get the sweating to stop. He has check his temp multiples times, and no fevers. He is not sure what is causing this. His blood pressure is good and drinking plenty of water. He is feeling nauseated.

## 2022-05-04 NOTE — TELEPHONE ENCOUNTER
Spoke with patient he denies any chest pain. Denied feeling faint. Scheduled pt a appt Friday with Daisy. Informed pt if sx worsen go to ED

## 2022-05-05 DIAGNOSIS — E11.65 TYPE 2 DIABETES MELLITUS WITH HYPERGLYCEMIA, WITHOUT LONG-TERM CURRENT USE OF INSULIN: ICD-10-CM

## 2022-05-05 RX ORDER — GLIMEPIRIDE 4 MG/1
4 TABLET ORAL
Qty: 90 TABLET | Refills: 3 | Status: SHIPPED | OUTPATIENT
Start: 2022-05-05 | End: 2022-05-19 | Stop reason: SDUPTHER

## 2022-05-05 RX ORDER — SEMAGLUTIDE 1.34 MG/ML
1 INJECTION, SOLUTION SUBCUTANEOUS WEEKLY
Qty: 3 ML | Refills: 5 | Status: SHIPPED | OUTPATIENT
Start: 2022-05-05 | End: 2022-05-19 | Stop reason: SINTOL

## 2022-05-05 RX ORDER — SEMAGLUTIDE 1.34 MG/ML
1 INJECTION, SOLUTION SUBCUTANEOUS WEEKLY
Qty: 3 ML | Refills: 5 | Status: SHIPPED | OUTPATIENT
Start: 2022-05-05 | End: 2022-05-05

## 2022-05-06 ENCOUNTER — OFFICE VISIT (OUTPATIENT)
Dept: INTERNAL MEDICINE | Facility: CLINIC | Age: 52
End: 2022-05-06

## 2022-05-06 ENCOUNTER — TELEPHONE (OUTPATIENT)
Dept: INTERNAL MEDICINE | Facility: CLINIC | Age: 52
End: 2022-05-06

## 2022-05-06 VITALS
HEART RATE: 87 BPM | DIASTOLIC BLOOD PRESSURE: 82 MMHG | BODY MASS INDEX: 42.66 KG/M2 | SYSTOLIC BLOOD PRESSURE: 140 MMHG | WEIGHT: 315 LBS | TEMPERATURE: 97.5 F | HEIGHT: 72 IN | OXYGEN SATURATION: 97 %

## 2022-05-06 DIAGNOSIS — R94.31 ABNORMAL EKG: ICD-10-CM

## 2022-05-06 DIAGNOSIS — E66.01 CLASS 3 SEVERE OBESITY DUE TO EXCESS CALORIES WITH SERIOUS COMORBIDITY AND BODY MASS INDEX (BMI) OF 50.0 TO 59.9 IN ADULT: ICD-10-CM

## 2022-05-06 DIAGNOSIS — R61 DIAPHORESIS: ICD-10-CM

## 2022-05-06 DIAGNOSIS — R11.0 NAUSEA: ICD-10-CM

## 2022-05-06 DIAGNOSIS — E11.65 TYPE 2 DIABETES MELLITUS WITH HYPERGLYCEMIA, WITHOUT LONG-TERM CURRENT USE OF INSULIN: Primary | ICD-10-CM

## 2022-05-06 DIAGNOSIS — I10 ESSENTIAL HYPERTENSION: ICD-10-CM

## 2022-05-06 LAB
ALBUMIN SERPL-MCNC: 4.2 G/DL (ref 3.5–5.2)
ALBUMIN/GLOB SERPL: 1.5 G/DL
ALP SERPL-CCNC: 114 U/L (ref 39–117)
ALT SERPL-CCNC: 28 U/L (ref 1–41)
AMYLASE SERPL-CCNC: 21 U/L (ref 28–100)
AST SERPL-CCNC: 22 U/L (ref 1–40)
BILIRUB SERPL-MCNC: 0.9 MG/DL (ref 0–1.2)
BUN SERPL-MCNC: 17 MG/DL (ref 6–20)
BUN/CREAT SERPL: 15.7 (ref 7–25)
CALCIUM SERPL-MCNC: 10.1 MG/DL (ref 8.6–10.5)
CHLORIDE SERPL-SCNC: 85 MMOL/L (ref 98–107)
CO2 SERPL-SCNC: 35.1 MMOL/L (ref 22–29)
CREAT SERPL-MCNC: 1.08 MG/DL (ref 0.76–1.27)
EGFRCR SERPLBLD CKD-EPI 2021: 83.1 ML/MIN/1.73
ERYTHROCYTE [DISTWIDTH] IN BLOOD BY AUTOMATED COUNT: 14.4 % (ref 12.3–15.4)
GLOBULIN SER CALC-MCNC: 2.8 GM/DL
GLUCOSE SERPL-MCNC: 450 MG/DL (ref 65–99)
HCT VFR BLD AUTO: 50.9 % (ref 37.5–51)
HGB BLD-MCNC: 17.4 G/DL (ref 13–17.7)
LIPASE SERPL-CCNC: 45 U/L (ref 13–60)
MCH RBC QN AUTO: 28.8 PG (ref 26.6–33)
MCHC RBC AUTO-ENTMCNC: 34.2 G/DL (ref 31.5–35.7)
MCV RBC AUTO: 84.1 FL (ref 79–97)
PLATELET # BLD AUTO: 222 10*3/MM3 (ref 140–450)
POTASSIUM SERPL-SCNC: 4 MMOL/L (ref 3.5–5.2)
PROT SERPL-MCNC: 7 G/DL (ref 6–8.5)
RBC # BLD AUTO: 6.05 10*6/MM3 (ref 4.14–5.8)
SODIUM SERPL-SCNC: 136 MMOL/L (ref 136–145)
WBC # BLD AUTO: 7.83 10*3/MM3 (ref 3.4–10.8)

## 2022-05-06 PROCEDURE — 99215 OFFICE O/P EST HI 40 MIN: CPT | Performed by: NURSE PRACTITIONER

## 2022-05-06 PROCEDURE — 93000 ELECTROCARDIOGRAM COMPLETE: CPT | Performed by: NURSE PRACTITIONER

## 2022-05-06 NOTE — PROGRESS NOTES
Office Visit      Patient Name: Carlos Eduardo Danielle  : 1970   MRN: [id number]   Care Team: Patient Care Team:  Ruslan Issa MD as PCP - General (Internal Medicine)  Nayeli Guajardo MD as Consulting Physician (Endocrinology)    Chief Complaint  Nausea (Sweating, blood sugar is extremely high, has already spoke to his Endocrinologist )    Subjective     Subjective      Carlos Eduardo Danielle presents to [facility] for excess sweating and nausea.   Symptoms started about 5 days ago.   Endorses excess sweating, nausea, fatigue, decreased appetite, hyperglycemia, polydipsia, and polyuria.  Denies chest pain, shortness of breath, fever, chills, abdominal pain, and vomiting.   Blood sugar has been running in the 400-500's prior to this episode it was averaging 200.  He is trying to follow a diabetic diet and has not made any changes in diet recently.   He was ill with URI a few weeks ago and was treated with antibiotics, symptoms have since resolved.   He spoke with endocrinology yesterday who started him on glimepiride yesterday, he is also waiting on semaglutide to be mailed to him.   He has not been on corticosteroids recently. Denies use of OTC decongestants . Has not changed his diabetes regimen recently and is compliant with his medication.   Has severe sleep apnea, does not wear a CPAP.      ECG 12 Lead    Date/Time: 2022 9:08 AM  Performed by: Daisy Webber APRN  Authorized by: Daisy Webber APRN   Comparison: compared with previous ECG from 2021  Comparison to previous ECG: Sinus tachycardia has replaced normal sinus rhythm, new left axis deviation, pulmonary disease pattern, and nonspecific ST abnormality.   Rhythm: sinus tachycardia  Rate: tachycardic  BPM: 111  QRS axis: normal  Other findings: non-specific ST-T wave changes    Clinical impression: abnormal EKG            Review of Systems   Constitutional: Positive for fatigue. Negative for appetite change.  "  Respiratory: Negative for cough, shortness of breath and wheezing.    Cardiovascular: Negative for chest pain, palpitations and leg swelling.   Gastrointestinal: Positive for diarrhea and nausea. Negative for abdominal pain and vomiting.   Endocrine: Positive for polydipsia.   Skin: Negative for rash.   Neurological: Negative for dizziness, weakness, headache and confusion.       Objective     Objective   Vital Signs:   /82   Pulse 87   Temp 97.5 °F (36.4 °C) (Temporal)   Ht 182.9 cm (72\")   Wt (!) 187 kg (412 lb)   SpO2 97%   BMI 55.88 kg/m²     Physical Exam  Vitals and nursing note reviewed.   Constitutional:       General: He is not in acute distress.     Appearance: Normal appearance. He is obese. He is not toxic-appearing.   Eyes:      Pupils: Pupils are equal, round, and reactive to light.   Neck:      Vascular: No carotid bruit.   Cardiovascular:      Rate and Rhythm: Normal rate and regular rhythm.      Heart sounds: Normal heart sounds. No murmur heard.  Pulmonary:      Effort: Pulmonary effort is normal. No respiratory distress.      Breath sounds: Normal breath sounds. No wheezing.   Abdominal:      General: Bowel sounds are normal. There is no distension.      Palpations: Abdomen is soft.      Tenderness: There is no abdominal tenderness.   Musculoskeletal:      Cervical back: Neck supple. No tenderness.   Skin:     General: Skin is warm and dry.      Findings: No rash.   Neurological:      General: No focal deficit present.      Mental Status: He is alert and oriented to person, place, and time.      Motor: No weakness.   Psychiatric:         Mood and Affect: Mood normal.         Behavior: Behavior normal.          Assessment / Plan      Assessment/Plan   Problem List Items Addressed This Visit        Cardiac and Vasculature    Essential hypertension    Relevant Orders    Ambulatory Referral to Cardiology    New left axis deviation on EKG, recommend evaluation by cardiology and referral " placed. Urged to seek treatment in ER with chest pain or shortness of breath. Really needs to consider CPAP.       Endocrine and Metabolic    Type 2 diabetes mellitus, without long-term current use of insulin (HCC) - Primary    Relevant Orders    ECG 12 Lead    CBC No Differential    Comprehensive metabolic panel    Lipase    Amylase    Uncontrolled. Managed by endocrinology. Recommend diabetic diet, push fluids, and compliance with medication regimen. Medications sent in by Dr. Guajardo. Keep follow-up.       Other Visit Diagnoses     Nausea        Relevant Orders    ECG 12 Lead    CBC No Differential    Comprehensive metabolic panel    Lipase    Amylase    Diaphoresis        Relevant Orders    CBC No Differential    Comprehensive metabolic panel    Lipase    Amylase    Unclear etiology for acute hyperglycemia. Recommend labs as above. Advised rest, push fluids, diabetic diet, and medication compliance. ER with any acute worsening. If not improving needs to re-evaluated next week.     Abnormal EKG        Relevant Orders    Ambulatory Referral to Cardiology    Class 3 severe obesity due to excess calories with serious comorbidity and body mass index (BMI) of 50.0 to 59.9 in adult (HCC)        Relevant Orders    Ambulatory Referral to Cardiology        I spent 45 minutes caring for Carlos Eduardo on this date of service. This time includes time spent by me in the following activities:preparing for the visit, reviewing tests, obtaining and/or reviewing a separately obtained history, performing a medically appropriate examination and/or evaluation , counseling and educating the patient/family/caregiver, ordering medications, tests, or procedures, documenting information in the medical record and independently interpreting results and communicating that information with the patient/family/caregiver         Follow Up   Return if symptoms worsen or fail to improve.  Patient was given instructions and counseling regarding his  condition or for health maintenance advice. Please see specific information pulled into the AVS if appropriate.     AGUILAR Chan  [facility]    Answers for HPI/ROS submitted by the patient on 5/5/2022  What is the primary reason for your visit?: Diabetes  Diabetes type: type 2  MedicAlert ID: No  Disease duration: 2.5 years  Symptom course: worsening  sweats: Yes  CAD risks: family history  Current treatments: oral agent (dual therapy)  Treatment compliance: most of the time  Dose schedule: pre-breakfast  Given by: patient  Injection sites: arms  Blood glucose trend: increasing steadily  Weight trend: decreasing steadily  Current diet: generally healthy  Meal planning: avoidance of concentrated sweets  Exercise: daily  Dietitian visit: No  Eye exam current: No  Sees podiatrist: No

## 2022-05-06 NOTE — TELEPHONE ENCOUNTER
Patient saw aretha today and was told cardiology referral would be placed. He would like to see dr nguyen in [place].

## 2022-05-09 ENCOUNTER — PATIENT MESSAGE (OUTPATIENT)
Dept: INTERNAL MEDICINE | Facility: CLINIC | Age: 52
End: 2022-05-09

## 2022-05-09 ENCOUNTER — TELEPHONE (OUTPATIENT)
Dept: INTERNAL MEDICINE | Facility: CLINIC | Age: 52
End: 2022-05-09

## 2022-05-09 NOTE — TELEPHONE ENCOUNTER
Pt stated he was very concerned about the lab results he had seen in my chart.   Informed pt that Daisy was out today as its her day off   Pt asked if it was possible for his PCP dr kong to review and release the labs as he was very concerned about his pancreas function and his blood sugar.

## 2022-05-13 RX ORDER — ONDANSETRON HYDROCHLORIDE 8 MG/1
8 TABLET, FILM COATED ORAL EVERY 8 HOURS PRN
Qty: 12 TABLET | Refills: 0 | OUTPATIENT
Start: 2022-05-13

## 2022-05-13 RX ORDER — ONDANSETRON HYDROCHLORIDE 8 MG/1
8 TABLET, FILM COATED ORAL EVERY 8 HOURS PRN
Qty: 12 TABLET | Refills: 0 | Status: SHIPPED | OUTPATIENT
Start: 2022-05-13 | End: 2022-06-27 | Stop reason: SDUPTHER

## 2022-05-13 NOTE — TELEPHONE ENCOUNTER
Patient states he has an appointment with Zuleika Laguerre on 6/7/2022, due to his work schedule he will have to wait until this appointment.  Thank you.

## 2022-05-13 NOTE — TELEPHONE ENCOUNTER
Rx Refill Note  Requested Prescriptions     Pending Prescriptions Disp Refills   • ondansetron (Zofran) 8 MG tablet 12 tablet 0     Sig: Take 1 tablet by mouth Every 8 (Eight) Hours As Needed for Nausea or Vomiting.      Last office visit with prescribing clinician: 5/6/2022     Next office visit with prescribing clinician: Visit date not found            ANGELA VILLEDA MA  05/13/22, 09:58 EDT

## 2022-05-18 DIAGNOSIS — F41.9 ANXIETY: ICD-10-CM

## 2022-05-18 DIAGNOSIS — E11.65 TYPE 2 DIABETES MELLITUS WITH HYPERGLYCEMIA, WITHOUT LONG-TERM CURRENT USE OF INSULIN: Primary | ICD-10-CM

## 2022-05-18 DIAGNOSIS — R11.0 NAUSEA: ICD-10-CM

## 2022-05-19 ENCOUNTER — TELEPHONE (OUTPATIENT)
Dept: ENDOCRINOLOGY | Facility: CLINIC | Age: 52
End: 2022-05-19

## 2022-05-19 DIAGNOSIS — E11.65 TYPE 2 DIABETES MELLITUS WITH HYPERGLYCEMIA, WITHOUT LONG-TERM CURRENT USE OF INSULIN: ICD-10-CM

## 2022-05-19 DIAGNOSIS — R11.2 NAUSEA AND VOMITING, UNSPECIFIED VOMITING TYPE: Primary | ICD-10-CM

## 2022-05-19 RX ORDER — GLIMEPIRIDE 4 MG/1
4 TABLET ORAL 2 TIMES DAILY
Qty: 180 TABLET | Refills: 5 | Status: SHIPPED | OUTPATIENT
Start: 2022-05-19

## 2022-05-19 NOTE — TELEPHONE ENCOUNTER
"Patient called this evening regarding episodes of nausea w/ vomiting for the past 2 weeks. He denies diarrhea, but has had an episode of \"black stool\". On further review, he states his glucoses have improved since the addition of glimepiride on 05/05/2022.    On further review with patient, he has had intermittent nausea and vomiting since starting GLP-1 antagonists prescribed by his PCP in 03/2021.   He was initially prescribed Rybelsus in 03/2021. The dose was titrated from 3 mg to 14 mg; however, the patient reports he had persistent nausea on this medication.   He was then prescribed Saxenda (liraglutide) by his PCP and the dose was increased to 3.0 mg daily in 10/2021. Pt reported intermittent nausea and vomiting on this medication, but less often.    Initial visit with me in Endocrinology was in 11/2021, Pt reported some mild nausea on the 3 mg dose, but had just increased to that dose the week prior. Plan was to continue the Saxenda at that time.     In January 2022, pt contacted me that Saxenda was no longer covered by his insurance. I reviewed his options and changed his Rx to Ozempic 1 mg weekly. At that time, pt was not having nausea.    In early May 2022, pt reported having hyperglycemia in the 400's and episodes of diaphoreses. Pt was taking metformin ER 1000 mg BID and Ozempic 1mg daily. He did report having nausea intermittently at that time. When I asked if he thought this was from the Ozempic, pt was not sure. Ozempic 1 mg was continued and glimepiride 4 mg daily was added. Metformin ER 1000 mg BID was also continued.     Patient called this evening (05/19/2022) regarding episodes of nausea w/ vomiting for the past 2 weeks. He denies diarrhea, but has had an episode of \"black stool\". On further review, he states his glucoses have improved since the addition of glimepiride on 05/05/2022. Discussed with patient my concern that his GI symptoms may be in part due to the OZempic. Reviewed the timeline of " his symptoms and there appears to be a correlation with when the GI symptoms began and when the semaglutide was first prescribed and continued with liraglutide.     Advised pt to stop the Ozempic at this time. Will increase his glimepiride to 4 mg BID for now. Reviewed risk for hypoglycemia on this higher dosing. Continue the metformin ER 1000 mg BID. Pt advised to call me with BG readings in 2 weeks. Pt requested referral to GI for further evaluation as well. Order for referral sent.     Signed: Nayeli Guajardo MD

## 2022-05-20 ENCOUNTER — LAB (OUTPATIENT)
Dept: LAB | Facility: HOSPITAL | Age: 52
End: 2022-05-20

## 2022-05-20 DIAGNOSIS — R19.7 DIARRHEA OF PRESUMED INFECTIOUS ORIGIN: ICD-10-CM

## 2022-05-20 DIAGNOSIS — R11.0 NAUSEA: Primary | ICD-10-CM

## 2022-05-20 LAB
ALBUMIN SERPL-MCNC: 4.5 G/DL (ref 3.5–5.2)
ALBUMIN/GLOB SERPL: 2.5 G/DL
ALP SERPL-CCNC: 105 U/L (ref 39–117)
ALT SERPL W P-5'-P-CCNC: 51 U/L (ref 1–41)
ANION GAP SERPL CALCULATED.3IONS-SCNC: 18 MMOL/L (ref 5–15)
AST SERPL-CCNC: 37 U/L (ref 1–40)
BASOPHILS # BLD AUTO: 0.06 10*3/MM3 (ref 0–0.2)
BASOPHILS NFR BLD AUTO: 0.7 % (ref 0–1.5)
BILIRUB SERPL-MCNC: 0.6 MG/DL (ref 0–1.2)
BUN SERPL-MCNC: 14 MG/DL (ref 6–20)
BUN/CREAT SERPL: 14.4 (ref 7–25)
CALCIUM SPEC-SCNC: 9.4 MG/DL (ref 8.6–10.5)
CHLORIDE SERPL-SCNC: 91 MMOL/L (ref 98–107)
CO2 SERPL-SCNC: 31 MMOL/L (ref 22–29)
CREAT SERPL-MCNC: 0.97 MG/DL (ref 0.76–1.27)
DEPRECATED RDW RBC AUTO: 43.9 FL (ref 37–54)
EGFRCR SERPLBLD CKD-EPI 2021: 94.5 ML/MIN/1.73
EOSINOPHIL # BLD AUTO: 0.14 10*3/MM3 (ref 0–0.4)
EOSINOPHIL NFR BLD AUTO: 1.6 % (ref 0.3–6.2)
ERYTHROCYTE [DISTWIDTH] IN BLOOD BY AUTOMATED COUNT: 14.7 % (ref 12.3–15.4)
GLOBULIN UR ELPH-MCNC: 1.8 GM/DL
GLUCOSE SERPL-MCNC: 115 MG/DL (ref 65–99)
HBA1C MFR BLD: 11.1 % (ref 4.8–5.6)
HCT VFR BLD AUTO: 49 % (ref 37.5–51)
HGB BLD-MCNC: 16.7 G/DL (ref 13–17.7)
IGG1 SER-MCNC: 653 MG/DL (ref 700–1600)
IMM GRANULOCYTES # BLD AUTO: 0.03 10*3/MM3 (ref 0–0.05)
IMM GRANULOCYTES NFR BLD AUTO: 0.3 % (ref 0–0.5)
LIPASE SERPL-CCNC: 44 U/L (ref 13–60)
LYMPHOCYTES # BLD AUTO: 2.68 10*3/MM3 (ref 0.7–3.1)
LYMPHOCYTES NFR BLD AUTO: 31.1 % (ref 19.6–45.3)
MCH RBC QN AUTO: 28.7 PG (ref 26.6–33)
MCHC RBC AUTO-ENTMCNC: 34.1 G/DL (ref 31.5–35.7)
MCV RBC AUTO: 84.3 FL (ref 79–97)
MONOCYTES # BLD AUTO: 0.55 10*3/MM3 (ref 0.1–0.9)
MONOCYTES NFR BLD AUTO: 6.4 % (ref 5–12)
NEUTROPHILS NFR BLD AUTO: 5.16 10*3/MM3 (ref 1.7–7)
NEUTROPHILS NFR BLD AUTO: 59.9 % (ref 42.7–76)
NRBC BLD AUTO-RTO: 0 /100 WBC (ref 0–0.2)
PLATELET # BLD AUTO: 249 10*3/MM3 (ref 140–450)
PMV BLD AUTO: 12.2 FL (ref 6–12)
POTASSIUM SERPL-SCNC: 2.9 MMOL/L (ref 3.5–5.2)
PROT SERPL-MCNC: 6.3 G/DL (ref 6–8.5)
RBC # BLD AUTO: 5.81 10*6/MM3 (ref 4.14–5.8)
SODIUM SERPL-SCNC: 140 MMOL/L (ref 136–145)
WBC NRBC COR # BLD: 8.62 10*3/MM3 (ref 3.4–10.8)

## 2022-05-20 PROCEDURE — 85025 COMPLETE CBC W/AUTO DIFF WBC: CPT

## 2022-05-20 PROCEDURE — 83690 ASSAY OF LIPASE: CPT

## 2022-05-20 PROCEDURE — 83036 HEMOGLOBIN GLYCOSYLATED A1C: CPT

## 2022-05-20 PROCEDURE — 80053 COMPREHEN METABOLIC PANEL: CPT

## 2022-05-20 PROCEDURE — 36415 COLL VENOUS BLD VENIPUNCTURE: CPT

## 2022-05-20 PROCEDURE — 82784 ASSAY IGA/IGD/IGG/IGM EACH: CPT

## 2022-05-20 PROCEDURE — 86677 HELICOBACTER PYLORI ANTIBODY: CPT

## 2022-05-21 DIAGNOSIS — E87.6 HYPOKALEMIA: Primary | ICD-10-CM

## 2022-05-21 RX ORDER — POTASSIUM CHLORIDE 750 MG/1
TABLET, FILM COATED, EXTENDED RELEASE ORAL
Qty: 12 TABLET | Refills: 0 | Status: SHIPPED | OUTPATIENT
Start: 2022-05-21 | End: 2022-05-25

## 2022-05-21 NOTE — PROGRESS NOTES
Please let them know the potassium is very low. Will call in potassium.  Take 2 pill 2 x a day for the first 2 days then one 2 x a day. Repeat BMP this Monday.  Meds and labs sent

## 2022-05-23 LAB
H PYLORI IGA SER-ACNC: <9 UNITS (ref 0–8.9)
H PYLORI IGG SER IA-ACNC: 0.24 INDEX VALUE (ref 0–0.79)
H PYLORI IGM SER-ACNC: <9 UNITS (ref 0–8.9)

## 2022-06-08 RX ORDER — FAMOTIDINE 20 MG/1
20 TABLET, FILM COATED ORAL 2 TIMES DAILY
Qty: 60 TABLET | Refills: 0 | Status: SHIPPED | OUTPATIENT
Start: 2022-06-08 | End: 2023-03-02

## 2022-06-14 DIAGNOSIS — E11.65 TYPE 2 DIABETES MELLITUS WITH HYPERGLYCEMIA, WITHOUT LONG-TERM CURRENT USE OF INSULIN: ICD-10-CM

## 2022-06-15 RX ORDER — METFORMIN HYDROCHLORIDE 500 MG/1
1000 TABLET, EXTENDED RELEASE ORAL 2 TIMES DAILY WITH MEALS
Qty: 120 TABLET | Refills: 5 | Status: SHIPPED | OUTPATIENT
Start: 2022-06-15

## 2022-06-27 ENCOUNTER — OFFICE VISIT (OUTPATIENT)
Dept: ENDOCRINOLOGY | Facility: CLINIC | Age: 52
End: 2022-06-27

## 2022-06-27 VITALS
BODY MASS INDEX: 42.66 KG/M2 | HEART RATE: 65 BPM | SYSTOLIC BLOOD PRESSURE: 140 MMHG | DIASTOLIC BLOOD PRESSURE: 84 MMHG | WEIGHT: 315 LBS | OXYGEN SATURATION: 98 % | HEIGHT: 72 IN

## 2022-06-27 DIAGNOSIS — E11.65 TYPE 2 DIABETES MELLITUS WITH HYPERGLYCEMIA, WITHOUT LONG-TERM CURRENT USE OF INSULIN: Primary | ICD-10-CM

## 2022-06-27 DIAGNOSIS — E78.2 MIXED HYPERLIPIDEMIA: ICD-10-CM

## 2022-06-27 LAB
EXPIRATION DATE: NORMAL
GLUCOSE BLDC GLUCOMTR-MCNC: 96 MG/DL (ref 70–130)
HBA1C MFR BLD: 8.6 %
Lab: NORMAL

## 2022-06-27 PROCEDURE — 99213 OFFICE O/P EST LOW 20 MIN: CPT | Performed by: INTERNAL MEDICINE

## 2022-06-27 PROCEDURE — 82947 ASSAY GLUCOSE BLOOD QUANT: CPT | Performed by: INTERNAL MEDICINE

## 2022-06-27 PROCEDURE — 83036 HEMOGLOBIN GLYCOSYLATED A1C: CPT | Performed by: INTERNAL MEDICINE

## 2022-06-27 RX ORDER — ONDANSETRON HYDROCHLORIDE 8 MG/1
8 TABLET, FILM COATED ORAL EVERY 8 HOURS PRN
Qty: 12 TABLET | Refills: 0 | Status: SHIPPED | OUTPATIENT
Start: 2022-06-27

## 2022-07-21 ENCOUNTER — PATIENT ROUNDING (BHMG ONLY) (OUTPATIENT)
Dept: FAMILY MEDICINE CLINIC | Facility: CLINIC | Age: 52
End: 2022-07-21

## 2022-07-21 ENCOUNTER — LAB (OUTPATIENT)
Dept: LAB | Facility: HOSPITAL | Age: 52
End: 2022-07-21

## 2022-07-21 ENCOUNTER — OFFICE VISIT (OUTPATIENT)
Dept: FAMILY MEDICINE CLINIC | Facility: CLINIC | Age: 52
End: 2022-07-21

## 2022-07-21 VITALS
WEIGHT: 315 LBS | SYSTOLIC BLOOD PRESSURE: 140 MMHG | DIASTOLIC BLOOD PRESSURE: 92 MMHG | TEMPERATURE: 96.6 F | BODY MASS INDEX: 42.66 KG/M2 | RESPIRATION RATE: 16 BRPM | HEIGHT: 72 IN | HEART RATE: 74 BPM | OXYGEN SATURATION: 97 %

## 2022-07-21 DIAGNOSIS — E66.01 MORBID (SEVERE) OBESITY DUE TO EXCESS CALORIES: ICD-10-CM

## 2022-07-21 DIAGNOSIS — F41.9 ANXIETY: ICD-10-CM

## 2022-07-21 DIAGNOSIS — I10 ESSENTIAL HYPERTENSION: Primary | ICD-10-CM

## 2022-07-21 DIAGNOSIS — I10 ESSENTIAL HYPERTENSION: ICD-10-CM

## 2022-07-21 DIAGNOSIS — K21.9 GASTROESOPHAGEAL REFLUX DISEASE, UNSPECIFIED WHETHER ESOPHAGITIS PRESENT: ICD-10-CM

## 2022-07-21 DIAGNOSIS — E11.9 TYPE 2 DIABETES MELLITUS WITHOUT COMPLICATION, WITHOUT LONG-TERM CURRENT USE OF INSULIN: ICD-10-CM

## 2022-07-21 DIAGNOSIS — F33.2 SEVERE RECURRENT MAJOR DEPRESSION WITHOUT PSYCHOTIC FEATURES: ICD-10-CM

## 2022-07-21 DIAGNOSIS — E78.2 MIXED HYPERLIPIDEMIA: ICD-10-CM

## 2022-07-21 LAB
ALBUMIN SERPL-MCNC: 4.2 G/DL (ref 3.5–5.2)
ALBUMIN/GLOB SERPL: 1.5 G/DL
ALP SERPL-CCNC: 94 U/L (ref 39–117)
ALT SERPL W P-5'-P-CCNC: 23 U/L (ref 1–41)
ANION GAP SERPL CALCULATED.3IONS-SCNC: 14.7 MMOL/L (ref 5–15)
AST SERPL-CCNC: 20 U/L (ref 1–40)
BILIRUB SERPL-MCNC: 0.8 MG/DL (ref 0–1.2)
BUN SERPL-MCNC: 15 MG/DL (ref 6–20)
BUN/CREAT SERPL: 14.6 (ref 7–25)
CALCIUM SPEC-SCNC: 8.8 MG/DL (ref 8.6–10.5)
CHLORIDE SERPL-SCNC: 98 MMOL/L (ref 98–107)
CHOLEST SERPL-MCNC: 233 MG/DL (ref 0–200)
CO2 SERPL-SCNC: 27.3 MMOL/L (ref 22–29)
CREAT SERPL-MCNC: 1.03 MG/DL (ref 0.76–1.27)
DEPRECATED RDW RBC AUTO: 46 FL (ref 37–54)
EGFRCR SERPLBLD CKD-EPI 2021: 87.9 ML/MIN/1.73
ERYTHROCYTE [DISTWIDTH] IN BLOOD BY AUTOMATED COUNT: 14.5 % (ref 12.3–15.4)
GLOBULIN UR ELPH-MCNC: 2.8 GM/DL
GLUCOSE SERPL-MCNC: 130 MG/DL (ref 65–99)
HBA1C MFR BLD: 8.1 % (ref 4.8–5.6)
HCT VFR BLD AUTO: 46.4 % (ref 37.5–51)
HDLC SERPL-MCNC: 33 MG/DL (ref 40–60)
HGB BLD-MCNC: 15.4 G/DL (ref 13–17.7)
LDLC SERPL CALC-MCNC: 135 MG/DL (ref 0–100)
LDLC/HDLC SERPL: 3.9 {RATIO}
MCH RBC QN AUTO: 28.8 PG (ref 26.6–33)
MCHC RBC AUTO-ENTMCNC: 33.2 G/DL (ref 31.5–35.7)
MCV RBC AUTO: 86.9 FL (ref 79–97)
PLATELET # BLD AUTO: 228 10*3/MM3 (ref 140–450)
PMV BLD AUTO: 12.5 FL (ref 6–12)
POTASSIUM SERPL-SCNC: 3.7 MMOL/L (ref 3.5–5.2)
PROT SERPL-MCNC: 7 G/DL (ref 6–8.5)
RBC # BLD AUTO: 5.34 10*6/MM3 (ref 4.14–5.8)
SODIUM SERPL-SCNC: 140 MMOL/L (ref 136–145)
T4 FREE SERPL-MCNC: 1.04 NG/DL (ref 0.93–1.7)
TRIGL SERPL-MCNC: 357 MG/DL (ref 0–150)
TSH SERPL DL<=0.05 MIU/L-ACNC: 2.59 UIU/ML (ref 0.27–4.2)
VLDLC SERPL-MCNC: 65 MG/DL (ref 5–40)
WBC NRBC COR # BLD: 8.68 10*3/MM3 (ref 3.4–10.8)

## 2022-07-21 PROCEDURE — 80050 GENERAL HEALTH PANEL: CPT

## 2022-07-21 PROCEDURE — 83036 HEMOGLOBIN GLYCOSYLATED A1C: CPT

## 2022-07-21 PROCEDURE — 99213 OFFICE O/P EST LOW 20 MIN: CPT | Performed by: FAMILY MEDICINE

## 2022-07-21 PROCEDURE — 36415 COLL VENOUS BLD VENIPUNCTURE: CPT

## 2022-07-21 PROCEDURE — 80061 LIPID PANEL: CPT

## 2022-07-21 PROCEDURE — 84439 ASSAY OF FREE THYROXINE: CPT

## 2022-07-21 RX ORDER — VENLAFAXINE HYDROCHLORIDE 150 MG/1
300 CAPSULE, EXTENDED RELEASE ORAL EVERY MORNING
Qty: 180 CAPSULE | Refills: 3 | Status: SHIPPED | OUTPATIENT
Start: 2022-07-21

## 2022-07-21 NOTE — PROGRESS NOTES
[facility] [place] My-Chart message has been sent to the patient for PATIENT ROUNDING with a my chart message

## 2022-07-21 NOTE — PROGRESS NOTES
"Subjective   Carlos Eduardo Danielle is a 52 y.o. male    Chief Complaint    Establish primary care  Hypertension  Hyperlipidemia  Diabetes mellitus  Hypogonadism  Morbid obesity  GERD  Anxiety and depression  Obstructive sleep apnea    History of Present Illness  The patient presents today to establish primary care. His problem list in part is as above.    The patient tells me he is feeling better now, but earlier this spring he was not well. He reports she was taking Ozempic and had previously taken Saxenda and Rybelsus, and had constant nausea and vomiting with occasional diarrhea. In 05/2022 his blood glucose was \"up around 6\" for a week. He recalls that it would be higher in the morning and normalize later in the day when he took his medicine. He reports having black stools at that time and was told it was due to his blood sugar, but he did not agree, and finally his endocrinologist referred him to a gastroenterologist who took him off the Ozempic and 3 to 4 days later he started feeling better and was able to keep food down. He has an appointment scheduled with gastroenterology on 10/11/2022.    He notes that he has lost 27 pounds since 01/2022, mainly due to illness, but is still overweight and is not sure how to lose more. He says his diet is not bad and he does not eat a lot of sweets, although he knows he needs to cut back on carbohydrates, and he drinks a lot of water. He tells me he does not like vegetables. He has a Siberian Husky and walks her 3 times daily.    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 He tells me he will soon be going to [place] to bury his mother.    The following portions of the patient's history were reviewed and updated as appropriate: allergies, current medications, past social history and problem list    Review of Systems   Constitutional: Negative for appetite change, chills, diaphoresis, fatigue, fever and unexpected weight change.   Eyes: Negative for visual " disturbance.   Respiratory: Negative for cough, chest tightness, shortness of breath and wheezing.    Cardiovascular: Negative for chest pain, palpitations and leg swelling.   Gastrointestinal: Negative for abdominal distention, abdominal pain, diarrhea, nausea and vomiting.        Patient experiencing heartburn/acid reflux     Endocrine: Negative for polydipsia, polyphagia and polyuria.   Genitourinary: Negative for dysuria, frequency and urgency.   Musculoskeletal: Negative for arthralgias, back pain and myalgias.   Skin: Negative for color change and rash.   Neurological: Negative for dizziness, tremors, weakness, light-headedness, numbness and headaches.   Hematological: Negative for adenopathy. Does not bruise/bleed easily.   Psychiatric/Behavioral: Positive for dysphoric mood and sleep disturbance. Negative for agitation, behavioral problems, confusion, decreased concentration and suicidal ideas. The patient is nervous/anxious.        Objective     Vitals:    07/21/22 1308   BP: 140/92   Pulse: 74   Resp: 16   Temp: 96.6 °F (35.9 °C)   SpO2: 97%       Physical Exam  Vitals and nursing note reviewed.   Constitutional:       General: He is not in acute distress.     Appearance: He is well-developed. He is obese. He is not ill-appearing, toxic-appearing or diaphoretic.   HENT:      Head: Normocephalic and atraumatic.   Eyes:      General: No scleral icterus.     Conjunctiva/sclera: Conjunctivae normal.      Pupils: Pupils are equal, round, and reactive to light.   Neck:      Thyroid: No thyromegaly.      Vascular: No carotid bruit or JVD.   Cardiovascular:      Rate and Rhythm: Normal rate and regular rhythm.      Pulses: Normal pulses.      Heart sounds: Normal heart sounds. No murmur heard.  Pulmonary:      Effort: Pulmonary effort is normal. No respiratory distress.      Breath sounds: No wheezing or rales.   Abdominal:      General: Bowel sounds are normal. There is no distension.      Palpations: Abdomen is  soft. There is no mass.      Tenderness: There is no abdominal tenderness. There is no guarding or rebound.      Hernia: No hernia is present.   Musculoskeletal:      Cervical back: Neck supple.      Right lower leg: Edema present.      Left lower leg: Edema present.   Lymphadenopathy:      Cervical: No cervical adenopathy.   Skin:     General: Skin is warm and dry.      Coloration: Skin is not jaundiced or pale.      Findings: No rash.   Neurological:      Mental Status: He is alert and oriented to person, place, and time.      Sensory: No sensory deficit.   Psychiatric:         Mood and Affect: Mood normal.         Behavior: Behavior normal.         Assessment & Plan   Problems Addressed this Visit        Cardiac and Vasculature    Essential hypertension - Primary    Relevant Orders    Comprehensive Metabolic Panel    TSH    T4, Free    Mixed hyperlipidemia    Relevant Orders    Comprehensive Metabolic Panel    Lipid Panel       Endocrine and Metabolic    Type 2 diabetes mellitus, without long-term current use of insulin (HCC)    Relevant Orders    Comprehensive Metabolic Panel    Hemoglobin A1c    Lipid Panel    Morbid (severe) obesity due to excess calories (HCC)    Relevant Orders    CBC (No Diff)    Comprehensive Metabolic Panel       Mental Health    Anxiety    Relevant Medications    venlafaxine XR (EFFEXOR-XR) 150 MG 24 hr capsule    Other Relevant Orders    TSH    T4, Free    Severe recurrent major depression without psychotic features (HCC)    Relevant Medications    venlafaxine XR (EFFEXOR-XR) 150 MG 24 hr capsule      Other Visit Diagnoses     Gastroesophageal reflux disease, unspecified whether esophagitis present          Diagnoses       Codes Comments    Essential hypertension    -  Primary ICD-10-CM: I10  ICD-9-CM: 401.9     Anxiety     ICD-10-CM: F41.9  ICD-9-CM: 300.00     Severe recurrent major depression without psychotic features (HCC)     ICD-10-CM: F33.2  ICD-9-CM: 296.33     Morbid (severe)  obesity due to excess calories (HCC)     ICD-10-CM: E66.01  ICD-9-CM: 278.01     Mixed hyperlipidemia     ICD-10-CM: E78.2  ICD-9-CM: 272.2     Gastroesophageal reflux disease, unspecified whether esophagitis present     ICD-10-CM: K21.9  ICD-9-CM: 530.81     Type 2 diabetes mellitus without complication, without long-term current use of insulin (HCC)     ICD-10-CM: E11.9  ICD-9-CM: 250.00         I spent 35 minutes in patient care: Reviewing records prior to the visit, examining the patient, entering orders and documentation    Part of this note may be an electronic transcription/translation of spoken language to printed text using the Dragon Dictation System.           Transcribed from ambient dictation for CHELY Vann MD by GENEVIEVE THORNTON.  07/21/22   15:42 EDT    Patient verbalized consent to the visit recording.  I have personally performed the services described in this document as transcribed by the above individual, and it is both accurate and complete.  CHELY Vann MD  7/21/2022  16:48 EDT

## 2022-09-23 ENCOUNTER — TELEPHONE (OUTPATIENT)
Dept: INTERNAL MEDICINE | Facility: CLINIC | Age: 52
End: 2022-09-23

## 2022-09-23 NOTE — TELEPHONE ENCOUNTER
Caller: Carlos Eduardo Danielle    Relationship: Self    Best call back number: [phone]    What is the best time to reach you: ANY-OK TO LEAVE DETAILED MESSAGE IF NO ANSWER  9/23/22 HUB    What was the call regarding: PATIENT NEEDS TO  A COPY OF IMMUNIZATION RECORDS    Do you require a callback: YES, PLEASE CALL WHEN READY FOR

## 2022-10-11 ENCOUNTER — APPOINTMENT (OUTPATIENT)
Dept: NUTRITION | Facility: HOSPITAL | Age: 52
End: 2022-10-11

## 2022-12-12 RX ORDER — LISINOPRIL 40 MG/1
40 TABLET ORAL DAILY
Qty: 90 TABLET | Refills: 1 | Status: SHIPPED | OUTPATIENT
Start: 2022-12-12 | End: 2023-01-26

## 2023-01-06 NOTE — TELEPHONE ENCOUNTER
Caller: Carlos Eduardo Danielle    Relationship to patient: Self    Best call back number: [phone]    Chief complaint: ABDOMEN PAIN, GASTRO ISSUES    Type of visit: ACUTE TELEHEALTH    Requested date: TODAY     Additional Notes:  PATIENT CALLED IN TODAY DUE TO HIS VOMITING, NAUSEA AND HE WOULD LIKE AN APPOINTMENT TODAY FOR TELEHEALTH.       Last injection in mid October... needs to be scheduled for mid February (4 mths apart) pt is aware.      Paulette Craig, RN routed conversation to [facility] Sched Pool - DEXTER Malone 2 days ago    Paulette Craig RN 2 days ago    SM  Left hip IA  Hold  Meloxicam  Aspirin 81 - no permission     SURGERY SCHEDULING REQUIREMENTS INCLUDE:     Facility: Either  Admission Type: Day Surgery   Time Needed: 15 minutes  Anesthesia: Local  Special Equipment: none   CPT CODE  Trigger Points/Joint, Bursa Injections: (20610) Joint Major Shoulder/Hip/Knee/Bursa   Procedure:   Left intra-articular Hip Injection  Dx Code: Left Hip Arthritis   Anticoagulation:   Is the patient on Coumadin/Warfarin, Lovenox, Plavix, or Aspirin/Excedrin? Yes.  Hold ASPIRIN for 6 days prior to procedure until 24 hours following the procedure.  Hold MELOXICAM/MOBIC for 4 days prior to procedure and 24 hours following.    MRSA: No   Pacemaker: No   Allergy to Contrast Media: No   Follow up needed unless mentioned here:   Special Instructions: Under Fluoroscopy  BMI Estimated body mass index is 33.24 kg/m² as calculated from the following:    Height as of 1/4/23: 5' 5\" (1.651 m).    Weight as of 1/4/23: 90.6 kg (199 lb 11.8 oz).

## 2023-01-26 ENCOUNTER — OFFICE VISIT (OUTPATIENT)
Dept: FAMILY MEDICINE CLINIC | Facility: CLINIC | Age: 53
End: 2023-01-26
Payer: COMMERCIAL

## 2023-01-26 VITALS
RESPIRATION RATE: 20 BRPM | SYSTOLIC BLOOD PRESSURE: 162 MMHG | WEIGHT: 315 LBS | HEART RATE: 85 BPM | HEIGHT: 72 IN | TEMPERATURE: 98 F | OXYGEN SATURATION: 96 % | BODY MASS INDEX: 42.66 KG/M2 | DIASTOLIC BLOOD PRESSURE: 96 MMHG

## 2023-01-26 DIAGNOSIS — I10 ESSENTIAL HYPERTENSION: Primary | ICD-10-CM

## 2023-01-26 DIAGNOSIS — K21.9 GASTROESOPHAGEAL REFLUX DISEASE, UNSPECIFIED WHETHER ESOPHAGITIS PRESENT: ICD-10-CM

## 2023-01-26 DIAGNOSIS — E66.01 MORBID (SEVERE) OBESITY DUE TO EXCESS CALORIES: ICD-10-CM

## 2023-01-26 PROCEDURE — 99214 OFFICE O/P EST MOD 30 MIN: CPT | Performed by: FAMILY MEDICINE

## 2023-01-26 RX ORDER — AMLODIPINE BESYLATE AND BENAZEPRIL HYDROCHLORIDE 10; 40 MG/1; MG/1
1 CAPSULE ORAL DAILY
Qty: 30 CAPSULE | Refills: 5 | Status: SHIPPED | OUTPATIENT
Start: 2023-01-26 | End: 2024-01-26

## 2023-01-26 RX ORDER — MIRTAZAPINE 15 MG/1
15 TABLET, FILM COATED ORAL NIGHTLY PRN
Qty: 30 TABLET | Refills: 5 | Status: SHIPPED | OUTPATIENT
Start: 2023-01-26

## 2023-01-26 NOTE — PROGRESS NOTES
"Subjective   Carlos Eduardo Danielle is a 52 y.o. male    Chief Complaint  Hypertension  Gastrointestinal reflux disease    History of Present Illness  The patient presents today for a blood pressure follow-up. His blood pressure is elevated today at 162/96 mmHg.    The patient reports he took his blood pressure medication approximately 1 hour before he came in, and he is not sure if it has \"kicked in yet.\" He checks his blood pressure sporadically when he goes to the store, and he claims his blood pressure is \"decent;\" however, his blood pressure is high at today's visit. He states that he can tell if his blood pressure is high, because his breathing is \"a little more harsher,\" and when his medicine \"kicks in,\" he can breathe much better. The patient is administering lisinopril, amlodipine and furosemide 40 mg twice a day. He said he will have 1 sip of a drink, and he will be in the bathroom every 10 minutes to urinate. The patient has not administered hydrochlorothiazide in a while. He said that they wanted to give him potassium tablets, but he declined because he claims that the last time he took potassium, it caused lower extremity cramps frequently. The patient said this past week was the first time he experienced a lower extremity cramp in a while, but it only lasted approximately 1.5 minutes, and this has not recurred.    The patient reports that he is having a problem with sleeping and staying asleep. He arrives home from work at approximately 11:00 PM. He walks his dog at approximately 11:20 PM. It usually 1:30 AM or 2:00 AM by the time he goes to bed. He goes to sleep, and then 20 to 30 minutes later, he will wake up. The patient said he will lay there for a while, and then goes back to sleep, and 20 to 30 minutes later he is awake again. He said by the time he does fall asleep, it is usually after 4:00 AM.    The patient said he has been considering bariatric surgery for at least the last year. He said that he " eats well. He has tried some of the programs that they offer at his job.He said they do offer Weight Watchers, but he will not be covered for this until he is there for 12 weeks.    The patient was working for [employer] and then [employer], but he was let go from both, so he was 3 months without a job. 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      The following portions of the patient's history were reviewed and updated as appropriate: allergies, current medications, past social history and problem list    Review of Systems   Constitutional: Negative for fatigue and unexpected weight change.   Respiratory: Negative for cough, chest tightness and shortness of breath.    Cardiovascular: Negative for chest pain, palpitations and leg swelling.   Gastrointestinal: Negative for nausea.   Musculoskeletal: Negative for neck pain.   Skin: Negative for color change and rash.   Neurological: Negative for dizziness, tremors, syncope, weakness, light-headedness and headaches.   Psychiatric/Behavioral: Positive for sleep disturbance. Negative for agitation, behavioral problems, confusion, decreased concentration, dysphoric mood and hallucinations. The patient is not nervous/anxious and is not hyperactive.        Objective     Vitals:    01/26/23 1314   BP: 162/96   Pulse: 85   Resp: 20   Temp: 98 °F (36.7 °C)   SpO2: 96%       Physical Exam  Vitals and nursing note reviewed.   Constitutional:       General: He is not in acute distress.     Appearance: Normal appearance. He is well-developed. He is not ill-appearing, toxic-appearing or diaphoretic.   Eyes:      Conjunctiva/sclera: Conjunctivae normal.   Neck:      Vascular: No carotid bruit or JVD.   Cardiovascular:      Rate and Rhythm: Normal rate and regular rhythm.      Pulses: Normal pulses.      Heart sounds: Normal heart sounds. No murmur heard.  Pulmonary:      Effort: Pulmonary effort is normal. No respiratory distress.       Breath sounds: Normal breath sounds.   Abdominal:      Palpations: Abdomen is soft.      Tenderness: There is no abdominal tenderness.   Skin:     General: Skin is warm and dry.   Neurological:      Mental Status: He is alert and oriented to person, place, and time.      Coordination: Coordination normal.   Psychiatric:         Attention and Perception: He is attentive.         Mood and Affect: Mood normal.         Behavior: Behavior normal.         Thought Content: Thought content normal.         Judgment: Judgment normal.         Assessment & Plan     Problems Addressed this Visit        Cardiac and Vasculature    Essential hypertension - Primary    Relevant Medications    amLODIPine-benazepril (Lotrel) 10-40 MG per capsule       Endocrine and Metabolic    Morbid (severe) obesity due to excess calories (HCC)    Relevant Orders    Ambulatory Referral to Bariatric Surgery   Other Visit Diagnoses     Gastroesophageal reflux disease, unspecified whether esophagitis present        Relevant Medications    mirtazapine (Remeron) 15 MG tablet      Diagnoses       Codes Comments    Essential hypertension    -  Primary ICD-10-CM: I10  ICD-9-CM: 401.9     Gastroesophageal reflux disease, unspecified whether esophagitis present     ICD-10-CM: K21.9  ICD-9-CM: 530.81     Morbid (severe) obesity due to excess calories (HCC)     ICD-10-CM: E66.01  ICD-9-CM: 278.01         I spent 30 minutes in patient care: Reviewing records prior to the visit, examining the patient, entering orders and documentation    Part of this note may be an electronic transcription/translation of spoken language to printed text using the Dragon Dictation System.           Transcribed from ambient dictation for CHELY Vann MD by Juliette Cowan.  01/26/23   14:33 EST    Patient or patient representative verbalized consent to the visit recording.  I have personally performed the services described in this document as transcribed by the above  individual, and it is both accurate and complete.

## 2023-02-22 ENCOUNTER — DOCUMENTATION (OUTPATIENT)
Dept: BARIATRICS/WEIGHT MGMT | Facility: CLINIC | Age: 53
End: 2023-02-22
Payer: COMMERCIAL

## 2023-02-22 ENCOUNTER — OFFICE VISIT (OUTPATIENT)
Dept: BARIATRICS/WEIGHT MGMT | Facility: CLINIC | Age: 53
End: 2023-02-22
Payer: COMMERCIAL

## 2023-02-22 ENCOUNTER — OFFICE VISIT (OUTPATIENT)
Dept: BEHAVIORAL HEALTH | Facility: CLINIC | Age: 53
End: 2023-02-22
Payer: COMMERCIAL

## 2023-02-22 ENCOUNTER — PREP FOR SURGERY (OUTPATIENT)
Dept: OTHER | Facility: HOSPITAL | Age: 53
End: 2023-02-22
Payer: COMMERCIAL

## 2023-02-22 VITALS
TEMPERATURE: 97.6 F | OXYGEN SATURATION: 97 % | WEIGHT: 315 LBS | DIASTOLIC BLOOD PRESSURE: 82 MMHG | RESPIRATION RATE: 20 BRPM | BODY MASS INDEX: 44.1 KG/M2 | HEIGHT: 71 IN | SYSTOLIC BLOOD PRESSURE: 136 MMHG | HEART RATE: 91 BPM

## 2023-02-22 DIAGNOSIS — R53.83 FATIGUE, UNSPECIFIED TYPE: Primary | ICD-10-CM

## 2023-02-22 DIAGNOSIS — Z71.89 ENCOUNTER FOR PSYCHOLOGICAL ASSESSMENT PRIOR TO BARIATRIC SURGERY: ICD-10-CM

## 2023-02-22 DIAGNOSIS — E66.01 MORBID OBESITY WITH BMI OF 60.0-69.9, ADULT: Primary | ICD-10-CM

## 2023-02-22 DIAGNOSIS — Z86.59 HISTORY OF SUICIDAL IDEATION: ICD-10-CM

## 2023-02-22 DIAGNOSIS — E11.9 TYPE 2 DIABETES MELLITUS WITHOUT COMPLICATION, WITHOUT LONG-TERM CURRENT USE OF INSULIN: ICD-10-CM

## 2023-02-22 DIAGNOSIS — F60.3 BORDERLINE PERSONALITY DISORDER: ICD-10-CM

## 2023-02-22 DIAGNOSIS — I10 ESSENTIAL HYPERTENSION: ICD-10-CM

## 2023-02-22 DIAGNOSIS — E66.01 MORBID OBESITY WITH BODY MASS INDEX (BMI) OF 60.0 TO 69.9 IN ADULT: ICD-10-CM

## 2023-02-22 DIAGNOSIS — G47.33 OSA (OBSTRUCTIVE SLEEP APNEA): ICD-10-CM

## 2023-02-22 DIAGNOSIS — K21.9 GASTROESOPHAGEAL REFLUX DISEASE, UNSPECIFIED WHETHER ESOPHAGITIS PRESENT: ICD-10-CM

## 2023-02-22 DIAGNOSIS — K21.9 GASTROESOPHAGEAL REFLUX DISEASE, UNSPECIFIED WHETHER ESOPHAGITIS PRESENT: Primary | ICD-10-CM

## 2023-02-22 PROBLEM — E78.5 HYPERLIPIDEMIA: Status: ACTIVE | Noted: 2021-12-26

## 2023-02-22 PROBLEM — J44.9 COPD (CHRONIC OBSTRUCTIVE PULMONARY DISEASE): Status: ACTIVE | Noted: 2023-02-22

## 2023-02-22 PROCEDURE — 90791 PSYCH DIAGNOSTIC EVALUATION: CPT | Performed by: PSYCHOLOGIST

## 2023-02-22 PROCEDURE — 99204 OFFICE O/P NEW MOD 45 MIN: CPT | Performed by: PHYSICIAN ASSISTANT

## 2023-02-22 RX ORDER — SODIUM CHLORIDE 9 MG/ML
40 INJECTION, SOLUTION INTRAVENOUS AS NEEDED
OUTPATIENT
Start: 2023-02-22

## 2023-02-22 RX ORDER — SODIUM CHLORIDE 9 MG/ML
150 INJECTION, SOLUTION INTRAVENOUS CONTINUOUS
OUTPATIENT
Start: 2023-02-22

## 2023-02-22 RX ORDER — SODIUM CHLORIDE 0.9 % (FLUSH) 0.9 %
3 SYRINGE (ML) INJECTION EVERY 12 HOURS SCHEDULED
OUTPATIENT
Start: 2023-02-22

## 2023-02-22 RX ORDER — SODIUM CHLORIDE 0.9 % (FLUSH) 0.9 %
3-10 SYRINGE (ML) INJECTION AS NEEDED
OUTPATIENT
Start: 2023-02-22

## 2023-02-22 NOTE — PROGRESS NOTES
[facility]  [address]  [address]  [facility] [id number] [phone]      Patient  Name:  Carlos Eduardo Danielle  :  1970      Date of Visit: 2023      Chief Complaint:  weight gain; unable to maintain weight loss      History of Present Illness:  Carlos Eduardo Danielle is a 52 y.o. male who presents today for evaluation, education and consultation regarding metabolic and bariatric surgery with Dr. Charles.     Carlos Eduardo has been overweight for at least 30 years, has been 35 pounds or more overweight for at least 20+ years, has been 100 pounds or more overweight for 20 or more years and started dieting at age 30.  Previous diet attempts include: Low Carbohydrate and Cabbage Soup; Nutri-System; Amphetamines.  The most weight Carlos Eduardo lost was 70 pounds but was unable to maintain that weight loss.  His maximum lifetime weight is 450 pounds.    GI: History of GERD.  Takes Protonix and Pepcid daily.  Last EGD in 2020.  Symptoms have worsened.  He denies any history of H. pylori.  He denies any postprandial nausea, abdominal pain, or bloating.  Only abdominal surgery is laparoscopic appendectomy in  that was uneventful.    Other past medical history includes hypertension, hyperlipidemia, obstructive sleep apnea noncompliant with BiPAP, COPD (on albuterol only), depression/anxiety, history of suicide attempts many years ago.    He is a type II diabetic and was diagnosed in .  He has never been on insulin and his last A1c was 8.1.    He is a former smoker and quit in .      Complete history has been obtained and discussed today, as pertinent to metabolic/ bariatric surgery.     Past Medical History:   Diagnosis Date   • Anxiety    • Back problem    • Colon polyp    • COPD (chronic obstructive pulmonary disease) (HCC)     albuterol PRN; seen pulm remotely   • Depression    • Diabetes mellitus (HCC)    • GERD (gastroesophageal reflux disease)    • GERD  (gastroesophageal reflux disease)     on Protonix and Pepcid daily. EGD Jan 2020; no hx of h pylori   • Hyperlipidemia    • Hypertension    • Sleep apnea     noncompliant with BIPAP   • Suicide attempt (HCC)     reports hx of suicide attempts three times in 30 years ago   • TIA (transient ischemic attack)     2010 and 2015; related to hypertension.   • Type 2 diabetes mellitus (HCC)     dx in 2019; no insulin; last A1c 8.0   • Venous stasis     bilateral lower ext     Past Surgical History:   Procedure Laterality Date   • APPENDECTOMY N/A 09/08/2021    Procedure: APPENDECTOMY LAPAROSCOPIC;  Surgeon: Radames Arauz MD;  Location: [facility];  Service: General;  Laterality: N/A;   • EPIDIDYMAL CYST EXCISION     • TONSILLECTOMY  2006       Allergies   Allergen Reactions   • Atorvastatin Headache     Weakness.     • Topamax [Topiramate] Other (See Comments)     Migraine         Current Outpatient Medications:   •  amLODIPine-benazepril (Lotrel) 10-40 MG per capsule, Take 1 capsule by mouth Daily., Disp: 30 capsule, Rfl: 5  •  famotidine (Pepcid) 20 MG tablet, Take 1 tablet by mouth 2 (Two) Times a Day., Disp: 60 tablet, Rfl: 0  •  furosemide (LASIX) 40 MG tablet, Take 1 tablet by mouth 2 (Two) Times a Day., Disp: 60 tablet, Rfl: 11  •  glimepiride (AMARYL) 4 MG tablet, Take 1 tablet by mouth 2 (Two) Times a Day., Disp: 180 tablet, Rfl: 5  •  metFORMIN ER (GLUCOPHAGE-XR) 500 MG 24 hr tablet, Take 2 tablets by mouth 2 (Two) Times a Day With Meals., Disp: 120 tablet, Rfl: 5  •  mirtazapine (Remeron) 15 MG tablet, Take 1 tablet by mouth At Night As Needed (sleep)., Disp: 30 tablet, Rfl: 5  •  ondansetron (Zofran) 8 MG tablet, Take 1 tablet by mouth Every 8 (Eight) Hours As Needed for Nausea or Vomiting., Disp: 12 tablet, Rfl: 0  •  pantoprazole (PROTONIX) 40 MG EC tablet, Take 1 tablet by mouth Daily., Disp: 90 tablet, Rfl: 3  •  venlafaxine XR (EFFEXOR-XR) 150 MG 24 hr capsule, Take 2 capsules by mouth every  morning, Disp: 180 capsule, Rfl: 3    Social History     Socioeconomic History   • Marital status: Single   Tobacco Use   • Smoking status: Former     Packs/day: 0.75     Years: 0.00     Pack years: 0.00     Types: Cigarettes     Quit date: 2008     Years since quittin.5   • Smokeless tobacco: Never   Vaping Use   • Vaping Use: Never used   Substance and Sexual Activity   • Alcohol use: Yes     Comment: occas   • Drug use: Never   • Sexual activity: Not Currently     Partners: Male     Social History     Social History Narrative    Patient lives in [place], [place].  He is single and works full time as a  at [employer].       Family History   Problem Relation Age of Onset   • Diabetes Mother    • Hypertension Mother    • Schizophrenia Mother    • Depression Mother    • Obesity Mother    • No Known Problems Brother    • No Known Problems Brother    • Heart disease Maternal Grandmother    • Hypertension Maternal Grandmother    • Hypertension Maternal Grandfather    • Kidney disease Maternal Uncle        Review of Systems:  Constitutional:  reports fatigue, weight gain and denies fevers, chills.  HEENT:  denies headache, ear pain or loss of hearing, blurred or double vision, nasal discharge or sore throat.  Cardiovascular:  reports HTN, HLD and denies CAD, Atrial Fib, hx heart disease, heart murmur, hx MI, chest pain, palpitations, edema, hx DVT.  Respiratory:  reports COPD and denies dyspnea on exertion, shortness of breath , cough , wheezing, sleep apnea, asthma, hx PE.  Gastrointestinal:  reports heartburn and denies dysphagia, nausea, vomiting, abdominal pain, IBS, diarrhea, constipation, melena, blood in stool, gallbladder issues, liver disease, hx pancreatitis.  Genitourinary:  reports none and denies history of  frequent UTI, incontinence, hematuria, dysuria, polyuria, polydipsia, renal insufficiency, renal failure.    Musculoskeletal:  reports none and denies joint pain,  fibromyalgia, arthritis and autoimmune disease.  Neurological:  reports TIA and denies headaches, migraines, numbness /tingling, dizziness, confusion, seizure, stroke.  Psychiatric:  reports hx depression, hx anxiety, hx suicide attempt and denies depressed mood, feeling anxious, bipolar disorder, suicidal ideation, hx self injury, hx substance abuse, eating disorder.  Endocrine:  reports diabetes and denies glucose intolerance, thyroid disease, gout.  Hematologic:  reports none and denies bruising, bleeding disorder, hx anemia, hx blood transfusion.  Skin:  reports none and denies rashes, hx MRSA.    Physical Exam:  Vital Signs:  Weight: (!) 203 kg (446 lb 8 oz)   Body mass index is 62.27 kg/m².  Temp: 97.6 °F (36.4 °C)   Heart Rate: 91   BP: 136/82     Physical Exam  Constitutional:       General: He is not in acute distress.     Appearance: He is obese.   HENT:      Head: Normocephalic and atraumatic.   Eyes:      Extraocular Movements: Extraocular movements intact.      Pupils: Pupils are equal, round, and reactive to light.   Cardiovascular:      Rate and Rhythm: Normal rate and regular rhythm.   Pulmonary:      Effort: Pulmonary effort is normal.      Breath sounds: Normal breath sounds.   Abdominal:      General: Bowel sounds are normal. There is no distension.      Palpations: Abdomen is soft. There is no mass.      Tenderness: There is no abdominal tenderness.      Comments: Old lap scars from prior appendectomy   Musculoskeletal:      Cervical back: Normal range of motion and neck supple.   Skin:     General: Skin is warm and dry.   Neurological:      General: No focal deficit present.      Mental Status: He is alert and oriented to person, place, and time.   Psychiatric:         Mood and Affect: Mood normal.         Behavior: Behavior normal.         Patient Active Problem List   Diagnosis   • AJAY (obstructive sleep apnea)   • Vitamin D insufficiency   • Type 2 diabetes mellitus, without long-term current  use of insulin ([facility])   • Anxiety   • Hypertension   • Hypogonadism in male   • History of non anemic vitamin B12 deficiency   • Morbid (severe) obesity due to excess calories ([facility])   • Gastritis without bleeding   • Fatigue   • Severe recurrent major depression without psychotic features ([facility])   • Generalized anxiety disorder   • Suicidal ideation   • Acute appendicitis   • Acute appendicitis with localized peritonitis, without perforation, abscess, or gangrene   • Hyperlipidemia   • Bronchitis   • COPD (chronic obstructive pulmonary disease) ([facility])   • GERD (gastroesophageal reflux disease)   • Type 2 diabetes mellitus ([facility])       Assessment:  52 y.o. male with medically complicated obesity pursuing sleeve gastrectomy.    Metabolic & Bariatric Surgery is deemed medically necessary given the following: Class 3 Severe Obesity (BMI >=40). Obesity-related health conditions include the following: obstructive sleep apnea, hypertension, diabetes mellitus, dyslipidemias and GERD. Obesity is worsening. BMI is is above average; BMI management plan is completed. We discussed consulting a Bariatric surgeon.        Plan:  Further evaluation will include: CBC, CMP, Lipids, TSH, HgA1C, H.Pylori serum, EKG, CXR, Gastric Emptying Study and EGD.    The risks and benefits of the upper endoscopy were discussed with the patient in detail and all questions were answered.  Possibility of perforation, bleeding, aspiration, and anesthesia reaction were reviewed.  Patient agrees to proceed.      Additional clearances needed prior to surgery will include: Cardiology and Pulmonary.     Patient understands that bariatric surgery is not cosmetic surgery but rather a tool to help make a lifelong commitment to lifestyle changes including diet, exercise and behavior modifications.  The patient has been educated today on those expected postoperative lifestyle changes.  Psychological and Nutritional consultations will be completed prior to surgery.   Instructions on how to access [employer] (an internet based site w/ educational surgical videos) were given to the patient.  Recommended perioperative vitamin supplementation was reviewed.  The importance of avoiding ASA/ NSAIDS/ steroids/ tobacco/nicotine/ hormones/ immunomodulators perioperatively was discussed in detail.  All questions/concerns have been addressed.      Further input to follow pending the above.           DANIEL Castaneda

## 2023-02-22 NOTE — PROGRESS NOTES
Bariatric Nutrition Consult     Name: Carlos Eduardo Danielle   : 1970   AGE: 52 y.o.   MRN: [id number]      Consult Date: 2023     Surgery desired: sleeve    Height: 180.3cm                  Current weight: 446lbs                    BMI: 62.27    Highest weight: 454lbs                           Lowest weight: 220lbs    Goals: 225lbs, to come off diabetes meds and resolve hypertension         Past Medical History:   Diagnosis Date   • Anxiety    • Back problem    • Colon polyp    • COPD (chronic obstructive pulmonary disease) (HCC)     albuterol PRN; seen pulm remotely   • Depression    • Diabetes mellitus (HCC)    • GERD (gastroesophageal reflux disease)    • GERD (gastroesophageal reflux disease)     on Protonix and Pepcid daily. EGD 2020; no hx of h pylori   • Hyperlipidemia    • Hypertension    • Sleep apnea     noncompliant with BIPAP   • Suicide attempt ([facility])     reports hx of suicide attempts three times in 30 years ago   • TIA (transient ischemic attack)      and ; related to hypertension.   • Type 2 diabetes mellitus ([facility])     dx in ; no insulin; last A1c 8.0   • Venous stasis     bilateral lower ext                                 Diet history reveals works second shift , eats 3 meals and 3 snacks daily. High in carbs and low in protein. 24hr recall:    Breakfast: shredded wheat, 8oz oj  Lunch: fried fish sandwich  Snacks: 3 pieces fruit-likes dried cranberries and raisins also  Dinner: 2 cans tuna    Protein sources: meat, fish, chicken, eggs, cheese, occasionally greek yogurt    Drinks: oj, sprite, water    Food allergies/intolerances: no    Night eating: no    Patient has/has not been diagnosed with an eating disorder: no    Exercise/activity: walks his dog 3 times a day    Main bariatric nutrition principles discussed and explained. Patient needs to focus on 100g protein daily, 100-140g carbohydrates daily, healthy fat intake, 64 oz fluid daily, no carbonation, and try protein  drinks/protein powders. Avoid high fructose corn syrup. Patient verbalized understanding and queries were answered.  Additional nutritional counseling will be available      Michelle Rueda RD,LD

## 2023-02-22 NOTE — PROGRESS NOTES
PROGRESS NOTE    Data:    Carlos Eduardo Danielle is a 52 y.o. male who met with the undersigned for a scheduled psychological evaluation from 10:30 - 11:15am.       Clinical Maneuvering/Intervention:      Chief complaint and history of presenting illness/Problems: struggling with obesity for several years. Despite trying different weight loss plans and diets, the pt reported being unsuccessful in losing weight. A psychological evaluation was conducted in order to assess past and current level of functioning. Areas assessed included, but were not limited to: perception of social support, perception of ability to face and deal with challenges in life (positive functioning), anxiety symptoms, depressive symptoms, perspective on beliefs/belief system, coping skills for stress, intelligence level, addiction issues, etc. Therapeutic rapport was established. Interventions conducted today were geared towards assessing the pt's readiness for weight loss surgery and identifying and psychological contraindications for undergoing such a major life change. Social support was deemed strong (specific to weight loss surgery/weight loss in this manner and in a general sense): several friends, doctor, brothers. Current psychological struggles were described as low, but included borderline personality disorder (unstable sense of self, black and white thinking, fear of abandonment, etc.) and frustrations with being overweight. He talked about becoming more motivated for surgery lately after losing his mother over a year ago and the way her health declined.  Coping skills for distress and related to undergoing a major life change such as weight loss surgery/weight loss were deemed strong and included liking many aspects of life (friends, his dog, his job), good sense of humor, follows directions well, responsible person,maintains quality relationships with others,and believes in himself that he will be successful with weight loss surgery.  The pt endorsed having characteristics of readiness to undergo major life changes inherent in the journey of weight loss surgery. The pt could speak to the detailed process of becoming ready mentally for this major life change, having 'suffered' enough, and how he feels good about his decision. He talked about feeling excited, hopeful, and not nervous about weight loss surgery.The pt expressed gratitude for today's visit.     Past Family and Social History:      History of family mental health problems: mother (schizophrenia)    Psychosocial history: treatment of psychiatric care in the past (one year ago, self-admitted to [facility] for suicidal ideation with a plan, did not act on plan), alcohol/substance abuse treatment in the past (N/A), alcohol/substance abuse problems (N/A), inpatient psychiatric care (N/A).    Mental Status Exam (MSE):  Hygiene:  good  Dress: normal  Attitude:  cooperative and proactive  Motor Activity: normal  Speech: normal  Mood:   slightly nervous  Affect:  congruent  Thought Processes: normal  Thought Content:  normal  Suicidal Thoughts:  not endorsed  Homicidal Thoughts:  not endorsed  Crisis Safety Plan: not needed   Hallucinations:  none      Patient's Support Network Includes:  family, friends      Progress toward goal: there is evidence to suggest that he is taking measures to improve the quality of his life including seeking weight loss surgery.       Functional Status: moderate to high      Prognosis: good with weight loss surgery    Evaluation, Diagnoses, and Ability/Capacity to Respond to Treatment:      The pt presented to be struggling with borderline personality disorder (BPD) and obesity (BMI = 62.27, morbid obesity). Results of MSE demonstrated a functional status of moderate to high. Strengths: belief in self that he will be successful with weight loss surgery, etc (see detailed list of coping skills above). Needed for growth (CPT code requirement for Weaknesses):  weight loss.      From a psychological standpoint, the pt presents as a good candidate for bariatric surgery. He is motivated for the surgery, has showed readiness for the lifestyle change in terms of starting to adjust his eating habits, and seems to have appropriate expectations of how to prepare and how to live after surgery in order to lose weight successfully.    Treatment Plan:      Short term goals: Start improving his health by following up with his bariatric surgeon in order to receive weight loss surgery as soon as feasible/appropriate and demonstrate success with compliance to adhering to the recommended diet. Long term goals: reach a healthy weight and overall improvement in mood/mood regulation via taking control of his health.     Lucia Medina, PhD, LP

## 2023-02-23 LAB
ALBUMIN SERPL-MCNC: 4.2 G/DL (ref 3.8–4.9)
ALBUMIN/GLOB SERPL: 1.9 {RATIO} (ref 1.2–2.2)
ALP SERPL-CCNC: 111 IU/L (ref 44–121)
ALT SERPL-CCNC: 35 IU/L (ref 0–44)
AST SERPL-CCNC: 28 IU/L (ref 0–40)
BASOPHILS # BLD AUTO: 0.1 X10E3/UL (ref 0–0.2)
BASOPHILS NFR BLD AUTO: 1 %
BILIRUB SERPL-MCNC: 0.5 MG/DL (ref 0–1.2)
BUN SERPL-MCNC: 12 MG/DL (ref 6–24)
BUN/CREAT SERPL: 15 (ref 9–20)
CALCIUM SERPL-MCNC: 9.2 MG/DL (ref 8.7–10.2)
CHLORIDE SERPL-SCNC: 99 MMOL/L (ref 96–106)
CHOLEST SERPL-MCNC: 239 MG/DL (ref 100–199)
CO2 SERPL-SCNC: 26 MMOL/L (ref 20–29)
CREAT SERPL-MCNC: 0.82 MG/DL (ref 0.76–1.27)
EGFRCR SERPLBLD CKD-EPI 2021: 106 ML/MIN/1.73
EOSINOPHIL # BLD AUTO: 0.2 X10E3/UL (ref 0–0.4)
EOSINOPHIL NFR BLD AUTO: 2 %
ERYTHROCYTE [DISTWIDTH] IN BLOOD BY AUTOMATED COUNT: 15 % (ref 11.6–15.4)
GLOBULIN SER CALC-MCNC: 2.2 G/DL (ref 1.5–4.5)
GLUCOSE SERPL-MCNC: 197 MG/DL (ref 70–99)
H PYLORI IGA SER-ACNC: <9 UNITS (ref 0–8.9)
H PYLORI IGG SER IA-ACNC: 0.21 INDEX VALUE (ref 0–0.79)
HBA1C MFR BLD: 9.1 % (ref 4.8–5.6)
HCT VFR BLD AUTO: 46.6 % (ref 37.5–51)
HDLC SERPL-MCNC: 31 MG/DL
HGB BLD-MCNC: 15.1 G/DL (ref 13–17.7)
IMM GRANULOCYTES # BLD AUTO: 0.1 X10E3/UL (ref 0–0.1)
IMM GRANULOCYTES NFR BLD AUTO: 1 %
LDLC SERPL CALC-MCNC: 116 MG/DL (ref 0–99)
LYMPHOCYTES # BLD AUTO: 2.9 X10E3/UL (ref 0.7–3.1)
LYMPHOCYTES NFR BLD AUTO: 32 %
MCH RBC QN AUTO: 28.1 PG (ref 26.6–33)
MCHC RBC AUTO-ENTMCNC: 32.4 G/DL (ref 31.5–35.7)
MCV RBC AUTO: 87 FL (ref 79–97)
MONOCYTES # BLD AUTO: 0.5 X10E3/UL (ref 0.1–0.9)
MONOCYTES NFR BLD AUTO: 6 %
NEUTROPHILS # BLD AUTO: 5.3 X10E3/UL (ref 1.4–7)
NEUTROPHILS NFR BLD AUTO: 58 %
PLATELET # BLD AUTO: 228 X10E3/UL (ref 150–450)
POTASSIUM SERPL-SCNC: 4.4 MMOL/L (ref 3.5–5.2)
PROT SERPL-MCNC: 6.4 G/DL (ref 6–8.5)
RBC # BLD AUTO: 5.38 X10E6/UL (ref 4.14–5.8)
SODIUM SERPL-SCNC: 141 MMOL/L (ref 134–144)
TRIGL SERPL-MCNC: 523 MG/DL (ref 0–149)
TSH SERPL DL<=0.005 MIU/L-ACNC: 2.59 UIU/ML (ref 0.45–4.5)
VLDLC SERPL CALC-MCNC: 92 MG/DL (ref 5–40)
WBC # BLD AUTO: 9.1 X10E3/UL (ref 3.4–10.8)

## 2023-03-02 ENCOUNTER — PRE-ADMISSION TESTING (OUTPATIENT)
Dept: PREADMISSION TESTING | Facility: HOSPITAL | Age: 53
End: 2023-03-02
Payer: COMMERCIAL

## 2023-03-02 VITALS — WEIGHT: 315 LBS | HEIGHT: 71 IN | BODY MASS INDEX: 44.1 KG/M2

## 2023-03-02 LAB — POTASSIUM SERPL-SCNC: 3.8 MMOL/L (ref 3.5–5.2)

## 2023-03-02 PROCEDURE — 84132 ASSAY OF SERUM POTASSIUM: CPT

## 2023-03-02 PROCEDURE — 93010 ELECTROCARDIOGRAM REPORT: CPT | Performed by: INTERNAL MEDICINE

## 2023-03-02 PROCEDURE — 36415 COLL VENOUS BLD VENIPUNCTURE: CPT

## 2023-03-02 PROCEDURE — 93005 ELECTROCARDIOGRAM TRACING: CPT

## 2023-03-02 NOTE — PAT
An arrival time for procedure was not provided during PAT visit. If patient had any questions or concerns about their arrival time, they were instructed to contact their surgeon/physician.  Additionally, if the patient referred to an arrival time that was acquired from their my chart account, patient was encouraged to verify that time with their surgeon/physician. Arrival times are NOT provided in Pre Admission Testing Department.    Patient denies any current skin issues.     EKG CLEARED BY DR. CALLAWAY.

## 2023-03-03 LAB
QT INTERVAL: 428 MS
QTC INTERVAL: 477 MS

## 2023-03-07 DIAGNOSIS — E11.65 TYPE 2 DIABETES MELLITUS WITH HYPERGLYCEMIA, WITHOUT LONG-TERM CURRENT USE OF INSULIN: ICD-10-CM

## 2023-03-08 RX ORDER — PANTOPRAZOLE SODIUM 40 MG/1
40 TABLET, DELAYED RELEASE ORAL DAILY
Qty: 90 TABLET | Refills: 3 | OUTPATIENT
Start: 2023-03-08

## 2023-03-08 RX ORDER — FUROSEMIDE 40 MG/1
40 TABLET ORAL 2 TIMES DAILY
Qty: 60 TABLET | Refills: 11 | OUTPATIENT
Start: 2023-03-08

## 2023-03-08 RX ORDER — METFORMIN HYDROCHLORIDE 500 MG/1
1000 TABLET, EXTENDED RELEASE ORAL 2 TIMES DAILY WITH MEALS
Qty: 120 TABLET | Refills: 5 | OUTPATIENT
Start: 2023-03-08

## 2023-03-08 RX ORDER — ONDANSETRON HYDROCHLORIDE 8 MG/1
8 TABLET, FILM COATED ORAL EVERY 8 HOURS PRN
Qty: 12 TABLET | Refills: 0 | OUTPATIENT
Start: 2023-03-08

## 2023-03-10 DIAGNOSIS — E11.65 TYPE 2 DIABETES MELLITUS WITH HYPERGLYCEMIA, WITHOUT LONG-TERM CURRENT USE OF INSULIN: ICD-10-CM

## 2023-03-13 RX ORDER — FUROSEMIDE 40 MG/1
40 TABLET ORAL 2 TIMES DAILY
Qty: 60 TABLET | Refills: 11 | Status: SHIPPED | OUTPATIENT
Start: 2023-03-13

## 2023-03-13 RX ORDER — METFORMIN HYDROCHLORIDE 500 MG/1
1000 TABLET, EXTENDED RELEASE ORAL 2 TIMES DAILY WITH MEALS
Qty: 120 TABLET | Refills: 5 | OUTPATIENT
Start: 2023-03-13

## 2023-03-13 RX ORDER — PANTOPRAZOLE SODIUM 40 MG/1
40 TABLET, DELAYED RELEASE ORAL DAILY
Qty: 90 TABLET | Refills: 3 | Status: SHIPPED | OUTPATIENT
Start: 2023-03-13

## 2023-03-21 NOTE — PROGRESS NOTES
[facility] Cardiology  [address], [address]  [place], [place], [id number] [phone]  [email or web address]           OUTPATIENT CLINIC CONSULTATION NOTE    Patient care team:  Patient Care Team:  Ian Vann MD as PCP - General (Family Medicine)  Nayeli Guajardo MD as Consulting Physician (Endocrinology)  Ruslan Villavicencio MD as Consulting Physician (Cardiology)    Requesting Provider and Reason for consultation: The patient is being seen today at the request of DANIEL Castaneda for hypertension, obesity.     Subjective:   Chief complaint:   Chief Complaint   Patient presents with   • Hypertension   • Hyperlipidemia   • Surgical Clearance     Gastric sleeve       HPI:    Carlos Eduardo Danielle is a 52 y.o. male.  Cardiac focused problem list:  1. Hypertension   2. Hyperlipidemia   3. Morbid obesity   4. Type 2 diabetes mellitus  5. GERD   6. Venous stasis   7. AJAY, noncompliant with BiPAP  8. COPD   9. Anxiety  10. Depression   11. Remote history of suicide attempts     Patient presents today for consultation and preoperative cardiac risk assessment prior to bariatric surgery.     Patient with remote stress test possibly greater than 10 years ago, reportedly normal.  Former smoker, currently non-smoker.  No first-degree relative with premature coronary artery disease.  Ongoing diabetes and blood pressure, less than ideally controlled.  Walks his dog.  Shortness of air after 2-3 flights of stairs.    Review of Systems:  As noted above in the HPI    PFSH:  Patient Active Problem List   Diagnosis   • AJAY (obstructive sleep apnea)   • Vitamin D insufficiency   • Type 2 diabetes mellitus, without long-term current use of insulin (HCC)   • Anxiety   • Hypertension   • Hypogonadism in male   • History of non anemic vitamin B12 deficiency   • Morbid (severe) obesity due to excess calories (HCC)   • Gastritis without bleeding   • Fatigue   • Severe recurrent major depression without  psychotic features ([facility])   • Generalized anxiety disorder   • Suicidal ideation   • Acute appendicitis   • Acute appendicitis with localized peritonitis, without perforation, abscess, or gangrene   • Hyperlipidemia   • Bronchitis   • COPD (chronic obstructive pulmonary disease) ([facility])   • GERD (gastroesophageal reflux disease)   • Type 2 diabetes mellitus ([facility])         Current Outpatient Medications:   •  amLODIPine-benazepril (Lotrel) 10-40 MG per capsule, Take 1 capsule by mouth Daily., Disp: 30 capsule, Rfl: 5  •  furosemide (LASIX) 40 MG tablet, Take 1 tablet by mouth 2 (Two) Times a Day., Disp: 60 tablet, Rfl: 11  •  glimepiride (AMARYL) 4 MG tablet, Take 1 tablet by mouth 2 (Two) Times a Day., Disp: 180 tablet, Rfl: 5  •  metFORMIN ER (GLUCOPHAGE-XR) 500 MG 24 hr tablet, Take 2 tablets by mouth 2 (Two) Times a Day With Meals., Disp: 120 tablet, Rfl: 5  •  mirtazapine (Remeron) 15 MG tablet, Take 1 tablet by mouth At Night As Needed (sleep)., Disp: 30 tablet, Rfl: 5  •  ondansetron (Zofran) 8 MG tablet, Take 1 tablet by mouth Every 8 (Eight) Hours As Needed for Nausea or Vomiting., Disp: 12 tablet, Rfl: 0  •  pantoprazole (PROTONIX) 40 MG EC tablet, Take 1 tablet by mouth Daily., Disp: 90 tablet, Rfl: 3  •  venlafaxine XR (EFFEXOR-XR) 150 MG 24 hr capsule, Take 2 capsules by mouth every morning, Disp: 180 capsule, Rfl: 3    Allergies   Allergen Reactions   • Atorvastatin Headache     Weakness.     • Topamax [Topiramate] Other (See Comments)     Migraine         Social History     Socioeconomic History   • Marital status: Single   Tobacco Use   • Smoking status: Former     Packs/day: 0.75     Years: 0.00     Pack years: 0.00     Types: Cigarettes     Quit date: 2008     Years since quittin.6   • Smokeless tobacco: Never   Vaping Use   • Vaping Use: Never used   Substance and Sexual Activity   • Alcohol use: Yes     Comment: occas   • Drug use: Never   • Sexual activity: Not Currently     Partners: Male  "    Family History   Problem Relation Age of Onset   • Diabetes Mother    • Hypertension Mother    • Schizophrenia Mother    • Depression Mother    • Obesity Mother    • No Known Problems Brother    • No Known Problems Brother    • Kidney disease Maternal Uncle    • Heart disease Maternal Grandmother    • Hypertension Maternal Grandmother    • Hypertension Maternal Grandfather          Objective:   Physical Exam:  /84 (BP Location: Right arm, Patient Position: Sitting)   Pulse 82   Ht 180.3 cm (71\")   Wt (!) 200 kg (442 lb)   SpO2 94%   BMI 61.65 kg/m²    CONSTITUTIONAL: No acute distress  RESPIRATORY: Normal effort. Clear to auscultation bilaterally without wheezing or rales  CARDIOVASCULAR: Regular rate and rhythm with normal S1 and S2. Without murmur.  PERIPHERAL VASCULAR: No carotid bruit bilaterally.  Normal radial pulse.     Labs:  Labs reviewed by myself    Lab Results   Component Value Date    CHOL 233 (H) 07/21/2022     Lab Results   Component Value Date    TRIG 523 (H) 02/22/2023     Lab Results   Component Value Date    HDL 31 (L) 02/22/2023     Lab Results   Component Value Date     (H) 02/22/2023     No components found for: LDLDIRECTC    Diagnostic Data:      ECG 12 Lead    Date/Time: 3/29/2023 11:00 AM  Performed by: Ruslan Villavicencio MD  Authorized by: Ruslan Villavicencio MD   Comparison: compared with previous ECG from 3/2/2023  Similar to previous ECG  Comparison to previous ECG: Nonspecific T wave abnormality in the high lateral leads.  Q wave in lead III (possible normal variant)  Rhythm: sinus rhythm                Assessment and Plan:     Anginal equivalent  Pre-operative cardiovascular examination  Obesity  -Risk factors for CAD include diabetes, hypertension, severe obesity  -Possible ongoing anginal equivalent  -EKG with nonspecific changes including borderline Q waves in the inferior leads which may be a normal variant (most notable in lead III), nonspecific T wave abnormality in " the high lateral leads.  -Possible upcoming bariatric surgery  -Recommend cardiac stress testing to further risk stratify prior to surgery.    - Return if symptoms worsen or fail to improve.    Ruslan Villavicencio MD, MSc, FACC, [facility]  Interventional Cardiology  [facility]

## 2023-03-29 ENCOUNTER — OFFICE VISIT (OUTPATIENT)
Dept: PULMONOLOGY | Facility: CLINIC | Age: 53
End: 2023-03-29
Payer: COMMERCIAL

## 2023-03-29 ENCOUNTER — OFFICE VISIT (OUTPATIENT)
Dept: CARDIOLOGY | Facility: CLINIC | Age: 53
End: 2023-03-29
Payer: COMMERCIAL

## 2023-03-29 VITALS
BODY MASS INDEX: 44.1 KG/M2 | OXYGEN SATURATION: 94 % | SYSTOLIC BLOOD PRESSURE: 154 MMHG | WEIGHT: 315 LBS | HEIGHT: 71 IN | DIASTOLIC BLOOD PRESSURE: 104 MMHG | RESPIRATION RATE: 18 BRPM | TEMPERATURE: 98.2 F | HEART RATE: 77 BPM

## 2023-03-29 VITALS
HEART RATE: 82 BPM | WEIGHT: 315 LBS | HEIGHT: 71 IN | BODY MASS INDEX: 44.1 KG/M2 | DIASTOLIC BLOOD PRESSURE: 84 MMHG | OXYGEN SATURATION: 94 % | SYSTOLIC BLOOD PRESSURE: 144 MMHG

## 2023-03-29 DIAGNOSIS — Z01.810 PRE-OPERATIVE CARDIOVASCULAR EXAMINATION: ICD-10-CM

## 2023-03-29 DIAGNOSIS — K21.9 GASTROESOPHAGEAL REFLUX DISEASE, UNSPECIFIED WHETHER ESOPHAGITIS PRESENT: ICD-10-CM

## 2023-03-29 DIAGNOSIS — G47.33 OSA (OBSTRUCTIVE SLEEP APNEA): ICD-10-CM

## 2023-03-29 DIAGNOSIS — Z01.818 PREOPERATIVE CLEARANCE: Primary | ICD-10-CM

## 2023-03-29 DIAGNOSIS — I20.8 ANGINAL EQUIVALENT: Primary | ICD-10-CM

## 2023-03-29 DIAGNOSIS — E66.01 MORBID (SEVERE) OBESITY DUE TO EXCESS CALORIES: ICD-10-CM

## 2023-03-29 PROBLEM — J44.9 COPD (CHRONIC OBSTRUCTIVE PULMONARY DISEASE): Status: RESOLVED | Noted: 2023-02-22 | Resolved: 2023-03-29

## 2023-03-29 PROCEDURE — 94726 PLETHYSMOGRAPHY LUNG VOLUMES: CPT | Performed by: INTERNAL MEDICINE

## 2023-03-29 PROCEDURE — 1160F RVW MEDS BY RX/DR IN RCRD: CPT | Performed by: INTERNAL MEDICINE

## 2023-03-29 PROCEDURE — 94729 DIFFUSING CAPACITY: CPT | Performed by: INTERNAL MEDICINE

## 2023-03-29 PROCEDURE — 3080F DIAST BP >= 90 MM HG: CPT | Performed by: INTERNAL MEDICINE

## 2023-03-29 PROCEDURE — 94010 BREATHING CAPACITY TEST: CPT | Performed by: INTERNAL MEDICINE

## 2023-03-29 PROCEDURE — 99204 OFFICE O/P NEW MOD 45 MIN: CPT | Performed by: INTERNAL MEDICINE

## 2023-03-29 PROCEDURE — 3077F SYST BP >= 140 MM HG: CPT | Performed by: INTERNAL MEDICINE

## 2023-03-29 PROCEDURE — 1159F MED LIST DOCD IN RCRD: CPT | Performed by: INTERNAL MEDICINE

## 2023-03-29 NOTE — PROGRESS NOTES
Initial Pulmonary Consult Note    Subjective   Reason for consultation: Preoperative pulmonary evaluation for planned bariatric surgery    Carlos Eduardo Danielle is a 52 y.o. male is being seen for consultation today at the request of DANIEL Castaneda    History of Present Illness     52 y.o. former smoker for 20 years about 1 PPD with h/o DMII, HTN, AJAY intolerant of CPAP, GERD, COPD on albuterol as needed is here for preoperative pulmonary evaluation for planned gastric sleeve surgery.  He reports he has not needed a rescue inhaler in over 2 years.  He denies cough.      The following portions of the patient's history were reviewed and updated as appropriate: allergies, current medications, past family history, past medical history, past social history, past surgical history and problem list.    Active Ambulatory Problems     Diagnosis Date Noted   • AJAY (obstructive sleep apnea) 09/10/2020   • Vitamin D insufficiency 10/04/2020   • Type 2 diabetes mellitus, without long-term current use of insulin ([facility]) 10/07/2020   • Anxiety 10/11/2020   • Hypertension 10/11/2020   • Hypogonadism in male 10/11/2020   • History of non anemic vitamin B12 deficiency 10/11/2020   • Morbid (severe) obesity due to excess calories ([facility]) 02/23/2021   • Gastritis without bleeding 02/23/2021   • Fatigue 02/23/2021   • Severe recurrent major depression without psychotic features ([facility]) 06/29/2021   • Generalized anxiety disorder 06/29/2021   • Suicidal ideation 07/08/2021   • Acute appendicitis 09/08/2021   • Acute appendicitis with localized peritonitis, without perforation, abscess, or gangrene 09/08/2021   • Hyperlipidemia 12/26/2021   • Bronchitis 04/19/2022   • COPD (chronic obstructive pulmonary disease) ([facility]) 02/22/2023   • GERD (gastroesophageal reflux disease) 02/22/2023   • Type 2 diabetes mellitus ([facility]) 02/22/2023     Resolved Ambulatory Problems     Diagnosis Date Noted   • No Resolved Ambulatory Problems     Past Medical History:    Diagnosis Date   • Back problem    • Cellulitis    • Colon polyp    • Depression    • Diabetes mellitus (HCC)    • Sleep apnea    • Suicide attempt (HCC)    • TIA (transient ischemic attack)    • Venous stasis        Past Surgical History:   Procedure Laterality Date   • APPENDECTOMY N/A 2021    Procedure: APPENDECTOMY LAPAROSCOPIC;  Surgeon: Radames Arauz MD;  Location: [facility];  Service: General;  Laterality: N/A;   • COLONOSCOPY     • EPIDIDYMAL CYST EXCISION     • TONSILLECTOMY         Family History   Problem Relation Age of Onset   • Diabetes Mother    • Hypertension Mother    • Schizophrenia Mother    • Depression Mother    • Obesity Mother    • No Known Problems Brother    • No Known Problems Brother    • Kidney disease Maternal Uncle    • Heart disease Maternal Grandmother    • Hypertension Maternal Grandmother    • Hypertension Maternal Grandfather        Social History     Socioeconomic History   • Marital status: Single   Tobacco Use   • Smoking status: Former     Packs/day: 0.75     Years: 0.00     Pack years: 0.00     Types: Cigarettes     Quit date: 2008     Years since quittin.6   • Smokeless tobacco: Never   Vaping Use   • Vaping Use: Never used   Substance and Sexual Activity   • Alcohol use: Yes     Comment: occas   • Drug use: Never   • Sexual activity: Not Currently     Partners: Male       Allergies   Allergen Reactions   • Atorvastatin Headache     Weakness.     • Topamax [Topiramate] Other (See Comments)     Migraine         Current Outpatient Medications   Medication Sig Dispense Refill   • amLODIPine-benazepril (Lotrel) 10-40 MG per capsule Take 1 capsule by mouth Daily. 30 capsule 5   • furosemide (LASIX) 40 MG tablet Take 1 tablet by mouth 2 (Two) Times a Day. 60 tablet 11   • glimepiride (AMARYL) 4 MG tablet Take 1 tablet by mouth 2 (Two) Times a Day. 180 tablet 5   • metFORMIN ER (GLUCOPHAGE-XR) 500 MG 24 hr tablet Take 2 tablets by mouth 2 (Two) Times a  "Day With Meals. 120 tablet 5   • mirtazapine (Remeron) 15 MG tablet Take 1 tablet by mouth At Night As Needed (sleep). 30 tablet 5   • ondansetron (Zofran) 8 MG tablet Take 1 tablet by mouth Every 8 (Eight) Hours As Needed for Nausea or Vomiting. 12 tablet 0   • pantoprazole (PROTONIX) 40 MG EC tablet Take 1 tablet by mouth Daily. 90 tablet 3   • venlafaxine XR (EFFEXOR-XR) 150 MG 24 hr capsule Take 2 capsules by mouth every morning 180 capsule 3     No current facility-administered medications for this visit.       Review of Systems  All other systems were reviewed and are negative.  Exceptions are included in the HPI or as otherwise noted above.     Objective   Blood pressure (!) 154/104, pulse 77, temperature 98.2 °F (36.8 °C), resp. rate 18, height 180.3 cm (70.98\"), weight (!) 200 kg (442 lb), SpO2 94 %.  Physical Exam    Physical Exam:     Constitutional:    Alert, cooperative, in no acute distress   Head:    Normocephalic, without obvious abnormality, atraumatic   Eyes:            Lids and lashes normal, conjunctivae and sclerae normal, no   icterus, no pallor, corneas clear, PER   ENMT:   Ears appear intact with no abnormalities noted      No oral lesions, no thrush, oral mucosa moist.  Malampati III   Neck:   No adenopathy, supple, trachea midline, no thyromegaly, no JVD       Lungs/Resp:     Normal effort, symmetric chest rise, no crepitus, clear to      auscultation bilaterally, no chest wall tenderness                 Heart/CV:    Regular rhythm and normal rate, normal S1 and S2, no            murmur   Abdomen/GI:     Soft, non-tender, non-distended, normal bowel sounds   :     Deferred   Extremities/MSK:   No clubbing or cyanosis.  2+ bilateral LE edema.  Normal tone.  No deformities.    Pulses:   Pulses palpable and equal bilaterally   Skin:   No bleeding, bruising or rash   Heme/Lymph:   No cervical or supraclavicular adenopathy.    Neurologic:    Psychiatric:       Moves all extremities with no " obvious focal motor deficit.  Cranial nerves 2 - 12 grossly intact   Oriented x 3, normal affect.          The above findings are documentation of my personal physical examination from today.  Electronically signed by:  Tee Irizarry MD  03/29/23  12:59 EDT      PFTs:  Full pulmonary function testing was done on 3/29/2023.  Please see scanned PFT report for complete details.  FVC was 4.12 L or 79% predicted.  FEV1 was 3.30 L or 82% predicted.  FEV1 to FVC ratio was 80%.  Maximal voluntary ventilation was 118 L/min or 78% predicted.  Total lung capacity was 7.15 L or 102% predicted.  Residual volume was 2.82 L or 123% predicted.  DLCO was 65% predicted.  DLCO/VA was 103% predicted.    Interpretation: No obstruction.  Low maximal voluntary ventilation, which may be effort related.  No restriction.  No air trapping or hyperinflation.  Low DLCO which corrects when adjusted for alveolar ventilated volumes.  No prior study available for immediate comparison.    Imaging:  Chest x-ray PA and lateral    Study date March 29, 2023    Indication: Preoperative pulmonary evaluation for planned bariatric surgery with reported history of COPD.    Comparison: No direct comparison available.  I did review the lung slices from CT abdomen pelvis on September 8, 2021.    Interpretation: Trachea is midline.  Main chiquita is slightly splayed.  There is no pleural effusion or pneumothorax.  No significant parenchymal lung abnormality.  There are mild degenerative changes to the thoracic spine.    Impression: No acute radiographic chest abnormality.    Assessment & Plan   Problems Addressed this Visit        Endocrine and Metabolic    Morbid (severe) obesity due to excess calories (HCC)       Gastrointestinal Abdominal     GERD (gastroesophageal reflux disease)       Sleep    AJAY (obstructive sleep apnea) -intolerant of BiPAP   Other Visit Diagnoses     Preoperative clearance    -  Primary    Relevant Orders    XR Chest PA & Lateral     Pulmonary Function Test (Completed)      Diagnoses       Codes Comments    Preoperative clearance    -  Primary ICD-10-CM: Z01.818  ICD-9-CM: V72.84     Morbid (severe) obesity due to excess calories (HCC)     ICD-10-CM: E66.01  ICD-9-CM: 278.01     Gastroesophageal reflux disease, unspecified whether esophagitis present     ICD-10-CM: K21.9  ICD-9-CM: 530.81     AJAY (obstructive sleep apnea)     ICD-10-CM: G47.33  ICD-9-CM: 327.23           Discussion:  52 y.o. former smoker for 20 years about 1 PPD with h/o DMII, HTN, AJAY intolerant of CPAP, GERD, COPD on albuterol as needed is here for preoperative pulmonary evaluation for planned gastric sleeve surgery.  He reports he has not needed a rescue inhaler in over 2 years.  He denies cough.      Evaluation today shows no obstruction on pulmonary function testing.  Chest x-ray with no acute abnormality.    Patient is at mildly increased risk of perioperative pulmonary complication owing to his untreated sleep apnea.  He is not tolerant of BiPAP and reports that it did not help him when he used it.  He is not interested in a repeat sleep study/sleep titration.  Interestingly, despite carrying a diagnosis of COPD, he has no obstruction on pulmonary function testing.  He may have had an episode of acute bronchitis with bronchoconstriction on prior testing, although this is not available for review.  His findings do not suggest active asthma, as he has not used his rescue inhaler in over 2 years.  He may have mild intermittent asthma.  I recommended he continue to use his albuterol rescue inhaler as needed, which is not frequently.    Plan:  1.  For preoperative pulmonary evaluation for planned bariatric surgery: Pulmonary function testing (done).  Chest x-ray (done).  Patient at mildly increased risk of perioperative pulmonary complications secondary to untreated sleep apnea and possible mild intermittent asthma.  Continue albuterol rescue inhaler as needed.  Recommend  monitoring oxygen saturations and respiration carefully perioperatively with supplemental oxygen/NIPPV as needed.  Recommend standard DVT prophylaxis, early ambulation, etc.  2.  For COPD: He does not have COPD based on pulmonary function testing.  I will delete this diagnosis.  3.  For possible mild intermittent asthma: Continue albuterol rescue inhaler as needed.  4.  Follow-up as needed.               Electronically signed by:  Tee Irizarry MD  03/29/23  12:59 EDT    • Please note that portions of this note were completed with [employer] - a voice recognition program.

## 2023-04-07 ENCOUNTER — HOSPITAL ENCOUNTER (OUTPATIENT)
Dept: CARDIOLOGY | Facility: HOSPITAL | Age: 53
Discharge: HOME | End: 2023-04-07
Admitting: INTERNAL MEDICINE
Payer: COMMERCIAL

## 2023-04-07 ENCOUNTER — TELEPHONE (OUTPATIENT)
Dept: FAMILY MEDICINE CLINIC | Facility: CLINIC | Age: 53
End: 2023-04-07
Payer: COMMERCIAL

## 2023-04-07 VITALS
OXYGEN SATURATION: 95 % | SYSTOLIC BLOOD PRESSURE: 183 MMHG | DIASTOLIC BLOOD PRESSURE: 110 MMHG | HEIGHT: 71 IN | WEIGHT: 315 LBS | HEART RATE: 61 BPM | BODY MASS INDEX: 44.1 KG/M2

## 2023-04-07 DIAGNOSIS — I20.8 ANGINAL EQUIVALENT: ICD-10-CM

## 2023-04-07 PROCEDURE — 0 RUBIDIUM CHLORIDE: Performed by: INTERNAL MEDICINE

## 2023-04-07 PROCEDURE — 93017 CV STRESS TEST TRACING ONLY: CPT

## 2023-04-07 PROCEDURE — 78431 MYOCRD IMG PET RST&STRS CT: CPT

## 2023-04-07 PROCEDURE — A9555 RB82 RUBIDIUM: HCPCS | Performed by: INTERNAL MEDICINE

## 2023-04-07 PROCEDURE — 78431 MYOCRD IMG PET RST&STRS CT: CPT | Performed by: INTERNAL MEDICINE

## 2023-04-07 PROCEDURE — 25010000002 REGADENOSON 0.4 MG/5ML SOLUTION: Performed by: INTERNAL MEDICINE

## 2023-04-07 PROCEDURE — 93018 CV STRESS TEST I&R ONLY: CPT | Performed by: INTERNAL MEDICINE

## 2023-04-07 RX ADMIN — RUBIDIUM CHLORIDE RB-82 1 DOSE: 150 INJECTION, SOLUTION INTRAVENOUS at 09:58

## 2023-04-07 RX ADMIN — REGADENOSON 0.4 MG: 0.08 INJECTION, SOLUTION INTRAVENOUS at 10:08

## 2023-04-07 RX ADMIN — RUBIDIUM CHLORIDE RB-82 1 DOSE: 150 INJECTION, SOLUTION INTRAVENOUS at 10:10

## 2023-04-07 NOTE — TELEPHONE ENCOUNTER
Patient stated that his pulmonary doctor told him he had arthritis in his back, patient said it hurts and wanted to know if something can be called in for it. No other information given.    [facility] - [place] [id number]   Phone: [phone] Fax: [phone]

## 2023-04-08 LAB
BH CV REST NUCLEAR ISOTOPE DOSE: 32.1 MCI
BH CV STRESS BP STAGE 2: NORMAL
BH CV STRESS BP STAGE 4: NORMAL
BH CV STRESS COMMENTS STAGE 1: NORMAL
BH CV STRESS DOSE REGADENOSON STAGE 1: 0.4
BH CV STRESS DURATION MIN STAGE 1: 1
BH CV STRESS DURATION MIN STAGE 2: 1
BH CV STRESS DURATION MIN STAGE 3: 1
BH CV STRESS DURATION MIN STAGE 4: 1
BH CV STRESS DURATION SEC STAGE 2: 0
BH CV STRESS HR STAGE 1: 62
BH CV STRESS HR STAGE 2: 71
BH CV STRESS HR STAGE 3: 65
BH CV STRESS HR STAGE 4: 65
BH CV STRESS NUCLEAR ISOTOPE DOSE: 32.1 MCI
BH CV STRESS O2 STAGE 1: 95
BH CV STRESS O2 STAGE 2: 99
BH CV STRESS O2 STAGE 3: 99
BH CV STRESS O2 STAGE 4: 96
BH CV STRESS PROTOCOL 1: NORMAL
BH CV STRESS RECOVERY BP: NORMAL MMHG
BH CV STRESS RECOVERY HR: 66 BPM
BH CV STRESS RECOVERY O2: 96 %
BH CV STRESS STAGE 1: 1
BH CV STRESS STAGE 2: 2
BH CV STRESS STAGE 3: 3
BH CV STRESS STAGE 4: 4
LV EF NUC BP: 55 %
MAXIMAL PREDICTED HEART RATE: 169 BPM
PERCENT MAX PREDICTED HR: 44.97 %
STRESS BASELINE BP: NORMAL MMHG
STRESS BASELINE HR: 61 BPM
STRESS O2 SAT REST: 95 %
STRESS PERCENT HR: 53 %
STRESS POST ESTIMATED WORKLOAD: 1 METS
STRESS POST EXERCISE DUR MIN: 4 MIN
STRESS POST EXERCISE DUR SEC: 0 SEC
STRESS POST O2 SAT PEAK: 96 %
STRESS POST PEAK BP: NORMAL MMHG
STRESS POST PEAK HR: 76 BPM
STRESS TARGET HR: 144 BPM

## 2023-04-10 ENCOUNTER — TELEPHONE (OUTPATIENT)
Dept: CARDIOLOGY | Facility: CLINIC | Age: 53
End: 2023-04-10
Payer: COMMERCIAL

## 2023-04-10 ENCOUNTER — TELEMEDICINE (OUTPATIENT)
Dept: BARIATRICS/WEIGHT MGMT | Facility: CLINIC | Age: 53
End: 2023-04-10
Payer: COMMERCIAL

## 2023-04-10 VITALS — BODY MASS INDEX: 44.1 KG/M2 | WEIGHT: 315 LBS | HEIGHT: 71 IN

## 2023-04-10 DIAGNOSIS — K21.9 GASTROESOPHAGEAL REFLUX DISEASE, UNSPECIFIED WHETHER ESOPHAGITIS PRESENT: Primary | ICD-10-CM

## 2023-04-10 PROCEDURE — 1160F RVW MEDS BY RX/DR IN RCRD: CPT | Performed by: PHYSICIAN ASSISTANT

## 2023-04-10 PROCEDURE — 99214 OFFICE O/P EST MOD 30 MIN: CPT | Performed by: PHYSICIAN ASSISTANT

## 2023-04-10 PROCEDURE — 1159F MED LIST DOCD IN RCRD: CPT | Performed by: PHYSICIAN ASSISTANT

## 2023-04-10 NOTE — TELEPHONE ENCOUNTER
----- Message from Ruslan Villavicencio MD sent at 4/9/2023  6:00 PM EDT -----  Stress test looks good.  Okay to proceed with bariatric surgery  ----- Message -----  From: Ruslan Villavicencio MD  Sent: 4/8/2023   9:25 PM EDT  To: Ruslan Villavicencio MD

## 2023-04-10 NOTE — H&P (VIEW-ONLY)
"[facility] BARIATRIC SURGERY  [address]  [address]  [facility] [id number] [phone]      Patient  Name:  Carlos Eduardo Danielle  :  1970      Date of Visit: 04/10/2023        Chief Complaint:  weight gain, GERD.     History of Present Illness:  Carlos Eduardo Danielle is a 52 y.o. male pursuing MBS w/ Dr. Ruelas.     Initial Intake Eval 23 w/ JOSE D.Giovanni PAC - \"Carlos Eduardo has been overweight for at least 30 years, has been 35 pounds or more overweight for at least 20+ years, has been 100 pounds or more overweight for 20 or more years and started dieting at age 30.  Previous diet attempts include: Low Carbohydrate and Cabbage Soup; Nutri-System; Amphetamines.  The most weight Carlos Eduardo lost was 70 pounds but was unable to maintain that weight loss.  His maximum lifetime weight is 450 pounds.    GI: History of GERD.  Takes Protonix and Pepcid daily.  Last EGD in 2020.  Symptoms have worsened.  He denies any history of H. pylori.  He denies any postprandial nausea, abdominal pain, or bloating.  Only abdominal surgery is laparoscopic appendectomy in  that was uneventful.    Other past medical history includes hypertension, hyperlipidemia, obstructive sleep apnea noncompliant with BiPAP, COPD (on albuterol only), depression/anxiety, history of suicide attempts many years ago.    He is a type II diabetic and was diagnosed in 2019.  He has never been on insulin and his last A1c was 8.1.    He is a former smoker and quit in .\"    No new issues/concerns.  Requests to change surgeon preference to Dr. Ruelas.  Says he knows several people who have had surgery w/ her.  Since last visit he has lost 6 lbs.  Intake A1C 9.1.  GES scheduled.  Denies issues w/ N/V/AP.  Continues on daily PPI for chronic GERD.  H.Pylori serum negative.       Past Medical History:   Diagnosis Date   • Anxiety    • Back problem    • Cellulitis     HX; BILATERAL LEGS   • Colon polyp    • COPD (chronic obstructive " pulmonary disease)     albuterol PRN; seen pulm remotely   • Depression    • Diabetes mellitus    • GERD (gastroesophageal reflux disease)     on Protonix daily. EGD Jan 2020; no hx of h pylori   • Hyperlipidemia    • Hypertension    • Sleep apnea     noncompliant with BIPAP   • Suicide attempt     reports hx of suicide attempts three times in 30 years ago   • TIA (transient ischemic attack)     2010 and 2015; related to hypertension.   • Type 2 diabetes mellitus     dx in 2019; no insulin; last A1c 8.0   • Venous stasis     bilateral lower ext     Past Surgical History:   Procedure Laterality Date   • APPENDECTOMY N/A 09/08/2021    Procedure: APPENDECTOMY LAPAROSCOPIC;  Surgeon: Radames Arauz MD;  [facility];  Service: General;  Laterality: N/A;   • COLONOSCOPY  2020   • EPIDIDYMAL CYST EXCISION     • TONSILLECTOMY  2006       Allergies   Allergen Reactions   • Atorvastatin Headache     Weakness.     • Topamax [Topiramate] Other (See Comments)     Migraine         Current Outpatient Medications:   •  amLODIPine-benazepril (Lotrel) 10-40 MG per capsule, Take 1 capsule by mouth Daily., Disp: 30 capsule, Rfl: 5  •  furosemide (LASIX) 40 MG tablet, Take 1 tablet by mouth 2 (Two) Times a Day., Disp: 60 tablet, Rfl: 11  •  glimepiride (AMARYL) 4 MG tablet, Take 1 tablet by mouth 2 (Two) Times a Day., Disp: 180 tablet, Rfl: 5  •  metFORMIN ER (GLUCOPHAGE-XR) 500 MG 24 hr tablet, Take 2 tablets by mouth 2 (Two) Times a Day With Meals., Disp: 120 tablet, Rfl: 5  •  mirtazapine (Remeron) 15 MG tablet, Take 1 tablet by mouth At Night As Needed (sleep)., Disp: 30 tablet, Rfl: 5  •  ondansetron (Zofran) 8 MG tablet, Take 1 tablet by mouth Every 8 (Eight) Hours As Needed for Nausea or Vomiting., Disp: 12 tablet, Rfl: 0  •  pantoprazole (PROTONIX) 40 MG EC tablet, Take 1 tablet by mouth Daily., Disp: 90 tablet, Rfl: 3  •  venlafaxine XR (EFFEXOR-XR) 150 MG 24 hr capsule, Take 2 capsules by mouth every morning,  Disp: 180 capsule, Rfl: 3    Social History     Socioeconomic History   • Marital status: Single   Tobacco Use   • Smoking status: Former     Packs/day: 0.75     Years: 0.00     Pack years: 0.00     Types: Cigarettes     Quit date: 2008     Years since quittin.6   • Smokeless tobacco: Never   Vaping Use   • Vaping Use: Never used   Substance and Sexual Activity   • Alcohol use: Yes     Comment: occas   • Drug use: Never   • Sexual activity: Not Currently     Partners: Male     Social History     Social History Narrative    Patient lives in [place], [place].  He is single and works full time as a  at [employer].       Family History   Problem Relation Age of Onset   • Diabetes Mother    • Hypertension Mother    • Schizophrenia Mother    • Depression Mother    • Obesity Mother    • No Known Problems Brother    • No Known Problems Brother    • Kidney disease Maternal Uncle    • Heart disease Maternal Grandmother    • Hypertension Maternal Grandmother    • Hypertension Maternal Grandfather        Review of Systems:  Constitutional:  reports fatigue, weight gain and denies fevers, chills.  HEENT:  denies headache, ear pain or loss of hearing, blurred or double vision, nasal discharge or sore throat.  Cardiovascular:  reports HTN, HLD and denies CAD, Atrial Fib, hx heart disease, heart murmur, hx MI, chest pain, palpitations, edema, hx DVT.  Respiratory:  reports COPD and denies dyspnea on exertion, shortness of breath , cough , wheezing, sleep apnea, asthma, hx PE.  Gastrointestinal:  reports heartburn and denies dysphagia, nausea, vomiting, abdominal pain, IBS, diarrhea, constipation, melena, blood in stool, gallbladder issues, liver disease, hx pancreatitis.  Genitourinary:  reports none and denies history of  frequent UTI, incontinence, hematuria, dysuria, polyuria, polydipsia, renal insufficiency, renal failure.    Musculoskeletal:  reports none and denies joint pain, fibromyalgia,  arthritis and autoimmune disease.  Neurological:  reports TIA and denies headaches, migraines, numbness /tingling, dizziness, confusion, seizure, stroke.  Psychiatric:  reports hx depression, hx anxiety, hx suicide attempt and denies depressed mood, feeling anxious, bipolar disorder, suicidal ideation, hx self injury, hx substance abuse, eating disorder.  Endocrine:  reports diabetes and denies glucose intolerance, thyroid disease, gout.  Hematologic:  reports none and denies bruising, bleeding disorder, hx anemia, hx blood transfusion.  Skin:  reports none and denies rashes, hx MRSA.    ROS reviewed - accurate.     Physical Exam:  Vital Signs:  Weight: (!) 200 kg (440 lb)   Body mass index is 61.37 kg/m².                Physical Exam  Constitutional:       General: He is not in acute distress.     Appearance: He is well-developed. He is not diaphoretic.   Eyes:      General: No scleral icterus.  Pulmonary:      Effort: Pulmonary effort is normal.   Neurological:      Mental Status: He is alert and oriented to person, place, and time.         Patient Active Problem List   Diagnosis   • AJAY (obstructive sleep apnea) -intolerant of BiPAP   • Vitamin D insufficiency   • Type 2 diabetes mellitus, without long-term current use of insulin   • Anxiety   • Hypertension   • Hypogonadism in male   • History of non anemic vitamin B12 deficiency   • Morbid (severe) obesity due to excess calories   • Gastritis without bleeding   • Fatigue   • Severe recurrent major depression without psychotic features   • Generalized anxiety disorder   • Suicidal ideation   • Acute appendicitis   • Acute appendicitis with localized peritonitis, without perforation, abscess, or gangrene   • Hyperlipidemia   • Bronchitis   • GERD (gastroesophageal reflux disease)   • Type 2 diabetes mellitus       Assessment:  52 y.o. male with medically complicated obesity pursuing sleeve gastrectomy.    Metabolic & Bariatric Surgery is deemed medically  necessary given the following: Class 3 Severe Obesity (BMI >=40). Obesity-related health conditions include the following: obstructive sleep apnea, hypertension, diabetes mellitus, dyslipidemias and GERD. Obesity is worsening. BMI is is above average; BMI management plan is completed. We discussed consulting a Bariatric surgeon.        Plan:  Will proceed w/ EGD @[facility] w/ Dr. Ruelas for further eval.  Continue working on positive lifestyle changes in preparation for MBS.  Additional input to follow.       Note: This was an audio and video enabled telemedicine encounter conducted via [employer].  Patient is located in [place].  Provider is at her office.  Consent was obtained prior to the visit.

## 2023-04-10 NOTE — PROGRESS NOTES
"[facility] BARIATRIC SURGERY  [address]  [address]  [facility] [id number] [phone]      Patient  Name:  Carlos Eduardo Danielle  :  1970      Date of Visit: 04/10/2023        Chief Complaint:  weight gain, GERD.     History of Present Illness:  Carlos Eduardo Danielle is a 52 y.o. male pursuing MBS w/ Dr. Ruelas.     Initial Intake Eval 23 w/ JOSE D.Giovanni PAC - \"Carlos Eduardo has been overweight for at least 30 years, has been 35 pounds or more overweight for at least 20+ years, has been 100 pounds or more overweight for 20 or more years and started dieting at age 30.  Previous diet attempts include: Low Carbohydrate and Cabbage Soup; Nutri-System; Amphetamines.  The most weight Carlos Eduardo lost was 70 pounds but was unable to maintain that weight loss.  His maximum lifetime weight is 450 pounds.    GI: History of GERD.  Takes Protonix and Pepcid daily.  Last EGD in 2020.  Symptoms have worsened.  He denies any history of H. pylori.  He denies any postprandial nausea, abdominal pain, or bloating.  Only abdominal surgery is laparoscopic appendectomy in  that was uneventful.    Other past medical history includes hypertension, hyperlipidemia, obstructive sleep apnea noncompliant with BiPAP, COPD (on albuterol only), depression/anxiety, history of suicide attempts many years ago.    He is a type II diabetic and was diagnosed in 2019.  He has never been on insulin and his last A1c was 8.1.    He is a former smoker and quit in .\"    No new issues/concerns.  Requests to change surgeon preference to Dr. Ruelas.  Says he knows several people who have had surgery w/ her.  Since last visit he has lost 6 lbs.  Intake A1C 9.1.  GES scheduled.  Denies issues w/ N/V/AP.  Continues on daily PPI for chronic GERD.  H.Pylori serum negative.       Past Medical History:   Diagnosis Date   • Anxiety    • Back problem    • Cellulitis     HX; BILATERAL LEGS   • Colon polyp    • COPD (chronic obstructive " pulmonary disease)     albuterol PRN; seen pulm remotely   • Depression    • Diabetes mellitus    • GERD (gastroesophageal reflux disease)     on Protonix daily. EGD Jan 2020; no hx of h pylori   • Hyperlipidemia    • Hypertension    • Sleep apnea     noncompliant with BIPAP   • Suicide attempt     reports hx of suicide attempts three times in 30 years ago   • TIA (transient ischemic attack)     2010 and 2015; related to hypertension.   • Type 2 diabetes mellitus     dx in 2019; no insulin; last A1c 8.0   • Venous stasis     bilateral lower ext     Past Surgical History:   Procedure Laterality Date   • APPENDECTOMY N/A 09/08/2021    Procedure: APPENDECTOMY LAPAROSCOPIC;  Surgeon: Radames Arauz MD;  Location: [facility];  Service: General;  Laterality: N/A;   • COLONOSCOPY  2020   • EPIDIDYMAL CYST EXCISION     • TONSILLECTOMY  2006       Allergies   Allergen Reactions   • Atorvastatin Headache     Weakness.     • Topamax [Topiramate] Other (See Comments)     Migraine         Current Outpatient Medications:   •  amLODIPine-benazepril (Lotrel) 10-40 MG per capsule, Take 1 capsule by mouth Daily., Disp: 30 capsule, Rfl: 5  •  furosemide (LASIX) 40 MG tablet, Take 1 tablet by mouth 2 (Two) Times a Day., Disp: 60 tablet, Rfl: 11  •  glimepiride (AMARYL) 4 MG tablet, Take 1 tablet by mouth 2 (Two) Times a Day., Disp: 180 tablet, Rfl: 5  •  metFORMIN ER (GLUCOPHAGE-XR) 500 MG 24 hr tablet, Take 2 tablets by mouth 2 (Two) Times a Day With Meals., Disp: 120 tablet, Rfl: 5  •  mirtazapine (Remeron) 15 MG tablet, Take 1 tablet by mouth At Night As Needed (sleep)., Disp: 30 tablet, Rfl: 5  •  ondansetron (Zofran) 8 MG tablet, Take 1 tablet by mouth Every 8 (Eight) Hours As Needed for Nausea or Vomiting., Disp: 12 tablet, Rfl: 0  •  pantoprazole (PROTONIX) 40 MG EC tablet, Take 1 tablet by mouth Daily., Disp: 90 tablet, Rfl: 3  •  venlafaxine XR (EFFEXOR-XR) 150 MG 24 hr capsule, Take 2 capsules by mouth every morning,  Disp: 180 capsule, Rfl: 3    Social History     Socioeconomic History   • Marital status: Single   Tobacco Use   • Smoking status: Former     Packs/day: 0.75     Years: 0.00     Pack years: 0.00     Types: Cigarettes     Quit date: 2008     Years since quittin.6   • Smokeless tobacco: Never   Vaping Use   • Vaping Use: Never used   Substance and Sexual Activity   • Alcohol use: Yes     Comment: occas   • Drug use: Never   • Sexual activity: Not Currently     Partners: Male     Social History     Social History Narrative    Patient lives in [place], [place].  He is single and works full time as a  at [email or web address].       Family History   Problem Relation Age of Onset   • Diabetes Mother    • Hypertension Mother    • Schizophrenia Mother    • Depression Mother    • Obesity Mother    • No Known Problems Brother    • No Known Problems Brother    • Kidney disease Maternal Uncle    • Heart disease Maternal Grandmother    • Hypertension Maternal Grandmother    • Hypertension Maternal Grandfather        Review of Systems:  Constitutional:  reports fatigue, weight gain and denies fevers, chills.  HEENT:  denies headache, ear pain or loss of hearing, blurred or double vision, nasal discharge or sore throat.  Cardiovascular:  reports HTN, HLD and denies CAD, Atrial Fib, hx heart disease, heart murmur, hx MI, chest pain, palpitations, edema, hx DVT.  Respiratory:  reports COPD and denies dyspnea on exertion, shortness of breath , cough , wheezing, sleep apnea, asthma, hx PE.  Gastrointestinal:  reports heartburn and denies dysphagia, nausea, vomiting, abdominal pain, IBS, diarrhea, constipation, melena, blood in stool, gallbladder issues, liver disease, hx pancreatitis.  Genitourinary:  reports none and denies history of  frequent UTI, incontinence, hematuria, dysuria, polyuria, polydipsia, renal insufficiency, renal failure.    Musculoskeletal:  reports none and denies joint pain, fibromyalgia,  arthritis and autoimmune disease.  Neurological:  reports TIA and denies headaches, migraines, numbness /tingling, dizziness, confusion, seizure, stroke.  Psychiatric:  reports hx depression, hx anxiety, hx suicide attempt and denies depressed mood, feeling anxious, bipolar disorder, suicidal ideation, hx self injury, hx substance abuse, eating disorder.  Endocrine:  reports diabetes and denies glucose intolerance, thyroid disease, gout.  Hematologic:  reports none and denies bruising, bleeding disorder, hx anemia, hx blood transfusion.  Skin:  reports none and denies rashes, hx MRSA.    ROS reviewed - accurate.     Physical Exam:  Vital Signs:  Weight: (!) 200 kg (440 lb)   Body mass index is 61.37 kg/m².                Physical Exam  Constitutional:       General: He is not in acute distress.     Appearance: He is well-developed. He is not diaphoretic.   Eyes:      General: No scleral icterus.  Pulmonary:      Effort: Pulmonary effort is normal.   Neurological:      Mental Status: He is alert and oriented to person, place, and time.         Patient Active Problem List   Diagnosis   • AJAY (obstructive sleep apnea) -intolerant of BiPAP   • Vitamin D insufficiency   • Type 2 diabetes mellitus, without long-term current use of insulin   • Anxiety   • Hypertension   • Hypogonadism in male   • History of non anemic vitamin B12 deficiency   • Morbid (severe) obesity due to excess calories   • Gastritis without bleeding   • Fatigue   • Severe recurrent major depression without psychotic features   • Generalized anxiety disorder   • Suicidal ideation   • Acute appendicitis   • Acute appendicitis with localized peritonitis, without perforation, abscess, or gangrene   • Hyperlipidemia   • Bronchitis   • GERD (gastroesophageal reflux disease)   • Type 2 diabetes mellitus       Assessment:  52 y.o. male with medically complicated obesity pursuing sleeve gastrectomy.    Metabolic & Bariatric Surgery is deemed medically  necessary given the following: Class 3 Severe Obesity (BMI >=40). Obesity-related health conditions include the following: obstructive sleep apnea, hypertension, diabetes mellitus, dyslipidemias and GERD. Obesity is worsening. BMI is is above average; BMI management plan is completed. We discussed consulting a Bariatric surgeon.        Plan:  Will proceed w/ EGD @[facility] w/ Dr. Ruelas for further eval.  Continue working on positive lifestyle changes in preparation for MBS.  Additional input to follow.       Note: This was an audio and video enabled telemedicine encounter conducted via [employer].  Patient is located in [place].  Provider is at her office.  Consent was obtained prior to the visit.

## 2023-04-13 ENCOUNTER — PRE-ADMISSION TESTING (OUTPATIENT)
Dept: PREADMISSION TESTING | Facility: HOSPITAL | Age: 53
End: 2023-04-13
Payer: COMMERCIAL

## 2023-04-13 DIAGNOSIS — E11.65 TYPE 2 DIABETES MELLITUS WITH HYPERGLYCEMIA, WITHOUT LONG-TERM CURRENT USE OF INSULIN: ICD-10-CM

## 2023-04-13 LAB
DEPRECATED RDW RBC AUTO: 45.1 FL (ref 37–54)
ERYTHROCYTE [DISTWIDTH] IN BLOOD BY AUTOMATED COUNT: 14.2 % (ref 12.3–15.4)
HCT VFR BLD AUTO: 45 % (ref 37.5–51)
HGB BLD-MCNC: 15.2 G/DL (ref 13–17.7)
MCH RBC QN AUTO: 29.4 PG (ref 26.6–33)
MCHC RBC AUTO-ENTMCNC: 33.8 G/DL (ref 31.5–35.7)
MCV RBC AUTO: 87 FL (ref 79–97)
PLATELET # BLD AUTO: 166 10*3/MM3 (ref 140–450)
PMV BLD AUTO: 12.1 FL (ref 6–12)
POTASSIUM SERPL-SCNC: 4.2 MMOL/L (ref 3.5–5.2)
RBC # BLD AUTO: 5.17 10*6/MM3 (ref 4.14–5.8)
WBC NRBC COR # BLD: 5.79 10*3/MM3 (ref 3.4–10.8)

## 2023-04-13 PROCEDURE — 85027 COMPLETE CBC AUTOMATED: CPT

## 2023-04-13 PROCEDURE — 36415 COLL VENOUS BLD VENIPUNCTURE: CPT

## 2023-04-13 PROCEDURE — 84132 ASSAY OF SERUM POTASSIUM: CPT

## 2023-04-13 RX ORDER — METFORMIN HYDROCHLORIDE 500 MG/1
1000 TABLET, EXTENDED RELEASE ORAL 2 TIMES DAILY WITH MEALS
Qty: 120 TABLET | Refills: 5 | OUTPATIENT
Start: 2023-04-13

## 2023-04-13 NOTE — PAT
Patient did not review general PAT education video as instructed in their preoperative information received from their surgeon.  One-on-one Pre Admission Testing general education provided during PAT visit.  Copies of PAT general education handouts (Incentive Spirometry, Meds to Beds Program, Patient Belongings, Pre-op skin preparation instructions, Blood Glucose testing, Visitor policy, Surgery FAQ, Code H) distributed to patient. Encouraged patient/family to read PAT general education handouts thoroughly and notify PAT staff with any questions or concerns. Patient instructed to bring PAT pass and completed skin prep sheet (if applicable) on the day of procedure. Patient verbalized understanding of all information and priority content.     Per Anesthesia Request, patient instructed not to take their ACE/ARB medications on the AM of surgery.    Cardiac clearance on chart from 4-.

## 2023-04-17 ENCOUNTER — ANESTHESIA (OUTPATIENT)
Dept: GASTROENTEROLOGY | Facility: HOSPITAL | Age: 53
End: 2023-04-17
Payer: COMMERCIAL

## 2023-04-17 ENCOUNTER — ANESTHESIA EVENT (OUTPATIENT)
Dept: GASTROENTEROLOGY | Facility: HOSPITAL | Age: 53
End: 2023-04-17
Payer: COMMERCIAL

## 2023-04-17 ENCOUNTER — HOSPITAL ENCOUNTER (OUTPATIENT)
Facility: HOSPITAL | Age: 53
Setting detail: HOSPITAL OUTPATIENT SURGERY
Discharge: HOME | End: 2023-04-17
Attending: SURGERY | Admitting: SURGERY
Payer: COMMERCIAL

## 2023-04-17 VITALS
HEART RATE: 65 BPM | WEIGHT: 315 LBS | RESPIRATION RATE: 18 BRPM | SYSTOLIC BLOOD PRESSURE: 164 MMHG | DIASTOLIC BLOOD PRESSURE: 90 MMHG | BODY MASS INDEX: 44.1 KG/M2 | OXYGEN SATURATION: 95 % | TEMPERATURE: 97.2 F | HEIGHT: 71 IN

## 2023-04-17 DIAGNOSIS — K21.9 GASTROESOPHAGEAL REFLUX DISEASE, UNSPECIFIED WHETHER ESOPHAGITIS PRESENT: ICD-10-CM

## 2023-04-17 LAB — GLUCOSE BLDC GLUCOMTR-MCNC: 277 MG/DL (ref 70–130)

## 2023-04-17 PROCEDURE — 43239 EGD BIOPSY SINGLE/MULTIPLE: CPT | Performed by: SURGERY

## 2023-04-17 PROCEDURE — 88305 TISSUE EXAM BY PATHOLOGIST: CPT | Performed by: SURGERY

## 2023-04-17 PROCEDURE — 25010000002 PROPOFOL 10 MG/ML EMULSION: Performed by: NURSE ANESTHETIST, CERTIFIED REGISTERED

## 2023-04-17 PROCEDURE — 25010000002 HYDRALAZINE PER 20 MG: Performed by: NURSE ANESTHETIST, CERTIFIED REGISTERED

## 2023-04-17 PROCEDURE — 82962 GLUCOSE BLOOD TEST: CPT

## 2023-04-17 RX ORDER — ONDANSETRON 2 MG/ML
4 INJECTION INTRAMUSCULAR; INTRAVENOUS ONCE AS NEEDED
Status: DISCONTINUED | OUTPATIENT
Start: 2023-04-17 | End: 2023-04-17 | Stop reason: HOSPADM

## 2023-04-17 RX ORDER — IPRATROPIUM BROMIDE AND ALBUTEROL SULFATE 2.5; .5 MG/3ML; MG/3ML
3 SOLUTION RESPIRATORY (INHALATION) ONCE AS NEEDED
Status: DISCONTINUED | OUTPATIENT
Start: 2023-04-17 | End: 2023-04-17 | Stop reason: HOSPADM

## 2023-04-17 RX ORDER — SODIUM CHLORIDE, SODIUM LACTATE, POTASSIUM CHLORIDE, CALCIUM CHLORIDE 600; 310; 30; 20 MG/100ML; MG/100ML; MG/100ML; MG/100ML
INJECTION, SOLUTION INTRAVENOUS CONTINUOUS PRN
Status: DISCONTINUED | OUTPATIENT
Start: 2023-04-17 | End: 2023-04-17 | Stop reason: SURG

## 2023-04-17 RX ORDER — LIDOCAINE HYDROCHLORIDE 10 MG/ML
0.5 INJECTION, SOLUTION EPIDURAL; INFILTRATION; INTRACAUDAL; PERINEURAL ONCE AS NEEDED
Status: DISCONTINUED | OUTPATIENT
Start: 2023-04-17 | End: 2023-04-17 | Stop reason: HOSPADM

## 2023-04-17 RX ORDER — SODIUM CHLORIDE 9 MG/ML
150 INJECTION, SOLUTION INTRAVENOUS CONTINUOUS
Status: DISCONTINUED | OUTPATIENT
Start: 2023-04-17 | End: 2023-04-17 | Stop reason: HOSPADM

## 2023-04-17 RX ORDER — FAMOTIDINE 10 MG/ML
20 INJECTION, SOLUTION INTRAVENOUS ONCE
Status: COMPLETED | OUTPATIENT
Start: 2023-04-17 | End: 2023-04-17

## 2023-04-17 RX ORDER — LABETALOL HYDROCHLORIDE 5 MG/ML
INJECTION, SOLUTION INTRAVENOUS AS NEEDED
Status: DISCONTINUED | OUTPATIENT
Start: 2023-04-17 | End: 2023-04-17 | Stop reason: SURG

## 2023-04-17 RX ORDER — SODIUM CHLORIDE 0.9 % (FLUSH) 0.9 %
10 SYRINGE (ML) INJECTION EVERY 12 HOURS SCHEDULED
Status: DISCONTINUED | OUTPATIENT
Start: 2023-04-17 | End: 2023-04-17 | Stop reason: HOSPADM

## 2023-04-17 RX ORDER — HYDRALAZINE HYDROCHLORIDE 20 MG/ML
INJECTION INTRAMUSCULAR; INTRAVENOUS AS NEEDED
Status: DISCONTINUED | OUTPATIENT
Start: 2023-04-17 | End: 2023-04-17 | Stop reason: SURG

## 2023-04-17 RX ORDER — SODIUM CHLORIDE 0.9 % (FLUSH) 0.9 %
3 SYRINGE (ML) INJECTION EVERY 12 HOURS SCHEDULED
Status: DISCONTINUED | OUTPATIENT
Start: 2023-04-17 | End: 2023-04-17 | Stop reason: HOSPADM

## 2023-04-17 RX ORDER — SODIUM CHLORIDE 0.9 % (FLUSH) 0.9 %
3-10 SYRINGE (ML) INJECTION AS NEEDED
Status: DISCONTINUED | OUTPATIENT
Start: 2023-04-17 | End: 2023-04-17 | Stop reason: HOSPADM

## 2023-04-17 RX ORDER — SODIUM CHLORIDE, SODIUM LACTATE, POTASSIUM CHLORIDE, CALCIUM CHLORIDE 600; 310; 30; 20 MG/100ML; MG/100ML; MG/100ML; MG/100ML
9 INJECTION, SOLUTION INTRAVENOUS CONTINUOUS
Status: DISCONTINUED | OUTPATIENT
Start: 2023-04-17 | End: 2023-04-17 | Stop reason: HOSPADM

## 2023-04-17 RX ORDER — SODIUM CHLORIDE 0.9 % (FLUSH) 0.9 %
10 SYRINGE (ML) INJECTION AS NEEDED
Status: DISCONTINUED | OUTPATIENT
Start: 2023-04-17 | End: 2023-04-17 | Stop reason: HOSPADM

## 2023-04-17 RX ORDER — FAMOTIDINE 20 MG/1
20 TABLET, FILM COATED ORAL ONCE
Status: DISCONTINUED | OUTPATIENT
Start: 2023-04-17 | End: 2023-04-17 | Stop reason: HOSPADM

## 2023-04-17 RX ORDER — LIDOCAINE HYDROCHLORIDE 10 MG/ML
INJECTION, SOLUTION EPIDURAL; INFILTRATION; INTRACAUDAL; PERINEURAL AS NEEDED
Status: DISCONTINUED | OUTPATIENT
Start: 2023-04-17 | End: 2023-04-17 | Stop reason: SURG

## 2023-04-17 RX ORDER — SODIUM CHLORIDE 9 MG/ML
40 INJECTION, SOLUTION INTRAVENOUS AS NEEDED
Status: DISCONTINUED | OUTPATIENT
Start: 2023-04-17 | End: 2023-04-17 | Stop reason: HOSPADM

## 2023-04-17 RX ORDER — PROPOFOL 10 MG/ML
VIAL (ML) INTRAVENOUS AS NEEDED
Status: DISCONTINUED | OUTPATIENT
Start: 2023-04-17 | End: 2023-04-17 | Stop reason: SURG

## 2023-04-17 RX ADMIN — SODIUM CHLORIDE 150 ML/HR: 9 INJECTION, SOLUTION INTRAVENOUS at 07:46

## 2023-04-17 RX ADMIN — PROPOFOL 100 MG: 10 INJECTION, EMULSION INTRAVENOUS at 08:39

## 2023-04-17 RX ADMIN — SODIUM CHLORIDE, POTASSIUM CHLORIDE, SODIUM LACTATE AND CALCIUM CHLORIDE: 600; 310; 30; 20 INJECTION, SOLUTION INTRAVENOUS at 08:20

## 2023-04-17 RX ADMIN — LIDOCAINE HYDROCHLORIDE 100 MG: 10 INJECTION, SOLUTION EPIDURAL; INFILTRATION; INTRACAUDAL; PERINEURAL at 08:39

## 2023-04-17 RX ADMIN — FAMOTIDINE 20 MG: 10 INJECTION INTRAVENOUS at 07:45

## 2023-04-17 RX ADMIN — LABETALOL 20 MG/4 ML (5 MG/ML) INTRAVENOUS SYRINGE 10 MG: at 09:06

## 2023-04-17 RX ADMIN — PROPOFOL 50 MG: 10 INJECTION, EMULSION INTRAVENOUS at 08:47

## 2023-04-17 RX ADMIN — HYDRALAZINE HYDROCHLORIDE 10 MG: 20 INJECTION INTRAMUSCULAR; INTRAVENOUS at 08:42

## 2023-04-17 NOTE — ANESTHESIA PREPROCEDURE EVALUATION
Anesthesia Evaluation                  Airway   Mallampati: II  TM distance: >3 FB  Neck ROM: full  Possible difficult intubation  Dental      Pulmonary    (+) COPD, sleep apnea,   Cardiovascular     ECG reviewed    (+) hypertension,       Neuro/Psych  (+) TIA, psychiatric history,    GI/Hepatic/Renal/Endo    (+) morbid obesity, GERD,  diabetes mellitus,     Musculoskeletal     Abdominal    Substance History      OB/GYN          Other                        Anesthesia Plan    ASA 3     general       Anesthetic plan, risks, benefits, and alternatives have been provided, discussed and informed consent has been obtained with: patient.    Plan discussed with CRNA.        CODE STATUS:

## 2023-04-17 NOTE — ANESTHESIA POSTPROCEDURE EVALUATION
Patient: Carlos Eduardo Danielle    Procedure Summary     Date: 04/17/23 Room / Location:  [facility]    Anesthesia Start: 0839 Anesthesia Stop: 0859    Procedure: ESOPHAGOGASTRODUODENOSCOPY Diagnosis:       Gastroesophageal reflux disease, unspecified whether esophagitis present      (Gastroesophageal reflux disease, unspecified whether esophagitis present [K21.9])    Surgeons: Akua Ruelas MD Provider: Sesar Suh MD    Anesthesia Type: general ASA Status: 3          Anesthesia Type: general    Vitals  Vitals Value Taken Time   /87 04/17/23 0856   Temp     Pulse 71 04/17/23 0858   Resp     SpO2 97 % 04/17/23 0858   Vitals shown include unvalidated device data.        Post Anesthesia Care and Evaluation    Patient location during evaluation: PACU  Patient participation: complete - patient participated  Level of consciousness: awake and alert  Pain score: 0  Pain management: adequate    Airway patency: patent  Anesthetic complications: No anesthetic complications  PONV Status: none  Cardiovascular status: hemodynamically stable and acceptable  Respiratory status: nonlabored ventilation and acceptable  Hydration status: acceptable

## 2023-04-17 NOTE — OP NOTE
PROCEDURE NOTE.    Date: 04/17/23    Patient: Carlos Eduardo Danielle     Surgeon: Akua Ruelas MD      Preoperative Diagnosis: GERD     Postoperative Diagnosis: GERD     Procedure Performed: Esophagogastroduodenoscopy with biopsy (CPT 87128)    Estimated Blood Loss: minimal    Complications: none    Findings: GEJ at 45 cm, no hiatal hernia      Specimens: Distal esophageal (44 cm) and antral biopsies     Indications: Carlos Eduardo Danielle is a 52 y.o. year old supermorbidly obese male who is preparing for sleeve gastrectomy.  The patient has reflux that is intermieent and takes PRN Pepcid or omeprazole. He presents today for diagnostic endoscopy.       Procedure:      After informed consent was taken, the patient was brought to the operating room and placed in the supine position. MAC was given by anesthesia staff. A bite block was placed and time out performed. A flexible endoscope was passed transorally down to the second portion of the duodenum without difficulty. The scope was slowly withdrawn and the anatomy examined with photodocumentation throughout. The duodenum was normal. The pylorus was without spasm. The antrum of the stomach was without significant gastritis. A biopsy was taken to rule out H. Pylori. The scope was retroflexed and the body, fundus, and cardia were examined. There was no abnormality and no hiatal hernia seen from this angle. The scope was withdrawn back into the esophagus after decompressing the stomach. The GE junction was at 45 cm and the distal esophagus showed no evidence of reflux esophagitis. Biopsy was taken. There was no hiatal hernia. The scope was further withdrawn. There was no other esophageal abnormality. The vocal cords were normal. The scope was withdrawn completely and the procedure concluded. The patient was awakened and taken to recovery in good condition.      Recommendations: We will discuss biopsy results at next office appointment.

## 2023-04-21 ENCOUNTER — OFFICE VISIT (OUTPATIENT)
Dept: FAMILY MEDICINE CLINIC | Facility: CLINIC | Age: 53
End: 2023-04-21
Payer: COMMERCIAL

## 2023-04-21 VITALS
SYSTOLIC BLOOD PRESSURE: 132 MMHG | RESPIRATION RATE: 18 BRPM | WEIGHT: 315 LBS | HEART RATE: 73 BPM | HEIGHT: 71 IN | BODY MASS INDEX: 44.1 KG/M2 | DIASTOLIC BLOOD PRESSURE: 78 MMHG | OXYGEN SATURATION: 97 %

## 2023-04-21 DIAGNOSIS — E66.01 MORBID (SEVERE) OBESITY DUE TO EXCESS CALORIES: ICD-10-CM

## 2023-04-21 DIAGNOSIS — E11.65 TYPE 2 DIABETES MELLITUS WITH HYPERGLYCEMIA, WITHOUT LONG-TERM CURRENT USE OF INSULIN: ICD-10-CM

## 2023-04-21 DIAGNOSIS — M54.50 CHRONIC BILATERAL LOW BACK PAIN WITHOUT SCIATICA: Primary | ICD-10-CM

## 2023-04-21 DIAGNOSIS — G89.29 CHRONIC BILATERAL LOW BACK PAIN WITHOUT SCIATICA: Primary | ICD-10-CM

## 2023-04-21 RX ORDER — ACETAMINOPHEN 500 MG
500 TABLET ORAL EVERY 6 HOURS PRN
Qty: 20 TABLET | Refills: 0 | Status: SHIPPED | OUTPATIENT
Start: 2023-04-21

## 2023-04-21 NOTE — PROGRESS NOTES
Subjective   Carlos Eduardo Danielle is a 52 y.o. male  Back Pain (Recent XR shows degenerative change in spine. Pain is constant, stands all day. Had a few diclofenac which helped but ran out) and Diabetes      History of Present Illness    The following portions of the patient's history were reviewed and updated as appropriate: allergies, current medications, past social history and problem list    Review of Systems   Constitutional: Negative for fatigue, fever and unexpected weight change.   Respiratory: Negative for cough, chest tightness and shortness of breath.    Cardiovascular: Negative for chest pain, palpitations and leg swelling.   Gastrointestinal: Negative for abdominal pain and nausea.   Genitourinary: Negative for dysuria.   Musculoskeletal: Positive for back pain.   Skin: Negative for color change and rash.   Neurological: Negative for dizziness, syncope, weakness, numbness and headaches.       Objective     Vitals:    04/21/23 1613   BP: 132/78   Resp: 18       Physical Exam  Vitals and nursing note reviewed.   Constitutional:       General: He is not in acute distress.     Appearance: Normal appearance. He is well-developed. He is not ill-appearing, toxic-appearing or diaphoretic.   Neck:      Vascular: No carotid bruit or JVD.   Cardiovascular:      Rate and Rhythm: Normal rate and regular rhythm.      Pulses: Normal pulses.      Heart sounds: Normal heart sounds. No murmur heard.  Pulmonary:      Effort: Pulmonary effort is normal. No respiratory distress.      Breath sounds: Normal breath sounds.   Abdominal:      Palpations: Abdomen is soft.      Tenderness: There is no abdominal tenderness.   Musculoskeletal:      Lumbar back: Tenderness and bony tenderness present. Decreased range of motion.   Skin:     General: Skin is warm and dry.   Neurological:      Mental Status: He is alert.         Assessment & Plan     There are no diagnoses linked to this encounter.

## 2023-04-21 NOTE — PROGRESS NOTES
Subjective   Carlos Eduardo Danielle is a 52 y.o. male  Back Pain (Recent XR shows degenerative change in spine. Pain is constant, stands all day. Had a few diclofenac which helped but ran out) and Diabetes      History of Present Illness  Patient is a pleasant 52-year-old white male who comes in complaining of back pain patient had recent chest x-ray which showed degenerative changes in his L-spine comes today complaint chronic low back pain with sciatica patient's pain with flexion extension patient is morbidly obese he is type II diabetic sees Endo is past due a hemoglobin A1c and microalbumin, he does have follow-up appointment with Endo  The following portions of the patient's history were reviewed and updated as appropriate: allergies, current medications, past social history and problem list    Review of Systems   Constitutional: Negative.  Negative for fever.   HENT: Negative.    Eyes: Negative.    Respiratory: Negative.    Cardiovascular: Negative.  Negative for chest pain.   Gastrointestinal: Negative.  Negative for abdominal pain.   Endocrine: Negative.    Genitourinary: Negative.  Negative for dysuria.   Musculoskeletal: Positive for back pain.   Skin: Negative.    Allergic/Immunologic: Negative.    Neurological: Negative.  Negative for weakness, numbness and headaches.   Hematological: Negative.    Psychiatric/Behavioral: Negative.    All other systems reviewed and are negative.      Objective     Vitals:    04/21/23 1613   BP: 132/78   Pulse: 73   Resp: 18   SpO2: 97%       Physical Exam  Vitals and nursing note reviewed.   Constitutional:       General: He is not in acute distress.     Appearance: Normal appearance. He is well-developed. He is not ill-appearing, toxic-appearing or diaphoretic.   Neck:      Vascular: No carotid bruit or JVD.   Cardiovascular:      Rate and Rhythm: Normal rate and regular rhythm.      Pulses: Normal pulses.      Heart sounds: Normal heart sounds. No murmur heard.  Pulmonary:       Effort: Pulmonary effort is normal. No respiratory distress.      Breath sounds: Normal breath sounds.   Abdominal:      Palpations: Abdomen is soft.      Tenderness: There is no abdominal tenderness.   Skin:     General: Skin is warm and dry.   Neurological:      Mental Status: He is alert.         Assessment & Plan     Diagnoses and all orders for this visit:    1. Chronic bilateral low back pain without sciatica (Primary)  -     XR Spine Lumbar 2 or 3 View; Future  -     acetaminophen (TYLENOL) 500 MG tablet; Take 1 tablet by mouth Every 6 (Six) Hours As Needed for Mild Pain.  Dispense: 20 tablet; Refill: 0    2. Type 2 diabetes mellitus with hyperglycemia, without long-term current use of insulin  -     Hemoglobin A1c; Future  -     MicroAlbumin, Urine, Random - Urine, Clean Catch; Future    3. Morbid (severe) obesity due to excess calories    Follow-up with Dr. Moctezuma endocrinology      I spent 15 minutes in patient care: Reviewing records prior to the visit, examining the patient, entering orders and documentation    Part of this note may be an electronic transcription/translation of spoken language to printed text using the Dragon Dictation System.    Answers for HPI/ROS submitted by the patient on 4/20/2023  What is the primary reason for your visit?: Back Pain

## 2023-04-24 ENCOUNTER — HOSPITAL ENCOUNTER (OUTPATIENT)
Dept: GENERAL RADIOLOGY | Facility: HOSPITAL | Age: 53
Discharge: HOME | End: 2023-04-24
Payer: COMMERCIAL

## 2023-04-24 ENCOUNTER — LAB (OUTPATIENT)
Dept: LAB | Facility: HOSPITAL | Age: 53
End: 2023-04-24
Payer: COMMERCIAL

## 2023-04-24 DIAGNOSIS — M54.50 CHRONIC BILATERAL LOW BACK PAIN WITHOUT SCIATICA: ICD-10-CM

## 2023-04-24 DIAGNOSIS — E11.65 TYPE 2 DIABETES MELLITUS WITH HYPERGLYCEMIA, WITHOUT LONG-TERM CURRENT USE OF INSULIN: ICD-10-CM

## 2023-04-24 DIAGNOSIS — G89.29 CHRONIC BILATERAL LOW BACK PAIN WITHOUT SCIATICA: ICD-10-CM

## 2023-04-24 LAB
ALBUMIN UR-MCNC: 33.8 MG/DL
HBA1C MFR BLD: 9.6 % (ref 4.8–5.6)

## 2023-04-24 PROCEDURE — 36415 COLL VENOUS BLD VENIPUNCTURE: CPT

## 2023-04-24 PROCEDURE — 72100 X-RAY EXAM L-S SPINE 2/3 VWS: CPT

## 2023-04-24 PROCEDURE — 83036 HEMOGLOBIN GLYCOSYLATED A1C: CPT

## 2023-04-24 PROCEDURE — 82043 UR ALBUMIN QUANTITATIVE: CPT

## 2023-04-26 ENCOUNTER — TELEPHONE (OUTPATIENT)
Dept: FAMILY MEDICINE CLINIC | Facility: CLINIC | Age: 53
End: 2023-04-26
Payer: COMMERCIAL

## 2023-04-26 NOTE — TELEPHONE ENCOUNTER
Pt calling to go over his xray results from 04/21/23. Please call patient/leave detailed vm since he will be at work.

## 2023-04-28 ENCOUNTER — TELEPHONE (OUTPATIENT)
Dept: FAMILY MEDICINE CLINIC | Facility: CLINIC | Age: 53
End: 2023-04-28
Payer: COMMERCIAL

## 2023-04-28 NOTE — TELEPHONE ENCOUNTER
PATIENT WOULD LIKE TO INCREASE HIS TYLENOL PRESCRIPTION FROM 500 MG  MG. CURRENT PRESCRIPTION DOES NOT HELP WITH THE PAIN. WOULD LIKE THIS SENT IN TODAY IF POSSIBLE.    PHONE: [phone]

## 2023-05-03 ENCOUNTER — HOSPITAL ENCOUNTER (OUTPATIENT)
Dept: NUCLEAR MEDICINE | Facility: HOSPITAL | Age: 53
Discharge: HOME | End: 2023-05-03
Payer: COMMERCIAL

## 2023-05-03 ENCOUNTER — TELEPHONE (OUTPATIENT)
Dept: FAMILY MEDICINE CLINIC | Facility: CLINIC | Age: 53
End: 2023-05-03
Payer: COMMERCIAL

## 2023-05-03 DIAGNOSIS — E11.9 TYPE 2 DIABETES MELLITUS WITHOUT COMPLICATION, WITHOUT LONG-TERM CURRENT USE OF INSULIN: ICD-10-CM

## 2023-05-03 DIAGNOSIS — M54.50 BILATERAL LOW BACK PAIN WITHOUT SCIATICA, UNSPECIFIED CHRONICITY: Primary | ICD-10-CM

## 2023-05-03 PROCEDURE — A9541 TC99M SULFUR COLLOID: HCPCS | Performed by: PHYSICIAN ASSISTANT

## 2023-05-03 PROCEDURE — 0 TECHNETIUM SULFUR COLLOID: Performed by: PHYSICIAN ASSISTANT

## 2023-05-03 PROCEDURE — 78264 GASTRIC EMPTYING IMG STUDY: CPT

## 2023-05-03 RX ORDER — IBUPROFEN 800 MG/1
800 TABLET ORAL EVERY 6 HOURS PRN
Qty: 90 TABLET | Refills: 2 | Status: SHIPPED | OUTPATIENT
Start: 2023-05-03

## 2023-05-03 RX ADMIN — TECHNETIUM TC 99M SULFUR COLLOID 1 DOSE: KIT at 10:40

## 2023-05-03 NOTE — TELEPHONE ENCOUNTER
Patient is requesting more detail on xray results and something else for pain than Tylenol. Please advise. Thanks!

## 2023-05-03 NOTE — TELEPHONE ENCOUNTER
Pt is upset that he has not gotten a response back about his xray results. States that he wants a phone call back not a mychart message. He states that the answer about xray results was vague and he doesn't understand what that means. He also wants to know if there is another pain medication that can be sent in for him. Please advise.

## 2023-05-04 NOTE — TELEPHONE ENCOUNTER
I spoke with patient yesterday and he is interested in physical therapy. Julio sent ibuprofen 800mg in and put an order in for PT. Patient was notified.

## 2023-05-12 ENCOUNTER — TELEPHONE (OUTPATIENT)
Dept: FAMILY MEDICINE CLINIC | Facility: CLINIC | Age: 53
End: 2023-05-12
Payer: COMMERCIAL

## 2023-05-12 NOTE — TELEPHONE ENCOUNTER
----- Message from Carlos Eduardo Danielle sent at 5/12/2023  9:28 AM EDT -----  Regarding: Referral  Contact: [phone]  I’m following up.  I spoke to Manjula about referral to physical therapy.  I was just following up to see what the status is.

## 2023-06-01 ENCOUNTER — APPOINTMENT (OUTPATIENT)
Dept: CT IMAGING | Facility: HOSPITAL | Age: 53
DRG: 292 | End: 2023-06-01
Payer: COMMERCIAL

## 2023-06-01 ENCOUNTER — APPOINTMENT (OUTPATIENT)
Dept: GENERAL RADIOLOGY | Facility: HOSPITAL | Age: 53
DRG: 292 | End: 2023-06-01
Payer: COMMERCIAL

## 2023-06-01 ENCOUNTER — HOSPITAL ENCOUNTER (INPATIENT)
Facility: HOSPITAL | Age: 53
LOS: 2 days | Discharge: HOME | DRG: 292 | End: 2023-06-03
Attending: EMERGENCY MEDICINE | Admitting: STUDENT IN AN ORGANIZED HEALTH CARE EDUCATION/TRAINING PROGRAM
Payer: COMMERCIAL

## 2023-06-01 DIAGNOSIS — R73.9 HYPERGLYCEMIA: ICD-10-CM

## 2023-06-01 DIAGNOSIS — R09.02 HYPOXIA: ICD-10-CM

## 2023-06-01 DIAGNOSIS — Z87.09 HISTORY OF COPD: ICD-10-CM

## 2023-06-01 DIAGNOSIS — I16.0 HYPERTENSIVE URGENCY: Primary | ICD-10-CM

## 2023-06-01 DIAGNOSIS — I50.9 ACUTE ON CHRONIC CONGESTIVE HEART FAILURE, UNSPECIFIED HEART FAILURE TYPE: ICD-10-CM

## 2023-06-01 LAB
ALBUMIN SERPL-MCNC: 3.7 G/DL (ref 3.5–5.2)
ALBUMIN/GLOB SERPL: 1.4 G/DL
ALP SERPL-CCNC: 108 U/L (ref 39–117)
ALT SERPL W P-5'-P-CCNC: 23 U/L (ref 1–41)
ANION GAP SERPL CALCULATED.3IONS-SCNC: 11 MMOL/L (ref 5–15)
ANION GAP SERPL CALCULATED.3IONS-SCNC: 14 MMOL/L (ref 5–15)
AST SERPL-CCNC: 22 U/L (ref 1–40)
B PARAPERT DNA SPEC QL NAA+PROBE: NOT DETECTED
B PERT DNA SPEC QL NAA+PROBE: NOT DETECTED
BASOPHILS # BLD AUTO: 0.05 10*3/MM3 (ref 0–0.2)
BASOPHILS # BLD AUTO: 0.06 10*3/MM3 (ref 0–0.2)
BASOPHILS NFR BLD AUTO: 0.8 % (ref 0–1.5)
BASOPHILS NFR BLD AUTO: 0.9 % (ref 0–1.5)
BILIRUB SERPL-MCNC: 0.7 MG/DL (ref 0–1.2)
BUN SERPL-MCNC: 16 MG/DL (ref 6–20)
BUN SERPL-MCNC: 19 MG/DL (ref 6–20)
BUN/CREAT SERPL: 19.5 (ref 7–25)
BUN/CREAT SERPL: 20.9 (ref 7–25)
C PNEUM DNA NPH QL NAA+NON-PROBE: NOT DETECTED
CALCIUM SPEC-SCNC: 9 MG/DL (ref 8.6–10.5)
CALCIUM SPEC-SCNC: 9.5 MG/DL (ref 8.6–10.5)
CHLORIDE SERPL-SCNC: 100 MMOL/L (ref 98–107)
CHLORIDE SERPL-SCNC: 98 MMOL/L (ref 98–107)
CO2 SERPL-SCNC: 25 MMOL/L (ref 22–29)
CO2 SERPL-SCNC: 26 MMOL/L (ref 22–29)
CREAT SERPL-MCNC: 0.82 MG/DL (ref 0.76–1.27)
CREAT SERPL-MCNC: 0.91 MG/DL (ref 0.76–1.27)
DEPRECATED RDW RBC AUTO: 42.6 FL (ref 37–54)
DEPRECATED RDW RBC AUTO: 43.3 FL (ref 37–54)
EGFRCR SERPLBLD CKD-EPI 2021: 101.4 ML/MIN/1.73
EGFRCR SERPLBLD CKD-EPI 2021: 105.7 ML/MIN/1.73
EOSINOPHIL # BLD AUTO: 0.17 10*3/MM3 (ref 0–0.4)
EOSINOPHIL # BLD AUTO: 0.17 10*3/MM3 (ref 0–0.4)
EOSINOPHIL NFR BLD AUTO: 2.4 % (ref 0.3–6.2)
EOSINOPHIL NFR BLD AUTO: 2.7 % (ref 0.3–6.2)
ERYTHROCYTE [DISTWIDTH] IN BLOOD BY AUTOMATED COUNT: 13.6 % (ref 12.3–15.4)
ERYTHROCYTE [DISTWIDTH] IN BLOOD BY AUTOMATED COUNT: 13.9 % (ref 12.3–15.4)
FLUAV SUBTYP SPEC NAA+PROBE: NOT DETECTED
FLUBV RNA ISLT QL NAA+PROBE: NOT DETECTED
GLOBULIN UR ELPH-MCNC: 2.6 GM/DL
GLUCOSE BLDC GLUCOMTR-MCNC: 358 MG/DL (ref 70–130)
GLUCOSE SERPL-MCNC: 355 MG/DL (ref 65–99)
GLUCOSE SERPL-MCNC: 389 MG/DL (ref 65–99)
HADV DNA SPEC NAA+PROBE: NOT DETECTED
HBA1C MFR BLD: 11.8 % (ref 4.8–5.6)
HCOV 229E RNA SPEC QL NAA+PROBE: NOT DETECTED
HCOV HKU1 RNA SPEC QL NAA+PROBE: NOT DETECTED
HCOV NL63 RNA SPEC QL NAA+PROBE: NOT DETECTED
HCOV OC43 RNA SPEC QL NAA+PROBE: NOT DETECTED
HCT VFR BLD AUTO: 42.7 % (ref 37.5–51)
HCT VFR BLD AUTO: 44 % (ref 37.5–51)
HGB BLD-MCNC: 14.1 G/DL (ref 13–17.7)
HGB BLD-MCNC: 14.6 G/DL (ref 13–17.7)
HMPV RNA NPH QL NAA+NON-PROBE: NOT DETECTED
HOLD SPECIMEN: NORMAL
HPIV1 RNA ISLT QL NAA+PROBE: NOT DETECTED
HPIV2 RNA SPEC QL NAA+PROBE: NOT DETECTED
HPIV3 RNA NPH QL NAA+PROBE: NOT DETECTED
HPIV4 P GENE NPH QL NAA+PROBE: NOT DETECTED
IMM GRANULOCYTES # BLD AUTO: 0.09 10*3/MM3 (ref 0–0.05)
IMM GRANULOCYTES # BLD AUTO: 0.09 10*3/MM3 (ref 0–0.05)
IMM GRANULOCYTES NFR BLD AUTO: 1.3 % (ref 0–0.5)
IMM GRANULOCYTES NFR BLD AUTO: 1.4 % (ref 0–0.5)
LYMPHOCYTES # BLD AUTO: 1.26 10*3/MM3 (ref 0.7–3.1)
LYMPHOCYTES # BLD AUTO: 1.83 10*3/MM3 (ref 0.7–3.1)
LYMPHOCYTES NFR BLD AUTO: 20 % (ref 19.6–45.3)
LYMPHOCYTES NFR BLD AUTO: 26.1 % (ref 19.6–45.3)
M PNEUMO IGG SER IA-ACNC: NOT DETECTED
MAGNESIUM SERPL-MCNC: 1.7 MG/DL (ref 1.6–2.6)
MCH RBC QN AUTO: 28.9 PG (ref 26.6–33)
MCH RBC QN AUTO: 28.9 PG (ref 26.6–33)
MCHC RBC AUTO-ENTMCNC: 33 G/DL (ref 31.5–35.7)
MCHC RBC AUTO-ENTMCNC: 33.2 G/DL (ref 31.5–35.7)
MCV RBC AUTO: 87.1 FL (ref 79–97)
MCV RBC AUTO: 87.5 FL (ref 79–97)
MONOCYTES # BLD AUTO: 0.37 10*3/MM3 (ref 0.1–0.9)
MONOCYTES # BLD AUTO: 0.5 10*3/MM3 (ref 0.1–0.9)
MONOCYTES NFR BLD AUTO: 5.9 % (ref 5–12)
MONOCYTES NFR BLD AUTO: 7.1 % (ref 5–12)
NEUTROPHILS NFR BLD AUTO: 4.36 10*3/MM3 (ref 1.7–7)
NEUTROPHILS NFR BLD AUTO: 4.37 10*3/MM3 (ref 1.7–7)
NEUTROPHILS NFR BLD AUTO: 62.2 % (ref 42.7–76)
NEUTROPHILS NFR BLD AUTO: 69.2 % (ref 42.7–76)
NRBC BLD AUTO-RTO: 0.4 /100 WBC (ref 0–0.2)
NRBC BLD AUTO-RTO: 0.5 /100 WBC (ref 0–0.2)
NT-PROBNP SERPL-MCNC: 390.9 PG/ML (ref 0–900)
NT-PROBNP SERPL-MCNC: 501.2 PG/ML (ref 0–900)
PLAT MORPH BLD: NORMAL
PLATELET # BLD AUTO: 169 10*3/MM3 (ref 140–450)
PLATELET # BLD AUTO: 172 10*3/MM3 (ref 140–450)
PMV BLD AUTO: 11.7 FL (ref 6–12)
PMV BLD AUTO: 12.3 FL (ref 6–12)
POTASSIUM SERPL-SCNC: 3.6 MMOL/L (ref 3.5–5.2)
POTASSIUM SERPL-SCNC: 3.9 MMOL/L (ref 3.5–5.2)
PROT SERPL-MCNC: 6.3 G/DL (ref 6–8.5)
QT INTERVAL: 422 MS
QTC INTERVAL: 462 MS
RBC # BLD AUTO: 4.88 10*6/MM3 (ref 4.14–5.8)
RBC # BLD AUTO: 5.05 10*6/MM3 (ref 4.14–5.8)
RBC MORPH BLD: NORMAL
RHINOVIRUS RNA SPEC NAA+PROBE: NOT DETECTED
RSV RNA NPH QL NAA+NON-PROBE: NOT DETECTED
SARS-COV-2 RNA NPH QL NAA+NON-PROBE: NOT DETECTED
SODIUM SERPL-SCNC: 135 MMOL/L (ref 136–145)
SODIUM SERPL-SCNC: 139 MMOL/L (ref 136–145)
TROPONIN T SERPL HS-MCNC: 22 NG/L
TROPONIN T SERPL HS-MCNC: 23 NG/L
WBC MORPH BLD: NORMAL
WBC NRBC COR # BLD: 6.31 10*3/MM3 (ref 3.4–10.8)
WBC NRBC COR # BLD: 7.01 10*3/MM3 (ref 3.4–10.8)
WHOLE BLOOD HOLD COAG: NORMAL
WHOLE BLOOD HOLD SPECIMEN: NORMAL

## 2023-06-01 PROCEDURE — 83735 ASSAY OF MAGNESIUM: CPT | Performed by: INTERNAL MEDICINE

## 2023-06-01 PROCEDURE — 85007 BL SMEAR W/DIFF WBC COUNT: CPT | Performed by: EMERGENCY MEDICINE

## 2023-06-01 PROCEDURE — 25510000001 IOPAMIDOL PER 1 ML: Performed by: EMERGENCY MEDICINE

## 2023-06-01 PROCEDURE — 99285 EMERGENCY DEPT VISIT HI MDM: CPT

## 2023-06-01 PROCEDURE — 71275 CT ANGIOGRAPHY CHEST: CPT

## 2023-06-01 PROCEDURE — 80053 COMPREHEN METABOLIC PANEL: CPT | Performed by: EMERGENCY MEDICINE

## 2023-06-01 PROCEDURE — 93005 ELECTROCARDIOGRAM TRACING: CPT | Performed by: EMERGENCY MEDICINE

## 2023-06-01 PROCEDURE — 25010000002 HYDRALAZINE PER 20 MG: Performed by: EMERGENCY MEDICINE

## 2023-06-01 PROCEDURE — 71045 X-RAY EXAM CHEST 1 VIEW: CPT

## 2023-06-01 PROCEDURE — 36415 COLL VENOUS BLD VENIPUNCTURE: CPT

## 2023-06-01 PROCEDURE — 85025 COMPLETE CBC W/AUTO DIFF WBC: CPT | Performed by: EMERGENCY MEDICINE

## 2023-06-01 PROCEDURE — 63710000001 INSULIN LISPRO (HUMAN) PER 5 UNITS: Performed by: INTERNAL MEDICINE

## 2023-06-01 PROCEDURE — 99223 1ST HOSP IP/OBS HIGH 75: CPT | Performed by: INTERNAL MEDICINE

## 2023-06-01 PROCEDURE — 0202U NFCT DS 22 TRGT SARS-COV-2: CPT | Performed by: EMERGENCY MEDICINE

## 2023-06-01 PROCEDURE — 83880 ASSAY OF NATRIURETIC PEPTIDE: CPT | Performed by: INTERNAL MEDICINE

## 2023-06-01 PROCEDURE — 83036 HEMOGLOBIN GLYCOSYLATED A1C: CPT | Performed by: INTERNAL MEDICINE

## 2023-06-01 PROCEDURE — 84484 ASSAY OF TROPONIN QUANT: CPT | Performed by: EMERGENCY MEDICINE

## 2023-06-01 PROCEDURE — 25010000002 HEPARIN (PORCINE) PER 1000 UNITS: Performed by: INTERNAL MEDICINE

## 2023-06-01 PROCEDURE — 25010000002 FUROSEMIDE PER 20 MG: Performed by: INTERNAL MEDICINE

## 2023-06-01 PROCEDURE — 82948 REAGENT STRIP/BLOOD GLUCOSE: CPT

## 2023-06-01 PROCEDURE — 83880 ASSAY OF NATRIURETIC PEPTIDE: CPT | Performed by: EMERGENCY MEDICINE

## 2023-06-01 PROCEDURE — 85025 COMPLETE CBC W/AUTO DIFF WBC: CPT | Performed by: INTERNAL MEDICINE

## 2023-06-01 RX ORDER — POTASSIUM CHLORIDE 20 MEQ/1
40 TABLET, EXTENDED RELEASE ORAL EVERY 4 HOURS
Status: DISPENSED | OUTPATIENT
Start: 2023-06-01 | End: 2023-06-02

## 2023-06-01 RX ORDER — AMOXICILLIN 250 MG
2 CAPSULE ORAL 2 TIMES DAILY
Status: DISCONTINUED | OUTPATIENT
Start: 2023-06-01 | End: 2023-06-03 | Stop reason: HOSPADM

## 2023-06-01 RX ORDER — ACETAMINOPHEN 650 MG/1
650 SUPPOSITORY RECTAL EVERY 4 HOURS PRN
Status: DISCONTINUED | OUTPATIENT
Start: 2023-06-01 | End: 2023-06-03 | Stop reason: HOSPADM

## 2023-06-01 RX ORDER — MIRTAZAPINE 15 MG/1
15 TABLET, FILM COATED ORAL NIGHTLY PRN
Status: DISCONTINUED | OUTPATIENT
Start: 2023-06-01 | End: 2023-06-03 | Stop reason: HOSPADM

## 2023-06-01 RX ORDER — VENLAFAXINE HYDROCHLORIDE 75 MG/1
225 CAPSULE, EXTENDED RELEASE ORAL EVERY MORNING
Status: DISCONTINUED | OUTPATIENT
Start: 2023-06-02 | End: 2023-06-03 | Stop reason: HOSPADM

## 2023-06-01 RX ORDER — PANTOPRAZOLE SODIUM 40 MG/1
40 TABLET, DELAYED RELEASE ORAL DAILY
Status: DISCONTINUED | OUTPATIENT
Start: 2023-06-02 | End: 2023-06-03 | Stop reason: HOSPADM

## 2023-06-01 RX ORDER — FUROSEMIDE 10 MG/ML
40 INJECTION INTRAMUSCULAR; INTRAVENOUS EVERY 12 HOURS
Status: DISCONTINUED | OUTPATIENT
Start: 2023-06-01 | End: 2023-06-02

## 2023-06-01 RX ORDER — SODIUM CHLORIDE 0.9 % (FLUSH) 0.9 %
10 SYRINGE (ML) INJECTION AS NEEDED
Status: DISCONTINUED | OUTPATIENT
Start: 2023-06-01 | End: 2023-06-03 | Stop reason: HOSPADM

## 2023-06-01 RX ORDER — HYDROXYZINE HYDROCHLORIDE 25 MG/1
25 TABLET, FILM COATED ORAL 3 TIMES DAILY PRN
Status: DISCONTINUED | OUTPATIENT
Start: 2023-06-01 | End: 2023-06-03 | Stop reason: HOSPADM

## 2023-06-01 RX ORDER — HYDRALAZINE HYDROCHLORIDE 20 MG/ML
20 INJECTION INTRAMUSCULAR; INTRAVENOUS ONCE
Status: COMPLETED | OUTPATIENT
Start: 2023-06-01 | End: 2023-06-01

## 2023-06-01 RX ORDER — ONDANSETRON 4 MG/1
4 TABLET, FILM COATED ORAL EVERY 6 HOURS PRN
Status: DISCONTINUED | OUTPATIENT
Start: 2023-06-01 | End: 2023-06-03 | Stop reason: HOSPADM

## 2023-06-01 RX ORDER — AMLODIPINE BESYLATE 10 MG/1
10 TABLET ORAL
Status: DISCONTINUED | OUTPATIENT
Start: 2023-06-02 | End: 2023-06-03

## 2023-06-01 RX ORDER — HEPARIN SODIUM 5000 [USP'U]/ML
5000 INJECTION, SOLUTION INTRAVENOUS; SUBCUTANEOUS EVERY 8 HOURS SCHEDULED
Status: DISCONTINUED | OUTPATIENT
Start: 2023-06-01 | End: 2023-06-02

## 2023-06-01 RX ORDER — NITROGLYCERIN 20 MG/100ML
5-200 INJECTION INTRAVENOUS
Status: DISCONTINUED | OUTPATIENT
Start: 2023-06-01 | End: 2023-06-03 | Stop reason: HOSPADM

## 2023-06-01 RX ORDER — POLYETHYLENE GLYCOL 3350 17 G/17G
17 POWDER, FOR SOLUTION ORAL DAILY PRN
Status: DISCONTINUED | OUTPATIENT
Start: 2023-06-01 | End: 2023-06-03 | Stop reason: HOSPADM

## 2023-06-01 RX ORDER — NICOTINE POLACRILEX 4 MG
15 LOZENGE BUCCAL
Status: DISCONTINUED | OUTPATIENT
Start: 2023-06-01 | End: 2023-06-03 | Stop reason: HOSPADM

## 2023-06-01 RX ORDER — IBUPROFEN 600 MG/1
1 TABLET ORAL
Status: DISCONTINUED | OUTPATIENT
Start: 2023-06-01 | End: 2023-06-03 | Stop reason: HOSPADM

## 2023-06-01 RX ORDER — LISINOPRIL 40 MG/1
40 TABLET ORAL
Status: DISCONTINUED | OUTPATIENT
Start: 2023-06-02 | End: 2023-06-03

## 2023-06-01 RX ORDER — BISACODYL 10 MG
10 SUPPOSITORY, RECTAL RECTAL DAILY PRN
Status: DISCONTINUED | OUTPATIENT
Start: 2023-06-01 | End: 2023-06-03 | Stop reason: HOSPADM

## 2023-06-01 RX ORDER — NITROGLYCERIN 0.4 MG/1
0.4 TABLET SUBLINGUAL
Status: DISCONTINUED | OUTPATIENT
Start: 2023-06-01 | End: 2023-06-03 | Stop reason: HOSPADM

## 2023-06-01 RX ORDER — ONDANSETRON 2 MG/ML
4 INJECTION INTRAMUSCULAR; INTRAVENOUS EVERY 6 HOURS PRN
Status: DISCONTINUED | OUTPATIENT
Start: 2023-06-01 | End: 2023-06-03 | Stop reason: HOSPADM

## 2023-06-01 RX ORDER — ACETAMINOPHEN 160 MG/5ML
650 SOLUTION ORAL EVERY 4 HOURS PRN
Status: DISCONTINUED | OUTPATIENT
Start: 2023-06-01 | End: 2023-06-03 | Stop reason: HOSPADM

## 2023-06-01 RX ORDER — INSULIN LISPRO 100 [IU]/ML
2-7 INJECTION, SOLUTION INTRAVENOUS; SUBCUTANEOUS
Status: DISCONTINUED | OUTPATIENT
Start: 2023-06-01 | End: 2023-06-03 | Stop reason: HOSPADM

## 2023-06-01 RX ORDER — SODIUM CHLORIDE 0.9 % (FLUSH) 0.9 %
10 SYRINGE (ML) INJECTION EVERY 12 HOURS SCHEDULED
Status: DISCONTINUED | OUTPATIENT
Start: 2023-06-01 | End: 2023-06-03 | Stop reason: HOSPADM

## 2023-06-01 RX ORDER — DEXTROSE MONOHYDRATE 25 G/50ML
25 INJECTION, SOLUTION INTRAVENOUS
Status: DISCONTINUED | OUTPATIENT
Start: 2023-06-01 | End: 2023-06-03 | Stop reason: HOSPADM

## 2023-06-01 RX ORDER — SODIUM CHLORIDE 9 MG/ML
40 INJECTION, SOLUTION INTRAVENOUS AS NEEDED
Status: DISCONTINUED | OUTPATIENT
Start: 2023-06-01 | End: 2023-06-03 | Stop reason: HOSPADM

## 2023-06-01 RX ORDER — ACETAMINOPHEN 325 MG/1
650 TABLET ORAL EVERY 4 HOURS PRN
Status: DISCONTINUED | OUTPATIENT
Start: 2023-06-01 | End: 2023-06-03 | Stop reason: HOSPADM

## 2023-06-01 RX ORDER — ACETAMINOPHEN 500 MG
500 TABLET ORAL EVERY 6 HOURS PRN
Status: DISCONTINUED | OUTPATIENT
Start: 2023-06-01 | End: 2023-06-01 | Stop reason: SDUPTHER

## 2023-06-01 RX ORDER — BISACODYL 5 MG/1
5 TABLET, DELAYED RELEASE ORAL DAILY PRN
Status: DISCONTINUED | OUTPATIENT
Start: 2023-06-01 | End: 2023-06-03 | Stop reason: HOSPADM

## 2023-06-01 RX ADMIN — ACETAMINOPHEN 650 MG: 325 TABLET ORAL at 23:10

## 2023-06-01 RX ADMIN — FUROSEMIDE 40 MG: 40 INJECTION, SOLUTION INTRAMUSCULAR; INTRAVENOUS at 22:58

## 2023-06-01 RX ADMIN — IOPAMIDOL 90 ML: 755 INJECTION, SOLUTION INTRAVENOUS at 17:23

## 2023-06-01 RX ADMIN — INSULIN LISPRO 6 UNITS: 100 INJECTION, SOLUTION INTRAVENOUS; SUBCUTANEOUS at 22:57

## 2023-06-01 RX ADMIN — NICARDIPINE HYDROCHLORIDE 5 MG/HR: 2.5 INJECTION, SOLUTION INTRAVENOUS at 20:28

## 2023-06-01 RX ADMIN — HYDRALAZINE HYDROCHLORIDE 20 MG: 20 INJECTION INTRAMUSCULAR; INTRAVENOUS at 19:03

## 2023-06-01 RX ADMIN — NITROGLYCERIN 5 MCG/MIN: 20 INJECTION INTRAVENOUS at 22:57

## 2023-06-01 RX ADMIN — HEPARIN SODIUM 5000 UNITS: 5000 INJECTION, SOLUTION INTRAVENOUS; SUBCUTANEOUS at 22:57

## 2023-06-01 NOTE — Clinical Note
Level of Care: Telemetry [5]   Diagnosis: Hypertensive urgency [821894]   Certification: I Certify That Inpatient Hospital Services Are Medically Necessary For Greater Than 2 Midnights

## 2023-06-02 LAB
ANION GAP SERPL CALCULATED.3IONS-SCNC: 10 MMOL/L (ref 5–15)
BASOPHILS # BLD AUTO: 0.06 10*3/MM3 (ref 0–0.2)
BASOPHILS NFR BLD AUTO: 1.1 % (ref 0–1.5)
BUN SERPL-MCNC: 16 MG/DL (ref 6–20)
BUN/CREAT SERPL: 18.2 (ref 7–25)
CALCIUM SPEC-SCNC: 9 MG/DL (ref 8.6–10.5)
CHLORIDE SERPL-SCNC: 99 MMOL/L (ref 98–107)
CO2 SERPL-SCNC: 27 MMOL/L (ref 22–29)
CREAT SERPL-MCNC: 0.88 MG/DL (ref 0.76–1.27)
DEPRECATED RDW RBC AUTO: 43.5 FL (ref 37–54)
EGFRCR SERPLBLD CKD-EPI 2021: 103.5 ML/MIN/1.73
EOSINOPHIL # BLD AUTO: 0.18 10*3/MM3 (ref 0–0.4)
EOSINOPHIL NFR BLD AUTO: 3.2 % (ref 0.3–6.2)
ERYTHROCYTE [DISTWIDTH] IN BLOOD BY AUTOMATED COUNT: 13.8 % (ref 12.3–15.4)
GLUCOSE BLDC GLUCOMTR-MCNC: 292 MG/DL (ref 70–130)
GLUCOSE BLDC GLUCOMTR-MCNC: 327 MG/DL (ref 70–130)
GLUCOSE BLDC GLUCOMTR-MCNC: 362 MG/DL (ref 70–130)
GLUCOSE BLDC GLUCOMTR-MCNC: 389 MG/DL (ref 70–130)
GLUCOSE SERPL-MCNC: 343 MG/DL (ref 65–99)
HCT VFR BLD AUTO: 43.2 % (ref 37.5–51)
HGB BLD-MCNC: 14.5 G/DL (ref 13–17.7)
IMM GRANULOCYTES # BLD AUTO: 0.07 10*3/MM3 (ref 0–0.05)
IMM GRANULOCYTES NFR BLD AUTO: 1.2 % (ref 0–0.5)
LYMPHOCYTES # BLD AUTO: 1.15 10*3/MM3 (ref 0.7–3.1)
LYMPHOCYTES NFR BLD AUTO: 20.4 % (ref 19.6–45.3)
MAGNESIUM SERPL-MCNC: 1.7 MG/DL (ref 1.6–2.6)
MCH RBC QN AUTO: 29.4 PG (ref 26.6–33)
MCHC RBC AUTO-ENTMCNC: 33.6 G/DL (ref 31.5–35.7)
MCV RBC AUTO: 87.6 FL (ref 79–97)
MONOCYTES # BLD AUTO: 0.35 10*3/MM3 (ref 0.1–0.9)
MONOCYTES NFR BLD AUTO: 6.2 % (ref 5–12)
NEUTROPHILS NFR BLD AUTO: 3.84 10*3/MM3 (ref 1.7–7)
NEUTROPHILS NFR BLD AUTO: 67.9 % (ref 42.7–76)
NRBC BLD AUTO-RTO: 0.4 /100 WBC (ref 0–0.2)
PLATELET # BLD AUTO: 163 10*3/MM3 (ref 140–450)
PMV BLD AUTO: 12.1 FL (ref 6–12)
POTASSIUM SERPL-SCNC: 3.6 MMOL/L (ref 3.5–5.2)
POTASSIUM SERPL-SCNC: 3.6 MMOL/L (ref 3.5–5.2)
RBC # BLD AUTO: 4.93 10*6/MM3 (ref 4.14–5.8)
SODIUM SERPL-SCNC: 136 MMOL/L (ref 136–145)
WBC NRBC COR # BLD: 5.65 10*3/MM3 (ref 3.4–10.8)

## 2023-06-02 PROCEDURE — 25010000002 FUROSEMIDE PER 20 MG: Performed by: INTERNAL MEDICINE

## 2023-06-02 PROCEDURE — 85025 COMPLETE CBC W/AUTO DIFF WBC: CPT | Performed by: INTERNAL MEDICINE

## 2023-06-02 PROCEDURE — 63710000001 INSULIN LISPRO (HUMAN) PER 5 UNITS: Performed by: INTERNAL MEDICINE

## 2023-06-02 PROCEDURE — 99254 IP/OBS CNSLTJ NEW/EST MOD 60: CPT | Performed by: INTERNAL MEDICINE

## 2023-06-02 PROCEDURE — 83735 ASSAY OF MAGNESIUM: CPT | Performed by: INTERNAL MEDICINE

## 2023-06-02 PROCEDURE — 84132 ASSAY OF SERUM POTASSIUM: CPT | Performed by: INTERNAL MEDICINE

## 2023-06-02 PROCEDURE — 82948 REAGENT STRIP/BLOOD GLUCOSE: CPT

## 2023-06-02 PROCEDURE — 80048 BASIC METABOLIC PNL TOTAL CA: CPT | Performed by: INTERNAL MEDICINE

## 2023-06-02 PROCEDURE — 97162 PT EVAL MOD COMPLEX 30 MIN: CPT

## 2023-06-02 PROCEDURE — 94660 CPAP INITIATION&MGMT: CPT

## 2023-06-02 PROCEDURE — 25010000002 HEPARIN (PORCINE) PER 1000 UNITS: Performed by: STUDENT IN AN ORGANIZED HEALTH CARE EDUCATION/TRAINING PROGRAM

## 2023-06-02 PROCEDURE — 94799 UNLISTED PULMONARY SVC/PX: CPT

## 2023-06-02 PROCEDURE — 99233 SBSQ HOSP IP/OBS HIGH 50: CPT | Performed by: STUDENT IN AN ORGANIZED HEALTH CARE EDUCATION/TRAINING PROGRAM

## 2023-06-02 PROCEDURE — 63710000001 INSULIN DETEMIR PER 5 UNITS: Performed by: STUDENT IN AN ORGANIZED HEALTH CARE EDUCATION/TRAINING PROGRAM

## 2023-06-02 RX ORDER — HEPARIN SODIUM 5000 [USP'U]/ML
7500 INJECTION, SOLUTION INTRAVENOUS; SUBCUTANEOUS EVERY 8 HOURS SCHEDULED
Status: DISCONTINUED | OUTPATIENT
Start: 2023-06-02 | End: 2023-06-03 | Stop reason: HOSPADM

## 2023-06-02 RX ORDER — POTASSIUM CHLORIDE 20 MEQ/1
40 TABLET, EXTENDED RELEASE ORAL EVERY 4 HOURS
Status: DISPENSED | OUTPATIENT
Start: 2023-06-02 | End: 2023-06-02

## 2023-06-02 RX ORDER — FUROSEMIDE 10 MG/ML
40 INJECTION INTRAMUSCULAR; INTRAVENOUS
Status: DISCONTINUED | OUTPATIENT
Start: 2023-06-02 | End: 2023-06-03

## 2023-06-02 RX ORDER — SPIRONOLACTONE 25 MG/1
25 TABLET ORAL DAILY
Status: DISCONTINUED | OUTPATIENT
Start: 2023-06-02 | End: 2023-06-03 | Stop reason: HOSPADM

## 2023-06-02 RX ORDER — NEBIVOLOL 5 MG/1
2.5 TABLET ORAL
Status: DISCONTINUED | OUTPATIENT
Start: 2023-06-02 | End: 2023-06-03

## 2023-06-02 RX ORDER — CLONIDINE HYDROCHLORIDE 0.1 MG/1
0.1 TABLET ORAL
Status: DISCONTINUED | OUTPATIENT
Start: 2023-06-02 | End: 2023-06-03 | Stop reason: HOSPADM

## 2023-06-02 RX ADMIN — Medication 10 ML: at 08:14

## 2023-06-02 RX ADMIN — FUROSEMIDE 40 MG: 10 INJECTION, SOLUTION INTRAMUSCULAR; INTRAVENOUS at 17:27

## 2023-06-02 RX ADMIN — HEPARIN SODIUM 7500 UNITS: 5000 INJECTION INTRAVENOUS; SUBCUTANEOUS at 17:28

## 2023-06-02 RX ADMIN — LISINOPRIL 40 MG: 40 TABLET ORAL at 08:12

## 2023-06-02 RX ADMIN — INSULIN LISPRO 6 UNITS: 100 INJECTION, SOLUTION INTRAVENOUS; SUBCUTANEOUS at 08:11

## 2023-06-02 RX ADMIN — INSULIN LISPRO 5 UNITS: 100 INJECTION, SOLUTION INTRAVENOUS; SUBCUTANEOUS at 17:28

## 2023-06-02 RX ADMIN — AMLODIPINE BESYLATE 10 MG: 10 TABLET ORAL at 08:11

## 2023-06-02 RX ADMIN — NITROGLYCERIN 5 MCG/MIN: 20 INJECTION INTRAVENOUS at 05:03

## 2023-06-02 RX ADMIN — HEPARIN SODIUM 7500 UNITS: 5000 INJECTION INTRAVENOUS; SUBCUTANEOUS at 21:41

## 2023-06-02 RX ADMIN — INSULIN LISPRO 6 UNITS: 100 INJECTION, SOLUTION INTRAVENOUS; SUBCUTANEOUS at 12:41

## 2023-06-02 RX ADMIN — VENLAFAXINE HYDROCHLORIDE 225 MG: 75 CAPSULE, EXTENDED RELEASE ORAL at 08:11

## 2023-06-02 RX ADMIN — INSULIN DETEMIR 10 UNITS: 100 INJECTION, SOLUTION SUBCUTANEOUS at 12:42

## 2023-06-02 RX ADMIN — PANTOPRAZOLE SODIUM 40 MG: 40 TABLET, DELAYED RELEASE ORAL at 08:11

## 2023-06-02 RX ADMIN — INSULIN LISPRO 4 UNITS: 100 INJECTION, SOLUTION INTRAVENOUS; SUBCUTANEOUS at 21:41

## 2023-06-02 RX ADMIN — NEBIVOLOL 2.5 MG: 5 TABLET ORAL at 12:42

## 2023-06-02 RX ADMIN — SPIRONOLACTONE 25 MG: 25 TABLET ORAL at 12:42

## 2023-06-02 RX ADMIN — FUROSEMIDE 40 MG: 10 INJECTION, SOLUTION INTRAMUSCULAR; INTRAVENOUS at 12:42

## 2023-06-02 NOTE — CASE MANAGEMENT/SOCIAL WORK
Discharge Planning Assessment  [facility]     Patient Name: Carlos Eduardo Danielle  MRN: [id number]  Today's Date: 6/2/2023    Admit Date: 6/1/2023    Plan: CARLOS eval   Discharge Needs Assessment     Row Name 06/02/23 1405       Living Environment    People in Home alone    Current Living Arrangements home    Potentially Unsafe Housing Conditions none    Primary Care Provided by self    Provides Primary Care For no one    Quality of Family Relationships involved;helpful    Able to Return to Prior Arrangements yes       Resource/Environmental Concerns    Resource/Environmental Concerns none       Transition Planning    Patient/Family Anticipates Transition to home with help/services;home    Patient/Family Anticipated Services at Transition        Discharge Needs Assessment    Readmission Within the Last 30 Days no previous admission in last 30 days    Equipment Currently Used at Home glucometer    Concerns to be Addressed discharge planning    Anticipated Changes Related to Illness none    Equipment Needed After Discharge none               Discharge Plan     Row Name 06/02/23 1406       Plan    Plan CM eval    Plan Comments Confirmed with patient that he lives in [facility] - he lives alone and is independent of ADL. DME at home includes a glucometer. Cardiology is following, recommended continue lasix IV BID. PT is also following, ambulation with assist only for the 02 tank. CM will continue to follow for dc planning - goal is home - zachery [phone]    Final Discharge Disposition Code 01 - home or self-care              Continued Care and Services - Admitted Since 6/1/2023    Coordination has not been started for this encounter.       Expected Discharge Date and Time     Expected Discharge Date Expected Discharge Time    Jun 5, 2023          Demographic Summary     Row Name 06/02/23 1404       General Information    Admission Type inpatient    Arrived From home    Referral Source admission list    Reason  for Consult discharge planning    Preferred Language English       Contact Information    Permission Granted to Share Info With                Functional Status     Row Name 06/02/23 1401       Functional Status    Usual Activity Tolerance good    Current Activity Tolerance moderate       Functional Status, IADL    Medications independent    Meal Preparation independent    Housekeeping independent    Laundry independent    Shopping independent               Psychosocial    No documentation.                Abuse/Neglect    No documentation.                Legal    No documentation.                Substance Abuse    No documentation.                Patient Forms    No documentation.                   Radhika Felder RN

## 2023-06-02 NOTE — CONSULTS
[facility]  Cardiovascular Consultation Note    Reason for consultation: Hypertensive urgency with shortness of breath and symptoms suggestive of pulmonary edema    History of Present Illness:  53-year-old morbidly obese male with COPD, intermittent smoking, depression, diabetes, hypertension, hyperlipidemia sleep apnea presents with increased shortness of breath.  He saw Dr. Villavicencio for evaluation of his blood pressure and cardiac risk factors.  April 2023 underwent a stress PET which was negative for ischemia.  The patient states he has had progressive shortness of breath over the last couple weeks.  Its become so significant just walking across the house made him short of breath.  He also noted a couple days ago he started having smothering when he laid back.  He denies palpitations.  He denies tightness heaviness squeezing pressure chest jaw throat arms.  He states he does not miss his blood pressure medicines but does not take his diuretics until he gets home from work.  He probably has severe sleep apnea and does not wear CPAP machine.  Complained of some increased lower extremity edema.  He drinks a lot of water.  He complains of excessive thirst    Cardiac risk factors: #1 male sex #2 obesity #3 former smoker #4 hypertension #5 hyperlipidemia #6 poorly controlled diabetes    Past medical and surgical history, social and family history reviewed in EMR.    REVIEW OF SYSTEMS:     Past Medical History:   Diagnosis Date   • Anxiety    • Back problem    • Cellulitis     HX; BILATERAL LEGS   • Colon polyp    • COPD (chronic obstructive pulmonary disease)     albuterol PRN; seen pulm remotely   • Depression    • Diabetes mellitus    • GERD (gastroesophageal reflux disease)     on Protonix daily. EGD Jan 2020; no hx of h pylori   • Hyperlipidemia    • Hypertension    • Sleep apnea     noncompliant with BIPAP   • Suicide attempt     reports hx of suicide attempts three times in 30  years ago   • TIA (transient ischemic attack)      and ; related to hypertension.   • Type 2 diabetes mellitus     dx in 2019; no insulin; last A1c 8.0   • Venous stasis     bilateral lower ext     Past Surgical History:   Procedure Laterality Date   • APPENDECTOMY N/A 2021    Procedure: APPENDECTOMY LAPAROSCOPIC;  Surgeon: Radames Arauz MD;  Location: [facility] OR;  Service: General;  Laterality: N/A;   • COLONOSCOPY     • ENDOSCOPY N/A 2023    Procedure: ESOPHAGOGASTRODUODENOSCOPY;  Surgeon: Akua Ruelas MD;  Location:  [facility];  Service: Bariatric;  Laterality: N/A;   • EPIDIDYMAL CYST EXCISION     • TONSILLECTOMY       Social History     Socioeconomic History   • Marital status: Single   Tobacco Use   • Smoking status: Former     Packs/day: 0.75     Years: 0.00     Pack years: 0.00     Types: Cigarettes     Quit date: 2008     Years since quittin.8     Passive exposure: Past   • Smokeless tobacco: Never   Vaping Use   • Vaping Use: Never used   Substance and Sexual Activity   • Alcohol use: Yes     Comment: occas   • Drug use: Never   • Sexual activity: Not Currently     Partners: Male     Family History   Problem Relation Age of Onset   • Diabetes Mother    • Hypertension Mother    • Schizophrenia Mother    • Depression Mother    • Obesity Mother    • No Known Problems Brother    • No Known Problems Brother    • Kidney disease Maternal Uncle    • Heart disease Maternal Grandmother    • Hypertension Maternal Grandmother    • Hypertension Maternal Grandfather        H&P ROS reviewed and pertinent CV ROS as noted in HPI.    Cardiac: Complains of dyspnea/no palpitations no anginal type pain  Respiratory: Complains of significant dyspnea.  To the point of orthopnea.  Lower Extremities: Has chronic stasis abnormalities with edema but this time his edema did not go away  GI: No nausea vomiting diarrhea bright blood per rectum or melena  Neuro: Patient is  chronic back pain  Hematology: No hematologic malignancies ecchymosis or petechia  Renal: No known renal disease  Musculoskeletal: Complains of back pain no other joint pain  Endocrine: Has poorly controlled diabetes with an A1c of 11  Constitutional: No fever chills  Psych: History of suicidal attempt, has history of depression      Physical Exam: General morbidly obese male sitting on the side of the bed who appeared dyspneic when I walked into the room.  Heart rate 66 blood pressure 192 systolic       HEENT: No JVP or bruits       Respiratory: Equal bilateral symmetrical expansion with a rare crackle       Cardiovascular: Regular rate and rhythm without any obvious murmur and pitting edema to palpation       GI: Obese soft       Lower Extremities: Stasis abnormalities are present       Neuro: Facial expressions are symmetrical moves all 4 extremities       Skin: Warm and dry with pitting edema       Psych: Pleasant affect oriented x3    Results Review: EKG shows sinus rhythm with nonspecific lateral T abnormality this finding is consistent throughout EKGs dating back to 2021.  BNP is negative.  CT scan showed no PE but edema was present HST minimally elevated           Vital Sign Min/Max for last 24 hours  Temp  Min: 97.6 °F (36.4 °C)  Max: 98.3 °F (36.8 °C)   BP  Min: 155/99  Max: 224/110   Pulse  Min: 63  Max: 91   Resp  Min: 18  Max: 34   SpO2  Min: 91 %  Max: 100 %   No data recorded      Intake/Output Summary (Last 24 hours) at 6/2/2023 0952  Last data filed at 6/1/2023 2349  Gross per 24 hour   Intake --   Output 500 ml   Net -500 ml             Current Facility-Administered Medications:   •  acetaminophen (TYLENOL) tablet 650 mg, 650 mg, Oral, Q4H PRN, 650 mg at 06/01/23 2310 **OR** acetaminophen (TYLENOL) 160 MG/5ML solution 650 mg, 650 mg, Oral, Q4H PRN **OR** acetaminophen (TYLENOL) suppository 650 mg, 650 mg, Rectal, Q4H PRN, Nan Brown DO  •  amLODIPine (NORVASC) tablet 10 mg, 10 mg, Oral, Q24H,  10 mg at 06/02/23 0811 **AND** lisinopril (PRINIVIL,ZESTRIL) tablet 40 mg, 40 mg, Oral, Q24H, StephanieNan becerril G, DO, 40 mg at 06/02/23 0812  •  sennosides-docusate (PERICOLACE) 8.6-50 MG per tablet 2 tablet, 2 tablet, Oral, BID **AND** polyethylene glycol (MIRALAX) packet 17 g, 17 g, Oral, Daily PRN **AND** bisacodyl (DULCOLAX) EC tablet 5 mg, 5 mg, Oral, Daily PRN **AND** bisacodyl (DULCOLAX) suppository 10 mg, 10 mg, Rectal, Daily PRN, StephanieMaru becerrilsie G, DO  •  Calcium Replacement - Follow Nurse / BPA Driven Protocol, , Does not apply, PRN, Stephanie, Nan G, DO  •  dextrose (D50W) (25 g/50 mL) IV injection 25 g, 25 g, Intravenous, Q15 Min PRN, Stephanie, Nan G, DO  •  dextrose (GLUTOSE) oral gel 15 g, 15 g, Oral, Q15 Min PRN, Stephanie, Nan G, DO  •  furosemide (LASIX) injection 40 mg, 40 mg, Intravenous, Q12H, StephanieMaru becerrilsie G, DO, 40 mg at 06/01/23 2258  •  glucagon (GLUCAGEN) injection 1 mg, 1 mg, Intramuscular, Q15 Min PRN, Stephanie, Nan G, DO  •  heparin (porcine) 5000 UNIT/ML injection 5,000 Units, 5,000 Units, Subcutaneous, Q8H, StephanieMaru becerrilsie G, DO, 5,000 Units at 06/01/23 2257  •  hydrOXYzine (ATARAX) tablet 25 mg, 25 mg, Oral, TID PRN, Stephanie Nan G, DO  •  Insulin Lispro (humaLOG) injection 2-7 Units, 2-7 Units, Subcutaneous, 4x Daily AC & at Bedtime, StephanieMaru becerrilsie G, DO, 6 Units at 06/02/23 0811  •  Magnesium Standard Dose Replacement - Follow Nurse / BPA Driven Protocol, , Does not apply, PRN, Stephanie, Nan G, DO  •  mirtazapine (REMERON) tablet 15 mg, 15 mg, Oral, Nightly PRN, StephanieNan becerril G, DO  •  nitroglycerin (NITROSTAT) SL tablet 0.4 mg, 0.4 mg, Sublingual, Q5 Min PRN, StephanieRomina becerrile G, DO  •  nitroglycerin (TRIDIL) 200 mcg/ml infusion, 5-200 mcg/min, Intravenous, Titrated, Nan Brown, DO, Stopped at 06/02/23 0512  •  ondansetron (ZOFRAN) tablet 4 mg, 4 mg, Oral, Q6H PRN **OR** ondansetron (ZOFRAN) injection 4 mg, 4 mg, Intravenous, Q6H PRN, Nan Brown G, DO  •  pantoprazole (PROTONIX) EC  tablet 40 mg, 40 mg, Oral, Daily, Stephanie, Nan G, DO, 40 mg at 06/02/23 0811  •  Pharmacy Consult - MTM, , Does not apply, Daily, Deborah Staples, PharmD  •  Phosphorus Replacement - Follow Nurse / BPA Driven Protocol, , Does not apply, PRN, Stephanie, Nan G, DO  •  potassium chloride (K-DUR,KLOR-CON) CR tablet 40 mEq, 40 mEq, Oral, Q4H, Kajal Mane MD  •  Potassium Replacement - Follow Nurse / BPA Driven Protocol, , Does not apply, PRN, Stephanie, Nan G, DO  •  sodium chloride 0.9 % flush 10 mL, 10 mL, Intravenous, PRN, Stephanie, Nan G, DO  •  [COMPLETED] Insert Peripheral IV, , , Once **AND** sodium chloride 0.9 % flush 10 mL, 10 mL, Intravenous, PRN, Stephanie, Nan G, DO  •  sodium chloride 0.9 % flush 10 mL, 10 mL, Intravenous, Q12H, Stephanie, Nan G, DO, 10 mL at 06/02/23 0814  •  sodium chloride 0.9 % flush 10 mL, 10 mL, Intravenous, PRN, Stephanie, Nan G, DO  •  sodium chloride 0.9 % infusion 40 mL, 40 mL, Intravenous, PRN, Stephanie, Nan G, DO  •  venlafaxine XR (EFFEXOR-XR) 24 hr capsule 225 mg, 225 mg, Oral, QAM, Stephanie, Nan G, DO, 225 mg at 06/02/23 0811    Lab Review:   Results from last 7 days   Lab Units 06/02/23  0646 06/01/23 2244 06/01/23  1504   WBC 10*3/mm3 5.65 7.01 6.31   HEMOGLOBIN g/dL 14.5 14.6 14.1   PLATELETS 10*3/mm3 163 172 169     Results from last 7 days   Lab Units 06/02/23  0646 06/01/23  2244 06/01/23  1504   SODIUM mmol/L 136 139 135*   POTASSIUM mmol/L 3.6  3.6 3.9 3.6   CO2 mmol/L 27.0 25.0 26.0   BUN mg/dL 16 16 19   CREATININE mg/dL 0.88 0.82 0.91   MAGNESIUM mg/dL 1.7 1.7  --    GLUCOSE mg/dL 343* 355* 389*     Estimated Creatinine Clearance: 176.4 mL/min (by C-G formula based on SCr of 0.88 mg/dL).    Results from last 7 days   Lab Units 06/01/23  1504   HEMOGLOBIN A1C % 11.80*     .lipd  Lab Results   Component Value Date    CHLPL 239 (H) 02/22/2023    TRIG 523 (H) 02/22/2023    HDL 31 (L) 02/22/2023    AST 22 06/01/2023    ALT 23 06/01/2023       Radiology Reports:  Imaging  Results (Last 72 Hours)     Procedure Component Value Units Date/Time    CT Angiogram Chest [[id number]] Collected: 06/01/23 1727     Updated: 06/01/23 1732    Narrative:      CT ANGIOGRAM CHEST    Date of Exam: 6/1/2023 5:09 PM EDT    Indication: sob, hypoxic.    Comparison: None available.    Technique: CTA of the chest was performed after the uneventful intravenous administration of 90 mL Isovue-370. Reconstructed coronal and sagittal images were also obtained. In addition, a 3-D volume rendered image was created for interpretation.   Automated exposure control and iterative reconstruction methods were used.      Findings:  There is no pathologic axillary adenopathy or other worrisome body wall soft tissue finding in the chest. No acute findings are present in the partially characterized upper abdomen. There are small bilateral pleural effusions. There is no pericardial   effusion. Small mediastinal lymph nodes are present, without pathologic adenopathy. Nonaneurysmal, mildly ectatic thoracic aorta with maximum transverse dimension 3.9 cm. The pulmonary arteries are well-opacified and demonstrate no evidence of filling   defect concerning for acute pulmonary embolus. Evaluation of the lung parenchyma demonstrates no evidence of acute infectious process or suspicious pulmonary nodularity. There is some vascular prominence and mild interlobular septal thickening present   likely reflecting component of mild interstitial edema.      Impression:      Impression:  No pulmonary embolus. Small bilateral pleural effusions are present and there is evidence of mild pulmonary edema.        Electronically Signed: Christopher Palm    6/1/2023 5:29 PM EDT    Workstation ID: [id number]    XR Chest 1 View [[id number]] Collected: 06/01/23 1544     Updated: 06/01/23 1547    Narrative:      XR CHEST 1 VW    Date of Exam: 6/1/2023 3:19 PM EDT    Indication: SOA triage protocol    Comparison: 3/29/2023    Findings:  Heart size and pulmonary  vasculature appear within normal limits when accounting for projection and incomplete inspiration. Lungs grossly clear. Minimal blunting of the right costophrenic angle noted      Impression:      Impression:  Small right pleural effusion. No infiltrate or edema      Electronically Signed: Noah Desouza    6/1/2023 3:44 PM EDT    Workstation ID: [id number]          Assessment/Plan: #1 hypertensive urgency with CHF-the patient states he is compliant with his medications but he obviously has severe sleep apnea and does not use a CPAP or BiPAP at home.  I will start the patient on Bystolic 2.5 mg daily, spironolactone 25 mg daily and as needed clonidine to get his blood pressure down.  2 dyspnea on exertion and orthopnea.  The patient's BNP was normal however obese patient have a propensity for fluid retention.  I will continue the patient on Lasix 40 mg IV every 12 hours and have instituted spironolactone  3 minimally elevated high-sensitivity troponin-recent negative stress PET.  No exertional chest pain noted.  I reviewed his CT scan which shows no coronary artery calcification.  At this point no further ischemic evaluation is necessary    Ian Valles MD  06/02/23  09:52 EDT

## 2023-06-02 NOTE — CONSULTS
Reviewed chart for diabetes education consult. Noted history of type 2 diabetes.  Noted a1c of 11.8%. Spoke to Mr. Danielle at the bedside this morning.  He states that he has had diabetes for for a little over 3 years.  He states that he is still taking glimeperide for diabetes care, but he has not been taking metformin.  He states his prescription ran out.  He states that he sees Dr. Guajardo  For diabetes care at least once yearly.  He states that his a1c is not usually so high.  He states usually 8-9%.  Reviewed blood sugar goal by ADA of a1c less than 7%.  He states that he has a glucometer at home and checks his blood sugar about 1 time every 2 weeks.  He states that it is usually 200's in the morning and 150's in the afternoon.    Reviewed basic diabetes physiology with him.  Reviewed risk factors and risk for complications of diabetes that are circulation related.  Reviewed low blood sugar symptoms and how to treat with rule of 15 if less than 70.  Reviewed high blood sugar symptoms to watch for as well.  He states that it is hard for him to differentiate high blood sugar symptoms and other symptoms that he experiences with his health issues.  Encouraged him to check blood sugar more regularly to be sure symptoms are not related to diabetes.  He was given our What is diabetes handout and our contact information for any future needs.  Thank you for this referral.

## 2023-06-02 NOTE — PLAN OF CARE
Goal Outcome Evaluation:  Plan of Care Reviewed With: patient        Progress: no change  Outcome Evaluation: Admitted from the ED at 2130. VSS on 1-3LNC to BiPAP at night. SOA while sleeping and increased with ambulation. Nitro gtt stopped after 1 hr. PRN Tylenol given for headache. No complaints of nausea. Pt slept on and off throughout the night. Plan for Cardiology, Diabetes Educator, CM, PT, and OT consults today. Plan for ECHO today. Will continue with plan of care.

## 2023-06-02 NOTE — THERAPY DISCHARGE NOTE
Patient Name: Carlos Eduardo Danielle  : 1970    MRN: [id number]                              Today's Date: 2023       Admit Date: 2023    Visit Dx:     ICD-10-CM ICD-9-CM   1. Hypertensive urgency  I16.0 401.9   2. Hypoxia  R09.02 799.02   3. History of COPD  Z87.09 V12.69   4. Hyperglycemia  R73.9 790.29     Patient Active Problem List   Diagnosis   • AJAY (obstructive sleep apnea) -intolerant of BiPAP   • Vitamin D insufficiency   • Type 2 diabetes mellitus, without long-term current use of insulin   • Anxiety   • Hypertension   • Hypogonadism in male   • History of non anemic vitamin B12 deficiency   • Morbid (severe) obesity due to excess calories   • Gastritis without bleeding   • Fatigue   • Severe recurrent major depression without psychotic features   • Generalized anxiety disorder   • Suicidal ideation   • Acute appendicitis   • Acute appendicitis with localized peritonitis, without perforation, abscess, or gangrene   • Hyperlipidemia   • Bronchitis   • GERD (gastroesophageal reflux disease)   • Type 2 diabetes mellitus   • Hypertensive urgency   • Acute exacerbation of CHF (congestive heart failure)     Past Medical History:   Diagnosis Date   • Anxiety    • Back problem    • Cellulitis     HX; BILATERAL LEGS   • Colon polyp    • COPD (chronic obstructive pulmonary disease)     albuterol PRN; seen pulm remotely   • Depression    • Diabetes mellitus    • GERD (gastroesophageal reflux disease)     on Protonix daily. EGD 2020; no hx of h pylori   • Hyperlipidemia    • Hypertension    • Sleep apnea     noncompliant with BIPAP   • Suicide attempt     reports hx of suicide attempts three times in 30 years ago   • TIA (transient ischemic attack)      and ; related to hypertension.   • Type 2 diabetes mellitus     dx in 2019; no insulin; last A1c 8.0   • Venous stasis     bilateral lower ext     Past Surgical History:   Procedure Laterality Date   • APPENDECTOMY N/A 2021    Procedure:  APPENDECTOMY LAPAROSCOPIC;  Surgeon: Radames Arauz MD;  Location: [facility] OR;  Service: General;  Laterality: N/A;   • COLONOSCOPY  2020   • ENDOSCOPY N/A 4/17/2023    Procedure: ESOPHAGOGASTRODUODENOSCOPY;  Surgeon: Akua Ruelas MD;  Location: [facility] ENDOSCOPY;  Service: Bariatric;  Laterality: N/A;   • EPIDIDYMAL CYST EXCISION     • TONSILLECTOMY  2006      General Information     Row Name 06/02/23 1119          Physical Therapy Time and Intention    Document Type discharge evaluation/summary  -SD     Mode of Treatment individual therapy;physical therapy  -SD     Row Name 06/02/23 1119          General Information    Patient Profile Reviewed yes  -SD     Prior Level of Function independent:;all household mobility;community mobility;gait;transfer;bed mobility;ADL's;using stairs;driving;work  works at [employer]  -SD     Existing Precautions/Restrictions oxygen therapy device and L/min  -SD     Barriers to Rehab medically complex;physical barrier  -SD     Row Name 06/02/23 1119          Living Environment    People in Home alone  -SD     Row Name 06/02/23 1119          Home Main Entrance    Number of Stairs, Main Entrance none  -SD     Row Name 06/02/23 1119          Stairs Within Home, Primary    Stairs, Within Home, Primary needs met on main level of home  -SD     Row Name 06/02/23 1119          Cognition    Orientation Status (Cognition) oriented x 4  -SD     Row Name 06/02/23 1119          Safety Issues, Functional Mobility    Impairments Affecting Function (Mobility) endurance/activity tolerance;shortness of breath  -SD           User Key  (r) = Recorded By, (t) = Taken By, (c) = Cosigned By    Initials Name Provider Type    SD Trang Brooks PT Physical Therapist               Mobility     Row Name 06/02/23 1143          Bed Mobility    Bed Mobility supine-sit  -SD     Supine-Sit [place] (Bed Mobility) modified independence  -SD     Assistive Device (Bed Mobility) head of bed  elevated  -SD     Comment, (Bed Mobility) pt able to perform without difficulty  -SD     Row Name 06/02/23 1143          Transfers    Comment, (Transfers) pt able to perform without difficulty  -SD     Row Name 06/02/23 1143          Sit-Stand Transfer    Sit-Stand [place] (Transfers) independent  -SD     Row Name 06/02/23 1143          Gait/Stairs (Locomotion)    [place] Level (Gait) independent;1 person to manage equipment  -SD     Distance in Feet (Gait) 200  -SD     Deviations/Abnormal Patterns (Gait) bilateral deviations;base of support, wide  -SD     Comment, (Gait/Stairs) Pt amb in hallway without AD with 1 person assist only to manage O2 tank. Pt had no LOB, dem wide EMILY however does not contribute any safety concerns. BP elev upon return to room.  -SD           User Key  (r) = Recorded By, (t) = Taken By, (c) = Cosigned By    Initials Name Provider Type    Trang Claros PT Physical Therapist               Obj/Interventions     Row Name 06/02/23 1147          Range of Motion Comprehensive    General Range of Motion bilateral lower extremity ROM WFL  -SD     [facility] Name 06/02/23 1147          Strength Comprehensive (MMT)    General Manual Muscle Testing (MMT) Assessment no strength deficits identified  -SD     [facility] Name 06/02/23 1147          Balance    Balance Assessment sitting static balance;sitting dynamic balance;standing static balance  -SD     Static Sitting Balance independent  -SD     Dynamic Sitting Balance independent  -SD     Position, Sitting Balance unsupported;sitting edge of bed  -SD     Static Standing Balance independent  -SD     Position/Device Used, Standing Balance unsupported  -SD           User Key  (r) = Recorded By, (t) = Taken By, (c) = Cosigned By    Initials Name Provider Type    Trang Claros PT Physical Therapist               Goals/Plan    No documentation.                Clinical Impression     Row Name 06/02/23 1147          Pain    Pretreatment  Pain Rating 0/10 - no pain  -SD     Posttreatment Pain Rating 0/10 - no pain  -SD     Row Name 06/02/23 1147          Plan of Care Review    Plan of Care Reviewed With patient  -SD     Outcome Evaluation Pt performed mobility tasks independently, amb in hallway without AD with 1 person assist only to manage O2 tank. Pt had no LOB, dem wide EMILY however does not contribute any safety concerns. BP elev upon return to room. No further PT services warranted at this time. PT rec d/c home.  -SD     Row Name 06/02/23 1147          Therapy Assessment/Plan (PT)    Patient/Family Therapy Goals Statement (PT) return to home and work  -SD     Criteria for Skilled Interventions Met (PT) no problems identified which require skilled intervention  -SD     Therapy Frequency (PT) evaluation only  -SD     Row Name 06/02/23 1147          Vital Signs    Pre Systolic BP Rehab 176  -SD     Pre Treatment Diastolic BP 97  -SD     Post Systolic BP Rehab 187  -SD     Post Treatment Diastolic   -SD     Pretreatment Heart Rate (beats/min) 64  -SD     Posttreatment Heart Rate (beats/min) 66  -SD     Pre SpO2 (%) 95  -SD     O2 Delivery Pre Treatment nasal cannula  -SD     Post SpO2 (%) 93  -SD     O2 Delivery Post Treatment nasal cannula  -SD     Pre Patient Position Supine  -SD     Post Patient Position Sitting  -SD     Row Name 06/02/23 1147          Positioning and Restraints    Pre-Treatment Position in bed  -SD     Post Treatment Position bed  -SD     In Bed notified nsg;sitting EOB;call light within reach;encouraged to call for assist  -SD           User Key  (r) = Recorded By, (t) = Taken By, (c) = Cosigned By    Initials Name Provider Type    Trang Claros, PT Physical Therapist               Outcome Measures     Row Name 06/02/23 1150 06/02/23 0800       How much help from another person do you currently need...    Turning from your back to your side while in flat bed without using bedrails? 4  -SD 4  -LC    Moving from  lying on back to sitting on the side of a flat bed without bedrails? 4  -SD 4  -LC    Moving to and from a bed to a chair (including a wheelchair)? 4  -SD 4  -LC    Standing up from a chair using your arms (e.g., wheelchair, bedside chair)? 4  -SD 4  -LC    Climbing 3-5 steps with a railing? 4  -SD 4  -LC    To walk in hospital room? 4  -SD 4  -LC    AM-PAC 6 Clicks Score (PT) 24  -SD 24  -LC    Highest level of mobility 8 --> Walked 250 feet or more  -SD 8 --> Walked 250 feet or more  -LC    Row Name 06/02/23 1150          Functional Assessment    Outcome Measure Options AM-PAC 6 Clicks Basic Mobility (PT)  -SD           User Key  (r) = Recorded By, (t) = Taken By, (c) = Cosigned By    Initials Name Provider Type    Trang Claros, PT Physical Therapist    Kassidy Avila RN Registered Nurse              Physical Therapy Education     Title: PT OT SLP Therapies (Done)     Topic: Physical Therapy (Done)     Point: Mobility training (Done)     Learning Progress Summary           Patient DOMINGO Flaherty, DIANE,DU by SD at 6/2/2023 1151                               User Key     Initials Effective Dates Name Provider Type Discipline    SD 03/13/23 -  Trang Brooks, JENNYFER Physical Therapist PT              PT Recommendation and Plan     Plan of Care Reviewed With: patient  Outcome Evaluation: Pt performed mobility tasks independently, amb in hallway without AD with 1 person assist only to manage O2 tank. Pt had no LOB, dem wide EMILY however does not contribute any safety concerns. BP elev upon return to room. No further PT services warranted at this time. PT rec d/c home.     Time Calculation:    PT Charges     Row Name 06/02/23 1151             Time Calculation    Start Time 1119  chart review [id number]  -SD      PT Non-Billable Time (min) 36 min  -SD      PT Received On 06/02/23  -SD            User Key  (r) = Recorded By, (t) = Taken By, (c) = Cosigned By    Initials Name Provider Type    TIN Brooks  Trang, PT Physical Therapist              Therapy Charges for Today     Code Description Service Date Service Provider Modifiers Qty    32791080683 HC PT EVAL MOD COMPLEXITY 3 6/2/2023 Trang Brooks, PT GP 1          PT G-Codes  Outcome Measure Options: AM-PAC 6 Clicks Basic Mobility (PT)  AM-PAC 6 Clicks Score (PT): 24    PT Discharge Summary  Anticipated Discharge Disposition (PT): home    Trang Brooks, JENNYFER  6/2/2023

## 2023-06-02 NOTE — H&P
[facility] Medicine Services  HISTORY AND PHYSICAL    Patient Name: Carlos Eduardo Danielle  : 1970  MRN: [id number]  Primary Care Physician: Ian Vann MD  Date of admission: 2023      Subjective   Subjective     Chief Complaint:  Shortness of breath    HPI:  Carlos Eduardo Danielle is a 53 y.o. male with a PMH significant for morbid obesity, occasional tobacco abuse, COPD, AJAY, HTN, diabetes mellitus type 2, depression, GERD, HLD, chronic venous stasis who comes to the ED due to shortness of breath.  Patient reports onset of symptoms on Tuesday when he began to experience increased shortness of breath.  His symptoms became more severe yesterday.  Last night he was unable to sleep due to orthopnea, having to prop up on several pillows to be able to breathe.  He repetitively tried taking his rescue inhaler with no improvement of his symptoms.  Throughout the day today he had severe dyspnea with minimal exertion.  He reports increased abdominal distention, reports lower extremity edema is chronic and at baseline.  He only experience cough when he took his rescue inhaler.  He reports polyuria, polydipsia.  Denies fever, chills, malaise, chest pain, vomiting, diarrhea.      Review of Systems   Constitutional: Positive for activity change. Negative for fever.   HENT: Negative.    Eyes: Negative.    Respiratory: Positive for shortness of breath.    Cardiovascular: Positive for leg swelling. Negative for chest pain.   Gastrointestinal: Positive for abdominal distention. Negative for nausea and vomiting.   Endocrine: Negative.    Genitourinary: Negative.    Musculoskeletal: Negative.    Skin: Negative.    Allergic/Immunologic: Negative.    Neurological: Negative.    Hematological: Negative.    Psychiatric/Behavioral: Negative.           Personal History     Past Medical History:   Diagnosis Date   • Anxiety    • Back problem    • Cellulitis     HX; BILATERAL LEGS   • Colon polyp    •  COPD (chronic obstructive pulmonary disease)     albuterol PRN; seen pulm remotely   • Depression    • Diabetes mellitus    • GERD (gastroesophageal reflux disease)     on Protonix daily. EGD Jan 2020; no hx of h pylori   • Hyperlipidemia    • Hypertension    • Sleep apnea     noncompliant with BIPAP   • Suicide attempt     reports hx of suicide attempts three times in 30 years ago   • TIA (transient ischemic attack)     2010 and 2015; related to hypertension.   • Type 2 diabetes mellitus     dx in 2019; no insulin; last A1c 8.0   • Venous stasis     bilateral lower ext             Past Surgical History:   Procedure Laterality Date   • APPENDECTOMY N/A 09/08/2021    Procedure: APPENDECTOMY LAPAROSCOPIC;  Surgeon: Radames Arauz MD;  Location:  [facility] OR;  Service: General;  Laterality: N/A;   • COLONOSCOPY  2020   • ENDOSCOPY N/A 4/17/2023    Procedure: ESOPHAGOGASTRODUODENOSCOPY;  Surgeon: Akua Ruelas MD;  Location:  [facility] ENDOSCOPY;  Service: Bariatric;  Laterality: N/A;   • EPIDIDYMAL CYST EXCISION     • TONSILLECTOMY  2006       Family History: family history includes Depression in his mother; Diabetes in his mother; Heart disease in his maternal grandmother; Hypertension in his maternal grandfather, maternal grandmother, and mother; Kidney disease in his maternal uncle; No Known Problems in his brother and brother; Obesity in his mother; Schizophrenia in his mother.     Social History:  reports that he quit smoking about 14 years ago. His smoking use included cigarettes. He smoked an average of 0.75 packs per day. He has been exposed to tobacco smoke. He has never used smokeless tobacco. He reports current alcohol use. He reports that he does not use drugs.  Social History     Social History Narrative    Patient lives in [place], [place].  He is single and works full time as a  at [employer].       Medications:  Available home medication information reviewed.  Medications  Prior to Admission   Medication Sig Dispense Refill Last Dose   • acetaminophen (TYLENOL) 500 MG tablet Take 1 tablet by mouth Every 6 (Six) Hours As Needed for Mild Pain. 20 tablet 0    • amLODIPine-benazepril (Lotrel) 10-40 MG per capsule Take 1 capsule by mouth Daily. 30 capsule 5    • furosemide (LASIX) 40 MG tablet Take 1 tablet by mouth 2 (Two) Times a Day. 60 tablet 11    • glimepiride (AMARYL) 4 MG tablet Take 1 tablet by mouth 2 (Two) Times a Day. 180 tablet 5    • ibuprofen (ADVIL,MOTRIN) 800 MG tablet Take 1 tablet by mouth Every 6 (Six) Hours As Needed for Mild Pain. 90 tablet 2    • metFORMIN ER (GLUCOPHAGE-XR) 500 MG 24 hr tablet Take 2 tablets by mouth 2 (Two) Times a Day With Meals. 120 tablet 5    • mirtazapine (Remeron) 15 MG tablet Take 1 tablet by mouth At Night As Needed for sleep. 30 tablet 5    • ondansetron (Zofran) 8 MG tablet Take 1 tablet by mouth Every 8 (Eight) Hours As Needed for Nausea or Vomiting. 12 tablet 0    • pantoprazole (PROTONIX) 40 MG EC tablet Take 1 tablet by mouth Daily. 90 tablet 3    • venlafaxine XR (EFFEXOR-XR) 150 MG 24 hr capsule Take 2 capsules by mouth every morning 180 capsule 3        Allergies   Allergen Reactions   • Atorvastatin Headache     Weakness.     • Topamax [Topiramate] Other (See Comments)     Migraine         Objective   Objective     Vital Signs:   Temp:  [97.6 °F (36.4 °C)-98.3 °F (36.8 °C)] 98.3 °F (36.8 °C)  Heart Rate:  [66-89] 75  Resp:  [27-34] 27  BP: (178-224)/() 178/90       Physical Exam   Constitutional: Awake, alert  Eyes: PERRLA, sclerae anicteric, no conjunctival injection  HENT: NCAT, mucous membranes moist  Neck: Supple, no thyromegaly, no lymphadenopathy, trachea midline  Respiratory: Bilateral inspiratory crackles to midlung field  Cardiovascular: RRR, no murmurs, rubs, or gallops, palpable pedal pulses bilaterally  Gastrointestinal: Positive bowel sounds, soft, nontender, firm, distended  Musculoskeletal: 3+ bilateral ankle  edema, no clubbing or cyanosis to extremities  Psychiatric: Appropriate affect, cooperative  Neurologic: Oriented x 3, strength symmetric in all extremities, Cranial Nerves grossly intact to confrontation, speech clear  Skin: No rashes      Result Review:  I have personally reviewed the results from the time of this admission to 6/1/2023 22:31 EDT and agree with these findings:  [x]  Laboratory list / accordion  [x]  Microbiology  [x]  Radiology  [x]  EKG/Telemetry   []  Cardiology/Vascular   []  Pathology  [x]  Old records  []  Other        LAB RESULTS:      Lab 06/01/23  1504   WBC 6.31   HEMOGLOBIN 14.1   HEMATOCRIT 42.7   PLATELETS 169   NEUTROS ABS 4.37   IMMATURE GRANS (ABS) 0.09*   LYMPHS ABS 1.26   MONOS ABS 0.37   EOS ABS 0.17   MCV 87.5         Lab 06/01/23  1504   SODIUM 135*   POTASSIUM 3.6   CHLORIDE 98   CO2 26.0   ANION GAP 11.0   BUN 19   CREATININE 0.91   EGFR 101.4   GLUCOSE 389*   CALCIUM 9.0         Lab 06/01/23  1504   TOTAL PROTEIN 6.3   ALBUMIN 3.7   GLOBULIN 2.6   ALT (SGPT) 23   AST (SGOT) 22   BILIRUBIN 0.7   ALK PHOS 108         Lab 06/01/23  1825 06/01/23  1504   PROBNP 390.9 501.2   HSTROP T 23* 22*                     Microbiology Results (last 10 days)     Procedure Component Value - Date/Time    Respiratory Panel PCR w/COVID-19(SARS-CoV-2) [facility] In-House, NP Swab in UTM/VTM, 3-4 HR TAT - Swab, Nasopharynx [[id number]]  (Normal) Collected: 06/01/23 1505    Lab Status: Final result Specimen: Swab from Nasopharynx Updated: 06/01/23 1615     ADENOVIRUS, PCR Not Detected     Coronavirus 229E Not Detected     Coronavirus HKU1 Not Detected     Coronavirus NL63 Not Detected     Coronavirus OC43 Not Detected     COVID19 Not Detected     Human Metapneumovirus Not Detected     Human Rhinovirus/Enterovirus Not Detected     Influenza A PCR Not Detected     Influenza B PCR Not Detected     Parainfluenza Virus 1 Not Detected     Parainfluenza Virus 2 Not Detected      Parainfluenza Virus 3 Not Detected     Parainfluenza Virus 4 Not Detected     RSV, PCR Not Detected     Bordetella pertussis pcr Not Detected     Bordetella parapertussis PCR Not Detected     Chlamydophila pneumoniae PCR Not Detected     Mycoplasma pneumo by PCR Not Detected    Narrative:      In the setting of a positive respiratory panel with a viral infection PLUS a negative procalcitonin without other underlying concern for bacterial infection, consider observing off antibiotics or discontinuation of antibiotics and continue supportive care. If the respiratory panel is positive for atypical bacterial infection (Bordetella pertussis, Chlamydophila pneumoniae, or Mycoplasma pneumoniae), consider antibiotic de-escalation to target atypical bacterial infection.          XR Chest 1 View    Result Date: 6/1/2023  XR CHEST 1 VW Date of Exam: 6/1/2023 3:19 PM EDT Indication: SOA triage protocol Comparison: 3/29/2023 Findings: Heart size and pulmonary vasculature appear within normal limits when accounting for projection and incomplete inspiration. Lungs grossly clear. Minimal blunting of the right costophrenic angle noted     Impression: Impression: Small right pleural effusion. No infiltrate or edema Electronically Signed: Noah Desouza  6/1/2023 3:44 PM EDT  Workstation ID: [id number]    CT Angiogram Chest    Result Date: 6/1/2023  CT ANGIOGRAM CHEST Date of Exam: 6/1/2023 5:09 PM EDT Indication: sob, hypoxic. Comparison: None available. Technique: CTA of the chest was performed after the uneventful intravenous administration of 90 mL Isovue-370. Reconstructed coronal and sagittal images were also obtained. In addition, a 3-D volume rendered image was created for interpretation. Automated exposure control and iterative reconstruction methods were used. Findings: There is no pathologic axillary adenopathy or other worrisome body wall soft tissue finding in the chest. No acute findings are present in the partially  characterized upper abdomen. There are small bilateral pleural effusions. There is no pericardial effusion. Small mediastinal lymph nodes are present, without pathologic adenopathy. Nonaneurysmal, mildly ectatic thoracic aorta with maximum transverse dimension 3.9 cm. The pulmonary arteries are well-opacified and demonstrate no evidence of filling defect concerning for acute pulmonary embolus. Evaluation of the lung parenchyma demonstrates no evidence of acute infectious process or suspicious pulmonary nodularity. There is some vascular prominence and mild interlobular septal thickening present likely reflecting component of mild interstitial edema.     Impression: Impression: No pulmonary embolus. Small bilateral pleural effusions are present and there is evidence of mild pulmonary edema. Electronically Signed: Christopher Palm  6/1/2023 5:29 PM EDT  Workstation ID: [id number]          Assessment & Plan   Assessment & Plan     Active Hospital Problems    Diagnosis  POA   • **Acute exacerbation of CHF (congestive heart failure) [I50.9]  Unknown   • Hypertensive urgency [I16.0]  Yes   • GERD (gastroesophageal reflux disease) [K21.9]  Yes     on Protonix and Pepcid daily. EGD Jan 2020; no hx of h pylori     • Hyperlipidemia [E78.5]  Yes   • Severe recurrent major depression without psychotic features [F33.2]  Yes   • Generalized anxiety disorder [F41.1]  Yes   • Morbid (severe) obesity due to excess calories [E66.01]  Yes   • Anxiety [F41.9]  Yes   • Hypertension [I10]  Yes   • Type 2 diabetes mellitus, without long-term current use of insulin [E11.9]  Yes   • AJAY (obstructive sleep apnea) -intolerant of BiPAP [G47.33]  Yes     9/20-Sleep test positive for sleep apnea CPAP plan follow-up with Dr. Kraus Foxworth internal medicine   Reports he has sleep apnea as a child cannot afford the machine and cannot tolerate the machine.     Carlos Eduardo Danielle is a 53 y.o. male with a PMH significant for morbid obesity, occasional  tobacco abuse, COPD, AJAY, HTN, diabetes mellitus type 2, depression, GERD, HLD, chronic venous stasis who comes to the ED due to shortness of breath found to be in acute exacerbation of CHF.    Acute CHF exacerbation  Hypertensive urgency  --New diagnosis  - CTA with bilateral pleural effusions, pulmonary edema  -- Start Lasix 40 mg IV twice daily x2 doses  -- stricts is and os, daily weights  -- echo  -- Nasal cannula  -- Nitro drip  -- Follows with Dr. Sharma with cardiology, consult    Hypertensive urgency  - Continue home meds  - Nitro drip  - Not to drop SBP less than 160 overnight    HLD  Anxiety  - Continue home meds    Diabetes mellitus  - Hold home oral meds  - SSI with Accu-Cheks  - Hemoglobin A1c  - Reports polyuria, polydipsia blood glucose 389 currently  - Low threshold to start insulin drip    AJAY  - CPAP      DVT prophylaxis: Heparin      CODE STATUS: Full code  Code Status and Medical Interventions:   Ordered at: 06/01/23 3813     Level Of Support Discussed With:    Patient     Code Status (Patient has no pulse and is not breathing):    CPR (Attempt to Resuscitate)     Medical Interventions (Patient has pulse or is breathing):    Full Support       Expected Discharge   Expected Discharge Date: 6/3/2023; Expected Discharge Time:      Nan Brown DO  06/01/23

## 2023-06-02 NOTE — ED PROVIDER NOTES
"Subjective   History of Present Illness  53-year-old male with extensive past medical history presents for evaluation of shortness of breath.  Of note, the patient has a history of COPD as well as multiple other cardiac risk factors.  He is not oxygen dependent.  He states that since last night he has been experiencing labored breathing and difficulty \"taking a deep breath.\"  He is getting winded with minimal exertion.  He denies any cough or fever.  He denies any chest pain.  He endorses compliance with all of his medications.  He denies any known sick contacts or any known exposures to anyone with COVID-19.  He notes mild swelling to both of his legs that is not significantly different than his baseline.  The patient works at [employer].        Review of Systems   Respiratory: Positive for shortness of breath.    Cardiovascular: Positive for leg swelling.   All other systems reviewed and are negative.      Past Medical History:   Diagnosis Date   • Anxiety    • Back problem    • Cellulitis     HX; BILATERAL LEGS   • Colon polyp    • COPD (chronic obstructive pulmonary disease)     albuterol PRN; seen pulm remotely   • Depression    • Diabetes mellitus    • GERD (gastroesophageal reflux disease)     on Protonix daily. EGD Jan 2020; no hx of h pylori   • Hyperlipidemia    • Hypertension    • Sleep apnea     noncompliant with BIPAP   • Suicide attempt     reports hx of suicide attempts three times in 30 years ago   • TIA (transient ischemic attack)     2010 and 2015; related to hypertension.   • Type 2 diabetes mellitus     dx in 2019; no insulin; last A1c 8.0   • Venous stasis     bilateral lower ext       Allergies   Allergen Reactions   • Atorvastatin Headache     Weakness.     • Topamax [Topiramate] Other (See Comments)     Migraine         Past Surgical History:   Procedure Laterality Date   • APPENDECTOMY N/A 09/08/2021    Procedure: APPENDECTOMY LAPAROSCOPIC;  Surgeon: Radames Arauz MD;  Location: [facility]" [facility];  Service: General;  Laterality: N/A;   • COLONOSCOPY     • ENDOSCOPY N/A 2023    Procedure: ESOPHAGOGASTRODUODENOSCOPY;  Surgeon: Akua Ruelas MD;  Location: [facility] ENDOSCOPY;  Service: Bariatric;  Laterality: N/A;   • EPIDIDYMAL CYST EXCISION     • TONSILLECTOMY         Family History   Problem Relation Age of Onset   • Diabetes Mother    • Hypertension Mother    • Schizophrenia Mother    • Depression Mother    • Obesity Mother    • No Known Problems Brother    • No Known Problems Brother    • Kidney disease Maternal Uncle    • Heart disease Maternal Grandmother    • Hypertension Maternal Grandmother    • Hypertension Maternal Grandfather        Social History     Socioeconomic History   • Marital status: Single   Tobacco Use   • Smoking status: Former     Packs/day: 0.75     Years: 0.00     Pack years: 0.00     Types: Cigarettes     Quit date: 2008     Years since quittin.8     Passive exposure: Past   • Smokeless tobacco: Never   Vaping Use   • Vaping Use: Never used   Substance and Sexual Activity   • Alcohol use: Yes     Comment: occas   • Drug use: Never   • Sexual activity: Not Currently     Partners: Male           Objective   Physical Exam  Vitals and nursing note reviewed.   Constitutional:       Appearance: He is well-developed. He is obese. He is not diaphoretic.      Comments: Chronically ill-appearing male, obese   HENT:      Head: Normocephalic and atraumatic.   Eyes:      Pupils: Pupils are equal, round, and reactive to light.   Neck:      Vascular: No JVD.   Cardiovascular:      Rate and Rhythm: Normal rate and regular rhythm.      Heart sounds: Normal heart sounds. No murmur heard.    No friction rub. No gallop.   Pulmonary:      Effort: Tachypnea present.      Breath sounds: No wheezing or rales.      Comments: Tachypneic, breathing is somewhat labored, no accessory muscle use or retractions noted, no audible wheezes  Abdominal:      General: Bowel sounds  "are normal. There is no distension.      Palpations: Abdomen is soft. There is no mass.      Tenderness: There is no abdominal tenderness. There is no guarding.   Musculoskeletal:         General: Normal range of motion.      Cervical back: Neck supple.      Right lower leg: Edema present.      Left lower leg: Edema present.      Comments: Symmetrical, 1+ lower extremity edema noted bilaterally   Skin:     General: Skin is warm and dry.      Coloration: Skin is not pale.      Findings: No erythema or rash.   Neurological:      Mental Status: He is alert and oriented to person, place, and time.   Psychiatric:         Thought Content: Thought content normal.         Judgment: Judgment normal.      Comments: Appears mildly anxious         Critical Care  Performed by: Sesar Momin MD  Authorized by: Sesar Momin MD     Critical care provider statement:     Critical care time (minutes):  35    Critical care was necessary to treat or prevent imminent or life-threatening deterioration of the following conditions: Hypertensive urgency requiring Cardene drip.    Critical care was time spent personally by me on the following activities:  Development of treatment plan with patient or surrogate, discussions with consultants, examination of patient, evaluation of patient's response to treatment, obtaining history from patient or surrogate, ordering and performing treatments and interventions, ordering and review of laboratory studies, ordering and review of radiographic studies, pulse oximetry, re-evaluation of patient's condition and review of old charts               ED Course  ED Course as of 06/01/23 2203   u Jun 01, 2023   1724 53-year-old male with multiple comorbidities presents for evaluation of shortness of breath.  He states that since last night he has been experiencing labored breathing and difficulty \"taking a deep breath.\"  He denies any cough or fever.  Given his persistent symptoms he came to " the ED to be evaluated.  On arrival, the patient is quite hypertensive at 224/110.  He is also hypoxic on room air with oxygen saturations of 91%.  Labs remarkable for hyperglycemia and minimally elevated high-sensitivity troponin. [DD]   1725 I personally and independently viewed the patient's x-ray images myself, and I am in agreement with the radiologist's reading for final interpretation.   [DD]   1725 Moderate risk Well's.  [DD]   1725 Initial EKG revealed normal sinus rhythm with a heart rate of 72 and no ST segments suggestive of or concerning for ischemia. [DD]   1738 Chest CTA is negative for pulmonary embolism or emergent cardiothoracic process. [DD]      ED Course User Index  [DD] Sesar Momin MD                                       Recent Results (from the past 24 hour(s))   ECG 12 Lead ED Triage Standing Order; SOA    Collection Time: 06/01/23  2:56 PM   Result Value Ref Range    QT Interval 422 ms    QTC Interval 462 ms   Comprehensive Metabolic Panel    Collection Time: 06/01/23  3:04 PM    Specimen: Blood   Result Value Ref Range    Glucose 389 (H) 65 - 99 mg/dL    BUN 19 6 - 20 mg/dL    Creatinine 0.91 0.76 - 1.27 mg/dL    Sodium 135 (L) 136 - 145 mmol/L    Potassium 3.6 3.5 - 5.2 mmol/L    Chloride 98 98 - 107 mmol/L    CO2 26.0 22.0 - 29.0 mmol/L    Calcium 9.0 8.6 - 10.5 mg/dL    Total Protein 6.3 6.0 - 8.5 g/dL    Albumin 3.7 3.5 - 5.2 g/dL    ALT (SGPT) 23 1 - 41 U/L    AST (SGOT) 22 1 - 40 U/L    Alkaline Phosphatase 108 39 - 117 U/L    Total Bilirubin 0.7 0.0 - 1.2 mg/dL    Globulin 2.6 gm/dL    A/G Ratio 1.4 g/dL    BUN/Creatinine Ratio 20.9 7.0 - 25.0    Anion Gap 11.0 5.0 - 15.0 mmol/L    eGFR 101.4 >60.0 mL/min/1.73   BNP    Collection Time: 06/01/23  3:04 PM    Specimen: Blood   Result Value Ref Range    proBNP 501.2 0.0 - 900.0 pg/mL   Single High Sensitivity Troponin T    Collection Time: 06/01/23  3:04 PM    Specimen: Blood   Result Value Ref Range    HS Troponin T 22 (H) <15  ng/L   Green Top (Gel)    Collection Time: 06/01/23  3:04 PM   Result Value Ref Range    Extra Tube Hold for add-ons.    Lavender Top    Collection Time: 06/01/23  3:04 PM   Result Value Ref Range    Extra Tube hold for add-on    Gold Top - SST    Collection Time: 06/01/23  3:04 PM   Result Value Ref Range    Extra Tube Hold for add-ons.    Gray Top    Collection Time: 06/01/23  3:04 PM   Result Value Ref Range    Extra Tube Hold for add-ons.    Light Blue Top    Collection Time: 06/01/23  3:04 PM   Result Value Ref Range    Extra Tube Hold for add-ons.    CBC Auto Differential    Collection Time: 06/01/23  3:04 PM    Specimen: Blood   Result Value Ref Range    WBC 6.31 3.40 - 10.80 10*3/mm3    RBC 4.88 4.14 - 5.80 10*6/mm3    Hemoglobin 14.1 13.0 - 17.7 g/dL    Hematocrit 42.7 37.5 - 51.0 %    MCV 87.5 79.0 - 97.0 fL    MCH 28.9 26.6 - 33.0 pg    MCHC 33.0 31.5 - 35.7 g/dL    RDW 13.6 12.3 - 15.4 %    RDW-SD 42.6 37.0 - 54.0 fl    MPV 12.3 (H) 6.0 - 12.0 fL    Platelets 169 140 - 450 10*3/mm3    Neutrophil % 69.2 42.7 - 76.0 %    Lymphocyte % 20.0 19.6 - 45.3 %    Monocyte % 5.9 5.0 - 12.0 %    Eosinophil % 2.7 0.3 - 6.2 %    Basophil % 0.8 0.0 - 1.5 %    Immature Grans % 1.4 (H) 0.0 - 0.5 %    Neutrophils, Absolute 4.37 1.70 - 7.00 10*3/mm3    Lymphocytes, Absolute 1.26 0.70 - 3.10 10*3/mm3    Monocytes, Absolute 0.37 0.10 - 0.90 10*3/mm3    Eosinophils, Absolute 0.17 0.00 - 0.40 10*3/mm3    Basophils, Absolute 0.05 0.00 - 0.20 10*3/mm3    Immature Grans, Absolute 0.09 (H) 0.00 - 0.05 10*3/mm3    nRBC 0.5 (H) 0.0 - 0.2 /100 WBC   Scan Slide    Collection Time: 06/01/23  3:04 PM    Specimen: Blood   Result Value Ref Range    RBC Morphology Normal Normal    WBC Morphology Normal Normal    Platelet Morphology Normal Normal   Respiratory Panel PCR w/COVID-19(SARS-CoV-2) [facility] In-House, NP Swab in UTM/VTM, 3-4 HR TAT - Swab, Nasopharynx    Collection Time: 06/01/23  3:05 PM    Specimen:  Nasopharynx; Swab   Result Value Ref Range    ADENOVIRUS, PCR Not Detected Not Detected    Coronavirus 229E Not Detected Not Detected    Coronavirus HKU1 Not Detected Not Detected    Coronavirus NL63 Not Detected Not Detected    Coronavirus OC43 Not Detected Not Detected    COVID19 Not Detected Not Detected - Ref. Range    Human Metapneumovirus Not Detected Not Detected    Human Rhinovirus/Enterovirus Not Detected Not Detected    Influenza A PCR Not Detected Not Detected    Influenza B PCR Not Detected Not Detected    Parainfluenza Virus 1 Not Detected Not Detected    Parainfluenza Virus 2 Not Detected Not Detected    Parainfluenza Virus 3 Not Detected Not Detected    Parainfluenza Virus 4 Not Detected Not Detected    RSV, PCR Not Detected Not Detected    Bordetella pertussis pcr Not Detected Not Detected    Bordetella parapertussis PCR Not Detected Not Detected    Chlamydophila pneumoniae PCR Not Detected Not Detected    Mycoplasma pneumo by PCR Not Detected Not Detected   ECG 12 Lead Chest Pain    Collection Time: 06/01/23  6:23 PM   Result Value Ref Range    QT Interval 456 ms    QTC Interval 488 ms   Single High Sensitivity Troponin T    Collection Time: 06/01/23  6:25 PM    Specimen: Blood   Result Value Ref Range    HS Troponin T 23 (H) <15 ng/L     Note: In addition to lab results from this visit, the labs listed above may include labs taken at another facility or during a different encounter within the last 24 hours. Please correlate lab times with ED admission and discharge times for further clarification of the services performed during this visit.    CT Angiogram Chest   Final Result   Impression:   No pulmonary embolus. Small bilateral pleural effusions are present and there is evidence of mild pulmonary edema.            Electronically Signed: Christopher Palm     6/1/2023 5:29 PM EDT     Workstation ID: [id number]      XR Chest 1 View   Final Result   Impression:   Small right pleural effusion. No  infiltrate or edema         Electronically Signed: Noah Desouza     6/1/2023 3:44 PM EDT     Workstation ID: [id number]        Vitals:    06/01/23 1903 06/01/23 1920 06/01/23 2101 06/01/23 2157   BP: (!) 204/117 (!) 219/113 178/90    BP Location:   Right arm    Patient Position:   Lying    Pulse: 66 86 75    Resp:       Temp:       TempSrc:       SpO2:  100% 97%    Weight:    (!) 207 kg (457 lb)   Height:         Medications   sodium chloride 0.9 % flush 10 mL (has no administration in time range)   sodium chloride 0.9 % flush 10 mL (has no administration in time range)   amLODIPine (NORVASC) tablet 10 mg (has no administration in time range)     And   lisinopril (PRINIVIL,ZESTRIL) tablet 40 mg (has no administration in time range)   mirtazapine (REMERON) tablet 15 mg (has no administration in time range)   pantoprazole (PROTONIX) EC tablet 40 mg (has no administration in time range)   venlafaxine XR (EFFEXOR-XR) 24 hr capsule 225 mg (has no administration in time range)   sodium chloride 0.9 % flush 10 mL (has no administration in time range)   sodium chloride 0.9 % flush 10 mL (has no administration in time range)   sodium chloride 0.9 % infusion 40 mL (has no administration in time range)   sennosides-docusate (PERICOLACE) 8.6-50 MG per tablet 2 tablet (has no administration in time range)     And   polyethylene glycol (MIRALAX) packet 17 g (has no administration in time range)     And   bisacodyl (DULCOLAX) EC tablet 5 mg (has no administration in time range)     And   bisacodyl (DULCOLAX) suppository 10 mg (has no administration in time range)   dextrose (GLUTOSE) oral gel 15 g (has no administration in time range)   dextrose (D50W) (25 g/50 mL) IV injection 25 g (has no administration in time range)   glucagon (GLUCAGEN) injection 1 mg (has no administration in time range)   Insulin Lispro (humaLOG) injection 2-7 Units (has no administration in time range)   heparin (porcine) 5000 UNIT/ML injection 5,000 Units  (has no administration in time range)   nitroglycerin (NITROSTAT) SL tablet 0.4 mg (has no administration in time range)   Potassium Replacement - Follow Nurse / BPA Driven Protocol (has no administration in time range)   Magnesium Standard Dose Replacement - Follow Nurse / BPA Driven Protocol (has no administration in time range)   Phosphorus Replacement - Follow Nurse / BPA Driven Protocol (has no administration in time range)   Calcium Replacement - Follow Nurse / BPA Driven Protocol (has no administration in time range)   acetaminophen (TYLENOL) tablet 650 mg (has no administration in time range)     Or   acetaminophen (TYLENOL) 160 MG/5ML solution 650 mg (has no administration in time range)     Or   acetaminophen (TYLENOL) suppository 650 mg (has no administration in time range)   hydrOXYzine (ATARAX) tablet 25 mg (has no administration in time range)   ondansetron (ZOFRAN) tablet 4 mg (has no administration in time range)     Or   ondansetron (ZOFRAN) injection 4 mg (has no administration in time range)   furosemide (LASIX) injection 40 mg (has no administration in time range)   nitroglycerin (TRIDIL) 200 mcg/ml infusion (has no administration in time range)   iopamidol (ISOVUE-370) 76 % injection 100 mL (90 mL Intravenous Given 6/1/23 1723)   hydrALAZINE (APRESOLINE) injection 20 mg (20 mg Intravenous Given 6/1/23 1903)     ECG/EMG Results (last 24 hours)     Procedure Component Value Units Date/Time    ECG 12 Lead Chest Pain [[id number]] Collected: 06/01/23 1823     Updated: 06/01/23 1823     QT Interval 456 ms      QTC Interval 488 ms     Narrative:      Test Reason : Chest Pain  Blood Pressure :   */*   mmHG  Vent. Rate :  69 BPM     Atrial Rate :  69 BPM     P-R Int : 144 ms          QRS Dur : 106 ms      QT Int : 456 ms       P-R-T Axes :  54  -1 113 degrees     QTc Int : 488 ms    ** Poor data quality, interpretation may be adversely affected  Normal sinus rhythm  Nonspecific T wave  abnormality  Abnormal ECG  When compared with ECG of 01-JUN-2023 14:56, (Unconfirmed)  No significant change was found    Referred By: MICHAEL           Confirmed By:     ECG 12 Lead ED Triage Standing Order; SOA [[id number]] Collected: 06/01/23 1456     Updated: 06/01/23 1942     QT Interval 422 ms      QTC Interval 462 ms     Narrative:      Test Reason : ED Triage Standing Order~  Blood Pressure :   */*   mmHG  Vent. Rate :  72 BPM     Atrial Rate :  72 BPM     P-R Int : 160 ms          QRS Dur : 100 ms      QT Int : 422 ms       P-R-T Axes :  27   3  86 degrees     QTc Int : 462 ms    Normal sinus rhythm  Normal ECG  When compared with ECG of 02-MAR-2023 09:05,  No significant change was found  Confirmed by MD Momin Michael ([id number]) on 6/1/2023 7:41:30 PM    Referred By:            Confirmed By: Ruslan Momin MD        ECG 12 Lead Chest Pain   Preliminary Result   Test Reason : Chest Pain   Blood Pressure :   */*   mmHG   Vent. Rate :  69 BPM     Atrial Rate :  69 BPM      P-R Int : 144 ms          QRS Dur : 106 ms       QT Int : 456 ms       P-R-T Axes :  54  -1 113 degrees      QTc Int : 488 ms      ** Poor data quality, interpretation may be adversely affected   Normal sinus rhythm   Nonspecific T wave abnormality   Abnormal ECG   When compared with ECG of 01-JUN-2023 14:56, (Unconfirmed)   No significant change was found      Referred By: MICHAEL           Confirmed By:       ECG 12 Lead ED Triage Standing Order; SOA   Final Result   Test Reason : ED Triage Standing Order~   Blood Pressure :   */*   mmHG   Vent. Rate :  72 BPM     Atrial Rate :  72 BPM      P-R Int : 160 ms          QRS Dur : 100 ms       QT Int : 422 ms       P-R-T Axes :  27   3  86 degrees      QTc Int : 462 ms      Normal sinus rhythm   Normal ECG   When compared with ECG of 02-MAR-2023 09:05,   No significant change was found   Confirmed by MD Momin Michael ([id number]) on 6/1/2023 7:41:30 PM      Referred By:            Confirmed By: Ruslan Momin MD                  MDM    Final diagnoses:   Hypertensive urgency   Hypoxia   History of COPD   Hyperglycemia       ED Disposition  ED Disposition     ED Disposition   Decision to Admit    Condition   --    Comment   Level of Care: Telemetry [5]   Diagnosis: Hypertensive urgency [869575]               No follow-up provider specified.       Medication List      No changes were made to your prescriptions during this visit.          Sesar Momin MD  06/01/23 1484

## 2023-06-02 NOTE — PROGRESS NOTES
[facility] Medicine Services  PROGRESS NOTE    Patient Name: Carlos Eduardo Danielle  : 1970  MRN: [id number]    Date of Admission: 2023  Primary Care Physician: Ian Vann MD    Subjective   Subjective     CC:  dyspnea    HPI:  Reports some improvement w lasix    ROS:  Gen- No fevers, chills  CV- No chest pain, palpitations  Resp- dyspnea improving  GI- No N/V/D, abd pain         Objective   Objective     Vital Signs:   Temp:  [97.6 °F (36.4 °C)-99 °F (37.2 °C)] 99 °F (37.2 °C)  Heart Rate:  [63-91] 68  Resp:  [18-34] 18  BP: (155-224)/() 176/97  Flow (L/min):  [1-3] 2     Physical Exam:  Constitutional: No acute distress, awake, alert, obese  HENT: NCAT, mucous membranes moist  Respiratory: poor resp effort, decreased basilar breath sounds. Comfortable at rest on NC   Cardiovascular: RRR, no murmurs, rubs, or gallops  Gastrointestinal: Positive bowel sounds, soft, nontender, nondistended  Musculoskeletal: 1+ bilateral ankle edema  Psychiatric: Appropriate affect, cooperative  Neurologic: Oriented x 3, no focal deficits  Skin:BLE venous stasis      Results Reviewed:  LAB RESULTS:      Lab 23  0646 23  1504   WBC 5.65 7.01 6.31   HEMOGLOBIN 14.5 14.6 14.1   HEMATOCRIT 43.2 44.0 42.7   PLATELETS 163 172 169   NEUTROS ABS 3.84 4.36 4.37   IMMATURE GRANS (ABS) 0.07* 0.09* 0.09*   LYMPHS ABS 1.15 1.83 1.26   MONOS ABS 0.35 0.50 0.37   EOS ABS 0.18 0.17 0.17   MCV 87.6 87.1 87.5         Lab 23  0646 23  1504   SODIUM 136 139 135*   POTASSIUM 3.6  3.6 3.9 3.6   CHLORIDE 99 100 98   CO2 27.0 25.0 26.0   ANION GAP 10.0 14.0 11.0   BUN 16 16 19   CREATININE 0.88 0.82 0.91   EGFR 103.5 105.7 101.4   GLUCOSE 343* 355* 389*   CALCIUM 9.0 9.5 9.0   MAGNESIUM 1.7 1.7  --    HEMOGLOBIN A1C  --   --  11.80*         Lab 23  1504   TOTAL PROTEIN 6.3   ALBUMIN 3.7   GLOBULIN 2.6   ALT (SGPT) 23   AST (SGOT) 22   BILIRUBIN  0.7   ALK PHOS 108         Lab 06/01/23  1825 06/01/23  1504   PROBNP 390.9 501.2   HSTROP T 23* 22*                 Brief Urine Lab Results     None          Microbiology Results Abnormal     Procedure Component Value - Date/Time    Respiratory Panel PCR w/COVID-19(SARS-CoV-2) TERESA/AVILA/ALEXX/PAD/COR/MAD/ISMAEL In-House, NP Swab in UTM/VTM, 3-4 HR TAT - Swab, Nasopharynx [[id number]]  (Normal) Collected: 06/01/23 1505    Lab Status: Final result Specimen: Swab from Nasopharynx Updated: 06/01/23 1615     ADENOVIRUS, PCR Not Detected     Coronavirus 229E Not Detected     Coronavirus HKU1 Not Detected     Coronavirus NL63 Not Detected     Coronavirus OC43 Not Detected     COVID19 Not Detected     Human Metapneumovirus Not Detected     Human Rhinovirus/Enterovirus Not Detected     Influenza A PCR Not Detected     Influenza B PCR Not Detected     Parainfluenza Virus 1 Not Detected     Parainfluenza Virus 2 Not Detected     Parainfluenza Virus 3 Not Detected     Parainfluenza Virus 4 Not Detected     RSV, PCR Not Detected     Bordetella pertussis pcr Not Detected     Bordetella parapertussis PCR Not Detected     Chlamydophila pneumoniae PCR Not Detected     Mycoplasma pneumo by PCR Not Detected    Narrative:      In the setting of a positive respiratory panel with a viral infection PLUS a negative procalcitonin without other underlying concern for bacterial infection, consider observing off antibiotics or discontinuation of antibiotics and continue supportive care. If the respiratory panel is positive for atypical bacterial infection (Bordetella pertussis, Chlamydophila pneumoniae, or Mycoplasma pneumoniae), consider antibiotic de-escalation to target atypical bacterial infection.          XR Chest 1 View    Result Date: 6/1/2023  XR CHEST 1 VW Date of Exam: 6/1/2023 3:19 PM EDT Indication: SOA triage protocol Comparison: 3/29/2023 Findings: Heart size and pulmonary vasculature appear within normal limits when accounting for  projection and incomplete inspiration. Lungs grossly clear. Minimal blunting of the right costophrenic angle noted     Impression: Impression: Small right pleural effusion. No infiltrate or edema Electronically Signed: Noah Desouza  6/1/2023 3:44 PM EDT  Workstation ID: [id number]    CT Angiogram Chest    Result Date: 6/1/2023  CT ANGIOGRAM CHEST Date of Exam: 6/1/2023 5:09 PM EDT Indication: sob, hypoxic. Comparison: None available. Technique: CTA of the chest was performed after the uneventful intravenous administration of 90 mL Isovue-370. Reconstructed coronal and sagittal images were also obtained. In addition, a 3-D volume rendered image was created for interpretation. Automated exposure control and iterative reconstruction methods were used. Findings: There is no pathologic axillary adenopathy or other worrisome body wall soft tissue finding in the chest. No acute findings are present in the partially characterized upper abdomen. There are small bilateral pleural effusions. There is no pericardial effusion. Small mediastinal lymph nodes are present, without pathologic adenopathy. Nonaneurysmal, mildly ectatic thoracic aorta with maximum transverse dimension 3.9 cm. The pulmonary arteries are well-opacified and demonstrate no evidence of filling defect concerning for acute pulmonary embolus. Evaluation of the lung parenchyma demonstrates no evidence of acute infectious process or suspicious pulmonary nodularity. There is some vascular prominence and mild interlobular septal thickening present likely reflecting component of mild interstitial edema.     Impression: Impression: No pulmonary embolus. Small bilateral pleural effusions are present and there is evidence of mild pulmonary edema. Electronically Signed: Christopher Palm  6/1/2023 5:29 PM EDT  Workstation ID: [id number]          Current medications:  Scheduled Meds:amLODIPine, 10 mg, Oral, Q24H   And  lisinopril, 40 mg, Oral, Q24H  furosemide, 40 mg,  Intravenous, BID  heparin (porcine), 7,500 Units, Subcutaneous, Q8H  insulin lispro, 2-7 Units, Subcutaneous, 4x Daily AC & at Bedtime  nebivolol, 2.5 mg, Oral, Q24H  pantoprazole, 40 mg, Oral, Daily  pharmacy consult - MTM, , Does not apply, Daily  potassium chloride ER, 40 mEq, Oral, Q4H  senna-docusate sodium, 2 tablet, Oral, BID  sodium chloride, 10 mL, Intravenous, Q12H  spironolactone, 25 mg, Oral, Daily  venlafaxine XR, 225 mg, Oral, QAM      Continuous Infusions:nitroglycerin, 5-200 mcg/min, Last Rate: Stopped (06/02/23 0512)      PRN Meds:.•  acetaminophen **OR** acetaminophen **OR** acetaminophen  •  senna-docusate sodium **AND** polyethylene glycol **AND** bisacodyl **AND** bisacodyl  •  Calcium Replacement - Follow Nurse / BPA Driven Protocol  •  cloNIDine  •  dextrose  •  dextrose  •  glucagon (human recombinant)  •  hydrOXYzine  •  Magnesium Standard Dose Replacement - Follow Nurse / BPA Driven Protocol  •  mirtazapine  •  nitroglycerin  •  ondansetron **OR** ondansetron  •  Phosphorus Replacement - Follow Nurse / BPA Driven Protocol  •  Potassium Replacement - Follow Nurse / BPA Driven Protocol  •  sodium chloride  •  [COMPLETED] Insert Peripheral IV **AND** sodium chloride  •  sodium chloride  •  sodium chloride    Assessment & Plan   Assessment & Plan     Active Hospital Problems    Diagnosis  POA   • **Acute exacerbation of CHF (congestive heart failure) [I50.9]  Unknown   • Hypertensive urgency [I16.0]  Yes   • GERD (gastroesophageal reflux disease) [K21.9]  Yes   • Hyperlipidemia [E78.5]  Yes   • Severe recurrent major depression without psychotic features [F33.2]  Yes   • Generalized anxiety disorder [F41.1]  Yes   • Morbid (severe) obesity due to excess calories [E66.01]  Yes   • Anxiety [F41.9]  Yes   • Hypertension [I10]  Yes   • Type 2 diabetes mellitus, without long-term current use of insulin [E11.9]  Yes   • AJAY (obstructive sleep apnea) -intolerant of BiPAP [G47.33]  Yes      Resolved  Hospital Problems   No resolved problems to display.        Brief Hospital Course to date:  Carlos Eduardo Danielle is a 53 y.o. male with a PMH significant for morbid obesity, occasional tobacco abuse, COPD, AJAY, HTN, diabetes mellitus type 2, depression, GERD, HLD, chronic venous stasis who comes to the ED due to shortness of breath found to be in acute exacerbation of CHF.    Acute CHF exacerbation  Hypertensive urgency  --New diagnosis  - CTA with bilateral pleural effusions, pulmonary edema  -- Start Lasix 40 mg IV twice daily x2 doses  -- stricts is and os, daily weights  -- echo  -- Nasal cannula  -- Nitro drip  -- cardiology consulted, recommended continue lasix IV BID and added spironolactone. Although bnp was nl he is obese and sensitive to fluid overload     Hypertensive urgency  - Continue home meds  - s/p nitro drip  --spironolactone added  --BP improving     HLD  Anxiety  - Continue home meds     Diabetes mellitus  -a1c is 11.8, will add levemir to correctional     AJAY  - CPAP          Expected Discharge Location and Transportation: home  Expected Discharge   Expected Discharge Date: 6/5/2023; Expected Discharge Time:      DVT prophylaxis:  Medical DVT prophylaxis orders are present.     AM-PAC 6 Clicks Score (PT): 24 (06/02/23 0800)    CODE STATUS:   Code Status and Medical Interventions:   Ordered at: 06/01/23 2206     Level Of Support Discussed With:    Patient     Code Status (Patient has no pulse and is not breathing):    CPR (Attempt to Resuscitate)     Medical Interventions (Patient has pulse or is breathing):    Full Support       Kajal Mane MD  06/02/23

## 2023-06-02 NOTE — PLAN OF CARE
Goal Outcome Evaluation:  Plan of Care Reviewed With: patient           Outcome Evaluation: Pleasant man with health issues of HTN, Diabetes, SOA and edema. HTN meds added. ECHO in morning.

## 2023-06-02 NOTE — PAYOR COMM NOTE
"ID: [id number]  : 1970    Acute CHF exacerbation  Hypertensive urgency  --New diagnosis    1-3LNC to BiPAP at night. SOA while sleeping and increased with ambulation.     Patricio Rosenberg MD  (NPI: [id number])    Utilization Review  Phone [phone]  Fax [phone]    [place] [address]                Joel Danielle (53 y.o. Male)     Date of Birth   1970    Social Security Number       Address   [address] [place] [address]    Home Phone   [phone]    MRN   [id number]       [employer]   [employer]    Marital Status   Single                            Admission Date   23    Admission Type   Emergency    Admitting Provider   Nan Brown DO    Attending Provider   Kajal Mane MD    Department, Room/Bed   [address], S[address]/1       Discharge Date       Discharge Disposition       Discharge Destination                               Attending Provider: Kajal Mane MD    Allergies: Atorvastatin, Topamax [Topiramate]    Isolation: None   Infection: None   Code Status: CPR    Ht: 180.3 cm (71\")   Wt: 204 kg (450 lb 8 oz)    Admission Cmt: None   Principal Problem: Acute exacerbation of CHF (congestive heart failure) [I50.9]                 Active Insurance as of 2023     Primary Coverage     Payor Plan Insurance Group Employer/Plan Group    [facility] MAKEN[facility]IE ALVAREZ BY MAKENZIE [id number]     Payor Plan Address Payor Plan Phone Number Payor Plan Fax Number Effective Dates    [address] [address]   2022 - None Entered    [facility] [phone]       Subscriber Name Subscriber Birth Date Member ID       JOEL DANIELLE 1970 [id number]                 Emergency Contacts      (Rel.) Home Phone Work Phone Mobile Phone    Sharron Alfredo (Relative) [phone] -- --            [place]: NPI [id number] Tax ID [id number]  Insurance Information                [facility] BY " MAKENZIE/PASSPORT BY MAKENZIE Phone: --    Subscriber: Carlos Eduardo Danielle Subscriber#: [id number]    Group#: [id number] Precert#: --             History & Physical      Nan Brown DO at 23 2215              [facility] Medicine Services  HISTORY AND PHYSICAL    Patient Name: Carlos Eduardo Danielle  : 1970  MRN: [id number]  Primary Care Physician: Ian Vann MD  Date of admission: 2023      Subjective    Subjective     Chief Complaint:  Shortness of breath    HPI:  Carlos Eduardo Danielle is a 53 y.o. male with a PMH significant for morbid obesity, occasional tobacco abuse, COPD, AJAY, HTN, diabetes mellitus type 2, depression, GERD, HLD, chronic venous stasis who comes to the ED due to shortness of breath.  Patient reports onset of symptoms on Tuesday when he began to experience increased shortness of breath.  His symptoms became more severe yesterday.  Last night he was unable to sleep due to orthopnea, having to prop up on several pillows to be able to breathe.  He repetitively tried taking his rescue inhaler with no improvement of his symptoms.  Throughout the day today he had severe dyspnea with minimal exertion.  He reports increased abdominal distention, reports lower extremity edema is chronic and at baseline.  He only experience cough when he took his rescue inhaler.  He reports polyuria, polydipsia.  Denies fever, chills, malaise, chest pain, vomiting, diarrhea.      Review of Systems   Constitutional: Positive for activity change. Negative for fever.   HENT: Negative.    Eyes: Negative.    Respiratory: Positive for shortness of breath.    Cardiovascular: Positive for leg swelling. Negative for chest pain.   Gastrointestinal: Positive for abdominal distention. Negative for nausea and vomiting.   Endocrine: Negative.    Genitourinary: Negative.    Musculoskeletal: Negative.    Skin: Negative.    Allergic/Immunologic: Negative.    Neurological: Negative.     Hematological: Negative.    Psychiatric/Behavioral: Negative.           Personal History     Past Medical History:   Diagnosis Date   • Anxiety    • Back problem    • Cellulitis     HX; BILATERAL LEGS   • Colon polyp    • COPD (chronic obstructive pulmonary disease)     albuterol PRN; seen pulm remotely   • Depression    • Diabetes mellitus    • GERD (gastroesophageal reflux disease)     on Protonix daily. EGD Jan 2020; no hx of h pylori   • Hyperlipidemia    • Hypertension    • Sleep apnea     noncompliant with BIPAP   • Suicide attempt     reports hx of suicide attempts three times in 30 years ago   • TIA (transient ischemic attack)     2010 and 2015; related to hypertension.   • Type 2 diabetes mellitus     dx in 2019; no insulin; last A1c 8.0   • Venous stasis     bilateral lower ext             Past Surgical History:   Procedure Laterality Date   • APPENDECTOMY N/A 09/08/2021    Procedure: APPENDECTOMY LAPAROSCOPIC;  Surgeon: Radames Arauz MD;  Location:  [facility];  Service: General;  Laterality: N/A;   • COLONOSCOPY  2020   • ENDOSCOPY N/A 4/17/2023    Procedure: ESOPHAGOGASTRODUODENOSCOPY;  Surgeon: Akua Ruelas MD;  Location:  [facility] ENDOSCOPY;  Service: Bariatric;  Laterality: N/A;   • EPIDIDYMAL CYST EXCISION     • TONSILLECTOMY  2006       Family History: family history includes Depression in his mother; Diabetes in his mother; Heart disease in his maternal grandmother; Hypertension in his maternal grandfather, maternal grandmother, and mother; Kidney disease in his maternal uncle; No Known Problems in his brother and brother; Obesity in his mother; Schizophrenia in his mother.     Social History:  reports that he quit smoking about 14 years ago. His smoking use included cigarettes. He smoked an average of 0.75 packs per day. He has been exposed to tobacco smoke. He has never used smokeless tobacco. He reports current alcohol use. He reports that he does not use drugs.  Social History      Social History Narrative    Patient lives in [place], [place].  He is single and works full time as a  at [employer].       Medications:  Available home medication information reviewed.  Medications Prior to Admission   Medication Sig Dispense Refill Last Dose   • acetaminophen (TYLENOL) 500 MG tablet Take 1 tablet by mouth Every 6 (Six) Hours As Needed for Mild Pain. 20 tablet 0    • amLODIPine-benazepril (Lotrel) 10-40 MG per capsule Take 1 capsule by mouth Daily. 30 capsule 5    • furosemide (LASIX) 40 MG tablet Take 1 tablet by mouth 2 (Two) Times a Day. 60 tablet 11    • glimepiride (AMARYL) 4 MG tablet Take 1 tablet by mouth 2 (Two) Times a Day. 180 tablet 5    • ibuprofen (ADVIL,MOTRIN) 800 MG tablet Take 1 tablet by mouth Every 6 (Six) Hours As Needed for Mild Pain. 90 tablet 2    • metFORMIN ER (GLUCOPHAGE-XR) 500 MG 24 hr tablet Take 2 tablets by mouth 2 (Two) Times a Day With Meals. 120 tablet 5    • mirtazapine (Remeron) 15 MG tablet Take 1 tablet by mouth At Night As Needed for sleep. 30 tablet 5    • ondansetron (Zofran) 8 MG tablet Take 1 tablet by mouth Every 8 (Eight) Hours As Needed for Nausea or Vomiting. 12 tablet 0    • pantoprazole (PROTONIX) 40 MG EC tablet Take 1 tablet by mouth Daily. 90 tablet 3    • venlafaxine XR (EFFEXOR-XR) 150 MG 24 hr capsule Take 2 capsules by mouth every morning 180 capsule 3        Allergies   Allergen Reactions   • Atorvastatin Headache     Weakness.     • Topamax [Topiramate] Other (See Comments)     Migraine         Objective    Objective     Vital Signs:   Temp:  [97.6 °F (36.4 °C)-98.3 °F (36.8 °C)] 98.3 °F (36.8 °C)  Heart Rate:  [66-89] 75  Resp:  [27-34] 27  BP: (178-224)/() 178/90       Physical Exam   Constitutional: Awake, alert  Eyes: PERRLA, sclerae anicteric, no conjunctival injection  HENT: NCAT, mucous membranes moist  Neck: Supple, no thyromegaly, no lymphadenopathy, trachea midline  Respiratory: Bilateral inspiratory  crackles to midlung field  Cardiovascular: RRR, no murmurs, rubs, or gallops, palpable pedal pulses bilaterally  Gastrointestinal: Positive bowel sounds, soft, nontender, firm, distended  Musculoskeletal: 3+ bilateral ankle edema, no clubbing or cyanosis to extremities  Psychiatric: Appropriate affect, cooperative  Neurologic: Oriented x 3, strength symmetric in all extremities, Cranial Nerves grossly intact to confrontation, speech clear  Skin: No rashes      Result Review:  I have personally reviewed the results from the time of this admission to 6/1/2023 22:31 EDT and agree with these findings:  [x]  Laboratory list / accordion  [x]  Microbiology  [x]  Radiology  [x]  EKG/Telemetry   []  Cardiology/Vascular   []  Pathology  [x]  Old records  []  Other        LAB RESULTS:      Lab 06/01/23  1504   WBC 6.31   HEMOGLOBIN 14.1   HEMATOCRIT 42.7   PLATELETS 169   NEUTROS ABS 4.37   IMMATURE GRANS (ABS) 0.09*   LYMPHS ABS 1.26   MONOS ABS 0.37   EOS ABS 0.17   MCV 87.5         Lab 06/01/23  1504   SODIUM 135*   POTASSIUM 3.6   CHLORIDE 98   CO2 26.0   ANION GAP 11.0   BUN 19   CREATININE 0.91   EGFR 101.4   GLUCOSE 389*   CALCIUM 9.0         Lab 06/01/23  1504   TOTAL PROTEIN 6.3   ALBUMIN 3.7   GLOBULIN 2.6   ALT (SGPT) 23   AST (SGOT) 22   BILIRUBIN 0.7   ALK PHOS 108         Lab 06/01/23  1825 06/01/23  1504   PROBNP 390.9 501.2   HSTROP T 23* 22*                     Microbiology Results (last 10 days)     Procedure Component Value - Date/Time    Respiratory Panel PCR w/COVID-19(SARS-CoV-2) TERESA/AVILA/ALEXX/PAD/COR/MAD/ISMAEL In-House, NP Swab in UTM/VTM, 3-4 HR TAT - Swab, Nasopharynx [[id number]]  (Normal) Collected: 06/01/23 1505    Lab Status: Final result Specimen: Swab from Nasopharynx Updated: 06/01/23 1615     ADENOVIRUS, PCR Not Detected     Coronavirus 229E Not Detected     Coronavirus HKU1 Not Detected     Coronavirus NL63 Not Detected     Coronavirus OC43 Not Detected     COVID19 Not Detected     Human  Metapneumovirus Not Detected     Human Rhinovirus/Enterovirus Not Detected     Influenza A PCR Not Detected     Influenza B PCR Not Detected     Parainfluenza Virus 1 Not Detected     Parainfluenza Virus 2 Not Detected     Parainfluenza Virus 3 Not Detected     Parainfluenza Virus 4 Not Detected     RSV, PCR Not Detected     Bordetella pertussis pcr Not Detected     Bordetella parapertussis PCR Not Detected     Chlamydophila pneumoniae PCR Not Detected     Mycoplasma pneumo by PCR Not Detected    Narrative:      In the setting of a positive respiratory panel with a viral infection PLUS a negative procalcitonin without other underlying concern for bacterial infection, consider observing off antibiotics or discontinuation of antibiotics and continue supportive care. If the respiratory panel is positive for atypical bacterial infection (Bordetella pertussis, Chlamydophila pneumoniae, or Mycoplasma pneumoniae), consider antibiotic de-escalation to target atypical bacterial infection.          XR Chest 1 View    Result Date: 6/1/2023  XR CHEST 1 VW Date of Exam: 6/1/2023 3:19 PM EDT Indication: SOA triage protocol Comparison: 3/29/2023 Findings: Heart size and pulmonary vasculature appear within normal limits when accounting for projection and incomplete inspiration. Lungs grossly clear. Minimal blunting of the right costophrenic angle noted     Impression: Impression: Small right pleural effusion. No infiltrate or edema Electronically Signed: Noah Desouza  6/1/2023 3:44 PM EDT  Workstation ID: [id number]    CT Angiogram Chest    Result Date: 6/1/2023  CT ANGIOGRAM CHEST Date of Exam: 6/1/2023 5:09 PM EDT Indication: sob, hypoxic. Comparison: None available. Technique: CTA of the chest was performed after the uneventful intravenous administration of 90 mL Isovue-370. Reconstructed coronal and sagittal images were also obtained. In addition, a 3-D volume rendered image was created for interpretation. Automated exposure  control and iterative reconstruction methods were used. Findings: There is no pathologic axillary adenopathy or other worrisome body wall soft tissue finding in the chest. No acute findings are present in the partially characterized upper abdomen. There are small bilateral pleural effusions. There is no pericardial effusion. Small mediastinal lymph nodes are present, without pathologic adenopathy. Nonaneurysmal, mildly ectatic thoracic aorta with maximum transverse dimension 3.9 cm. The pulmonary arteries are well-opacified and demonstrate no evidence of filling defect concerning for acute pulmonary embolus. Evaluation of the lung parenchyma demonstrates no evidence of acute infectious process or suspicious pulmonary nodularity. There is some vascular prominence and mild interlobular septal thickening present likely reflecting component of mild interstitial edema.     Impression: Impression: No pulmonary embolus. Small bilateral pleural effusions are present and there is evidence of mild pulmonary edema. Electronically Signed: Christopher Palm  6/1/2023 5:29 PM EDT  Workstation ID: [id number]          Assessment & Plan   Assessment & Plan     Active Hospital Problems    Diagnosis  POA   • **Acute exacerbation of CHF (congestive heart failure) [I50.9]  Unknown   • Hypertensive urgency [I16.0]  Yes   • GERD (gastroesophageal reflux disease) [K21.9]  Yes     on Protonix and Pepcid daily. EGD Jan 2020; no hx of h pylori     • Hyperlipidemia [E78.5]  Yes   • Severe recurrent major depression without psychotic features [F33.2]  Yes   • Generalized anxiety disorder [F41.1]  Yes   • Morbid (severe) obesity due to excess calories [E66.01]  Yes   • Anxiety [F41.9]  Yes   • Hypertension [I10]  Yes   • Type 2 diabetes mellitus, without long-term current use of insulin [E11.9]  Yes   • AJAY (obstructive sleep apnea) -intolerant of BiPAP [G47.33]  Yes     9/20-Sleep test positive for sleep apnea CPAP plan follow-up with Dr. Kraus  [facility]   Reports he has sleep apnea as a child cannot afford the machine and cannot tolerate the machine.     Carlos Eduardo Danielle is a 53 y.o. male with a PMH significant for morbid obesity, occasional tobacco abuse, COPD, AJAY, HTN, diabetes mellitus type 2, depression, GERD, HLD, chronic venous stasis who comes to the ED due to shortness of breath found to be in acute exacerbation of CHF.    Acute CHF exacerbation  Hypertensive urgency  --New diagnosis  - CTA with bilateral pleural effusions, pulmonary edema  -- Start Lasix 40 mg IV twice daily x2 doses  -- stricts is and os, daily weights  -- echo  -- Nasal cannula  -- Nitro drip  -- Follows with Dr. Sharma with cardiology, consult    Hypertensive urgency  - Continue home meds  - Nitro drip  - Not to drop SBP less than 160 overnight    HLD  Anxiety  - Continue home meds    Diabetes mellitus  - Hold home oral meds  - SSI with Accu-Cheks  - Hemoglobin A1c  - Reports polyuria, polydipsia blood glucose 389 currently  - Low threshold to start insulin drip    AJAY  - CPAP      DVT prophylaxis: Heparin      CODE STATUS: Full code  Code Status and Medical Interventions:   Ordered at: 06/01/23 2206     Level Of Support Discussed With:    Patient     Code Status (Patient has no pulse and is not breathing):    CPR (Attempt to Resuscitate)     Medical Interventions (Patient has pulse or is breathing):    Full Support       Expected Discharge   Expected Discharge Date: 6/3/2023; Expected Discharge Time:      Nan Brown DO  06/01/23      Electronically signed by Nan Brown DO at 06/01/23 2231          Emergency Department Notes      Sesar Momin MD at 06/01/23 2203      Procedure Orders    1. Critical Care [[id number]] ordered by Sesar Momin MD               Subjective   History of Present Illness  53-year-old male with extensive past medical history presents for evaluation of shortness of breath.  Of note, the patient has a history  "of COPD as well as multiple other cardiac risk factors.  He is not oxygen dependent.  He states that since last night he has been experiencing labored breathing and difficulty \"taking a deep breath.\"  He is getting winded with minimal exertion.  He denies any cough or fever.  He denies any chest pain.  He endorses compliance with all of his medications.  He denies any known sick contacts or any known exposures to anyone with COVID-19.  He notes mild swelling to both of his legs that is not significantly different than his baseline.  The patient works at [employer].        Review of Systems   Respiratory: Positive for shortness of breath.    Cardiovascular: Positive for leg swelling.   All other systems reviewed and are negative.      Past Medical History:   Diagnosis Date   • Anxiety    • Back problem    • Cellulitis     HX; BILATERAL LEGS   • Colon polyp    • COPD (chronic obstructive pulmonary disease)     albuterol PRN; seen pulm remotely   • Depression    • Diabetes mellitus    • GERD (gastroesophageal reflux disease)     on Protonix daily. EGD Jan 2020; no hx of h pylori   • Hyperlipidemia    • Hypertension    • Sleep apnea     noncompliant with BIPAP   • Suicide attempt     reports hx of suicide attempts three times in 30 years ago   • TIA (transient ischemic attack)     2010 and 2015; related to hypertension.   • Type 2 diabetes mellitus     dx in 2019; no insulin; last A1c 8.0   • Venous stasis     bilateral lower ext       Allergies   Allergen Reactions   • Atorvastatin Headache     Weakness.     • Topamax [Topiramate] Other (See Comments)     Migraine         Past Surgical History:   Procedure Laterality Date   • APPENDECTOMY N/A 09/08/2021    Procedure: APPENDECTOMY LAPAROSCOPIC;  Surgeon: Radames Arauz MD;  [facility]: [facility];  Service: General;  Laterality: N/A;   • COLONOSCOPY  2020   • ENDOSCOPY N/A 4/17/2023    Procedure: ESOPHAGOGASTRODUODENOSCOPY;  Surgeon: Akua Ruelas MD;  " Location: [facility] ENDOSCOPY;  Service: Bariatric;  Laterality: N/A;   • EPIDIDYMAL CYST EXCISION     • TONSILLECTOMY         Family History   Problem Relation Age of Onset   • Diabetes Mother    • Hypertension Mother    • Schizophrenia Mother    • Depression Mother    • Obesity Mother    • No Known Problems Brother    • No Known Problems Brother    • Kidney disease Maternal Uncle    • Heart disease Maternal Grandmother    • Hypertension Maternal Grandmother    • Hypertension Maternal Grandfather        Social History     Socioeconomic History   • Marital status: Single   Tobacco Use   • Smoking status: Former     Packs/day: 0.75     Years: 0.00     Pack years: 0.00     Types: Cigarettes     Quit date: 2008     Years since quittin.8     Passive exposure: Past   • Smokeless tobacco: Never   Vaping Use   • Vaping Use: Never used   Substance and Sexual Activity   • Alcohol use: Yes     Comment: occas   • Drug use: Never   • Sexual activity: Not Currently     Partners: Male           Objective   Physical Exam  Vitals and nursing note reviewed.   Constitutional:       Appearance: He is well-developed. He is obese. He is not diaphoretic.      Comments: Chronically ill-appearing male, obese   HENT:      Head: Normocephalic and atraumatic.   Eyes:      Pupils: Pupils are equal, round, and reactive to light.   Neck:      Vascular: No JVD.   Cardiovascular:      Rate and Rhythm: Normal rate and regular rhythm.      Heart sounds: Normal heart sounds. No murmur heard.    No friction rub. No gallop.   Pulmonary:      Effort: Tachypnea present.      Breath sounds: No wheezing or rales.      Comments: Tachypneic, breathing is somewhat labored, no accessory muscle use or retractions noted, no audible wheezes  Abdominal:      General: Bowel sounds are normal. There is no distension.      Palpations: Abdomen is soft. There is no mass.      Tenderness: There is no abdominal tenderness. There is no guarding.  "  Musculoskeletal:         General: Normal range of motion.      Cervical back: Neck supple.      Right lower leg: Edema present.      Left lower leg: Edema present.      Comments: Symmetrical, 1+ lower extremity edema noted bilaterally   Skin:     General: Skin is warm and dry.      Coloration: Skin is not pale.      Findings: No erythema or rash.   Neurological:      Mental Status: He is alert and oriented to person, place, and time.   Psychiatric:         Thought Content: Thought content normal.         Judgment: Judgment normal.      Comments: Appears mildly anxious         Critical Care  Performed by: Sesar Momin MD  Authorized by: Sesar Momin MD     Critical care provider statement:     Critical care time (minutes):  35    Critical care was necessary to treat or prevent imminent or life-threatening deterioration of the following conditions: Hypertensive urgency requiring Cardene drip.    Critical care was time spent personally by me on the following activities:  Development of treatment plan with patient or surrogate, discussions with consultants, examination of patient, evaluation of patient's response to treatment, obtaining history from patient or surrogate, ordering and performing treatments and interventions, ordering and review of laboratory studies, ordering and review of radiographic studies, pulse oximetry, re-evaluation of patient's condition and review of old charts              ED Course  ED Course as of 06/01/23 2203   u Jun 01, 2023   1724 53-year-old male with multiple comorbidities presents for evaluation of shortness of breath.  He states that since last night he has been experiencing labored breathing and difficulty \"taking a deep breath.\"  He denies any cough or fever.  Given his persistent symptoms he came to the ED to be evaluated.  On arrival, the patient is quite hypertensive at 224/110.  He is also hypoxic on room air with oxygen saturations of 91%.  Labs remarkable " for hyperglycemia and minimally elevated high-sensitivity troponin. [DD]   1725 I personally and independently viewed the patient's x-ray images myself, and I am in agreement with the radiologist's reading for final interpretation.   [DD]   1725 Moderate risk Well's.  [DD]   1725 Initial EKG revealed normal sinus rhythm with a heart rate of 72 and no ST segments suggestive of or concerning for ischemia. [DD]   1738 Chest CTA is negative for pulmonary embolism or emergent cardiothoracic process. [DD]      ED Course User Index  [DD] Sesar Momin MD                                       Recent Results (from the past 24 hour(s))   ECG 12 Lead ED Triage Standing Order; SOA    Collection Time: 06/01/23  2:56 PM   Result Value Ref Range    QT Interval 422 ms    QTC Interval 462 ms   Comprehensive Metabolic Panel    Collection Time: 06/01/23  3:04 PM    Specimen: Blood   Result Value Ref Range    Glucose 389 (H) 65 - 99 mg/dL    BUN 19 6 - 20 mg/dL    Creatinine 0.91 0.76 - 1.27 mg/dL    Sodium 135 (L) 136 - 145 mmol/L    Potassium 3.6 3.5 - 5.2 mmol/L    Chloride 98 98 - 107 mmol/L    CO2 26.0 22.0 - 29.0 mmol/L    Calcium 9.0 8.6 - 10.5 mg/dL    Total Protein 6.3 6.0 - 8.5 g/dL    Albumin 3.7 3.5 - 5.2 g/dL    ALT (SGPT) 23 1 - 41 U/L    AST (SGOT) 22 1 - 40 U/L    Alkaline Phosphatase 108 39 - 117 U/L    Total Bilirubin 0.7 0.0 - 1.2 mg/dL    Globulin 2.6 gm/dL    A/G Ratio 1.4 g/dL    BUN/Creatinine Ratio 20.9 7.0 - 25.0    Anion Gap 11.0 5.0 - 15.0 mmol/L    eGFR 101.4 >60.0 mL/min/1.73   BNP    Collection Time: 06/01/23  3:04 PM    Specimen: Blood   Result Value Ref Range    proBNP 501.2 0.0 - 900.0 pg/mL   Single High Sensitivity Troponin T    Collection Time: 06/01/23  3:04 PM    Specimen: Blood   Result Value Ref Range    HS Troponin T 22 (H) <15 ng/L   Green Top (Gel)    Collection Time: 06/01/23  3:04 PM   Result Value Ref Range    Extra Tube Hold for add-ons.    Lavender Top    Collection Time: 06/01/23   3:04 PM   Result Value Ref Range    Extra Tube hold for add-on    Gold Top - SST    Collection Time: 06/01/23  3:04 PM   Result Value Ref Range    Extra Tube Hold for add-ons.    Gray Top    Collection Time: 06/01/23  3:04 PM   Result Value Ref Range    Extra Tube Hold for add-ons.    Light Blue Top    Collection Time: 06/01/23  3:04 PM   Result Value Ref Range    Extra Tube Hold for add-ons.    CBC Auto Differential    Collection Time: 06/01/23  3:04 PM    Specimen: Blood   Result Value Ref Range    WBC 6.31 3.40 - 10.80 10*3/mm3    RBC 4.88 4.14 - 5.80 10*6/mm3    Hemoglobin 14.1 13.0 - 17.7 g/dL    Hematocrit 42.7 37.5 - 51.0 %    MCV 87.5 79.0 - 97.0 fL    MCH 28.9 26.6 - 33.0 pg    MCHC 33.0 31.5 - 35.7 g/dL    RDW 13.6 12.3 - 15.4 %    RDW-SD 42.6 37.0 - 54.0 fl    MPV 12.3 (H) 6.0 - 12.0 fL    Platelets 169 140 - 450 10*3/mm3    Neutrophil % 69.2 42.7 - 76.0 %    Lymphocyte % 20.0 19.6 - 45.3 %    Monocyte % 5.9 5.0 - 12.0 %    Eosinophil % 2.7 0.3 - 6.2 %    Basophil % 0.8 0.0 - 1.5 %    Immature Grans % 1.4 (H) 0.0 - 0.5 %    Neutrophils, Absolute 4.37 1.70 - 7.00 10*3/mm3    Lymphocytes, Absolute 1.26 0.70 - 3.10 10*3/mm3    Monocytes, Absolute 0.37 0.10 - 0.90 10*3/mm3    Eosinophils, Absolute 0.17 0.00 - 0.40 10*3/mm3    Basophils, Absolute 0.05 0.00 - 0.20 10*3/mm3    Immature Grans, Absolute 0.09 (H) 0.00 - 0.05 10*3/mm3    nRBC 0.5 (H) 0.0 - 0.2 /100 WBC   Scan Slide    Collection Time: 06/01/23  3:04 PM    Specimen: Blood   Result Value Ref Range    RBC Morphology Normal Normal    WBC Morphology Normal Normal    Platelet Morphology Normal Normal   Respiratory Panel PCR w/COVID-19(SARS-CoV-2) TERESA/AVILA/ALEXX/PAD/COR/MAD/ISMAEL In-House, NP Swab in UTM/VTM, 3-4 HR TAT - Swab, Nasopharynx    Collection Time: 06/01/23  3:05 PM    Specimen: Nasopharynx; Swab   Result Value Ref Range    ADENOVIRUS, PCR Not Detected Not Detected    Coronavirus 229E Not Detected Not Detected    Coronavirus HKU1 Not Detected Not  Detected    Coronavirus NL63 Not Detected Not Detected    Coronavirus OC43 Not Detected Not Detected    COVID19 Not Detected Not Detected - Ref. Range    Human Metapneumovirus Not Detected Not Detected    Human Rhinovirus/Enterovirus Not Detected Not Detected    Influenza A PCR Not Detected Not Detected    Influenza B PCR Not Detected Not Detected    Parainfluenza Virus 1 Not Detected Not Detected    Parainfluenza Virus 2 Not Detected Not Detected    Parainfluenza Virus 3 Not Detected Not Detected    Parainfluenza Virus 4 Not Detected Not Detected    RSV, PCR Not Detected Not Detected    Bordetella pertussis pcr Not Detected Not Detected    Bordetella parapertussis PCR Not Detected Not Detected    Chlamydophila pneumoniae PCR Not Detected Not Detected    Mycoplasma pneumo by PCR Not Detected Not Detected   ECG 12 Lead Chest Pain    Collection Time: 06/01/23  6:23 PM   Result Value Ref Range    QT Interval 456 ms    QTC Interval 488 ms   Single High Sensitivity Troponin T    Collection Time: 06/01/23  6:25 PM    Specimen: Blood   Result Value Ref Range    HS Troponin T 23 (H) <15 ng/L     Note: In addition to lab results from this visit, the labs listed above may include labs taken at another facility or during a different encounter within the last 24 hours. Please correlate lab times with ED admission and discharge times for further clarification of the services performed during this visit.    CT Angiogram Chest   Final Result   Impression:   No pulmonary embolus. Small bilateral pleural effusions are present and there is evidence of mild pulmonary edema.            Electronically Signed: Christopher Palm     6/1/2023 5:29 PM EDT     Workstation ID: [id number]      XR Chest 1 View   Final Result   Impression:   Small right pleural effusion. No infiltrate or edema         Electronically Signed: Noah Desouza     6/1/2023 3:44 PM EDT     Workstation ID: [id number]        Vitals:    06/01/23 1903 06/01/23 1920 06/01/23 2101  06/01/23 2157   BP: (!) 204/117 (!) 219/113 178/90    BP Location:   Right arm    Patient Position:   Lying    Pulse: 66 86 75    Resp:       Temp:       TempSrc:       SpO2:  100% 97%    Weight:    (!) 207 kg (457 lb)   Height:         Medications   sodium chloride 0.9 % flush 10 mL (has no administration in time range)   sodium chloride 0.9 % flush 10 mL (has no administration in time range)   amLODIPine (NORVASC) tablet 10 mg (has no administration in time range)     And   lisinopril (PRINIVIL,ZESTRIL) tablet 40 mg (has no administration in time range)   mirtazapine (REMERON) tablet 15 mg (has no administration in time range)   pantoprazole (PROTONIX) EC tablet 40 mg (has no administration in time range)   venlafaxine XR (EFFEXOR-XR) 24 hr capsule 225 mg (has no administration in time range)   sodium chloride 0.9 % flush 10 mL (has no administration in time range)   sodium chloride 0.9 % flush 10 mL (has no administration in time range)   sodium chloride 0.9 % infusion 40 mL (has no administration in time range)   sennosides-docusate (PERICOLACE) 8.6-50 MG per tablet 2 tablet (has no administration in time range)     And   polyethylene glycol (MIRALAX) packet 17 g (has no administration in time range)     And   bisacodyl (DULCOLAX) EC tablet 5 mg (has no administration in time range)     And   bisacodyl (DULCOLAX) suppository 10 mg (has no administration in time range)   dextrose (GLUTOSE) oral gel 15 g (has no administration in time range)   dextrose (D50W) (25 g/50 mL) IV injection 25 g (has no administration in time range)   glucagon (GLUCAGEN) injection 1 mg (has no administration in time range)   Insulin Lispro (humaLOG) injection 2-7 Units (has no administration in time range)   heparin (porcine) 5000 UNIT/ML injection 5,000 Units (has no administration in time range)   nitroglycerin (NITROSTAT) SL tablet 0.4 mg (has no administration in time range)   Potassium Replacement - Follow Nurse / BPA Driven  Protocol (has no administration in time range)   Magnesium Standard Dose Replacement - Follow Nurse / BPA Driven Protocol (has no administration in time range)   Phosphorus Replacement - Follow Nurse / BPA Driven Protocol (has no administration in time range)   Calcium Replacement - Follow Nurse / BPA Driven Protocol (has no administration in time range)   acetaminophen (TYLENOL) tablet 650 mg (has no administration in time range)     Or   acetaminophen (TYLENOL) 160 MG/5ML solution 650 mg (has no administration in time range)     Or   acetaminophen (TYLENOL) suppository 650 mg (has no administration in time range)   hydrOXYzine (ATARAX) tablet 25 mg (has no administration in time range)   ondansetron (ZOFRAN) tablet 4 mg (has no administration in time range)     Or   ondansetron (ZOFRAN) injection 4 mg (has no administration in time range)   furosemide (LASIX) injection 40 mg (has no administration in time range)   nitroglycerin (TRIDIL) 200 mcg/ml infusion (has no administration in time range)   iopamidol (ISOVUE-370) 76 % injection 100 mL (90 mL Intravenous Given 6/1/23 1723)   hydrALAZINE (APRESOLINE) injection 20 mg (20 mg Intravenous Given 6/1/23 1903)     ECG/EMG Results (last 24 hours)     Procedure Component Value Units Date/Time    ECG 12 Lead Chest Pain [[id number]] Collected: 06/01/23 1823     Updated: 06/01/23 1823     QT Interval 456 ms      QTC Interval 488 ms     Narrative:      Test Reason : Chest Pain  Blood Pressure :   */*   mmHG  Vent. Rate :  69 BPM     Atrial Rate :  69 BPM     P-R Int : 144 ms          QRS Dur : 106 ms      QT Int : 456 ms       P-R-T Axes :  54  -1 113 degrees     QTc Int : 488 ms    ** Poor data quality, interpretation may be adversely affected  Normal sinus rhythm  Nonspecific T wave abnormality  Abnormal ECG  When compared with ECG of 01-JUN-2023 14:56, (Unconfirmed)  No significant change was found    Referred By: ERMD           Confirmed By:     ECG 12 Lead ED Triage  Standing Order; SOA [[id number]] Collected: 06/01/23 1456     Updated: 06/01/23 1942     QT Interval 422 ms      QTC Interval 462 ms     Narrative:      Test Reason : ED Triage Standing Order~  Blood Pressure :   */*   mmHG  Vent. Rate :  72 BPM     Atrial Rate :  72 BPM     P-R Int : 160 ms          QRS Dur : 100 ms      QT Int : 422 ms       P-R-T Axes :  27   3  86 degrees     QTc Int : 462 ms    Normal sinus rhythm  Normal ECG  When compared with ECG of 02-MAR-2023 09:05,  No significant change was found  Confirmed by MD Momin Michael ([id number]) on 6/1/2023 7:41:30 PM    Referred By:            Confirmed By: Ruslan Momin MD        ECG 12 Lead Chest Pain   Preliminary Result   Test Reason : Chest Pain   Blood Pressure :   */*   mmHG   Vent. Rate :  69 BPM     Atrial Rate :  69 BPM      P-R Int : 144 ms          QRS Dur : 106 ms       QT Int : 456 ms       P-R-T Axes :  54  -1 113 degrees      QTc Int : 488 ms      ** Poor data quality, interpretation may be adversely affected   Normal sinus rhythm   Nonspecific T wave abnormality   Abnormal ECG   When compared with ECG of 01-JUN-2023 14:56, (Unconfirmed)   No significant change was found      Referred By: MICHAEL           Confirmed By:       ECG 12 Lead ED Triage Standing Order; SOA   Final Result   Test Reason : ED Triage Standing Order~   Blood Pressure :   */*   mmHG   Vent. Rate :  72 BPM     Atrial Rate :  72 BPM      P-R Int : 160 ms          QRS Dur : 100 ms       QT Int : 422 ms       P-R-T Axes :  27   3  86 degrees      QTc Int : 462 ms      Normal sinus rhythm   Normal ECG   When compared with ECG of 02-MAR-2023 09:05,   No significant change was found   Confirmed by MD Momin Michael ([id number]) on 6/1/2023 7:41:30 PM      Referred By:            Confirmed By: Ruslan Momin MD                 [facility]    Final diagnoses:   Hypertensive urgency   Hypoxia   History of COPD   Hyperglycemia       ED Disposition  ED Disposition     ED Disposition   Decision to Admit     Condition   --    Comment   Level of Care: Telemetry [5]   Diagnosis: Hypertensive urgency [757413]               No follow-up provider specified.       Medication List      No changes were made to your prescriptions during this visit.          Sesar Momin MD  06/01/23 2210      Electronically signed by Sesar Momin MD at 06/01/23 2210       Physician Progress Notes (last 24 hours)  Notes from 06/01/23 0949 through 06/02/23 0949   No notes of this type exist for this encounter.         Consult Notes (last 24 hours)  Notes from 06/01/23 0949 through 06/02/23 0949   No notes of this type exist for this encounter.

## 2023-06-02 NOTE — PLAN OF CARE
Goal Outcome Evaluation:  Plan of Care Reviewed With: patient           Outcome Evaluation: Pt performed mobility tasks independently, amb in hallway without AD with 1 person assist only to manage O2 tank. Pt had no LOB, dem wide EMILY however does not contribute any safety concerns. BP elev upon return to room. No further PT services warranted at this time. PT rec d/c home.

## 2023-06-03 ENCOUNTER — APPOINTMENT (OUTPATIENT)
Dept: CARDIOLOGY | Facility: HOSPITAL | Age: 53
DRG: 292 | End: 2023-06-03
Payer: COMMERCIAL

## 2023-06-03 ENCOUNTER — READMISSION MANAGEMENT (OUTPATIENT)
Dept: CALL CENTER | Facility: HOSPITAL | Age: 53
End: 2023-06-03

## 2023-06-03 VITALS
RESPIRATION RATE: 18 BRPM | WEIGHT: 315 LBS | SYSTOLIC BLOOD PRESSURE: 167 MMHG | TEMPERATURE: 98.3 F | OXYGEN SATURATION: 96 % | HEIGHT: 71 IN | HEART RATE: 63 BPM | DIASTOLIC BLOOD PRESSURE: 98 MMHG | BODY MASS INDEX: 44.1 KG/M2

## 2023-06-03 LAB
ANION GAP SERPL CALCULATED.3IONS-SCNC: 10 MMOL/L (ref 5–15)
ASCENDING AORTA: 4.5 CM
BASOPHILS # BLD AUTO: 0.04 10*3/MM3 (ref 0–0.2)
BASOPHILS NFR BLD AUTO: 0.8 % (ref 0–1.5)
BH CV ECHO MEAS - AI P1/2T: 513 MSEC
BH CV ECHO MEAS - AO MAX PG: 9.6 MMHG
BH CV ECHO MEAS - AO MEAN PG: 5 MMHG
BH CV ECHO MEAS - AO ROOT AREA (BSA CORRECTED): 1.2 CM2
BH CV ECHO MEAS - AO ROOT DIAM: 3.7 CM
BH CV ECHO MEAS - AO V2 MAX: 155 CM/SEC
BH CV ECHO MEAS - AO V2 VTI: 34.3 CM
BH CV ECHO MEAS - AVA(I,D): 3 CM2
BH CV ECHO MEAS - EDV(CUBED): 274.6 ML
BH CV ECHO MEAS - EDV(MOD-SP2): 115 ML
BH CV ECHO MEAS - EDV(MOD-SP4): 243 ML
BH CV ECHO MEAS - EF(MOD-BP): 59.7 %
BH CV ECHO MEAS - EF(MOD-SP2): 59.7 %
BH CV ECHO MEAS - EF(MOD-SP4): 48.1 %
BH CV ECHO MEAS - ESV(CUBED): 54.9 ML
BH CV ECHO MEAS - ESV(MOD-SP2): 46.4 ML
BH CV ECHO MEAS - ESV(MOD-SP4): 126 ML
BH CV ECHO MEAS - FS: 41.5 %
BH CV ECHO MEAS - IVS/LVPW: 0.92 CM
BH CV ECHO MEAS - IVSD: 1.1 CM
BH CV ECHO MEAS - LA DIMENSION: 4.4 CM
BH CV ECHO MEAS - LAT PEAK E' VEL: 9.6 CM/SEC
BH CV ECHO MEAS - LV MASS(C)D: 339.1 GRAMS
BH CV ECHO MEAS - LV MAX PG: 4.4 MMHG
BH CV ECHO MEAS - LV MEAN PG: 2 MMHG
BH CV ECHO MEAS - LV V1 MAX: 105 CM/SEC
BH CV ECHO MEAS - LV V1 VTI: 24.6 CM
BH CV ECHO MEAS - LVIDD: 6.5 CM
BH CV ECHO MEAS - LVIDS: 3.8 CM
BH CV ECHO MEAS - LVOT AREA: 4.2 CM2
BH CV ECHO MEAS - LVOT DIAM: 2.3 CM
BH CV ECHO MEAS - LVPWD: 1.2 CM
BH CV ECHO MEAS - MED PEAK E' VEL: 7 CM/SEC
BH CV ECHO MEAS - MV A MAX VEL: 35.8 CM/SEC
BH CV ECHO MEAS - MV DEC SLOPE: 575 CM/SEC2
BH CV ECHO MEAS - MV DEC TIME: 0.15 MSEC
BH CV ECHO MEAS - MV E MAX VEL: 113 CM/SEC
BH CV ECHO MEAS - MV E/A: 3.2
BH CV ECHO MEAS - MV MAX PG: 3.1 MMHG
BH CV ECHO MEAS - MV MEAN PG: 1 MMHG
BH CV ECHO MEAS - MV P1/2T: 50.9 MSEC
BH CV ECHO MEAS - MV V2 VTI: 29.6 CM
BH CV ECHO MEAS - MVA(P1/2T): 4.3 CM2
BH CV ECHO MEAS - MVA(VTI): 3.5 CM2
BH CV ECHO MEAS - PA ACC TIME: 0.09 SEC
BH CV ECHO MEAS - PA PR(ACCEL): 37.8 MMHG
BH CV ECHO MEAS - PA V2 MAX: 109 CM/SEC
BH CV ECHO MEAS - RAP SYSTOLE: 3 MMHG
BH CV ECHO MEAS - RVSP: 31 MMHG
BH CV ECHO MEAS - SV(LVOT): 102.2 ML
BH CV ECHO MEAS - SV(MOD-SP2): 68.6 ML
BH CV ECHO MEAS - SV(MOD-SP4): 117 ML
BH CV ECHO MEAS - TAPSE (>1.6): 2.34 CM
BH CV ECHO MEAS - TR MAX PG: 28.4 MMHG
BH CV ECHO MEAS - TR MAX VEL: 266.5 CM/SEC
BH CV ECHO MEASUREMENTS AVERAGE E/E' RATIO: 13.61
BH CV VAS BP RIGHT ARM: NORMAL MMHG
BH CV XLRA - RV BASE: 4.8 CM
BH CV XLRA - RV LENGTH: 9 CM
BH CV XLRA - RV MID: 3.5 CM
BH CV XLRA - TDI S': 11.1 CM/SEC
BUN SERPL-MCNC: 13 MG/DL (ref 6–20)
BUN/CREAT SERPL: 16 (ref 7–25)
CALCIUM SPEC-SCNC: 8.4 MG/DL (ref 8.6–10.5)
CHLORIDE SERPL-SCNC: 99 MMOL/L (ref 98–107)
CO2 SERPL-SCNC: 28 MMOL/L (ref 22–29)
CREAT SERPL-MCNC: 0.81 MG/DL (ref 0.76–1.27)
DEPRECATED RDW RBC AUTO: 44.8 FL (ref 37–54)
EGFRCR SERPLBLD CKD-EPI 2021: 106.1 ML/MIN/1.73
EOSINOPHIL # BLD AUTO: 0.19 10*3/MM3 (ref 0–0.4)
EOSINOPHIL NFR BLD AUTO: 3.7 % (ref 0.3–6.2)
ERYTHROCYTE [DISTWIDTH] IN BLOOD BY AUTOMATED COUNT: 14.1 % (ref 12.3–15.4)
GLUCOSE BLDC GLUCOMTR-MCNC: 327 MG/DL (ref 70–130)
GLUCOSE BLDC GLUCOMTR-MCNC: 345 MG/DL (ref 70–130)
GLUCOSE SERPL-MCNC: 293 MG/DL (ref 65–99)
HCT VFR BLD AUTO: 41.4 % (ref 37.5–51)
HGB BLD-MCNC: 13.6 G/DL (ref 13–17.7)
IMM GRANULOCYTES # BLD AUTO: 0.04 10*3/MM3 (ref 0–0.05)
IMM GRANULOCYTES NFR BLD AUTO: 0.8 % (ref 0–0.5)
IVRT: 92 MSEC
LEFT ATRIUM VOLUME INDEX: 29.9 ML/M2
LV EF 2D ECHO EST: 60 %
LYMPHOCYTES # BLD AUTO: 1.42 10*3/MM3 (ref 0.7–3.1)
LYMPHOCYTES NFR BLD AUTO: 27.7 % (ref 19.6–45.3)
MAGNESIUM SERPL-MCNC: 1.8 MG/DL (ref 1.6–2.6)
MCH RBC QN AUTO: 29.2 PG (ref 26.6–33)
MCHC RBC AUTO-ENTMCNC: 32.9 G/DL (ref 31.5–35.7)
MCV RBC AUTO: 89 FL (ref 79–97)
MONOCYTES # BLD AUTO: 0.41 10*3/MM3 (ref 0.1–0.9)
MONOCYTES NFR BLD AUTO: 8 % (ref 5–12)
NEUTROPHILS NFR BLD AUTO: 3.03 10*3/MM3 (ref 1.7–7)
NEUTROPHILS NFR BLD AUTO: 59 % (ref 42.7–76)
NRBC BLD AUTO-RTO: 0 /100 WBC (ref 0–0.2)
PLATELET # BLD AUTO: 147 10*3/MM3 (ref 140–450)
POTASSIUM SERPL-SCNC: 3.9 MMOL/L (ref 3.5–5.2)
RBC # BLD AUTO: 4.65 10*6/MM3 (ref 4.14–5.8)
SODIUM SERPL-SCNC: 137 MMOL/L (ref 136–145)
WBC NRBC COR # BLD: 5.13 10*3/MM3 (ref 3.4–10.8)

## 2023-06-03 PROCEDURE — 93306 TTE W/DOPPLER COMPLETE: CPT

## 2023-06-03 PROCEDURE — 83735 ASSAY OF MAGNESIUM: CPT | Performed by: INTERNAL MEDICINE

## 2023-06-03 PROCEDURE — 63710000001 INSULIN LISPRO (HUMAN) PER 5 UNITS: Performed by: INTERNAL MEDICINE

## 2023-06-03 PROCEDURE — 99239 HOSP IP/OBS DSCHRG MGMT >30: CPT | Performed by: NURSE PRACTITIONER

## 2023-06-03 PROCEDURE — 93306 TTE W/DOPPLER COMPLETE: CPT | Performed by: INTERNAL MEDICINE

## 2023-06-03 PROCEDURE — 82948 REAGENT STRIP/BLOOD GLUCOSE: CPT

## 2023-06-03 PROCEDURE — 25010000002 HEPARIN (PORCINE) PER 1000 UNITS: Performed by: STUDENT IN AN ORGANIZED HEALTH CARE EDUCATION/TRAINING PROGRAM

## 2023-06-03 PROCEDURE — 80048 BASIC METABOLIC PNL TOTAL CA: CPT | Performed by: INTERNAL MEDICINE

## 2023-06-03 PROCEDURE — 99232 SBSQ HOSP IP/OBS MODERATE 35: CPT | Performed by: INTERNAL MEDICINE

## 2023-06-03 PROCEDURE — 63710000001 INSULIN DETEMIR PER 5 UNITS: Performed by: STUDENT IN AN ORGANIZED HEALTH CARE EDUCATION/TRAINING PROGRAM

## 2023-06-03 PROCEDURE — 85025 COMPLETE CBC W/AUTO DIFF WBC: CPT | Performed by: INTERNAL MEDICINE

## 2023-06-03 RX ORDER — AMLODIPINE BESYLATE 10 MG/1
10 TABLET ORAL
Qty: 30 TABLET | Refills: 0 | Status: SHIPPED | OUTPATIENT
Start: 2023-06-04

## 2023-06-03 RX ORDER — SPIRONOLACTONE 25 MG/1
25 TABLET ORAL DAILY
Qty: 30 TABLET | Refills: 0 | Status: SHIPPED | OUTPATIENT
Start: 2023-06-04

## 2023-06-03 RX ORDER — AMLODIPINE BESYLATE 10 MG/1
10 TABLET ORAL
Status: DISCONTINUED | OUTPATIENT
Start: 2023-06-03 | End: 2023-06-03 | Stop reason: HOSPADM

## 2023-06-03 RX ORDER — TORSEMIDE 20 MG/1
20 TABLET ORAL DAILY
Qty: 30 TABLET | Refills: 0 | Status: SHIPPED | OUTPATIENT
Start: 2023-06-04

## 2023-06-03 RX ORDER — TORSEMIDE 20 MG/1
20 TABLET ORAL DAILY
Status: DISCONTINUED | OUTPATIENT
Start: 2023-06-03 | End: 2023-06-03 | Stop reason: HOSPADM

## 2023-06-03 RX ORDER — LISINOPRIL 40 MG/1
40 TABLET ORAL EVERY 12 HOURS SCHEDULED
Qty: 60 TABLET | Refills: 0 | Status: SHIPPED | OUTPATIENT
Start: 2023-06-03

## 2023-06-03 RX ORDER — LISINOPRIL 40 MG/1
40 TABLET ORAL EVERY 12 HOURS SCHEDULED
Status: DISCONTINUED | OUTPATIENT
Start: 2023-06-03 | End: 2023-06-03 | Stop reason: HOSPADM

## 2023-06-03 RX ADMIN — INSULIN LISPRO 5 UNITS: 100 INJECTION, SOLUTION INTRAVENOUS; SUBCUTANEOUS at 08:59

## 2023-06-03 RX ADMIN — LISINOPRIL 40 MG: 40 TABLET ORAL at 08:58

## 2023-06-03 RX ADMIN — TORSEMIDE 20 MG: 20 TABLET ORAL at 08:58

## 2023-06-03 RX ADMIN — DOCUSATE SODIUM 50 MG AND SENNOSIDES 8.6 MG 2 TABLET: 8.6; 5 TABLET, FILM COATED ORAL at 08:59

## 2023-06-03 RX ADMIN — PANTOPRAZOLE SODIUM 40 MG: 40 TABLET, DELAYED RELEASE ORAL at 08:58

## 2023-06-03 RX ADMIN — AMLODIPINE BESYLATE 10 MG: 10 TABLET ORAL at 08:58

## 2023-06-03 RX ADMIN — INSULIN DETEMIR 10 UNITS: 100 INJECTION, SOLUTION SUBCUTANEOUS at 08:59

## 2023-06-03 RX ADMIN — CLONIDINE HYDROCHLORIDE 0.1 MG: 0.1 TABLET ORAL at 04:14

## 2023-06-03 RX ADMIN — VENLAFAXINE HYDROCHLORIDE 225 MG: 75 CAPSULE, EXTENDED RELEASE ORAL at 08:58

## 2023-06-03 RX ADMIN — EMPAGLIFLOZIN 10 MG: 10 TABLET, FILM COATED ORAL at 09:03

## 2023-06-03 RX ADMIN — SPIRONOLACTONE 25 MG: 25 TABLET ORAL at 08:58

## 2023-06-03 RX ADMIN — HEPARIN SODIUM 7500 UNITS: 5000 INJECTION INTRAVENOUS; SUBCUTANEOUS at 05:36

## 2023-06-03 RX ADMIN — INSULIN LISPRO 5 UNITS: 100 INJECTION, SOLUTION INTRAVENOUS; SUBCUTANEOUS at 12:19

## 2023-06-03 NOTE — PLAN OF CARE
Problem: Adult Inpatient Plan of Care  Goal: Plan of Care Review  Outcome: Met  Goal: Patient-Specific Goal (Individualized)  Outcome: Met  Goal: Absence of Hospital-Acquired Illness or Injury  Outcome: Met  Intervention: Identify and Manage Fall Risk  Recent Flowsheet Documentation  Taken 6/3/2023 1000 by Chrissy Teixeira RN  Safety Promotion/Fall Prevention: safety round/check completed  Taken 6/3/2023 0800 by Chrissy Teixeira RN  Safety Promotion/Fall Prevention: safety round/check completed  Intervention: Prevent Skin Injury  Recent Flowsheet Documentation  Taken 6/3/2023 1000 by Chrissy Teixeira RN  Body Position: position changed independently  Taken 6/3/2023 0800 by Chrissy Teixeira RN  Body Position: position changed independently  Intervention: Prevent and Manage VTE (Venous Thromboembolism) Risk  Recent Flowsheet Documentation  Taken 6/3/2023 1000 by Chrissy Teixeira RN  Activity Management: up ad serena  Taken 6/3/2023 0800 by Chrissy Teixeira RN  Activity Management: up ad serena  Goal: Optimal Comfort and Wellbeing  Outcome: Met  Goal: Readiness for Transition of Care  Outcome: Met     Problem: Heart Failure Comorbidity  Goal: Maintenance of Heart Failure Symptom Control  Outcome: Met  Intervention: Maintain Heart Failure-Management  Recent Flowsheet Documentation  Taken 6/3/2023 1000 by Chrissy Teixeira RN  Medication Review/Management: medications reviewed  Taken 6/3/2023 0800 by Chrissy Teixeira RN  Medication Review/Management: medications reviewed     Problem: Hypertension Comorbidity  Goal: Blood Pressure in Desired Range  Outcome: Met  Intervention: Maintain Blood Pressure Management  Recent Flowsheet Documentation  Taken 6/3/2023 1000 by Chrissy Teixeira RN  Medication Review/Management: medications reviewed  Taken 6/3/2023 0800 by Chrissy Teixeira RN  Medication Review/Management: medications reviewed   Goal Outcome Evaluation:

## 2023-06-03 NOTE — OUTREACH NOTE
Prep Survey      Flowsheet Row Responses   [facility] patient discharged from? [facility]   Is LACE score < 7 ? No   Eligibility [facility]   Date of Admission 06/01/23   Date of Discharge 06/03/23   Discharge Disposition Home or Self Care   Discharge diagnosis Acute exacerbation of CHF (congestive heart failure)   Does the patient have one of the following disease processes/diagnoses(primary or secondary)? CHF   Does the patient have Home health ordered? No   Is there a DME ordered? No   Prep survey completed? Yes            Tricia WALDROP - Registered Nurse

## 2023-06-03 NOTE — PLAN OF CARE
Goal Outcome Evaluation:  Plan of Care Reviewed With: patient        Progress: no change  Outcome Evaluation: HTN noted, gave PO clonidine x1. Otherwise VSS on 2LNC. No complaints of pain or nausea. Pt slept throughout the night. Plan for ECHO today. Will continue with plan of care.

## 2023-06-03 NOTE — DISCHARGE SUMMARY
[facility] Medicine Services  DISCHARGE SUMMARY    Patient Name: Carlos Eduardo Danielle  : 1970  MRN: [id number]    Date of Admission: 2023  5:51 PM  Date of Discharge:  23  Primary Care Physician: Ian Vann MD    Consults       Date and Time Order Name Status Description    2023 10:18 PM Inpatient Cardiology Consult Completed             Hospital Course     Presenting Problem: Shortness of breath     Active Hospital Problems    Diagnosis  POA    **Acute exacerbation of CHF (congestive heart failure) [I50.9]  Unknown    Hypertensive urgency [I16.0]  Yes    GERD (gastroesophageal reflux disease) [K21.9]  Yes    Hyperlipidemia [E78.5]  Yes    Severe recurrent major depression without psychotic features [F33.2]  Yes    Generalized anxiety disorder [F41.1]  Yes    Morbid (severe) obesity due to excess calories [E66.01]  Yes    Anxiety [F41.9]  Yes    Hypertension [I10]  Yes    Type 2 diabetes mellitus, without long-term current use of insulin [E11.9]  Yes    AJAY (obstructive sleep apnea) -intolerant of BiPAP [G47.33]  Yes      Resolved Hospital Problems   No resolved problems to display.          Hospital Course:  Carlos Eduardo Danielle is a 53 y.o. male with a PMH significant for morbid obesity, occasional tobacco abuse, COPD, AJAY, HTN, diabetes mellitus type 2, depression, GERD, HLD, chronic venous stasis who presented to the ED on 6/3/2023 due to shortness of breath and was diagnosed with acute CHF exacerbation. Cardiology was consulted and assisted with diuresis and blood pressure management. Presenting symptoms have improved and he will be discharged home today in stable condition.      Acute CHF exacerbation  Hypertensive urgency  --New diagnosis  -CTA with bilateral pleural effusions, pulmonary edema  --s/p IV Lasix   --echo ordered   -Continue Amlodipine 10 mg daily   --Started on Lisinopril 40 mg BID, spironolactone 25 mg daily, torsemide 20 mg   --Started on  Jardiance      Hypertensive urgency  - s/p nitro drip  daily   -Started on Lisinopril 40 mg BID, spironolactone 25 mg daily, torsemide 20 mg   --BP improved     HLD  Anxiety  - Continue home meds     Diabetes mellitus  -a1c is 11.8  -Resume Metformin, Glimepiride   -Started on Jardiance  - Discussed recommendation to initiate insulin. Will start Jardiance for now with plan for close follow up with PCP next week with blood glucose log.     AJAY  - CPAP      Discharge Follow Up Recommendations for outpatient labs/diagnostics:  Follow up with PCP in 1 week.  Follow up with Dr. Villavicencio in 4-6 weeks.  Heart failure clinic in 1 week.     Day of Discharge     HPI:   Patient seen resting in bed no apparent distress.  No acute events overnight per nursing.  He is feeling much better since admission and feels ready to discharge home today.  We discussed his elevated hemoglobin A1c and recommendation for insulin.  He is aware of recommendation to follow-up with PCP next week with blood glucose log.  Follow-up with cardiology in 4 to 6 weeks.  He will be discharged today in stable condition.    Review of Systems  Gen- No fevers, chills  CV- No chest pain, palpitations  Resp- No cough, dyspnea  GI- No N/V/D, abd pain    Vital Signs:   Temp:  [97.8 °F (36.6 °C)-99 °F (37.2 °C)] 98.2 °F (36.8 °C)  Heart Rate:  [56-90] 90  Resp:  [18] 18  BP: (133-190)/() 145/94  Flow (L/min):  [2] 2      Physical Exam:  Constitutional: No acute distress, awake, alert  HENT: NCAT, mucous membranes moist  Respiratory: diminished in bases, respiratory effort normal   Cardiovascular: RRR, no murmurs, palpable pedal pulses bilaterally, cap refill brisk   Gastrointestinal: Positive bowel sounds, soft, nontender, nondistended  Musculoskeletal: 1+ BLE edema   Psychiatric: Appropriate affect, cooperative  Neurologic: Oriented x 3, moves all extremities, speech clear  Skin: warm, dry, no visible rash     Pertinent  and/or Most Recent Results     LAB  RESULTS:      Lab 06/03/23  0352 06/02/23  0646 06/01/23  2244 06/01/23  1504   WBC 5.13 5.65 7.01 6.31   HEMOGLOBIN 13.6 14.5 14.6 14.1   HEMATOCRIT 41.4 43.2 44.0 42.7   PLATELETS 147 163 172 169   NEUTROS ABS 3.03 3.84 4.36 4.37   IMMATURE GRANS (ABS) 0.04 0.07* 0.09* 0.09*   LYMPHS ABS 1.42 1.15 1.83 1.26   MONOS ABS 0.41 0.35 0.50 0.37   EOS ABS 0.19 0.18 0.17 0.17   MCV 89.0 87.6 87.1 87.5         Lab 06/03/23  0352 06/02/23  0646 06/01/23 2244 06/01/23  1504   SODIUM 137 136 139 135*   POTASSIUM 3.9 3.6  3.6 3.9 3.6   CHLORIDE 99 99 100 98   CO2 28.0 27.0 25.0 26.0   ANION GAP 10.0 10.0 14.0 11.0   BUN 13 16 16 19   CREATININE 0.81 0.88 0.82 0.91   EGFR 106.1 103.5 105.7 101.4   GLUCOSE 293* 343* 355* 389*   CALCIUM 8.4* 9.0 9.5 9.0   MAGNESIUM 1.8 1.7 1.7  --    HEMOGLOBIN A1C  --   --   --  11.80*         Lab 06/01/23  1504   TOTAL PROTEIN 6.3   ALBUMIN 3.7   GLOBULIN 2.6   ALT (SGPT) 23   AST (SGOT) 22   BILIRUBIN 0.7   ALK PHOS 108         Lab 06/01/23  1825 06/01/23  1504   PROBNP 390.9 501.2   HSTROP T 23* 22*                 Brief Urine Lab Results       None          Microbiology Results (last 10 days)       Procedure Component Value - Date/Time    Respiratory Panel PCR w/COVID-19(SARS-CoV-2) TER[facility]A/[facility]/ALEXX/PAD/COR/MAD/ISMAEL In-House, NP Swab in UTM/[facility], 3-4 HR TAT - Swab, Nasopharynx [[id number]]  (Normal) Collected: 06/01/23 1505    Lab Status: Final result Specimen: Swab from Nasopharynx Updated: 06/01/23 1615     ADENOVIRUS, PCR Not Detected     Coronavirus 229E Not Detected     Coronavirus HKU1 Not Detected     Coronavirus NL63 Not Detected     Coronavirus OC43 Not Detected     COVID19 Not Detected     Human Metapneumovirus Not Detected     Human Rhinovirus/Enterovirus Not Detected     Influenza A PCR Not Detected     Influenza B PCR Not Detected     Parainfluenza Virus 1 Not Detected     Parainfluenza Virus 2 Not Detected     Parainfluenza Virus 3 Not Detected     Parainfluenza Virus 4 Not Detected      RSV, PCR Not Detected     Bordetella pertussis pcr Not Detected     Bordetella parapertussis PCR Not Detected     Chlamydophila pneumoniae PCR Not Detected     Mycoplasma pneumo by PCR Not Detected    Narrative:      In the setting of a positive respiratory panel with a viral infection PLUS a negative procalcitonin without other underlying concern for bacterial infection, consider observing off antibiotics or discontinuation of antibiotics and continue supportive care. If the respiratory panel is positive for atypical bacterial infection (Bordetella pertussis, Chlamydophila pneumoniae, or Mycoplasma pneumoniae), consider antibiotic de-escalation to target atypical bacterial infection.            XR Chest 1 View    Result Date: 6/1/2023  XR CHEST 1 VW Date of Exam: 6/1/2023 3:19 PM EDT Indication: SOA triage protocol Comparison: 3/29/2023 Findings: Heart size and pulmonary vasculature appear within normal limits when accounting for projection and incomplete inspiration. Lungs grossly clear. Minimal blunting of the right costophrenic angle noted     Impression: Small right pleural effusion. No infiltrate or edema Electronically Signed: Noah Desouza  6/1/2023 3:44 PM EDT  Workstation ID: [id number]    CT Angiogram Chest    Result Date: 6/1/2023  CT ANGIOGRAM CHEST Date of Exam: 6/1/2023 5:09 PM EDT Indication: sob, hypoxic. Comparison: None available. Technique: CTA of the chest was performed after the uneventful intravenous administration of 90 mL Isovue-370. Reconstructed coronal and sagittal images were also obtained. In addition, a 3-D volume rendered image was created for interpretation. Automated exposure control and iterative reconstruction methods were used. Findings: There is no pathologic axillary adenopathy or other worrisome body wall soft tissue finding in the chest. No acute findings are present in the partially characterized upper abdomen. There are small bilateral pleural effusions. There is no  pericardial effusion. Small mediastinal lymph nodes are present, without pathologic adenopathy. Nonaneurysmal, mildly ectatic thoracic aorta with maximum transverse dimension 3.9 cm. The pulmonary arteries are well-opacified and demonstrate no evidence of filling defect concerning for acute pulmonary embolus. Evaluation of the lung parenchyma demonstrates no evidence of acute infectious process or suspicious pulmonary nodularity. There is some vascular prominence and mild interlobular septal thickening present likely reflecting component of mild interstitial edema.     Impression: No pulmonary embolus. Small bilateral pleural effusions are present and there is evidence of mild pulmonary edema. Electronically Signed: Christopher Palm  6/1/2023 5:29 PM EDT  Workstation ID: [id number]                 Plan for Follow-up of Pending Labs/Results: Follow up as directed.     Discharge Details        Discharge Medications        New Medications        Instructions Start Date   amLODIPine 10 MG tablet  Commonly known as: NORVASC   10 mg, Oral, Every 24 Hours Scheduled   Start Date: June 4, 2023     empagliflozin 10 MG tablet tablet  Commonly known as: JARDIANCE   10 mg, Oral, Daily   Start Date: June 4, 2023     lisinopril 40 MG tablet  Commonly known as: PRINIVIL,ZESTRIL   40 mg, Oral, Every 12 Hours Scheduled      spironolactone 25 MG tablet  Commonly known as: ALDACTONE   25 mg, Oral, Daily   Start Date: June 4, 2023     torsemide 20 MG tablet  Commonly known as: DEMADEX   20 mg, Oral, Daily   Start Date: June 4, 2023            Continue These Medications        Instructions Start Date   glimepiride 4 MG tablet  Commonly known as: AMARYL   4 mg, Oral, 2 Times Daily      ibuprofen 800 MG tablet  Commonly known as: ADVIL,MOTRIN   800 mg, Oral, Every 6 Hours PRN      metFORMIN  MG 24 hr tablet  Commonly known as: GLUCOPHAGE-XR   1,000 mg, Oral, 2 Times Daily With Meals      mirtazapine 15 MG tablet  Commonly known as:  Remeron   Take 1 tablet by mouth At Night As Needed for sleep.      pantoprazole 40 MG EC tablet  Commonly known as: PROTONIX   40 mg, Oral, Daily      venlafaxine  MG 24 hr capsule  Commonly known as: EFFEXOR-XR   Take 2 capsules by mouth every morning             Stop These Medications      amLODIPine-benazepril 10-40 MG per capsule  Commonly known as: Lotrel     furosemide 40 MG tablet  Commonly known as: LASIX              Allergies   Allergen Reactions    Atorvastatin Headache     Weakness.      Topamax [Topiramate] Other (See Comments)     Migraine           Discharge Disposition: Home   Home or Self Care    Diet:  Hospital:  Diet Order   Procedures    Diet: Cardiac Diets, Diabetic Diets; Low Sodium (2g); Consistent Carbohydrate; Texture: Regular Texture (IDDSI 7); Fluid Consistency: Thin (IDDSI 0)       Activity: as tolerated            CODE STATUS:    Code Status and Medical Interventions:   Ordered at: 06/01/23 2206     Level Of Support Discussed With:    Patient     Code Status (Patient has no pulse and is not breathing):    CPR (Attempt to Resuscitate)     Medical Interventions (Patient has pulse or is breathing):    Full Support       Future Appointments   Date Time Provider Department Center   6/15/2023  3:00 PM Mary Davila PA [facility] None   9/6/2023 10:30 AM Ian Vann MD [facility]       Additional Instructions for the Follow-ups that You Need to Schedule       Discharge Follow-up with PCP   As directed       Currently Documented PCP:    Ian Vann MD    PCP Phone Number:    [phone]     Follow Up Details: Follow up with PCP in 1 week.         Discharge Follow-up with Specified Provider: Follow up with Dr. Villavicencio in 4-6 weeks.   As directed      To: Follow up with Dr. Villavicencio in 4-6 weeks.                       AGUILAR Hodge  06/03/23      Time Spent on Discharge:  I spent  41 minutes on this discharge activity which included: face-to-face  encounter with the patient, reviewing the data in the system, coordination of the care with the nursing staff as well as consultants, documentation, and entering orders.

## 2023-06-03 NOTE — PROGRESS NOTES
[facility]  IP Progress Note      Chief Complaint/Reason for service: Hypertensive urgency with CHF    Subjective   Subjective: Patient is awake and alert.  States his breathing is much improved.  His abdominal girth is gone down.  He was able to lay back without orthopnea he was to go home today    Past medical, surgical, social and family history reviewed in the patient's electronic medical record.    Objective     Vital Sign Min/Max for last 24 hours  Temp  Min: 97.8 °F (36.6 °C)  Max: 99 °F (37.2 °C)   BP  Min: 133/68  Max: 195/112   Pulse  Min: 56  Max: 85   Resp  Min: 18  Max: 18   SpO2  Min: 91 %  Max: 100 %   Flow (L/min)  Min: 2  Max: 2    No intake or output data in the 24 hours ending 06/03/23 0658          Current Facility-Administered Medications:     acetaminophen (TYLENOL) tablet 650 mg, 650 mg, Oral, Q4H PRN, 650 mg at 06/01/23 2310 **OR** acetaminophen (TYLENOL) 160 MG/5ML solution 650 mg, 650 mg, Oral, Q4H PRN **OR** acetaminophen (TYLENOL) suppository 650 mg, 650 mg, Rectal, Q4H PRN, Stephanie, Nan G, DO    amLODIPine (NORVASC) tablet 10 mg, 10 mg, Oral, Q24H **AND** lisinopril (PRINIVIL,ZESTRIL) tablet 40 mg, 40 mg, Oral, Q12H, Ian Valles MD    sennosides-docusate (PERICOLACE) 8.6-50 MG per tablet 2 tablet, 2 tablet, Oral, BID **AND** polyethylene glycol (MIRALAX) packet 17 g, 17 g, Oral, Daily PRN **AND** bisacodyl (DULCOLAX) EC tablet 5 mg, 5 mg, Oral, Daily PRN **AND** bisacodyl (DULCOLAX) suppository 10 mg, 10 mg, Rectal, Daily PRN, Stephanie, Nan G, DO    Calcium Replacement - Follow Nurse / BPA Driven Protocol, , Does not apply, PRN, Stephanie, Nan G, DO    cloNIDine (CATAPRES) tablet 0.1 mg, 0.1 mg, Oral, Q1H PRN, Ina Valles MD, 0.1 mg at 06/03/23 0414    dextrose (D50W) (25 g/50 mL) IV injection 25 g, 25 g, Intravenous, Q15 Min PRN, Nan Brown DO    dextrose (GLUTOSE) oral gel 15 g, 15 g, Oral, Q15 Min PRN, Nan Brown,  DO    furosemide (LASIX) injection 40 mg, 40 mg, Intravenous, BID, Ian Valles MD, 40 mg at 06/02/23 1727    glucagon (GLUCAGEN) injection 1 mg, 1 mg, Intramuscular, Q15 Min PRN, Nan Brown G, DO    heparin (porcine) 5000 UNIT/ML injection 7,500 Units, 7,500 Units, Subcutaneous, Q8H, Kajal Mane MD, 7,500 Units at 06/03/23 0536    hydrOXYzine (ATARAX) tablet 25 mg, 25 mg, Oral, TID PRN, StephanieMaru becerrilsie G, DO    insulin detemir (LEVEMIR) injection 10 Units, 10 Units, Subcutaneous, Daily, Kajal Mane MD, 10 Units at 06/02/23 1242    Insulin Lispro (humaLOG) injection 2-7 Units, 2-7 Units, Subcutaneous, 4x Daily AC & at Bedtime, Nan Brown, DO, 4 Units at 06/02/23 2141    Magnesium Standard Dose Replacement - Follow Nurse / BPA Driven Protocol, , Does not apply, PRN, Stephanie, Nan G, DO    mirtazapine (REMERON) tablet 15 mg, 15 mg, Oral, Nightly PRN, Maru Brownsie G, DO    nebivolol (BYSTOLIC) tablet 2.5 mg, 2.5 mg, Oral, Q24H, Ian Valles MD, 2.5 mg at 06/02/23 1242    nitroglycerin (NITROSTAT) SL tablet 0.4 mg, 0.4 mg, Sublingual, Q5 Min PRN, Nan Brown G, DO    nitroglycerin (TRIDIL) 200 mcg/ml infusion, 5-200 mcg/min, Intravenous, Titrated, StephanieMaru becerrilsie G, DO, Stopped at 06/02/23 0512    ondansetron (ZOFRAN) tablet 4 mg, 4 mg, Oral, Q6H PRN **OR** ondansetron (ZOFRAN) injection 4 mg, 4 mg, Intravenous, Q6H PRN, Stephanie, Nan G, DO    pantoprazole (PROTONIX) EC tablet 40 mg, 40 mg, Oral, Daily, Stephanie, Nan G, DO, 40 mg at 06/02/23 0811    Pharmacy Consult - MTM, , Does not apply, Daily, Deborah Staples, PharmD    Phosphorus Replacement - Follow Nurse / BPA Driven Protocol, , Does not apply, PRN, Nan Brown DO    Potassium Replacement - Follow Nurse / BPA Driven Protocol, , Does not apply, PRN, StephanieNan becerril G, DO    sodium chloride 0.9 % flush 10 mL, 10 mL, Intravenous, PRN, Nan Brown DO    [COMPLETED] Insert Peripheral IV, , , Once **AND** sodium chloride 0.9 % flush  10 mL, 10 mL, Intravenous, PRN, Stephanie, Nan G, DO    sodium chloride 0.9 % flush 10 mL, 10 mL, Intravenous, Q12H, Stephanie, Nan G, DO, 10 mL at 06/02/23 0814    sodium chloride 0.9 % flush 10 mL, 10 mL, Intravenous, PRN, Stephanie, Nan G, DO    sodium chloride 0.9 % infusion 40 mL, 40 mL, Intravenous, PRN, Stephanie, Nan G, DO    spironolactone (ALDACTONE) tablet 25 mg, 25 mg, Oral, Daily, Ian Valles MD, 25 mg at 06/02/23 1242    venlafaxine XR (EFFEXOR-XR) 24 hr capsule 225 mg, 225 mg, Oral, QAM, Stephanie, Nan G, DO, 225 mg at 06/02/23 0811    Physical Exam: General pleasant obese male not dyspneic not tachypneic current heart rate 55        HEENT: No JVP.  Nasal O2 present       Respiratory: Equal bilateral symmetrical expansion auscultation bilaterally       Cardiovascular: Bradycardic without any murmur and trace edema to palpation       GI: Obese and soft       Lower Extremities: No lesions       Neuro: Facial expressions are symmetrical moves all 4 extremities       Skin: Warm and dry       Psych: Pleasant affect    Results Review: Intake and output not recorded.  Potassium 3.9.  GFR is 106.1.  Blood pressures 1 53-1 90 heart rate 56-61    Radiology Results:  Imaging Results (Last 72 Hours)       Procedure Component Value Units Date/Time    CT Angiogram Chest [[id number]] Collected: 06/01/23 1727     Updated: 06/01/23 1732    Narrative:      CT ANGIOGRAM CHEST    Date of Exam: 6/1/2023 5:09 PM EDT    Indication: sob, hypoxic.    Comparison: None available.    Technique: CTA of the chest was performed after the uneventful intravenous administration of 90 mL Isovue-370. Reconstructed coronal and sagittal images were also obtained. In addition, a 3-D volume rendered image was created for interpretation.   Automated exposure control and iterative reconstruction methods were used.      Findings:  There is no pathologic axillary adenopathy or other worrisome body wall soft tissue finding in the chest. No  acute findings are present in the partially characterized upper abdomen. There are small bilateral pleural effusions. There is no pericardial   effusion. Small mediastinal lymph nodes are present, without pathologic adenopathy. Nonaneurysmal, mildly ectatic thoracic aorta with maximum transverse dimension 3.9 cm. The pulmonary arteries are well-opacified and demonstrate no evidence of filling   defect concerning for acute pulmonary embolus. Evaluation of the lung parenchyma demonstrates no evidence of acute infectious process or suspicious pulmonary nodularity. There is some vascular prominence and mild interlobular septal thickening present   likely reflecting component of mild interstitial edema.      Impression:      Impression:  No pulmonary embolus. Small bilateral pleural effusions are present and there is evidence of mild pulmonary edema.        Electronically Signed: Christopher Palm    6/1/2023 5:29 PM EDT    Workstation ID: [id number]    XR Chest 1 View [[id number]] Collected: 06/01/23 1544     Updated: 06/01/23 1547    Narrative:      XR CHEST 1 VW    Date of Exam: 6/1/2023 3:19 PM EDT    Indication: SOA triage protocol    Comparison: 3/29/2023    Findings:  Heart size and pulmonary vasculature appear within normal limits when accounting for projection and incomplete inspiration. Lungs grossly clear. Minimal blunting of the right costophrenic angle noted      Impression:      Impression:  Small right pleural effusion. No infiltrate or edema      Electronically Signed: Noah Desouza    6/1/2023 3:44 PM EDT    Workstation ID: [id number]            EKG: Sinus rhythm nonspecific ST-T abnormalities which are chronic    ECHO: Preserved EF      Assessment   Assessment/Plan: Hypertensive urgency with CHF-after IV diuresis the patient feels much improved.  He wants to go home today.  I will send the patient out on the following meds #1 lisinopril 40 mg p.o. twice daily  #2 spironolactone 25 mg daily  #3 torsemide 20 mg  daily  Follow-up Dr. Villavicencio 4 to 6 weeks  Needs to have a BMP tested in 1 week to monitor potassium    Ian Valles MD  06/03/23  06:58 EDT

## 2023-06-03 NOTE — CASE MANAGEMENT/SOCIAL WORK
Case Management Discharge Note      Final Note: Patient's walking oximetry without oxygen was between 93-95% per the staff RN. Patient does not qualify for home oxygen. Patient has transport home.         Selected Continued Care - Admitted Since 6/1/2023       Destination    No services have been selected for the patient.                Durable Medical Equipment    No services have been selected for the patient.                Dialysis/Infusion    No services have been selected for the patient.                Home Medical Care    No services have been selected for the patient.                Therapy    No services have been selected for the patient.                Community Resources    No services have been selected for the patient.                Community & DME    No services have been selected for the patient.                         Final Discharge Disposition Code: 01 - home or self-care

## 2023-06-04 LAB
QT INTERVAL: 456 MS
QTC INTERVAL: 488 MS

## 2023-06-05 ENCOUNTER — TRANSITIONAL CARE MANAGEMENT TELEPHONE ENCOUNTER (OUTPATIENT)
Dept: CALL CENTER | Facility: HOSPITAL | Age: 53
End: 2023-06-05
Payer: COMMERCIAL

## 2023-06-05 NOTE — PAYOR COMM NOTE
"  Auth# [id number]     Utilization Review  Phone [phone]  Fax [phone]    [place] [id number]           Joel Danielle (53 y.o. Male)       Date of Birth   1970    Social Security Number       Address   [address] [place] [address]    Home Phone   [phone]    MRN   [id number]       Judaism   Sabianism    Marital Status   Single                            Admission Date   6/1/23    Admission Type   Emergency    Admitting Provider   Kajal Mane MD    Attending Provider       Department, Room/Bed   [facility] 2F, S211/1       Discharge Date   6/3/2023    Discharge Disposition   Home or Self Care    Discharge Destination                                 Attending Provider: (none)   Allergies: Atorvastatin, Topamax [Topiramate]    Isolation: None   Infection: None   Code Status: Prior    Ht: 180 cm (70.87\")   Wt: 204 kg (450 lb)    Admission Cmt: None   Principal Problem: Acute exacerbation of CHF (congestive heart failure) [I50.9]                   Active Insurance as of 6/1/2023       Primary Coverage       Payor Plan Insurance Group Employer/Plan Group    [employer] [employer] [employer] [id number]       Payor Plan Address Payor Plan Phone Number Payor Plan Fax Number Effective Dates    [address] [address]   11/1/2022 - None Entered    [facility] [id number]         Subscriber Name Subscriber Birth Date Member ID       JOEL DANIELLE 1970 [id number]                     Emergency Contacts        (Rel.) Home Phone Work Phone Mobile Phone    Sharron Alfredo (Relative) [phone] -- --                 Discharge Summary        Rudy Torres APRN at 06/03/23 0815       Attestation signed by Kajal Mane MD at 06/04/23 1456    I have reviewed this documentation and agree.                      [facility] Medicine Services  DISCHARGE SUMMARY    Patient Name: Joel " Lolis  : 1970  MRN: [id number]    Date of Admission: 2023  5:51 PM  Date of Discharge:  23  Primary Care Physician: Ian Vann MD    Consults       Date and Time Order Name Status Description    2023 10:18 PM Inpatient Cardiology Consult Completed             Hospital Course     Presenting Problem: Shortness of breath     Active Hospital Problems    Diagnosis  POA    **Acute exacerbation of CHF (congestive heart failure) [I50.9]  Unknown    Hypertensive urgency [I16.0]  Yes    GERD (gastroesophageal reflux disease) [K21.9]  Yes    Hyperlipidemia [E78.5]  Yes    Severe recurrent major depression without psychotic features [F33.2]  Yes    Generalized anxiety disorder [F41.1]  Yes    Morbid (severe) obesity due to excess calories [E66.01]  Yes    Anxiety [F41.9]  Yes    Hypertension [I10]  Yes    Type 2 diabetes mellitus, without long-term current use of insulin [E11.9]  Yes    AJAY (obstructive sleep apnea) -intolerant of BiPAP [G47.33]  Yes      Resolved Hospital Problems   No resolved problems to display.          Hospital Course:  Carlos Eduardo Danielle is a 53 y.o. male with a PMH significant for morbid obesity, occasional tobacco abuse, COPD, AJAY, HTN, diabetes mellitus type 2, depression, GERD, HLD, chronic venous stasis who presented to the ED on 6/3/2023 due to shortness of breath and was diagnosed with acute CHF exacerbation. Cardiology was consulted and assisted with diuresis and blood pressure management. Presenting symptoms have improved and he will be discharged home today in stable condition.      Acute CHF exacerbation  Hypertensive urgency  --New diagnosis  -CTA with bilateral pleural effusions, pulmonary edema  --s/p IV Lasix   --echo ordered   -Continue Amlodipine 10 mg daily   --Started on Lisinopril 40 mg BID, spironolactone 25 mg daily, torsemide 20 mg   --Started on Jardiance      Hypertensive urgency  - s/p nitro drip  daily   -Started on Lisinopril 40 mg BID,  spironolactone 25 mg daily, torsemide 20 mg   --BP improved     HLD  Anxiety  - Continue home meds     Diabetes mellitus  -a1c is 11.8  -Resume Metformin, Glimepiride   -Started on Jardiance  - Discussed recommendation to initiate insulin. Will start Jardiance for now with plan for close follow up with PCP next week with blood glucose log.     AJAY  - CPAP      Discharge Follow Up Recommendations for outpatient labs/diagnostics:  Follow up with PCP in 1 week.  Follow up with Dr. Villavicencio in 4-6 weeks.  Heart failure clinic in 1 week.     Day of Discharge     HPI:   Patient seen resting in bed no apparent distress.  No acute events overnight per nursing.  He is feeling much better since admission and feels ready to discharge home today.  We discussed his elevated hemoglobin A1c and recommendation for insulin.  He is aware of recommendation to follow-up with PCP next week with blood glucose log.  Follow-up with cardiology in 4 to 6 weeks.  He will be discharged today in stable condition.    Review of Systems  Gen- No fevers, chills  CV- No chest pain, palpitations  Resp- No cough, dyspnea  GI- No N/V/D, abd pain    Vital Signs:   Temp:  [97.8 °F (36.6 °C)-99 °F (37.2 °C)] 98.2 °F (36.8 °C)  Heart Rate:  [56-90] 90  Resp:  [18] 18  BP: (133-190)/() 145/94  Flow (L/min):  [2] 2      Physical Exam:  Constitutional: No acute distress, awake, alert  HENT: NCAT, mucous membranes moist  Respiratory: diminished in bases, respiratory effort normal   Cardiovascular: RRR, no murmurs, palpable pedal pulses bilaterally, cap refill brisk   Gastrointestinal: Positive bowel sounds, soft, nontender, nondistended  Musculoskeletal: 1+ BLE edema   Psychiatric: Appropriate affect, cooperative  Neurologic: Oriented x 3, moves all extremities, speech clear  Skin: warm, dry, no visible rash     Pertinent  and/or Most Recent Results     LAB RESULTS:      Lab 06/03/23  0352 06/02/23  0646 06/01/23  2244 06/01/23  1504   WBC 5.13 5.65 7.01  6.31   HEMOGLOBIN 13.6 14.5 14.6 14.1   HEMATOCRIT 41.4 43.2 44.0 42.7   PLATELETS 147 163 172 169   NEUTROS ABS 3.03 3.84 4.36 4.37   IMMATURE GRANS (ABS) 0.04 0.07* 0.09* 0.09*   LYMPHS ABS 1.42 1.15 1.83 1.26   MONOS ABS 0.41 0.35 0.50 0.37   EOS ABS 0.19 0.18 0.17 0.17   MCV 89.0 87.6 87.1 87.5         Lab 06/03/23  0352 06/02/23  0646 06/01/23  2244 06/01/23  1504   SODIUM 137 136 139 135*   POTASSIUM 3.9 3.6  3.6 3.9 3.6   CHLORIDE 99 99 100 98   CO2 28.0 27.0 25.0 26.0   ANION GAP 10.0 10.0 14.0 11.0   BUN 13 16 16 19   CREATININE 0.81 0.88 0.82 0.91   EGFR 106.1 103.5 105.7 101.4   GLUCOSE 293* 343* 355* 389*   CALCIUM 8.4* 9.0 9.5 9.0   MAGNESIUM 1.8 1.7 1.7  --    HEMOGLOBIN A1C  --   --   --  11.80*         Lab 06/01/23  1504   TOTAL PROTEIN 6.3   ALBUMIN 3.7   GLOBULIN 2.6   ALT (SGPT) 23   AST (SGOT) 22   BILIRUBIN 0.7   ALK PHOS 108         Lab 06/01/23  1825 06/01/23  1504   PROBNP 390.9 501.2   HSTROP T 23* 22*                 Brief Urine Lab Results       None          Microbiology Results (last 10 days)       Procedure Component Value - Date/Time    Respiratory Panel PCR w/COVID-19(SARS-CoV-2) TERESA/AVILA/ALEXX/PAD/COR/MAD/ISMAEL In-House, NP Swab in UTM/VTM, 3-4 HR TAT - Swab, Nasopharynx [[id number]]  (Normal) Collected: 06/01/23 1505    Lab Status: Final result Specimen: Swab from Nasopharynx Updated: 06/01/23 1615     ADENOVIRUS, PCR Not Detected     Coronavirus 229E Not Detected     Coronavirus HKU1 Not Detected     Coronavirus NL63 Not Detected     Coronavirus OC43 Not Detected     COVID19 Not Detected     Human Metapneumovirus Not Detected     Human Rhinovirus/Enterovirus Not Detected     Influenza A PCR Not Detected     Influenza B PCR Not Detected     Parainfluenza Virus 1 Not Detected     Parainfluenza Virus 2 Not Detected     Parainfluenza Virus 3 Not Detected     Parainfluenza Virus 4 Not Detected     RSV, PCR Not Detected     Bordetella pertussis pcr Not Detected     Bordetella parapertussis  PCR Not Detected     Chlamydophila pneumoniae PCR Not Detected     Mycoplasma pneumo by PCR Not Detected    Narrative:      In the setting of a positive respiratory panel with a viral infection PLUS a negative procalcitonin without other underlying concern for bacterial infection, consider observing off antibiotics or discontinuation of antibiotics and continue supportive care. If the respiratory panel is positive for atypical bacterial infection (Bordetella pertussis, Chlamydophila pneumoniae, or Mycoplasma pneumoniae), consider antibiotic de-escalation to target atypical bacterial infection.            XR Chest 1 View    Result Date: 6/1/2023  XR CHEST 1 VW Date of Exam: 6/1/2023 3:19 PM EDT Indication: SOA triage protocol Comparison: 3/29/2023 Findings: Heart size and pulmonary vasculature appear within normal limits when accounting for projection and incomplete inspiration. Lungs grossly clear. Minimal blunting of the right costophrenic angle noted     Impression: Small right pleural effusion. No infiltrate or edema Electronically Signed: Noah Desouza  6/1/2023 3:44 PM EDT  Workstation ID: [id number]    CT Angiogram Chest    Result Date: 6/1/2023  CT ANGIOGRAM CHEST Date of Exam: 6/1/2023 5:09 PM EDT Indication: sob, hypoxic. Comparison: None available. Technique: CTA of the chest was performed after the uneventful intravenous administration of 90 mL Isovue-370. Reconstructed coronal and sagittal images were also obtained. In addition, a 3-D volume rendered image was created for interpretation. Automated exposure control and iterative reconstruction methods were used. Findings: There is no pathologic axillary adenopathy or other worrisome body wall soft tissue finding in the chest. No acute findings are present in the partially characterized upper abdomen. There are small bilateral pleural effusions. There is no pericardial effusion. Small mediastinal lymph nodes are present, without pathologic adenopathy.  Nonaneurysmal, mildly ectatic thoracic aorta with maximum transverse dimension 3.9 cm. The pulmonary arteries are well-opacified and demonstrate no evidence of filling defect concerning for acute pulmonary embolus. Evaluation of the lung parenchyma demonstrates no evidence of acute infectious process or suspicious pulmonary nodularity. There is some vascular prominence and mild interlobular septal thickening present likely reflecting component of mild interstitial edema.     Impression: No pulmonary embolus. Small bilateral pleural effusions are present and there is evidence of mild pulmonary edema. Electronically Signed: Christopher Palm  6/1/2023 5:29 PM EDT  Workstation ID: [id number]                 Plan for Follow-up of Pending Labs/Results: Follow up as directed.     Discharge Details        Discharge Medications        New Medications        Instructions Start Date   amLODIPine 10 MG tablet  Commonly known as: NORVASC   10 mg, Oral, Every 24 Hours Scheduled   Start Date: June 4, 2023     empagliflozin 10 MG tablet tablet  Commonly known as: JARDIANCE   10 mg, Oral, Daily   Start Date: June 4, 2023     lisinopril 40 MG tablet  Commonly known as: PRINIVIL,ZESTRIL   40 mg, Oral, Every 12 Hours Scheduled      spironolactone 25 MG tablet  Commonly known as: ALDACTONE   25 mg, Oral, Daily   Start Date: June 4, 2023     torsemide 20 MG tablet  Commonly known as: DEMADEX   20 mg, Oral, Daily   Start Date: June 4, 2023            Continue These Medications        Instructions Start Date   glimepiride 4 MG tablet  Commonly known as: AMARYL   4 mg, Oral, 2 Times Daily      ibuprofen 800 MG tablet  Commonly known as: ADVIL,MOTRIN   800 mg, Oral, Every 6 Hours PRN      metFORMIN  MG 24 hr tablet  Commonly known as: GLUCOPHAGE-XR   1,000 mg, Oral, 2 Times Daily With Meals      mirtazapine 15 MG tablet  Commonly known as: Remeron   Take 1 tablet by mouth At Night As Needed for sleep.      pantoprazole 40 MG EC  tablet  Commonly known as: PROTONIX   40 mg, Oral, Daily      venlafaxine  MG 24 hr capsule  Commonly known as: EFFEXOR-XR   Take 2 capsules by mouth every morning             Stop These Medications      amLODIPine-benazepril 10-40 MG per capsule  Commonly known as: Lotrel     furosemide 40 MG tablet  Commonly known as: LASIX              Allergies   Allergen Reactions    Atorvastatin Headache     Weakness.      Topamax [Topiramate] Other (See Comments)     Migraine           Discharge Disposition: Home   Home or Self Care    Diet:  Hospital:  Diet Order   Procedures    Diet: Cardiac Diets, Diabetic Diets; Low Sodium (2g); Consistent Carbohydrate; Texture: Regular Texture (IDDSI 7); Fluid Consistency: Thin (IDDSI 0)       Activity: as tolerated            CODE STATUS:    Code Status and Medical Interventions:   Ordered at: 06/01/23 2206     Level Of Support Discussed With:    Patient     Code Status (Patient has no pulse and is not breathing):    CPR (Attempt to Resuscitate)     Medical Interventions (Patient has pulse or is breathing):    Full Support       Future Appointments   Date Time Provider Department Center   6/15/2023  3:00 PM Mary Davila PA [facility] None   9/6/2023 10:30 AM Ian Vann MD [facility]       Additional Instructions for the Follow-ups that You Need to Schedule       Discharge Follow-up with PCP   As directed       Currently Documented PCP:    Ian Vann MD    PCP Phone Number:    [phone]     Follow Up Details: Follow up with PCP in 1 week.         Discharge Follow-up with Specified Provider: Follow up with Dr. Villavicencio in 4-6 weeks.   As directed      To: Follow up with Dr. Villavicencio in 4-6 weeks.                       AGUILAR Hodge  06/03/23      Time Spent on Discharge:  I spent  41 minutes on this discharge activity which included: face-to-face encounter with the patient, reviewing the data in the system, coordination of the care with  the nursing staff as well as consultants, documentation, and entering orders.          Electronically signed by Kajal Mane MD at 06/04/23 1451       Discharge Order (From admission, onward)       Start     Ordered    06/03/23 0958  Discharge patient  Once        Expected Discharge Date: 06/03/23    Discharge Disposition: Home or Self Care    Physician of Record for Attribution - Please select from Treatment Team: KAJAL MANE [[id number]]    Review needed by CMO to determine Physician of Record: No       Question Answer Comment   Physician of Record for Attribution - Please select from Treatment Team KAJAL MANE    Review needed by CMO to determine Physician of Record No        06/03/23 1000

## 2023-06-05 NOTE — OUTREACH NOTE
Call Center TCM Note      Flowsheet Row Responses   [facility] patient discharged from? [place]   Does the patient have one of the following disease processes/diagnoses(primary or secondary)? CHF   TCM attempt successful? No   Unsuccessful attempts Attempt 1   Call Status Left message             Sangeetha Mason MA    6/5/2023, 10:44 EDT

## 2023-06-05 NOTE — OUTREACH NOTE
Call Center TCM Note      Flowsheet Row Responses   [facility] patient discharged from? [place]   Does the patient have one of the following disease processes/diagnoses(primary or secondary)? CHF   TCM attempt successful? Yes   Call start time 1542   Call end time 1547   Discharge diagnosis Acute exacerbation of CHF (congestive heart failure)   Meds reviewed with patient/caregiver? Yes   Is the patient having any side effects they believe may be caused by any medication additions or changes? No   Does the patient have all medications ordered at discharge? No   What is keeping the patient from filling the prescriptions? Pre-authorization in progress  [still waiting on Jardiance]   Is the patient taking all medications as directed (includes completed medication regime)? Yes   Comments Hospital d/c f/u appt on 6/7/23 @4pm   Does the patient have an appointment with their PCP within 7 days of discharge? Yes   Has home health visited the patient within 72 hours of discharge? N/A   Pulse Ox monitoring None   Psychosocial issues? No   Did the patient receive a copy of their discharge instructions? Yes   Nursing interventions Reviewed instructions with patient   What is the patient's perception of their health status since discharge? Improving   Nursing interventions Nurse provided patient education   Is the patient/caregiver able to teach back the hierarchy of who to call/visit for symptoms/problems? PCP, Specialist, Home health nurse, Urgent Care, ED, 911 Yes   CHF Zone this Call Green Zone   Green Zone Patient reports doing well   Green Zone Interventions Meds as directed, Daily weight check   TCM call completed? Yes   Call end time 1547   Would this patient benefit from a Referral to Amb Social Work? No   Is the patient interested in additional calls from an ambulatory ?  NOTE:  applies to high risk patients requiring additional follow-up. No             Judit Venegas RN    6/5/2023, 15:47  EDT

## 2023-06-07 ENCOUNTER — OFFICE VISIT (OUTPATIENT)
Dept: FAMILY MEDICINE CLINIC | Facility: CLINIC | Age: 53
End: 2023-06-07
Payer: COMMERCIAL

## 2023-06-07 ENCOUNTER — HOSPITAL ENCOUNTER (OUTPATIENT)
Dept: CARDIOLOGY | Facility: HOSPITAL | Age: 53
Discharge: HOME | End: 2023-06-07
Payer: COMMERCIAL

## 2023-06-07 ENCOUNTER — OFFICE VISIT (OUTPATIENT)
Dept: CARDIOLOGY | Facility: HOSPITAL | Age: 53
End: 2023-06-07
Payer: COMMERCIAL

## 2023-06-07 VITALS
HEIGHT: 71 IN | WEIGHT: 315 LBS | RESPIRATION RATE: 20 BRPM | DIASTOLIC BLOOD PRESSURE: 82 MMHG | HEART RATE: 73 BPM | OXYGEN SATURATION: 97 % | SYSTOLIC BLOOD PRESSURE: 170 MMHG | BODY MASS INDEX: 44.1 KG/M2

## 2023-06-07 VITALS
SYSTOLIC BLOOD PRESSURE: 181 MMHG | RESPIRATION RATE: 20 BRPM | TEMPERATURE: 97.1 F | HEIGHT: 71 IN | WEIGHT: 315 LBS | OXYGEN SATURATION: 96 % | HEART RATE: 69 BPM | DIASTOLIC BLOOD PRESSURE: 91 MMHG | BODY MASS INDEX: 44.1 KG/M2

## 2023-06-07 DIAGNOSIS — G47.30 SLEEP APNEA, UNSPECIFIED TYPE: ICD-10-CM

## 2023-06-07 DIAGNOSIS — E66.01 MORBID (SEVERE) OBESITY DUE TO EXCESS CALORIES: ICD-10-CM

## 2023-06-07 DIAGNOSIS — I50.31 ACUTE HEART FAILURE WITH PRESERVED EJECTION FRACTION (HFPEF): ICD-10-CM

## 2023-06-07 DIAGNOSIS — E11.65 UNCONTROLLED TYPE 2 DIABETES MELLITUS WITH HYPERGLYCEMIA: Primary | ICD-10-CM

## 2023-06-07 DIAGNOSIS — I10 UNCONTROLLED HYPERTENSION: Primary | ICD-10-CM

## 2023-06-07 DIAGNOSIS — E11.65 TYPE 2 DIABETES MELLITUS WITH HYPERGLYCEMIA, WITHOUT LONG-TERM CURRENT USE OF INSULIN: ICD-10-CM

## 2023-06-07 DIAGNOSIS — R55 SYNCOPE AND COLLAPSE: ICD-10-CM

## 2023-06-07 DIAGNOSIS — I50.9 ACUTE ON CHRONIC CONGESTIVE HEART FAILURE, UNSPECIFIED HEART FAILURE TYPE: ICD-10-CM

## 2023-06-07 LAB — GLUCOSE BLDC GLUCOMTR-MCNC: 400 MG/DL (ref 70–130)

## 2023-06-07 RX ORDER — GLIMEPIRIDE 4 MG/1
4 TABLET ORAL 2 TIMES DAILY
Qty: 60 TABLET | Refills: 3 | Status: SHIPPED | OUTPATIENT
Start: 2023-06-07

## 2023-06-07 RX ORDER — METFORMIN HYDROCHLORIDE 500 MG/1
1000 TABLET, EXTENDED RELEASE ORAL 2 TIMES DAILY WITH MEALS
Qty: 60 TABLET | Refills: 3 | Status: SHIPPED | OUTPATIENT
Start: 2023-06-07

## 2023-06-07 NOTE — PROGRESS NOTES
Subjective   Carlos Eduardo Danielle is a 53 y.o. male  Congestive Heart Failure (Inpt at [facility] 6/1-6/3. Saw cardio today and has Holter monitor. )      Congestive Heart Failure  Pertinent negatives include no chest pain, fatigue, palpitations, shortness of breath or unexpected weight change.   Patient is a pleasant 53-year-old white male who comes in for follow-up of acute exacerbation of CHF patient was seen in hospital he has chronic medical conditions tobacco abuse COPD AJAY hypertension diabetes mellitus type 2 depression GERD, venous stasis disease relief from hospital 6/23/2023.  Patient was given prescription for Jardiance he was told to continue amlodipine daily A1c was 11.8 he is also to resume both metformin and glyburide.  Patient is not started Jardiance at this point picks up the prescription today.  Patient blood sugar 400 in office not controlled patient does have endocrinologist Dr. Guajardo    The following portions of the patient's history were reviewed and updated as appropriate: allergies, current medications, past social history and problem list    Review of Systems   Constitutional:  Negative for appetite change, diaphoresis, fatigue and unexpected weight change.   Eyes:  Negative for visual disturbance.   Respiratory:  Negative for cough, chest tightness and shortness of breath.    Cardiovascular:  Negative for chest pain, palpitations and leg swelling.   Gastrointestinal:  Negative for diarrhea, nausea and vomiting.   Endocrine: Negative for polydipsia, polyphagia and polyuria.   Skin:  Negative for color change and rash.   Neurological:  Negative for dizziness, syncope, weakness, light-headedness, numbness and headaches.     Objective     Vitals:    06/07/23 1623   BP: 170/82   Pulse: 73   Resp: 20   SpO2: 97%       Physical Exam  Vitals and nursing note reviewed.   Constitutional:       General: He is not in acute distress.     Appearance: Normal appearance. He is well-developed. He is not  ill-appearing, toxic-appearing or diaphoretic.   Neck:      Vascular: No carotid bruit or JVD.   Cardiovascular:      Rate and Rhythm: Normal rate and regular rhythm.      Pulses: Normal pulses.      Heart sounds: Normal heart sounds. No murmur heard.  Pulmonary:      Effort: Pulmonary effort is normal. No respiratory distress.      Breath sounds: Normal breath sounds.   Abdominal:      Palpations: Abdomen is soft.      Tenderness: There is no abdominal tenderness.   Skin:     General: Skin is warm and dry.   Neurological:      Mental Status: He is alert.       Assessment & Plan     Diagnoses and all orders for this visit:    1. Uncontrolled type 2 diabetes mellitus with hyperglycemia (Primary)    2. Morbid (severe) obesity due to excess calories    3. Acute on chronic congestive heart failure, unspecified heart failure type    #1 patient was encouraged to start Jardiance today that should help with blood sugar and CHF symptoms, he was encouraged to make an appointment with Endo he is going to initiate that tomorrow    2.  Follow-up with cardiology    3.  Encourage patient to lose weight and ultimately bariatric surgery.      I spent 15 minutes in patient care: Reviewing records prior to the visit, examining the patient, entering orders and documentation    Part of this note may be an electronic transcription/translation of spoken language to printed text using the Dragon Dictation System.

## 2023-06-07 NOTE — PROGRESS NOTES
[facility]  Heart and Valve Center    Chief Complaint  Congestive Heart Failure    Subjective    History of Present Illness {CC  Problem List  Visit  Diagnosis   Encounters  Notes  Medications  Labs  Result Review Imaging  Media :23}     Carlos Eduardo Danielle is a 53 y.o. male with COPD, AJAY, hypertension, diabetes, depression, GERD, hyperlipidemia, chronic venous stasis who presents today as a hospital referral for heart failure.    Patient was admitted 6/1 to 6/3 with hypertensive urgency and acute HFpEF.  CT of the chest did show bilateral pleural effusions and evidence of pulmonary edema.  He was started on lisinopril 40 mg twice daily, spironolactone, torsemide and Jardiance.  A1c was 11.8. Echo showed normal LVEF, mild LVH, mild to moderate aortic valve regurgitation.  He had a normal stress PET in April.    Established with Dr. Villavicencio    He reports that he used to wear BIPAP but stopped wearing it because it did not improve his breathing or fatigue. He also said there was a recall.He says he has had sleep apnea since he was 17. He says he is feeling much better and breathing has improved. He does notice some persistent. He says he is still up 20lbs from his baseline. Reports home SBPs are running 160s    Reports a history of vasovagal syncope that started a year. One episode happened on the toilet, often happens while going from sitting to standing. He had one episode while driving. Last episode about 5 months ago. He says he spoke with a friend who told him it was vasovagal but never sought medical attention. Reports that he had 5 TIAs      Objective     Vital Signs:   Vitals:    06/07/23 1507 06/07/23 1508 06/07/23 1509   BP: 177/87 165/78 (!) 181/91   BP Location: Right arm Left arm Left arm   Patient Position: Sitting Sitting Sitting   Cuff Size: Large Adult Large Adult Large Adult   Pulse: 71 74 69   Resp:   20   Temp: 97.1 °F (36.2 °C) 97.1 °F (36.2 °C) 97.1 °F (36.2 °C)   TempSrc:  "Temporal Temporal Temporal   SpO2: 97% 98% 96%   Weight:   (!) 206 kg (453 lb 6.4 oz)   Height:   180.3 cm (71\")     Body mass index is 63.24 kg/m².  Physical Exam  Vitals reviewed.   Constitutional:       Appearance: Normal appearance.   HENT:      Head: Normocephalic.   Neck:      Vascular: No carotid bruit.   Cardiovascular:      Rate and Rhythm: Normal rate and regular rhythm.      Pulses: Normal pulses.      Heart sounds: Normal heart sounds, S1 normal and S2 normal. No murmur heard.   with a grade of 2/6.   Pulmonary:      Effort: Pulmonary effort is normal. No respiratory distress.      Breath sounds: Normal breath sounds.   Chest:      Chest wall: No tenderness.   Abdominal:      General: Abdomen is flat.      Palpations: Abdomen is soft.   Musculoskeletal:      Cervical back: Neck supple.      Right lower le+ Pitting No edema.      Left lower le+ Pitting No edema.   Skin:     General: Skin is warm and dry.   Neurological:      General: No focal deficit present.      Mental Status: He is alert and oriented to person, place, and time. Mental status is at baseline.   Psychiatric:         Mood and Affect: Mood normal.         Behavior: Behavior normal.         Thought Content: Thought content normal.            Result Review  Data Reviewed:{ Labs  Result Review  Imaging  Med Tab  Media :23}   POC Glucose Once (2023 11:07)  Magnesium (2023 03:52)  CBC & Differential (2023 03:52)  Basic Metabolic Panel (2023 03:52)  Stress Test With Pet Myocardial Perfusion (2023 09:55)  Adult Transthoracic Echo Complete w/ Color, Spectral and Contrast if Necessary Per Protocol (2023 11:13)  ECG 12 Lead Chest Pain (2023 18:23)           Assessment and Plan {CC Problem List  Visit Diagnosis  ROS  Review (Popup)  Health Maintenance  Quality  BestPractice  Medications  SmartSets  SnapShot Encounters  Media :23}   1. Uncontrolled hypertension  -We will consider " increasing his torsemide to 40 mg depending on lab results today.  Advised to continue to monitor and call if systolic blood pressures consistently greater than 140. Will likely need an additional antihypertensive, but will avoid aggressive blood pressure lowering in the setting of recent syncopal events.Continue amlodipine, spironolactone.  I did advise that amlodipine was likely contributing to some of his lower extremity edema.  Looks like he was started on nebivolol in the hospital but was discontinued for unclear reasons  -Consider changing lisinopril to Entresto.  He currently is on lisinopril 40 mg twice daily.  - Basic Metabolic Panel; Future    2. Acute heart failure with preserved ejection fraction (HFpEF)  -No significant fluid overload on exam, he does appear to have some chronic venous insufficiency.  However, since he reports that he still up 20 pounds we will consider increasing his torsemide depending on lab results  -Heart failure education provided today including signs and symptoms, causes of heart failure, medications, daily weights, low sodium diet (less than 1500 mg per day). Reviewed HF Zones with patient       3. Syncope and collapse  - Unclear etiology.  Could likely be vasovagal but since he has not had this evaluated we will place him in a 30-day M cot.  He is also high risk for A-fib and reports a history of multiple TIAs  - Mobile Cardiac Outpatient Telemetry; Future    4. Sleep apnea, unspecified type  -Discussed the importance of sleep apnea compliance.  He agrees for a consult with the sleep center  - Ambulatory Referral to Sleep Medicine          Follow Up {Instructions Charge Capture  Follow-up Communications :23}   Return in about 2 weeks (around 6/21/2023) for Office follow up, HTN, fluid overload.    Patient was given instructions and counseling regarding his condition or for health maintenance advice. Please see specific information pulled into the AVS if  appropriate.  Advised to call the Heart and Valve Center with any questions, concerns, or worsening symptoms.

## 2023-06-07 NOTE — PROGRESS NOTES
[facility] Heart Monitor Documentation    Carlos Eduardo Danielle  1970  [id number]  06/07/23      [] ZIO XT Patch  Model [id number] Prescribed for N/A Days    Serial Number: (N + 9 Digits) N   Apply-By Date on Box:   USPS Tracking Number:   USPS Tracking        [] [employer]ian MINI PLUS Mobile Cardiac Telemetry  Model BGMINIPLUS Prescribed for 30 Days    Serial Number: (BGM + 7 Digits) [id number]  Shipped-By Date on Box: 6/7/23  UPS Tracking Number: [id number]  UPS Tracking      [] [employer]ian MINI Holter Monitor  Model BGMINIEL Prescribed for N/A Days    Serial Number: (7 Digits)   Shipped-By Date on Box:   UPS Tracking Number: [id number]  UPS Tracking        This monitor was applied to the patient's chest and checked for proper functioning.  Mr. Carlos Eduardo Danielle was instructed in the proper use of this monitor.  He was given the opportunity to ask questions and left the office with the device 's instruction manual.    Enrike Claudio MA, 15:50 EDT, 06/07/23                  [facility]

## 2023-06-07 NOTE — TELEPHONE ENCOUNTER
Rx Refill Note    Requested Prescriptions     Pending Prescriptions Disp Refills    glimepiride (AMARYL) 4 MG tablet 180 tablet 5     Sig: Take 1 tablet by mouth 2 (Two) Times a Day.    metFORMIN ER (GLUCOPHAGE-XR) 500 MG 24 hr tablet 120 tablet 5     Sig: Take 2 tablets by mouth 2 (Two) Times a Day With Meals.        Last office visit with prescribing clinician: 6/27/2022       Next office visit with prescribing clinician: Visit date not found     {    Gracy Narvaez MA  06/07/23, 15:45 EDT

## 2023-06-15 ENCOUNTER — OFFICE VISIT (OUTPATIENT)
Dept: BARIATRICS/WEIGHT MGMT | Facility: CLINIC | Age: 53
End: 2023-06-15
Payer: COMMERCIAL

## 2023-06-15 VITALS
WEIGHT: 315 LBS | TEMPERATURE: 97.5 F | DIASTOLIC BLOOD PRESSURE: 80 MMHG | BODY MASS INDEX: 44.1 KG/M2 | OXYGEN SATURATION: 96 % | HEART RATE: 85 BPM | HEIGHT: 71 IN | SYSTOLIC BLOOD PRESSURE: 160 MMHG

## 2023-06-15 DIAGNOSIS — E66.01 MORBID OBESITY WITH BODY MASS INDEX (BMI) OF 60.0 TO 69.9 IN ADULT: Primary | ICD-10-CM

## 2023-06-15 DIAGNOSIS — I10 ESSENTIAL HYPERTENSION: ICD-10-CM

## 2023-06-15 PROCEDURE — 1160F RVW MEDS BY RX/DR IN RCRD: CPT | Performed by: PHYSICIAN ASSISTANT

## 2023-06-15 PROCEDURE — 3077F SYST BP >= 140 MM HG: CPT | Performed by: PHYSICIAN ASSISTANT

## 2023-06-15 PROCEDURE — 3079F DIAST BP 80-89 MM HG: CPT | Performed by: PHYSICIAN ASSISTANT

## 2023-06-15 PROCEDURE — 1159F MED LIST DOCD IN RCRD: CPT | Performed by: PHYSICIAN ASSISTANT

## 2023-06-15 PROCEDURE — 99213 OFFICE O/P EST LOW 20 MIN: CPT | Performed by: PHYSICIAN ASSISTANT

## 2023-06-15 NOTE — PROGRESS NOTES
[facility] Bariatric Surgery  [address]  [address]  [facility] [id number] [phone]      Patient Name:  Carlos Eduardo Danielle  :  1970      Date of Visit:  06/15/2023      Reason for Visit:  Monthly Diet Visit #1       HPI:  Presents for supervised diet visit.  Pursuing metabolic and bariatric surgery w/ Suraj.    Admitted to [facility] 23-6/3/23 for HTN urgency and CHF exacercation.  Focusing on increasing protein, reducing carbs. Has been working to reduce high fructose corn syrup.  Patient's blood pressure was 220/100 initially.  He denied any headache, chest pain, visual changes, or shortness of breath.  He told me he has been in his usual state of health and compliant with his medications.  On recheck it was 160/80    Initial weight: 446 lbs.  Current weight:  442 lbs.    Past Medical History:   Diagnosis Date    Anxiety     Back problem     Cellulitis     HX; BILATERAL LEGS    Colon polyp     Depression     Diabetes mellitus     GERD (gastroesophageal reflux disease)     on Protonix daily. EGD 2020; no hx of h pylori    Hyperlipidemia     Hypertension     Sleep apnea     noncompliant with BIPAP    Suicide attempt     reports hx of suicide attempts three times in 30 years ago    TIA (transient ischemic attack)      and ; related to hypertension.    Type 2 diabetes mellitus     dx in 2019; no insulin; last A1c 8.0    Venous stasis     bilateral lower ext     Past Surgical History:   Procedure Laterality Date    APPENDECTOMY N/A 2021    Procedure: APPENDECTOMY LAPAROSCOPIC;  Surgeon: Radames Arauz MD;  Location: [facility] OR;  Service: General;  Laterality: N/A;    COLONOSCOPY  2020    ENDOSCOPY N/A 2023    Procedure: ESOPHAGOGASTRODUODENOSCOPY;  Surgeon: Akua Ruelas MD;  Location:  [facility];  Service: Bariatric;  Laterality: N/A;    EPIDIDYMAL CYST EXCISION      TONSILLECTOMY  2006       Allergies   Allergen Reactions     Atorvastatin Headache     Weakness.      Topamax [Topiramate] Other (See Comments)     Migraine           Current Outpatient Medications:     amLODIPine (NORVASC) 10 MG tablet, Take 1 tablet by mouth Daily., Disp: 30 tablet, Rfl: 0    empagliflozin (JARDIANCE) 10 MG tablet tablet, Take 1 tablet by mouth Daily., Disp: 30 tablet, Rfl: 0    glimepiride (AMARYL) 4 MG tablet, Take 1 tablet by mouth 2 (Two) Times a Day., Disp: 60 tablet, Rfl: 3    ibuprofen (ADVIL,MOTRIN) 800 MG tablet, Take 1 tablet by mouth Every 6 (Six) Hours As Needed for Mild Pain., Disp: 90 tablet, Rfl: 2    lisinopril (PRINIVIL,ZESTRIL) 40 MG tablet, Take 1 tablet by mouth Every 12 (Twelve) Hours., Disp: 60 tablet, Rfl: 0    metFORMIN ER (GLUCOPHAGE-XR) 500 MG 24 hr tablet, Take 2 tablets by mouth 2 (Two) Times a Day With Meals., Disp: 60 tablet, Rfl: 3    mirtazapine (Remeron) 15 MG tablet, Take 1 tablet by mouth At Night As Needed for sleep., Disp: 30 tablet, Rfl: 5    pantoprazole (PROTONIX) 40 MG EC tablet, Take 1 tablet by mouth Daily., Disp: 90 tablet, Rfl: 3    spironolactone (ALDACTONE) 25 MG tablet, Take 1 tablet by mouth Daily., Disp: 30 tablet, Rfl: 0    torsemide (DEMADEX) 20 MG tablet, Take 1 tablet by mouth Daily., Disp: 30 tablet, Rfl: 0    venlafaxine XR (EFFEXOR-XR) 150 MG 24 hr capsule, Take 2 capsules by mouth every morning, Disp: 180 capsule, Rfl: 3    Social History     Socioeconomic History    Marital status: Single   Tobacco Use    Smoking status: Former     Packs/day: 0.75     Years: 20.00     Pack years: 15.00     Types: Cigarettes     Quit date: 2008     Years since quittin.8     Passive exposure: Past    Smokeless tobacco: Never   Vaping Use    Vaping Use: Never used   Substance and Sexual Activity    Alcohol use: Yes     Comment: occas    Drug use: Never    Sexual activity: Not Currently     Partners: Male       Social History     Social History Narrative    Patient lives in [place], [place].  He is single and  "works full time as a  at [employer].         /80 (BP Location: Right arm, Patient Position: Sitting)   Pulse 85   Temp 97.5 °F (36.4 °C)   Ht 180.3 cm (71\")   Wt (!) 201 kg (442 lb 9.6 oz)   SpO2 96%   BMI 61.73 kg/m²     Physical Exam  Constitutional:       General: He is not in acute distress.     Appearance: He is obese.   Cardiovascular:      Rate and Rhythm: Normal rate and regular rhythm.   Pulmonary:      Effort: Pulmonary effort is normal.      Breath sounds: Normal breath sounds.   Abdominal:      General: Bowel sounds are normal. There is no distension.      Palpations: Abdomen is soft. There is no mass.      Tenderness: There is no abdominal tenderness.   Skin:     General: Skin is warm and dry.   Neurological:      General: No focal deficit present.      Mental Status: He is alert and oriented to person, place, and time.   Psychiatric:         Mood and Affect: Mood normal.         Behavior: Behavior normal.         Assessment:   pursuing metabolic and bariatric surgery w/ Dr. Ruelas.    ICD-10-CM ICD-9-CM   1. Morbid obesity with body mass index (BMI) of 60.0 to 69.9 in adult  E66.01 278.01    Z68.44 V85.44   2. Essential hypertension  I10 401.9       Discussion/Plan:  During diet appointments the patient is educated on high quality nutrition and habits to facilitate good health and possibly some weight loss. Necessary lifestyle changes and behavior modifications were discussed. Please note, the patient remains compliant in completing his diet requirements.     Goals:   1. Continue to be mindful of healthy food choices and portion control.  Advised to focus on eating 3 meals per day and minimizing snacking.  2. Increase daily protein intake and reduce carbs.  3. Increase daily exercise/activity as able.     Hypertension-BP reading improved on recheck. Instructed to follow-up with cardiology as soon as possible     Follow up in 1 month. Call w/ issues/concerns.       "

## 2023-06-20 NOTE — PROGRESS NOTES
"Enter Query Response Below      Query Response: Acute on chronic heart failure with preserved EF secondary to hypertensive urgency             If applicable, please update the problem list.     Patient: Carlos Eduardo Danielle        : 1970  Account: [id number]           Admit Date: 2023        How to Respond to this query:       a. Click New Note     b. Answer query within the yellow box.                c. Update the Problem List, if applicable.      If you have any questions about this query contact me at: [phone]    Dr. Valles:    53 y.o. male with history of morbid obesity, occasional tobacco abuse, COPD, HTN, diabetes mellitus type 2, presented with shortness of breath and was diagnosed with hypertensive urgency and acute CHF exacerbation.  Home medications include furosemide.  Echo shows EF 60%.  Cardiology progress note states \"hypertensive urgency with CHF.\"  Patient was given lasix IV on -.  Discharge summary states \"acute CHF exacerbation.\"       Can this be further clarified as:    - acute on chronic diastolic heart failure   - acute diastolic heart failure; no history of diastolic heart failure   - other (specify) ____________  - unable to determine       By submitting this query, we are merely seeking further clarification of documentation to accurately reflect all conditions that you are monitoring, evaluating, treating or that extend the hospitalization or utilize additional resources of care. Please utilize your independent clinical judgment when addressing the question(s) above.     This query and your response, once completed, will be entered into the legal medical record.    Sincerely,  Faith Nunes RN, MSN  Clinical Documentation Integrity Program   [email or web address]     "

## 2023-07-03 PROBLEM — I48.0 PAROXYSMAL ATRIAL FIBRILLATION: Status: ACTIVE | Noted: 2023-07-03

## 2023-07-03 PROBLEM — I50.32 CHRONIC HEART FAILURE WITH PRESERVED EJECTION FRACTION (HFPEF): Status: ACTIVE | Noted: 2023-07-03

## 2023-07-27 RX ORDER — ONDANSETRON HYDROCHLORIDE 8 MG/1
8 TABLET, FILM COATED ORAL EVERY 8 HOURS PRN
Qty: 60 TABLET | Refills: 1 | Status: SHIPPED | OUTPATIENT
Start: 2023-07-27

## 2023-08-14 DIAGNOSIS — E11.65 TYPE 2 DIABETES MELLITUS WITH HYPERGLYCEMIA, WITHOUT LONG-TERM CURRENT USE OF INSULIN: ICD-10-CM

## 2023-08-14 RX ORDER — METFORMIN HYDROCHLORIDE 500 MG/1
1000 TABLET, EXTENDED RELEASE ORAL 2 TIMES DAILY WITH MEALS
Qty: 60 TABLET | Refills: 3 | OUTPATIENT
Start: 2023-08-14

## 2023-08-15 ENCOUNTER — OFFICE VISIT (OUTPATIENT)
Dept: BARIATRICS/WEIGHT MGMT | Facility: CLINIC | Age: 53
End: 2023-08-15
Payer: COMMERCIAL

## 2023-08-15 VITALS
HEIGHT: 71 IN | HEART RATE: 56 BPM | TEMPERATURE: 97.1 F | BODY MASS INDEX: 44.1 KG/M2 | WEIGHT: 315 LBS | DIASTOLIC BLOOD PRESSURE: 82 MMHG | SYSTOLIC BLOOD PRESSURE: 138 MMHG | OXYGEN SATURATION: 95 %

## 2023-08-15 DIAGNOSIS — E66.01 MORBID OBESITY WITH BODY MASS INDEX (BMI) OF 60.0 TO 69.9 IN ADULT: Primary | ICD-10-CM

## 2023-08-15 PROCEDURE — 99213 OFFICE O/P EST LOW 20 MIN: CPT | Performed by: PHYSICIAN ASSISTANT

## 2023-08-15 PROCEDURE — 3079F DIAST BP 80-89 MM HG: CPT | Performed by: PHYSICIAN ASSISTANT

## 2023-08-15 PROCEDURE — 3075F SYST BP GE 130 - 139MM HG: CPT | Performed by: PHYSICIAN ASSISTANT

## 2023-08-15 PROCEDURE — 1160F RVW MEDS BY RX/DR IN RCRD: CPT | Performed by: PHYSICIAN ASSISTANT

## 2023-08-15 PROCEDURE — 1159F MED LIST DOCD IN RCRD: CPT | Performed by: PHYSICIAN ASSISTANT

## 2023-08-15 NOTE — PROGRESS NOTES
[facility] Bariatric Surgery  [address]  [address]  [facility] [id number] [phone]      Patient Name:  Carlos Eduardo Danielle  :  1970      Date of Visit:  08/15/2023      Reason for Visit:  Monthly Diet Visit #6 of 6       HPI:  Presents for supervised diet visit.  Pursuing metabolic and bariatric surgery w/ Dr. Ruelas.    Denies any medical issues since last visit.  Focusing on increasing seafood, veggies, some fruit. Getting around 100g protein per day. Avoiding HFCS and reading labels. Eating 3 meals and one snack per day. Trying to increase steps daily. Excited to proceed with surgery. Planning to join a gym with a friend.      Initial weight: 446 lbs.  Current weight:  435 lbs.    Past Medical History:   Diagnosis Date    Anxiety     Asthma 2023    Back problem     Cellulitis     HX; BILATERAL LEGS    CHF (congestive heart failure) 2023    Colon polyp     Depression     Diabetes mellitus     GERD (gastroesophageal reflux disease)     on Protonix daily. EGD 2020; no hx of h pylori    Hyperlipidemia     Hypertension     Paroxysmal atrial fibrillation 2023    Sleep apnea     noncompliant with BIPAP    Suicide attempt     reports hx of suicide attempts three times in 30 years ago    TIA (transient ischemic attack)      and ; related to hypertension.    Type 2 diabetes mellitus     dx in 2019; no insulin; last A1c 8.0    Venous stasis     bilateral lower ext     Past Surgical History:   Procedure Laterality Date    APPENDECTOMY N/A 2021    Procedure: APPENDECTOMY LAPAROSCOPIC;  Surgeon: Radames Arauz MD;  Location: [facility] OR;  Service: General;  Laterality: N/A;    COLONOSCOPY  2020    ENDOSCOPY N/A 2023    Procedure: ESOPHAGOGASTRODUODENOSCOPY;  Surgeon: Akua Ruelas MD;  Location: [facility];  Service: Bariatric;  Laterality: N/A;    EPIDIDYMAL CYST EXCISION      TONSILLECTOMY         Allergies   Allergen  Reactions    Atorvastatin Headache     Weakness.      Topamax [Topiramate] Other (See Comments)     Migraine           Current Outpatient Medications:     amLODIPine (NORVASC) 10 MG tablet, Take 1 tablet by mouth Daily., Disp: 90 tablet, Rfl: 3    apixaban (ELIQUIS) 5 MG tablet tablet, Take 1 tablet by mouth 2 (Two) Times a Day., Disp: 180 tablet, Rfl: 3    empagliflozin (JARDIANCE) 10 MG tablet tablet, Take 1 tablet by mouth Daily., Disp: 90 tablet, Rfl: 3    glimepiride (AMARYL) 4 MG tablet, Take 1 tablet by mouth 2 (Two) Times a Day., Disp: 60 tablet, Rfl: 3    lisinopril (PRINIVIL,ZESTRIL) 40 MG tablet, Take 1 tablet by mouth Every 12 (Twelve) Hours., Disp: 180 tablet, Rfl: 3    metFORMIN ER (GLUCOPHAGE-XR) 500 MG 24 hr tablet, Take 2 tablets by mouth 2 (Two) Times a Day With Meals., Disp: 60 tablet, Rfl: 3    mirtazapine (Remeron) 15 MG tablet, Take 1 tablet by mouth At Night As Needed for sleep., Disp: 30 tablet, Rfl: 5    nebivolol (Bystolic) 10 MG tablet, Take 1 tablet by mouth Daily., Disp: 30 tablet, Rfl: 3    ondansetron (Zofran) 8 MG tablet, Take 1 tablet by mouth Every 8 (Eight) Hours As Needed for Nausea or Vomiting., Disp: 60 tablet, Rfl: 1    pantoprazole (PROTONIX) 40 MG EC tablet, Take 1 tablet by mouth Daily., Disp: 90 tablet, Rfl: 3    spironolactone (ALDACTONE) 25 MG tablet, Take 1 tablet by mouth Daily., Disp: 90 tablet, Rfl: 3    torsemide (DEMADEX) 20 MG tablet, Take 1 tablet by mouth Daily., Disp: 90 tablet, Rfl: 3    venlafaxine XR (EFFEXOR-XR) 150 MG 24 hr capsule, Take 2 capsules by mouth every morning, Disp: 180 capsule, Rfl: 3    Social History     Socioeconomic History    Marital status: Single   Tobacco Use    Smoking status: Former     Packs/day: 0.75     Years: 20.00     Pack years: 15.00     Types: Cigarettes     Quit date: 8/1/2008     Years since quitting: 15.0     Passive exposure: Past    Smokeless tobacco: Never   Vaping Use    Vaping Use: Never used   Substance and Sexual  "Activity    Alcohol use: Yes     Comment: occas    Drug use: Never    Sexual activity: Not Currently     Partners: Male       Social History     Social History Narrative    Patient lives in [place], [place].  He is single and works full time as a  at [employer].         /82 (BP Location: Right arm, Patient Position: Sitting)   Pulse 56   Temp 97.1 øF (36.2 øC)   Ht 180.3 cm (71\")   Wt (!) 198 kg (435 lb 8 oz)   SpO2 95%   BMI 60.74 kg/mý     Physical Exam  Constitutional:       General: He is not in acute distress.     Appearance: He is obese.   HENT:      Head: Normocephalic and atraumatic.   Cardiovascular:      Rate and Rhythm: Normal rate and regular rhythm.   Pulmonary:      Effort: Pulmonary effort is normal.      Breath sounds: Normal breath sounds.   Abdominal:      General: Bowel sounds are normal. There is no distension.      Palpations: Abdomen is soft. There is no mass.      Tenderness: There is no abdominal tenderness.   Skin:     General: Skin is warm and dry.   Neurological:      General: No focal deficit present.      Mental Status: He is alert and oriented to person, place, and time.   Psychiatric:         Mood and Affect: Mood normal.         Behavior: Behavior normal.         Assessment:   pursuing metabolic and bariatric surgery w/ Dr. Ruelas.    ICD-10-CM ICD-9-CM   1. Morbid obesity with body mass index (BMI) of 60.0 to 69.9 in adult  E66.01 278.01    Z68.44 V85.44     Class 3 Severe Obesity (BMI >=40). Obesity-related health conditions include the following:  as above . Obesity is improving with treatment. BMI is is above average; BMI management plan is completed. We discussed low calorie, low carb based diet program, portion control, increasing exercise, and consulting a Bariatric surgeon.      Discussion/Plan:  During diet appointments the patient is educated on high quality nutrition and habits to facilitate good health and possibly some weight loss. " Necessary lifestyle changes and behavior modifications were discussed. Please note, the patient remains compliant in completing his diet requirements.     Goals:   1. Continue to be mindful of healthy food choices and portion control. Focus on eating 3 meals/day and minimizing snacking/grazing.   2. Increase daily protein intake and reduce carbs. Continue avoiding HFCS  3. Increase daily exercise/activity as able.      Follow up in 1 month. Call w/ issues/concerns.

## 2023-08-23 DIAGNOSIS — R53.83 FATIGUE, UNSPECIFIED TYPE: Primary | ICD-10-CM

## 2023-08-23 DIAGNOSIS — R06.00 DYSPNEA, UNSPECIFIED TYPE: ICD-10-CM

## 2023-09-11 ENCOUNTER — LAB (OUTPATIENT)
Dept: LAB | Facility: HOSPITAL | Age: 53
End: 2023-09-11
Payer: COMMERCIAL

## 2023-09-11 DIAGNOSIS — R06.00 DYSPNEA, UNSPECIFIED TYPE: ICD-10-CM

## 2023-09-11 DIAGNOSIS — R53.83 FATIGUE, UNSPECIFIED TYPE: ICD-10-CM

## 2023-09-11 LAB
ALBUMIN SERPL-MCNC: 3.9 G/DL (ref 3.5–5.2)
ALBUMIN/GLOB SERPL: 1.3 G/DL
ALP SERPL-CCNC: 88 U/L (ref 39–117)
ALT SERPL W P-5'-P-CCNC: 22 U/L (ref 1–41)
ANION GAP SERPL CALCULATED.3IONS-SCNC: 12.3 MMOL/L (ref 5–15)
AST SERPL-CCNC: 19 U/L (ref 1–40)
BASOPHILS # BLD AUTO: 0.07 10*3/MM3 (ref 0–0.2)
BASOPHILS NFR BLD AUTO: 1.1 % (ref 0–1.5)
BILIRUB SERPL-MCNC: 0.7 MG/DL (ref 0–1.2)
BUN SERPL-MCNC: 14 MG/DL (ref 6–20)
BUN/CREAT SERPL: 12.8 (ref 7–25)
CALCIUM SPEC-SCNC: 9 MG/DL (ref 8.6–10.5)
CHLORIDE SERPL-SCNC: 100 MMOL/L (ref 98–107)
CO2 SERPL-SCNC: 24.7 MMOL/L (ref 22–29)
CREAT SERPL-MCNC: 1.09 MG/DL (ref 0.76–1.27)
DEPRECATED RDW RBC AUTO: 44.2 FL (ref 37–54)
EGFRCR SERPLBLD CKD-EPI 2021: 81.7 ML/MIN/1.73
EOSINOPHIL # BLD AUTO: 0.17 10*3/MM3 (ref 0–0.4)
EOSINOPHIL NFR BLD AUTO: 2.6 % (ref 0.3–6.2)
ERYTHROCYTE [DISTWIDTH] IN BLOOD BY AUTOMATED COUNT: 14.7 % (ref 12.3–15.4)
GLOBULIN UR ELPH-MCNC: 2.9 GM/DL
GLUCOSE SERPL-MCNC: 203 MG/DL (ref 65–99)
HCT VFR BLD AUTO: 46 % (ref 37.5–51)
HGB BLD-MCNC: 15.8 G/DL (ref 13–17.7)
LYMPHOCYTES # BLD AUTO: 2.07 10*3/MM3 (ref 0.7–3.1)
LYMPHOCYTES NFR BLD AUTO: 32.2 % (ref 19.6–45.3)
MCH RBC QN AUTO: 29 PG (ref 26.6–33)
MCHC RBC AUTO-ENTMCNC: 34.3 G/DL (ref 31.5–35.7)
MCV RBC AUTO: 84.4 FL (ref 79–97)
MONOCYTES # BLD AUTO: 0.44 10*3/MM3 (ref 0.1–0.9)
MONOCYTES NFR BLD AUTO: 6.9 % (ref 5–12)
NEUTROPHILS NFR BLD AUTO: 3.61 10*3/MM3 (ref 1.7–7)
NEUTROPHILS NFR BLD AUTO: 56.3 % (ref 42.7–76)
PLATELET # BLD AUTO: 183 10*3/MM3 (ref 140–450)
PMV BLD AUTO: 13.3 FL (ref 6–12)
POTASSIUM SERPL-SCNC: 4.4 MMOL/L (ref 3.5–5.2)
PROT SERPL-MCNC: 6.8 G/DL (ref 6–8.5)
RBC # BLD AUTO: 5.45 10*6/MM3 (ref 4.14–5.8)
SODIUM SERPL-SCNC: 137 MMOL/L (ref 136–145)
WBC NRBC COR # BLD: 6.42 10*3/MM3 (ref 3.4–10.8)

## 2023-09-11 PROCEDURE — 80053 COMPREHEN METABOLIC PANEL: CPT

## 2023-09-11 PROCEDURE — 36415 COLL VENOUS BLD VENIPUNCTURE: CPT

## 2023-09-11 PROCEDURE — 85025 COMPLETE CBC W/AUTO DIFF WBC: CPT

## 2023-09-18 DIAGNOSIS — F41.9 ANXIETY: ICD-10-CM

## 2023-09-18 RX ORDER — VENLAFAXINE HYDROCHLORIDE 225 MG/1
450 TABLET, EXTENDED RELEASE ORAL
Qty: 60 EACH | Refills: 1 | Status: SHIPPED | OUTPATIENT
Start: 2023-09-18

## 2023-09-21 DIAGNOSIS — K21.9 GASTROESOPHAGEAL REFLUX DISEASE, UNSPECIFIED WHETHER ESOPHAGITIS PRESENT: ICD-10-CM

## 2023-09-22 RX ORDER — MIRTAZAPINE 15 MG/1
15 TABLET, FILM COATED ORAL NIGHTLY PRN
Qty: 30 TABLET | Refills: 5 | Status: SHIPPED | OUTPATIENT
Start: 2023-09-22

## 2023-09-25 ENCOUNTER — CONSULT (OUTPATIENT)
Dept: BARIATRICS/WEIGHT MGMT | Facility: CLINIC | Age: 53
End: 2023-09-25

## 2023-09-25 VITALS
SYSTOLIC BLOOD PRESSURE: 150 MMHG | HEIGHT: 71 IN | OXYGEN SATURATION: 94 % | HEART RATE: 64 BPM | DIASTOLIC BLOOD PRESSURE: 88 MMHG | WEIGHT: 315 LBS | BODY MASS INDEX: 44.1 KG/M2 | TEMPERATURE: 97.1 F

## 2023-09-25 DIAGNOSIS — F41.9 ANXIETY: ICD-10-CM

## 2023-09-25 DIAGNOSIS — I50.32 CHRONIC HEART FAILURE WITH PRESERVED EJECTION FRACTION (HFPEF): ICD-10-CM

## 2023-09-25 DIAGNOSIS — E11.9 TYPE 2 DIABETES MELLITUS WITHOUT COMPLICATION, WITHOUT LONG-TERM CURRENT USE OF INSULIN: ICD-10-CM

## 2023-09-25 DIAGNOSIS — M19.90 OSTEOARTHRITIS, UNSPECIFIED OSTEOARTHRITIS TYPE, UNSPECIFIED SITE: ICD-10-CM

## 2023-09-25 DIAGNOSIS — I48.0 PAROXYSMAL ATRIAL FIBRILLATION: ICD-10-CM

## 2023-09-25 DIAGNOSIS — K21.9 GASTROESOPHAGEAL REFLUX DISEASE, UNSPECIFIED WHETHER ESOPHAGITIS PRESENT: ICD-10-CM

## 2023-09-25 DIAGNOSIS — E78.5 HYPERLIPIDEMIA, UNSPECIFIED HYPERLIPIDEMIA TYPE: ICD-10-CM

## 2023-09-25 DIAGNOSIS — R53.83 FATIGUE, UNSPECIFIED TYPE: ICD-10-CM

## 2023-09-25 DIAGNOSIS — M54.9 BACK PAIN, UNSPECIFIED BACK LOCATION, UNSPECIFIED BACK PAIN LATERALITY, UNSPECIFIED CHRONICITY: ICD-10-CM

## 2023-09-25 DIAGNOSIS — I10 ESSENTIAL HYPERTENSION: ICD-10-CM

## 2023-09-25 DIAGNOSIS — F33.2 SEVERE RECURRENT MAJOR DEPRESSION WITHOUT PSYCHOTIC FEATURES: ICD-10-CM

## 2023-09-25 DIAGNOSIS — G47.33 OSA (OBSTRUCTIVE SLEEP APNEA): ICD-10-CM

## 2023-09-25 PROBLEM — E66.01 MORBID OBESITY: Status: ACTIVE | Noted: 2023-09-25

## 2023-09-25 RX ORDER — SCOLOPAMINE TRANSDERMAL SYSTEM 1 MG/1
1 PATCH, EXTENDED RELEASE TRANSDERMAL ONCE
OUTPATIENT
Start: 2023-09-25 | End: 2023-09-25

## 2023-09-25 RX ORDER — GABAPENTIN 100 MG/1
600 CAPSULE ORAL ONCE
OUTPATIENT
Start: 2023-09-25 | End: 2023-09-25

## 2023-09-25 RX ORDER — CHLORHEXIDINE GLUCONATE ORAL RINSE 1.2 MG/ML
15 SOLUTION DENTAL
OUTPATIENT
Start: 2023-09-25 | End: 2023-09-25

## 2023-09-25 RX ORDER — SODIUM CHLORIDE 9 MG/ML
150 INJECTION, SOLUTION INTRAVENOUS CONTINUOUS
OUTPATIENT
Start: 2023-09-25

## 2023-09-25 RX ORDER — ENOXAPARIN SODIUM 100 MG/ML
40 INJECTION SUBCUTANEOUS ONCE
OUTPATIENT
Start: 2023-09-25 | End: 2023-09-25

## 2023-09-25 RX ORDER — PANTOPRAZOLE SODIUM 40 MG/10ML
40 INJECTION, POWDER, LYOPHILIZED, FOR SOLUTION INTRAVENOUS ONCE
OUTPATIENT
Start: 2023-09-25 | End: 2023-09-25

## 2023-09-25 NOTE — LETTER
September 25, 2023     Patient: Carlos Eduardo Danielle   YOB: 1970   Date of Visit: 9/25/2023       To Whom It May Concern:    It is my medical opinion that Carlos Eduardo Danielle will need to be off from the date of surgery    _______________ until 7 days post-op ___________.          Please call the office with questions.       Sincerely,        Akua Ruelas MD    CC:   No Recipients

## 2023-09-25 NOTE — PROGRESS NOTES
"[facility] BARIATRIC SURGERY  [address]  [address]  [facility] [id number] [phone]      Patient  Name:  Carlos Eduardo Danielle  :  1970      Date of Visit: 2023      Chief Complaint:  weight gain; unable to maintain weight loss    History of Present Illness:  Carlos Eduardo Danielle is a 53 y.o. male who presents today for evaluation, education and consultation regarding weight loss surgery.     Patient has been overweight for many years, with numerous failed dietary/weight loss attempts.  He now has obesity related comorbidities of HTN, HLD, AJAY, depression, DM2, GERD, back pain, OA and as such has decided to pursue weight loss surgery.    From intake:  \"GI: History of GERD.  Takes Protonix and Pepcid daily.  Last EGD in 2020.  Symptoms have worsened.  He denies any history of H. pylori.  He denies any postprandial nausea, abdominal pain, or bloating.  Only abdominal surgery is laparoscopic appendectomy in  that was uneventful.    Other past medical history includes hypertension, hyperlipidemia, obstructive sleep apnea noncompliant with BiPAP, COPD (on albuterol only), depression/anxiety, history of suicide attempts many years ago.    He is a type II diabetic and was diagnosed in .  He has never been on insulin and his last A1c was 8.1.    He is a former smoker and quit in . \"    2023 Update:    Denies change to med hx.  Previously worked at the [employer], now works for [employer], from home, in customer service.    Hx of a fib, on Eliquis. Never required cardioversion.  Per pt, has more a fib at night.    CHF: torsemide.  Last hospitalized 2023 for CHF.  Previously on furosemide, switched to stronger diuretic (Demadex).    COPD on hx, but pt said pulmonologist said he doesn't have.    Chronic back pain, for which he takes PRN NSAIDs (infrequent).    DM2: metformin, glimeperide, A1C 8.1.    He plans to recover in [place], [place] with a friend after surgery.  He " understands he must walk every 30 minutes.    Has AJAY: does not use sleep apnea b/c he tried it and it doesn't help him at all.  He is going to f/u with pulm and try again.    The patient lives in [place].    No personal or family hx of VTE or clotting d/o.  No liver, lung, heart, or renal disease    Review of data:    MER: nothing  CBC: nl  CMP: Cr 1.09  HP neg    EKG: tracing reviewed, NSR     Path-benign    EGD: PQ, no HH  GES: no gastroparesis    Cardiac clearance:no additional testing  Echo: LVEF 60%, mild LVH, mild to mod AR, mild MR  Stress test: LVEF 55%, no ischemic, low risk study    PFTs: no restriction, obstruction, low DLCO which corrects when adjusted for alveolar ventilated volumes.  No air trapping or hyperinflation.  Pulm clearance: mildly increased risk due to untreated AJAY.        Last tobacco: 2008  Last NSAIDs: last had ibuprofen Corin  Last ASA: >1 year  Last steroids: 2 years ago  Last hormones: n/a         Past Medical History:   Diagnosis Date    Anxiety     Asthma 06/01/2023    Back pain     Back problem     Cellulitis     HX; BILATERAL LEGS    CHF (congestive heart failure) 06/01/2023    Colon polyp     Depression     Diabetes mellitus     GERD (gastroesophageal reflux disease)     on Protonix daily. EGD Jan 2020; no hx of h pylori    Hyperlipidemia     Hypertension     Osteoarthritis     Paroxysmal atrial fibrillation 07/03/2023    Sleep apnea     noncompliant with BIPAP    Suicide attempt     reports hx of suicide attempts three times in 30 years ago    TIA (transient ischemic attack)     2010 and 2015; related to hypertension.    Type 2 diabetes mellitus     dx in 2019; no insulin; last A1c 8.0    Venous stasis     bilateral lower ext     Past Surgical History:   Procedure Laterality Date    APPENDECTOMY N/A 09/08/2021    Procedure: APPENDECTOMY LAPAROSCOPIC;  Surgeon: Radames Arauz MD;  Location: [facility];  Service: General;  Laterality: N/A;    COLONOSCOPY  2020     ENDOSCOPY N/A 04/17/2023    Procedure: ESOPHAGOGASTRODUODENOSCOPY;  Surgeon: Akua Ruelas MD;  Location: [facility] ENDOSCOPY;  Service: Bariatric;  Laterality: N/A;    EPIDIDYMAL CYST EXCISION      TONSILLECTOMY  2006       Allergies   Allergen Reactions    Atorvastatin Headache     Weakness.      Topamax [Topiramate] Other (See Comments)     Migraine         Current Outpatient Medications:     amLODIPine (NORVASC) 10 MG tablet, Take 1 tablet by mouth Daily., Disp: 90 tablet, Rfl: 3    apixaban (ELIQUIS) 5 MG tablet tablet, Take 1 tablet by mouth 2 (Two) Times a Day., Disp: 180 tablet, Rfl: 3    empagliflozin (JARDIANCE) 10 MG tablet tablet, Take 1 tablet by mouth Daily., Disp: 90 tablet, Rfl: 3    glimepiride (AMARYL) 4 MG tablet, Take 1 tablet by mouth 2 (Two) Times a Day., Disp: 60 tablet, Rfl: 3    lisinopril (PRINIVIL,ZESTRIL) 40 MG tablet, Take 1 tablet by mouth Every 12 (Twelve) Hours., Disp: 180 tablet, Rfl: 3    metFORMIN ER (GLUCOPHAGE-XR) 500 MG 24 hr tablet, Take 2 tablets by mouth 2 (Two) Times a Day With Meals., Disp: 60 tablet, Rfl: 3    mirtazapine (Remeron) 15 MG tablet, Take 1 tablet by mouth At Night As Needed for sleep., Disp: 30 tablet, Rfl: 5    nebivolol (Bystolic) 10 MG tablet, Take 1 tablet by mouth Daily., Disp: 30 tablet, Rfl: 3    ondansetron (Zofran) 8 MG tablet, Take 1 tablet by mouth Every 8 (Eight) Hours As Needed for Nausea or Vomiting., Disp: 60 tablet, Rfl: 1    pantoprazole (PROTONIX) 40 MG EC tablet, Take 1 tablet by mouth Daily., Disp: 90 tablet, Rfl: 3    spironolactone (ALDACTONE) 25 MG tablet, Take 1 tablet by mouth Daily., Disp: 90 tablet, Rfl: 3    torsemide (DEMADEX) 20 MG tablet, Take 1 tablet by mouth Daily., Disp: 90 tablet, Rfl: 3    venlafaxine 225 MG tablet sustained-release 24 hour 24 hr tablet, Take 2 tablets by mouth Daily With Breakfast., Disp: 60 each, Rfl: 1    Social History     Socioeconomic History    Marital status: Single   Tobacco Use    Smoking  status: Former     Packs/day: 0.75     Years: 20.00     Pack years: 15.00     Types: Cigarettes     Quit date: 8/1/2008     Years since quitting: 15.1     Passive exposure: Past    Smokeless tobacco: Never   Vaping Use    Vaping Use: Never used   Substance and Sexual Activity    Alcohol use: Yes     Comment: occas    Drug use: Never    Sexual activity: Not Currently     Partners: Male     Social History     Social History Narrative    Patient lives in [place], [place].  He is single and works full time as a  at [employer].        Family History   Problem Relation Age of Onset    Diabetes Mother     Hypertension Mother     Schizophrenia Mother     Depression Mother     Obesity Mother     No Known Problems Father     No Known Problems Brother     Mental illness Brother     Kidney disease Maternal Uncle     Heart disease Maternal Grandmother     Hypertension Maternal Grandmother     Hypertension Maternal Grandfather        Review of Systems   Constitutional:  Positive for fatigue and unexpected weight gain. Negative for chills, diaphoresis, fever and unexpected weight loss.   HENT:  Negative for congestion and facial swelling.    Eyes:  Negative for blurred vision, double vision and discharge.   Respiratory:  Negative for chest tightness, shortness of breath and stridor.    Cardiovascular:  Negative for chest pain, palpitations and leg swelling.   Gastrointestinal:  Negative for blood in stool.   Endocrine: Negative for polydipsia.   Genitourinary:  Negative for hematuria.   Musculoskeletal:  Positive for arthralgias.   Skin:  Negative for color change.   Allergic/Immunologic: Negative for immunocompromised state.   Neurological:  Negative for confusion.   Psychiatric/Behavioral:  Negative for self-injury.      Physical Exam:  Vital Signs:  Weight: (!) 200 kg (442 lb)   Body mass index is 61.65 kg/m².  Temp: 97.1 °F (36.2 °C)   Heart Rate: 64   BP: 150/88     Physical Exam  Constitutional:        General: He is not in acute distress.     Appearance: He is well-developed. He is not diaphoretic.   HENT:      Head: Normocephalic and atraumatic.      Mouth/Throat:      Pharynx: No oropharyngeal exudate.   Eyes:      Conjunctiva/sclera: Conjunctivae normal.      Pupils: Pupils are equal, round, and reactive to light.   Cardiovascular:      Rate and Rhythm: Normal rate and regular rhythm.   Pulmonary:      Effort: Pulmonary effort is normal. No respiratory distress.   Abdominal:      General: There is no distension.      Palpations: Abdomen is soft.      Comments: Diastasis recti, no palpable hernias, umbilical lap scar   Musculoskeletal:      Right lower leg: Edema present.      Left lower leg: Edema present.      Comments: Chronic brawny edema, venous stasis changes b/l   Skin:     General: Skin is warm and dry.      Coloration: Skin is not pale.   Neurological:      Mental Status: He is alert and oriented to person, place, and time.      Cranial Nerves: No cranial nerve deficit.   Psychiatric:         Behavior: Behavior normal.         Thought Content: Thought content normal.     Problem List Items Addressed This Visit       Anxiety    Body mass index (BMI) of 60.0-69.9 in adult - Primary    Relevant Orders    Case Request (Completed)    MRSA Screen Culture (Outpatient) - Swab, Nares    Chronic heart failure with preserved ejection fraction (HFpEF)    Essential hypertension    Fatigue    GERD (gastroesophageal reflux disease)    Overview     on Protonix and Pepcid daily. EGD Jan 2020; no hx of h pylori         Hyperlipidemia    AJAY (obstructive sleep apnea) -intolerant of BiPAP    Overview     9/20-Sleep test positive for sleep apnea CPAP plan follow-up with Dr. Kraus Ekwok internal medicine   Reports he has sleep apnea as a child cannot afford the machine and cannot tolerate the machine.         Paroxysmal atrial fibrillation    Severe recurrent major depression without psychotic features    Type 2  diabetes mellitus, without long-term current use of insulin     Other Visit Diagnoses       Back pain, unspecified back location, unspecified back pain laterality, unspecified chronicity        Osteoarthritis, unspecified osteoarthritis type, unspecified site                Assessment:    Carlos Eduardo Danielle is a 53 y.o. year old male with medically complicated obesity.    Weight loss surgery is deemed medically necessary given the following obesity related comorbidities including HTN, HLD, AJAY, COPD, depression, DM2, GERD with current Weight: (!) 200 kg (442 lb) and Body mass index is 61.65 kg/m²..    Patient is aware that surgery is a tool, and that weight loss is not guaranteed but only seen in the context of appropriate use, follow up and exercise.    The patient was present for an approximately a 2.5 hour discussion of the purpose of weight loss surgery, how WLS is a tool to assist in achieving weight loss goals, the most common complications and how best to avoid them, and the strategies for short and long term weight loss.  Ample opportunity to discuss questions was available both in group and during the time of individual examination.    I reviewed all available preop labs, Xrays, tests, clearances, etc and signed off on these in the record.  All of this in addition to the patient's unique history and exam has been taken into consideration in determining their appropriate candidacy for weight loss surgery.    Complications  of laparoscopic/possible robotic gastric sleeve were discussed. The patient is well aware of the potential complications of surgery that include but not limited to bleeding, infections, deep venous thrombosis, pulmonary embolism, pulmonary complications such as pneumonia, cardiac events, hernias, small bowel obstruction, damage to the spleen or other organs, bowel injury, disfiguring scars, failure to lose weight, need for additional surgery, conversion to an open procedure, and death.  "Patient is also aware of complications which apply in this particular procedure that can include but are not limited to a \"leak\" at the staple line which in some instances may require conversion to gastric bypass.    The patient is aware if a hiatal hernia is encountered, it likely will be repaired.  R/B/A Rx to hiatal hernia repair were discussed as outlined in our long consent form.  Briefly risks in addition to those for LSG include recurrent hernia, RENU, dysphagia, esophageal injury, pneumothorax, injury to the vagus nerves, injury to the thoracic duct, aorta or vena cava.    Greater than 3 minutes was spent with the patient discussing avoiding all tobacco products and second hand smoke at least 2 weeks pre-operatively and 6 weeks post-operatively to minimize the risk of sleeve leak.  This included discussing the importance of avoiding even secondhand smoke as the risk of leak is increased.  Examples discussed:  I made it very clear that the patient understands they should avoid even riding in a car where someone has previously smoked in the last 2 weeks, living in a house where someone smokes (even if it's in a separate room/patio/attached garage, etc.) we discussed that they should not have a conversation with a group of people who are smoking even if it's outside.  They can be around wood burning fires and barbecue.  I told them I do not know if marijuana has a same effects but my overall recommendation is to avoid it for 2 weeks prior in 6 weeks after surgery.  They also are aware that nicotine may also increase the risk of leak and I strongly encouraged him to avoid that as well for 2 weeks prior in 6 weeks after surgery.    Discussed the risks, benefits and alternative therapies at great length as outlined in our extensive consent forms, consent videos, and educational teaching process under the direction of the center's .    A copy of the patient's signed informed consent is on " file.    Plan:  Robotic assisted sleeve gastrectomy at [facility]    He will be very sensitive to dehydration (CKD, CHF, a fib, DM2) after surgery, pt counseled to keep up with liquids.    R/B/A Rx discussed to postop anticoagulation including but not limited to bleeding, drug reaction, venothromboembolic events, etc. and he  will restart his therapeutic Eliquis asap after surgery.  Hold Eliquis x 48 hours preop.    Higher risk candidate due to untreated AJAY, a fib, therapeutic anticoagulation, CHF with hx of exacerbation.  Pt counseled, that his decision to go 8 hours away for recovery, may not be the best decision given his extensive health history and risk of complications that I would not be able to manage that far away.    MDM high:  Elective procedure with the following risk factors: morbid obesity, AJAY, CHF, a fib, HTN, DM2.  4+ chronic medical problems reviewed.      Thank you Ian Vann MD for allowing me to share in the care of our mutual patient.             Akua Ruelas MD

## 2023-09-25 NOTE — LETTER
"2023       No Recipients    Patient: Carlos Eduardo Danielle   YOB: 1970   Date of Visit: 2023     Dear Ian Vann MD:       Thank you for referring Carlos Eduardo Danielle to me for evaluation. Below are the relevant portions of my assessment and plan of care.    If you have questions, please do not hesitate to call me. I look forward to following Carlos Eduardo along with you.         Sincerely,        Akua Ruelas MD        CC:   No Recipients    Akua Ruelas MD  [phone]  Sign when Signing Visit  [facility] BARIATR[facility]  [address]  [address]  [facility] [address] [phone]      Patient  Name:  Carlos Eduardo Danielle  :  1970      Date of Visit: 2023      Chief Complaint:  weight gain; unable to maintain weight loss    History of Present Illness:  Carlos Eduardo Danielle is a 53 y.o. male who presents today for evaluation, education and consultation regarding weight loss surgery.     Patient has been overweight for many years, with numerous failed dietary/weight loss attempts.  He now has obesity related comorbidities of HTN, HLD, AJAY, depression, DM2, GERD, back pain, OA and as such has decided to pursue weight loss surgery.    From intake:  \"GI: History of GERD.  Takes Protonix and Pepcid daily.  Last EGD in 2020.  Symptoms have worsened.  He denies any history of H. pylori.  He denies any postprandial nausea, abdominal pain, or bloating.  Only abdominal surgery is laparoscopic appendectomy in  that was uneventful.    Other past medical history includes hypertension, hyperlipidemia, obstructive sleep apnea noncompliant with BiPAP, COPD (on albuterol only), depression/anxiety, history of suicide attempts many years ago.    He is a type II diabetic and was diagnosed in 2019.  He has never been on insulin and his last A1c was 8.1.    He is a former smoker and quit in . \"    2023 Update:    Denies change to med " hx.  Previously worked at the [employer], now works for [employer], from home, in customer service.    Hx of a fib, on Eliquis. Never required cardioversion.  Per pt, has more a fib at night.    CHF: torsemide.  Last hospitalized 6/2023 for CHF.  Previously on furosemide, switched to stronger diuretic (Demadex).    COPD on hx, but pt said pulmonologist said he doesn't have.    Chronic back pain, for which he takes PRN NSAIDs (infrequent).    DM2: metformin, glimeperide, A1C 8.1.    He plans to recover in [place], [place] with a friend after surgery.  He understands he must walk every 30 minutes.    Has AJAY: does not use sleep apnea b/c he tried it and it doesn't help him at all.  He is going to f/u with pulm and try again.    The patient lives in [place].    No personal or family hx of VTE or clotting d/o.  No liver, lung, heart, or renal disease    Review of data:    MER: nothing  CBC: nl  CMP: Cr 1.09  HP neg    EKG: tracing reviewed, NSR     Path-benign    EGD: PQ, no HH  GES: no gastroparesis    Cardiac clearance:no additional testing  Echo: LVEF 60%, mild LVH, mild to mod AR, mild MR  Stress test: LVEF 55%, no ischemic, low risk study    PFTs: no restriction, obstruction, low DLCO which corrects when adjusted for alveolar ventilated volumes.  No air trapping or hyperinflation.  Pulm clearance: mildly increased risk due to untreated AJAY.        Last tobacco: 2008  Last NSAIDs: last had ibuprofen Corin  Last ASA: >1 year  Last steroids: 2 years ago  Last hormones: n/a         Past Medical History:   Diagnosis Date   • Anxiety    • Asthma 06/01/2023   • Back pain    • Back problem    • Cellulitis     HX; BILATERAL LEGS   • CHF (congestive heart failure) 06/01/2023   • Colon polyp    • Depression    • Diabetes mellitus    • GERD (gastroesophageal reflux disease)     on Protonix daily. EGD Jan 2020; no hx of h pylori   • Hyperlipidemia    • Hypertension    • Osteoarthritis    • Paroxysmal atrial fibrillation  07/03/2023   • Sleep apnea     noncompliant with BIPAP   • Suicide attempt     reports hx of suicide attempts three times in 30 years ago   • TIA (transient ischemic attack)     2010 and 2015; related to hypertension.   • Type 2 diabetes mellitus     dx in 2019; no insulin; last A1c 8.0   • Venous stasis     bilateral lower ext     Past Surgical History:   Procedure Laterality Date   • APPENDECTOMY N/A 09/08/2021    Procedure: APPENDECTOMY LAPAROSCOPIC;  Surgeon: Radames Arauz MD;  Location:  [facility];  Service: General;  Laterality: N/A;   • COLONOSCOPY  2020   • ENDOSCOPY N/A 04/17/2023    Procedure: ESOPHAGOGASTRODUODENOSCOPY;  Surgeon: Akua Ruelas MD;  Location:  [facility];  Service: Bariatric;  Laterality: N/A;   • EPIDIDYMAL CYST EXCISION     • TONSILLECTOMY  2006       Allergies   Allergen Reactions   • Atorvastatin Headache     Weakness.     • Topamax [Topiramate] Other (See Comments)     Migraine         Current Outpatient Medications:   •  amLODIPine (NORVASC) 10 MG tablet, Take 1 tablet by mouth Daily., Disp: 90 tablet, Rfl: 3  •  apixaban (ELIQUIS) 5 MG tablet tablet, Take 1 tablet by mouth 2 (Two) Times a Day., Disp: 180 tablet, Rfl: 3  •  empagliflozin (JARDIANCE) 10 MG tablet tablet, Take 1 tablet by mouth Daily., Disp: 90 tablet, Rfl: 3  •  glimepiride (AMARYL) 4 MG tablet, Take 1 tablet by mouth 2 (Two) Times a Day., Disp: 60 tablet, Rfl: 3  •  lisinopril (PRINIVIL,ZESTRIL) 40 MG tablet, Take 1 tablet by mouth Every 12 (Twelve) Hours., Disp: 180 tablet, Rfl: 3  •  metFORMIN ER (GLUCOPHAGE-XR) 500 MG 24 hr tablet, Take 2 tablets by mouth 2 (Two) Times a Day With Meals., Disp: 60 tablet, Rfl: 3  •  mirtazapine (Remeron) 15 MG tablet, Take 1 tablet by mouth At Night As Needed for sleep., Disp: 30 tablet, Rfl: 5  •  nebivolol (Bystolic) 10 MG tablet, Take 1 tablet by mouth Daily., Disp: 30 tablet, Rfl: 3  •  ondansetron (Zofran) 8 MG tablet, Take 1 tablet by mouth Every 8  (Eight) Hours As Needed for Nausea or Vomiting., Disp: 60 tablet, Rfl: 1  •  pantoprazole (PROTONIX) 40 MG EC tablet, Take 1 tablet by mouth Daily., Disp: 90 tablet, Rfl: 3  •  spironolactone (ALDACTONE) 25 MG tablet, Take 1 tablet by mouth Daily., Disp: 90 tablet, Rfl: 3  •  torsemide (DEMADEX) 20 MG tablet, Take 1 tablet by mouth Daily., Disp: 90 tablet, Rfl: 3  •  venlafaxine 225 MG tablet sustained-release 24 hour 24 hr tablet, Take 2 tablets by mouth Daily With Breakfast., Disp: 60 each, Rfl: 1    Social History     Socioeconomic History   • Marital status: Single   Tobacco Use   • Smoking status: Former     Packs/day: 0.75     Years: 20.00     Pack years: 15.00     Types: Cigarettes     Quit date: 8/1/2008     Years since quitting: 15.1     Passive exposure: Past   • Smokeless tobacco: Never   Vaping Use   • Vaping Use: Never used   Substance and Sexual Activity   • Alcohol use: Yes     Comment: occas   • Drug use: Never   • Sexual activity: Not Currently     Partners: Male     Social History     Social History Narrative    Patient lives in [place], [place].  He is single and works full time as a  at [employer].        Family History   Problem Relation Age of Onset   • Diabetes Mother    • Hypertension Mother    • Schizophrenia Mother    • Depression Mother    • Obesity Mother    • No Known Problems Father    • No Known Problems Brother    • Mental illness Brother    • Kidney disease Maternal Uncle    • Heart disease Maternal Grandmother    • Hypertension Maternal Grandmother    • Hypertension Maternal Grandfather        Review of Systems   Constitutional:  Positive for fatigue and unexpected weight gain. Negative for chills, diaphoresis, fever and unexpected weight loss.   HENT:  Negative for congestion and facial swelling.    Eyes:  Negative for blurred vision, double vision and discharge.   Respiratory:  Negative for chest tightness, shortness of breath and stridor.    Cardiovascular:   Negative for chest pain, palpitations and leg swelling.   Gastrointestinal:  Negative for blood in stool.   Endocrine: Negative for polydipsia.   Genitourinary:  Negative for hematuria.   Musculoskeletal:  Positive for arthralgias.   Skin:  Negative for color change.   Allergic/Immunologic: Negative for immunocompromised state.   Neurological:  Negative for confusion.   Psychiatric/Behavioral:  Negative for self-injury.      Physical Exam:  Vital Signs:  Weight: (!) 200 kg (442 lb)   Body mass index is 61.65 kg/m².  Temp: 97.1 °F (36.2 °C)   Heart Rate: 64   BP: 150/88     Physical Exam  Constitutional:       General: He is not in acute distress.     Appearance: He is well-developed. He is not diaphoretic.   HENT:      Head: Normocephalic and atraumatic.      Mouth/Throat:      Pharynx: No oropharyngeal exudate.   Eyes:      Conjunctiva/sclera: Conjunctivae normal.      Pupils: Pupils are equal, round, and reactive to light.   Cardiovascular:      Rate and Rhythm: Normal rate and regular rhythm.   Pulmonary:      Effort: Pulmonary effort is normal. No respiratory distress.   Abdominal:      General: There is no distension.      Palpations: Abdomen is soft.      Comments: Diastasis recti, no palpable hernias, umbilical lap scar   Skin:     General: Skin is warm and dry.      Coloration: Skin is not pale.   Neurological:      Mental Status: He is alert and oriented to person, place, and time.      Cranial Nerves: No cranial nerve deficit.   Psychiatric:         Behavior: Behavior normal.         Thought Content: Thought content normal.     Problem List Items Addressed This Visit       Anxiety    Body mass index (BMI) of 60.0-69.9 in adult - Primary    Chronic heart failure with preserved ejection fraction (HFpEF)    Essential hypertension    Fatigue    GERD (gastroesophageal reflux disease)    Overview     on Protonix and Pepcid daily. EGD Jan 2020; no hx of h pylori         Hyperlipidemia    AJAY (obstructive sleep  apnea) -intolerant of BiPAP    Overview     9/20-Sleep test positive for sleep apnea CPAP plan follow-up with Dr. E[facility]   Reports he has sleep apnea as a child cannot afford the machine and cannot tolerate the machine.         Paroxysmal atrial fibrillation    Severe recurrent major depression without psychotic features    Type 2 diabetes mellitus, without long-term current use of insulin     Other Visit Diagnoses       Back pain, unspecified back location, unspecified back pain laterality, unspecified chronicity        Osteoarthritis, unspecified osteoarthritis type, unspecified site                Assessment:    Carlos Eduardo Danielle is a 53 y.o. year old male with medically complicated obesity.    Weight loss surgery is deemed medically necessary given the following obesity related comorbidities including HTN, HLD, AJAY, COPD, depression, DM2, GERD with current Weight: (!) 200 kg (442 lb) and Body mass index is 61.65 kg/m²..    Patient is aware that surgery is a tool, and that weight loss is not guaranteed but only seen in the context of appropriate use, follow up and exercise.    The patient was present for an approximately a 2.5 hour discussion of the purpose of weight loss surgery, how WLS is a tool to assist in achieving weight loss goals, the most common complications and how best to avoid them, and the strategies for short and long term weight loss.  Ample opportunity to discuss questions was available both in group and during the time of individual examination.    I reviewed all available preop labs, Xrays, tests, clearances, etc and signed off on these in the record.  All of this in addition to the patient's unique history and exam has been taken into consideration in determining their appropriate candidacy for weight loss surgery.    Complications  of laparoscopic/possible robotic gastric sleeve were discussed. The patient is well aware of the potential complications of surgery  "that include but not limited to bleeding, infections, deep venous thrombosis, pulmonary embolism, pulmonary complications such as pneumonia, cardiac events, hernias, small bowel obstruction, damage to the spleen or other organs, bowel injury, disfiguring scars, failure to lose weight, need for additional surgery, conversion to an open procedure, and death. Patient is also aware of complications which apply in this particular procedure that can include but are not limited to a \"leak\" at the staple line which in some instances may require conversion to gastric bypass.    The patient is aware if a hiatal hernia is encountered, it likely will be repaired.  R/B/A Rx to hiatal hernia repair were discussed as outlined in our long consent form.  Briefly risks in addition to those for LSG include recurrent hernia, RENU, dysphagia, esophageal injury, pneumothorax, injury to the vagus nerves, injury to the thoracic duct, aorta or vena cava.    Greater than 3 minutes was spent with the patient discussing avoiding all tobacco products and second hand smoke at least 2 weeks pre-operatively and 6 weeks post-operatively to minimize the risk of sleeve leak.  This included discussing the importance of avoiding even secondhand smoke as the risk of leak is increased.  Examples discussed:  I made it very clear that the patient understands they should avoid even riding in a car where someone has previously smoked in the last 2 weeks, living in a house where someone smokes (even if it's in a separate room/patio/attached garage, etc.) we discussed that they should not have a conversation with a group of people who are smoking even if it's outside.  They can be around wood burning fires and barbecue.  I told them I do not know if marijuana has a same effects but my overall recommendation is to avoid it for 2 weeks prior in 6 weeks after surgery.  They also are aware that nicotine may also increase the risk of leak and I strongly encouraged " him to avoid that as well for 2 weeks prior in 6 weeks after surgery.    Discussed the risks, benefits and alternative therapies at great length as outlined in our extensive consent forms, consent videos, and educational teaching process under the direction of the center's .    A copy of the patient's signed informed consent is on file.    Plan:  Robotic assisted sleeve gastrectomy at [facility]    He will be very sensitive to dehydration (CKD, CHF, a fib, DM2) after surgery, pt counseled to keep up with liquids.    R/B/A Rx discussed to postop anticoagulation including but not limited to bleeding, drug reaction, venothromboembolic events, etc. and he  will restart his therapeutic Eliquis asap after surgery.  Hold Eliquis x 48 hours preop.    Higher risk candidate due to untreated AJAY, a fib, therapeutic anticoagulation, CHF with hx of exacerbation.  Pt counseled, that his decision to go 8 hours away for recovery, may not be the best decision given his extensive health history and risk of complications that I would not be able to manage that far away.    MDM high:  Elective procedure with the following risk factors: morbid obesity, AJAY, CHF, a fib, HTN, DM2.  4+ chronic medical problems reviewed.      Thank you Ian Vann MD for allowing me to share in the care of our mutual patient.             Akua Ruelas MD

## 2023-09-25 NOTE — H&P (VIEW-ONLY)
"[facility] BARIATRIC SURGERY  [address]  [address]  [facility] [id number] [phone]      Patient  Name:  Carlos Eduardo Danielle  :  1970      Date of Visit: 2023      Chief Complaint:  weight gain; unable to maintain weight loss    History of Present Illness:  Carlos Eduardo Danielle is a 53 y.o. male who presents today for evaluation, education and consultation regarding weight loss surgery.     Patient has been overweight for many years, with numerous failed dietary/weight loss attempts.  He now has obesity related comorbidities of HTN, HLD, AJAY, depression, DM2, GERD, back pain, OA and as such has decided to pursue weight loss surgery.    From intake:  \"GI: History of GERD.  Takes Protonix and Pepcid daily.  Last EGD in 2020.  Symptoms have worsened.  He denies any history of H. pylori.  He denies any postprandial nausea, abdominal pain, or bloating.  Only abdominal surgery is laparoscopic appendectomy in  that was uneventful.    Other past medical history includes hypertension, hyperlipidemia, obstructive sleep apnea noncompliant with BiPAP, COPD (on albuterol only), depression/anxiety, history of suicide attempts many years ago.    He is a type II diabetic and was diagnosed in .  He has never been on insulin and his last A1c was 8.1.    He is a former smoker and quit in . \"    2023 Update:    Denies change to med hx.  Previously worked at the [employer], now works for [employer], from home, in customer service.    Hx of a fib, on Eliquis. Never required cardioversion.  Per pt, has more a fib at night.    CHF: torsemide.  Last hospitalized 2023 for CHF.  Previously on furosemide, switched to stronger diuretic (Demadex).    COPD on hx, but pt said pulmonologist said he doesn't have.    Chronic back pain, for which he takes PRN NSAIDs (infrequent).    DM2: metformin, glimeperide, A1C 8.1.    He plans to recover in [place], [place] with a friend after surgery.  He " understands he must walk every 30 minutes.    Has AJAY: does not use sleep apnea b/c he tried it and it doesn't help him at all.  He is going to f/u with pulm and try again.    The patient lives in [place].    No personal or family hx of VTE or clotting d/o.  No liver, lung, heart, or renal disease    Review of data:    MER: nothing  CBC: nl  CMP: Cr 1.09  HP neg    EKG: tracing reviewed, NSR     Path-benign    EGD: PQ, no HH  GES: no gastroparesis    Cardiac clearance:no additional testing  Echo: LVEF 60%, mild LVH, mild to mod AR, mild MR  Stress test: LVEF 55%, no ischemic, low risk study    PFTs: no restriction, obstruction, low DLCO which corrects when adjusted for alveolar ventilated volumes.  No air trapping or hyperinflation.  Pulm clearance: mildly increased risk due to untreated AJAY.        Last tobacco: 2008  Last NSAIDs: last had ibuprofen Croin  Last ASA: >1 year  Last steroids: 2 years ago  Last hormones: n/a         Past Medical History:   Diagnosis Date    Anxiety     Asthma 06/01/2023    Back pain     Back problem     Cellulitis     HX; BILATERAL LEGS    CHF (congestive heart failure) 06/01/2023    Colon polyp     Depression     Diabetes mellitus     GERD (gastroesophageal reflux disease)     on Protonix daily. EGD Jan 2020; no hx of h pylori    Hyperlipidemia     Hypertension     Osteoarthritis     Paroxysmal atrial fibrillation 07/03/2023    Sleep apnea     noncompliant with BIPAP    Suicide attempt     reports hx of suicide attempts three times in 30 years ago    TIA (transient ischemic attack)     2010 and 2015; related to hypertension.    Type 2 diabetes mellitus     dx in 2019; no insulin; last A1c 8.0    Venous stasis     bilateral lower ext     Past Surgical History:   Procedure Laterality Date    APPENDECTOMY N/A 09/08/2021    Procedure: APPENDECTOMY LAPAROSCOPIC;  Surgeon: Radames Arauz MD;  Location: [facility];  Service: General;  Laterality: N/A;    COLONOSCOPY  2020     ENDOSCOPY N/A 04/17/2023    Procedure: ESOPHAGOGASTRODUODENOSCOPY;  Surgeon: Akua Ruelas MD;  Location: [facility] ENDOSCOPY;  Service: Bariatric;  Laterality: N/A;    EPIDIDYMAL CYST EXCISION      TONSILLECTOMY  2006       Allergies   Allergen Reactions    Atorvastatin Headache     Weakness.      Topamax [Topiramate] Other (See Comments)     Migraine         Current Outpatient Medications:     amLODIPine (NORVASC) 10 MG tablet, Take 1 tablet by mouth Daily., Disp: 90 tablet, Rfl: 3    apixaban (ELIQUIS) 5 MG tablet tablet, Take 1 tablet by mouth 2 (Two) Times a Day., Disp: 180 tablet, Rfl: 3    empagliflozin (JARDIANCE) 10 MG tablet tablet, Take 1 tablet by mouth Daily., Disp: 90 tablet, Rfl: 3    glimepiride (AMARYL) 4 MG tablet, Take 1 tablet by mouth 2 (Two) Times a Day., Disp: 60 tablet, Rfl: 3    lisinopril (PRINIVIL,ZESTRIL) 40 MG tablet, Take 1 tablet by mouth Every 12 (Twelve) Hours., Disp: 180 tablet, Rfl: 3    metFORMIN ER (GLUCOPHAGE-XR) 500 MG 24 hr tablet, Take 2 tablets by mouth 2 (Two) Times a Day With Meals., Disp: 60 tablet, Rfl: 3    mirtazapine (Remeron) 15 MG tablet, Take 1 tablet by mouth At Night As Needed for sleep., Disp: 30 tablet, Rfl: 5    nebivolol (Bystolic) 10 MG tablet, Take 1 tablet by mouth Daily., Disp: 30 tablet, Rfl: 3    ondansetron (Zofran) 8 MG tablet, Take 1 tablet by mouth Every 8 (Eight) Hours As Needed for Nausea or Vomiting., Disp: 60 tablet, Rfl: 1    pantoprazole (PROTONIX) 40 MG EC tablet, Take 1 tablet by mouth Daily., Disp: 90 tablet, Rfl: 3    spironolactone (ALDACTONE) 25 MG tablet, Take 1 tablet by mouth Daily., Disp: 90 tablet, Rfl: 3    torsemide (DEMADEX) 20 MG tablet, Take 1 tablet by mouth Daily., Disp: 90 tablet, Rfl: 3    venlafaxine 225 MG tablet sustained-release 24 hour 24 hr tablet, Take 2 tablets by mouth Daily With Breakfast., Disp: 60 each, Rfl: 1    Social History     Socioeconomic History    Marital status: Single   Tobacco Use    Smoking  status: Former     Packs/day: 0.75     Years: 20.00     Pack years: 15.00     Types: Cigarettes     Quit date: 8/1/2008     Years since quitting: 15.1     Passive exposure: Past    Smokeless tobacco: Never   Vaping Use    Vaping Use: Never used   Substance and Sexual Activity    Alcohol use: Yes     Comment: occas    Drug use: Never    Sexual activity: Not Currently     Partners: Male     Social History     Social History Narrative    Patient lives in [place], [place].  He is single and works full time as a  at [employer].        Family History   Problem Relation Age of Onset    Diabetes Mother     Hypertension Mother     Schizophrenia Mother     Depression Mother     Obesity Mother     No Known Problems Father     No Known Problems Brother     Mental illness Brother     Kidney disease Maternal Uncle     Heart disease Maternal Grandmother     Hypertension Maternal Grandmother     Hypertension Maternal Grandfather        Review of Systems   Constitutional:  Positive for fatigue and unexpected weight gain. Negative for chills, diaphoresis, fever and unexpected weight loss.   HENT:  Negative for congestion and facial swelling.    Eyes:  Negative for blurred vision, double vision and discharge.   Respiratory:  Negative for chest tightness, shortness of breath and stridor.    Cardiovascular:  Negative for chest pain, palpitations and leg swelling.   Gastrointestinal:  Negative for blood in stool.   Endocrine: Negative for polydipsia.   Genitourinary:  Negative for hematuria.   Musculoskeletal:  Positive for arthralgias.   Skin:  Negative for color change.   Allergic/Immunologic: Negative for immunocompromised state.   Neurological:  Negative for confusion.   Psychiatric/Behavioral:  Negative for self-injury.      Physical Exam:  Vital Signs:  Weight: (!) 200 kg (442 lb)   Body mass index is 61.65 kg/m².  Temp: 97.1 °F (36.2 °C)   Heart Rate: 64   BP: 150/88     Physical Exam  Constitutional:        General: He is not in acute distress.     Appearance: He is well-developed. He is not diaphoretic.   HENT:      Head: Normocephalic and atraumatic.      Mouth/Throat:      Pharynx: No oropharyngeal exudate.   Eyes:      Conjunctiva/sclera: Conjunctivae normal.      Pupils: Pupils are equal, round, and reactive to light.   Cardiovascular:      Rate and Rhythm: Normal rate and regular rhythm.   Pulmonary:      Effort: Pulmonary effort is normal. No respiratory distress.   Abdominal:      General: There is no distension.      Palpations: Abdomen is soft.      Comments: Diastasis recti, no palpable hernias, umbilical lap scar   Musculoskeletal:      Right lower leg: Edema present.      Left lower leg: Edema present.      Comments: Chronic brawny edema, venous stasis changes b/l   Skin:     General: Skin is warm and dry.      Coloration: Skin is not pale.   Neurological:      Mental Status: He is alert and oriented to person, place, and time.      Cranial Nerves: No cranial nerve deficit.   Psychiatric:         Behavior: Behavior normal.         Thought Content: Thought content normal.     Problem List Items Addressed This Visit       Anxiety    Body mass index (BMI) of 60.0-69.9 in adult - Primary    Relevant Orders    Case Request (Completed)    MRSA Screen Culture (Outpatient) - Swab, Nares    Chronic heart failure with preserved ejection fraction (HFpEF)    Essential hypertension    Fatigue    GERD (gastroesophageal reflux disease)    Overview     on Protonix and Pepcid daily. EGD Jan 2020; no hx of h pylori         Hyperlipidemia    AJAY (obstructive sleep apnea) -intolerant of BiPAP    Overview     9/20-Sleep test positive for sleep apnea CPAP plan follow-up with Dr. Kraus Soboba internal medicine   Reports he has sleep apnea as a child cannot afford the machine and cannot tolerate the machine.         Paroxysmal atrial fibrillation    Severe recurrent major depression without psychotic features    Type 2  diabetes mellitus, without long-term current use of insulin     Other Visit Diagnoses       Back pain, unspecified back location, unspecified back pain laterality, unspecified chronicity        Osteoarthritis, unspecified osteoarthritis type, unspecified site                Assessment:    Carlos Eduardo Danielle is a 53 y.o. year old male with medically complicated obesity.    Weight loss surgery is deemed medically necessary given the following obesity related comorbidities including HTN, HLD, AJAY, COPD, depression, DM2, GERD with current Weight: (!) 200 kg (442 lb) and Body mass index is 61.65 kg/m²..    Patient is aware that surgery is a tool, and that weight loss is not guaranteed but only seen in the context of appropriate use, follow up and exercise.    The patient was present for an approximately a 2.5 hour discussion of the purpose of weight loss surgery, how WLS is a tool to assist in achieving weight loss goals, the most common complications and how best to avoid them, and the strategies for short and long term weight loss.  Ample opportunity to discuss questions was available both in group and during the time of individual examination.    I reviewed all available preop labs, Xrays, tests, clearances, etc and signed off on these in the record.  All of this in addition to the patient's unique history and exam has been taken into consideration in determining their appropriate candidacy for weight loss surgery.    Complications  of laparoscopic/possible robotic gastric sleeve were discussed. The patient is well aware of the potential complications of surgery that include but not limited to bleeding, infections, deep venous thrombosis, pulmonary embolism, pulmonary complications such as pneumonia, cardiac events, hernias, small bowel obstruction, damage to the spleen or other organs, bowel injury, disfiguring scars, failure to lose weight, need for additional surgery, conversion to an open procedure, and death.  "Patient is also aware of complications which apply in this particular procedure that can include but are not limited to a \"leak\" at the staple line which in some instances may require conversion to gastric bypass.    The patient is aware if a hiatal hernia is encountered, it likely will be repaired.  R/B/A Rx to hiatal hernia repair were discussed as outlined in our long consent form.  Briefly risks in addition to those for LSG include recurrent hernia, RENU, dysphagia, esophageal injury, pneumothorax, injury to the vagus nerves, injury to the thoracic duct, aorta or vena cava.    Greater than 3 minutes was spent with the patient discussing avoiding all tobacco products and second hand smoke at least 2 weeks pre-operatively and 6 weeks post-operatively to minimize the risk of sleeve leak.  This included discussing the importance of avoiding even secondhand smoke as the risk of leak is increased.  Examples discussed:  I made it very clear that the patient understands they should avoid even riding in a car where someone has previously smoked in the last 2 weeks, living in a house where someone smokes (even if it's in a separate room/patio/attached garage, etc.) we discussed that they should not have a conversation with a group of people who are smoking even if it's outside.  They can be around wood burning fires and barbecue.  I told them I do not know if marijuana has a same effects but my overall recommendation is to avoid it for 2 weeks prior in 6 weeks after surgery.  They also are aware that nicotine may also increase the risk of leak and I strongly encouraged him to avoid that as well for 2 weeks prior in 6 weeks after surgery.    Discussed the risks, benefits and alternative therapies at great length as outlined in our extensive consent forms, consent videos, and educational teaching process under the direction of the center's .    A copy of the patient's signed informed consent is on " file.    Plan:  Robotic assisted sleeve gastrectomy at [facility]    He will be very sensitive to dehydration (CKD, CHF, a fib, DM2) after surgery, pt counseled to keep up with liquids.    R/B/A Rx discussed to postop anticoagulation including but not limited to bleeding, drug reaction, venothromboembolic events, etc. and he  will restart his therapeutic Eliquis asap after surgery.  Hold Eliquis x 48 hours preop.    Higher risk candidate due to untreated AJAY, a fib, therapeutic anticoagulation, CHF with hx of exacerbation.  Pt counseled, that his decision to go 8 hours away for recovery, may not be the best decision given his extensive health history and risk of complications that I would not be able to manage that far away.    MDM high:  Elective procedure with the following risk factors: morbid obesity, AJAY, CHF, a fib, HTN, DM2.  4+ chronic medical problems reviewed.      Thank you Ian Vann MD for allowing me to share in the care of our mutual patient.             Akua Ruelas MD

## 2023-10-09 ENCOUNTER — TELEPHONE (OUTPATIENT)
Dept: BARIATRICS/WEIGHT MGMT | Facility: CLINIC | Age: 53
End: 2023-10-09
Payer: COMMERCIAL

## 2023-10-09 NOTE — TELEPHONE ENCOUNTER
Pt called and stated that he is having a lot of nausea. With him being only a week out from having surgery, he wanted to know what would be appropriate to take that would help with this? He stated that in the past he would try crackers and a 7-up or an Alia-8 but with him being this close to having surgery he did not want to do anything to interfere with that. Please advise, thanks!

## 2023-10-11 ENCOUNTER — PRE-ADMISSION TESTING (OUTPATIENT)
Dept: PREADMISSION TESTING | Facility: HOSPITAL | Age: 53
End: 2023-10-11
Payer: COMMERCIAL

## 2023-10-11 LAB
APTT PPP: 33.2 SECONDS (ref 22–39)
DEPRECATED RDW RBC AUTO: 44.7 FL (ref 37–54)
ERYTHROCYTE [DISTWIDTH] IN BLOOD BY AUTOMATED COUNT: 14.6 % (ref 12.3–15.4)
HBA1C MFR BLD: 7.9 % (ref 4.8–5.6)
HCT VFR BLD AUTO: 45.9 % (ref 37.5–51)
HGB BLD-MCNC: 15.3 G/DL (ref 13–17.7)
INR PPP: 0.97 (ref 0.89–1.12)
MCH RBC QN AUTO: 28.3 PG (ref 26.6–33)
MCHC RBC AUTO-ENTMCNC: 33.3 G/DL (ref 31.5–35.7)
MCV RBC AUTO: 84.8 FL (ref 79–97)
PLATELET # BLD AUTO: 184 10*3/MM3 (ref 140–450)
PMV BLD AUTO: 11.8 FL (ref 6–12)
POTASSIUM SERPL-SCNC: 4.2 MMOL/L (ref 3.5–5.2)
PROTHROMBIN TIME: 13 SECONDS (ref 12.2–14.5)
RBC # BLD AUTO: 5.41 10*6/MM3 (ref 4.14–5.8)
WBC NRBC COR # BLD: 5.74 10*3/MM3 (ref 3.4–10.8)

## 2023-10-11 PROCEDURE — 85610 PROTHROMBIN TIME: CPT

## 2023-10-11 PROCEDURE — 85027 COMPLETE CBC AUTOMATED: CPT

## 2023-10-11 PROCEDURE — 83036 HEMOGLOBIN GLYCOSYLATED A1C: CPT

## 2023-10-11 PROCEDURE — 87081 CULTURE SCREEN ONLY: CPT

## 2023-10-11 PROCEDURE — 36415 COLL VENOUS BLD VENIPUNCTURE: CPT

## 2023-10-11 PROCEDURE — 85730 THROMBOPLASTIN TIME PARTIAL: CPT

## 2023-10-11 PROCEDURE — 84132 ASSAY OF SERUM POTASSIUM: CPT

## 2023-10-11 NOTE — PAT
An arrival time for procedure was not provided during PAT visit. If patient had any questions or concerns about their arrival time, they were instructed to contact their surgeon/physician.  Additionally, if the patient referred to an arrival time that was acquired from their my chart account, patient was encouraged to verify that time with their surgeon/physician. Arrival times are NOT provided in Pre Admission Testing Department.    Patient denies any current skin issues.     Patient viewed general PAT education video as instructed in their preoperative information received from their surgeon.  Patient stated the general PAT education video was viewed in its entirety and survey completed.  Copies of [facility] general education handouts (Incentive Spirometry, Meds to Beds Program, Patient Belongings, Pre-op skin preparation instructions, Blood Glucose testing, Visitor policy, Surgery FAQ, Code H) distributed to patient if not printed. Education related to the PAT pass and skin preparation for surgery (if applicable) completed in PAT as a reinforcement to PAT education video. Patient instructed to return PAT pass provided today as well as completed skin preparation sheet (if applicable) on the day of procedure.     Additionally if patient had not viewed video yet but intended to view it at home or in our waiting area, then referred them to the handout with QR code/link provided during PAT visit.  Instructed patient to complete survey after viewing the video in its entirety.  Encouraged patient/family to read [facility] general education handouts thoroughly and notify PAT staff with any questions or concerns. Patient verbalized understanding of all information and priority content.    Patient to apply Chlorhexadine wipes  to surgical area (as instructed) the night before procedure and the AM of procedure. Wipes provided.    Patient instructed to drink 20 ounces of Gatorade or Gatorlyte (if diabetic) and it needs to be completed 1  hour (for Main OR patients) or 2 hours (scheduled  section & [facility] patients) before given arrival time for procedure (NO RED Gatorade and NO Gatorade Zero).    Patient verbalized understanding.    EKG on chart from 23. Patient denies any chest pain or shortness of breath.     Cardiac clearance on chart 7/3/23.

## 2023-10-12 LAB — MRSA SPEC QL CULT: NORMAL

## 2023-10-17 ENCOUNTER — ANESTHESIA (OUTPATIENT)
Dept: PERIOP | Facility: HOSPITAL | Age: 53
End: 2023-10-17
Payer: COMMERCIAL

## 2023-10-17 ENCOUNTER — HOSPITAL ENCOUNTER (INPATIENT)
Facility: HOSPITAL | Age: 53
LOS: 1 days | Discharge: HOME | End: 2023-10-18
Attending: SURGERY | Admitting: SURGERY
Payer: COMMERCIAL

## 2023-10-17 ENCOUNTER — ANESTHESIA EVENT (OUTPATIENT)
Dept: PERIOP | Facility: HOSPITAL | Age: 53
End: 2023-10-17
Payer: COMMERCIAL

## 2023-10-17 ENCOUNTER — ANESTHESIA EVENT CONVERTED (OUTPATIENT)
Dept: ANESTHESIOLOGY | Facility: HOSPITAL | Age: 53
End: 2023-10-17
Payer: COMMERCIAL

## 2023-10-17 DIAGNOSIS — E66.01 MORBID OBESITY: Primary | ICD-10-CM

## 2023-10-17 LAB
GLUCOSE BLDC GLUCOMTR-MCNC: 218 MG/DL (ref 70–130)
GLUCOSE BLDC GLUCOMTR-MCNC: 231 MG/DL (ref 70–130)
GLUCOSE BLDC GLUCOMTR-MCNC: 234 MG/DL (ref 70–130)
GLUCOSE BLDC GLUCOMTR-MCNC: 302 MG/DL (ref 70–130)

## 2023-10-17 PROCEDURE — S2900 ROBOTIC SURGICAL SYSTEM: HCPCS | Performed by: SURGERY

## 2023-10-17 PROCEDURE — 3E013GC INTRODUCTION OF OTHER THERAPEUTIC SUBSTANCE INTO SUBCUTANEOUS TISSUE, PERCUTANEOUS APPROACH: ICD-10-PCS | Performed by: SURGERY

## 2023-10-17 PROCEDURE — 63710000001 INSULIN LISPRO (HUMAN) PER 5 UNITS: Performed by: SURGERY

## 2023-10-17 PROCEDURE — 25810000003 SODIUM CHLORIDE 0.9 % SOLUTION 250 ML FLEX CONT: Performed by: SURGERY

## 2023-10-17 PROCEDURE — 25010000002 SUGAMMADEX 200 MG/2ML SOLUTION: Performed by: NURSE ANESTHETIST, CERTIFIED REGISTERED

## 2023-10-17 PROCEDURE — 25010000002 BUPIVACAINE (PF) 0.25 % SOLUTION: Performed by: NURSE ANESTHETIST, CERTIFIED REGISTERED

## 2023-10-17 PROCEDURE — 25010000002 AMISULPRIDE (ANTIEMETIC) 10 MG/4ML SOLUTION: Performed by: NURSE ANESTHETIST, CERTIFIED REGISTERED

## 2023-10-17 PROCEDURE — 88313 SPECIAL STAINS GROUP 2: CPT | Performed by: SURGERY

## 2023-10-17 PROCEDURE — 47001 NDL BIOPSY LVR TM OTH MAJ PX: CPT | Performed by: SURGERY

## 2023-10-17 PROCEDURE — 0DJ08ZZ INSPECTION OF UPPER INTESTINAL TRACT, VIA NATURAL OR ARTIFICIAL OPENING ENDOSCOPIC: ICD-10-PCS | Performed by: SURGERY

## 2023-10-17 PROCEDURE — 88307 TISSUE EXAM BY PATHOLOGIST: CPT | Performed by: SURGERY

## 2023-10-17 PROCEDURE — 25010000002 ONDANSETRON PER 1 MG: Performed by: SURGERY

## 2023-10-17 PROCEDURE — 8E0W4CZ ROBOTIC ASSISTED PROCEDURE OF TRUNK REGION, PERCUTANEOUS ENDOSCOPIC APPROACH: ICD-10-PCS | Performed by: SURGERY

## 2023-10-17 PROCEDURE — 25810000003 SODIUM CHLORIDE 0.9 % SOLUTION: Performed by: SURGERY

## 2023-10-17 PROCEDURE — 25010000002 THIAMINE PER 100 MG: Performed by: SURGERY

## 2023-10-17 PROCEDURE — 25010000002 CEFAZOLIN PER 500 MG: Performed by: SURGERY

## 2023-10-17 PROCEDURE — 0DB64Z3 EXCISION OF STOMACH, PERCUTANEOUS ENDOSCOPIC APPROACH, VERTICAL: ICD-10-PCS | Performed by: SURGERY

## 2023-10-17 PROCEDURE — 25010000002 DEXAMETHASONE SODIUM PHOSPHATE 10 MG/ML SOLUTION: Performed by: NURSE ANESTHETIST, CERTIFIED REGISTERED

## 2023-10-17 PROCEDURE — 0FB24ZX EXCISION OF LEFT LOBE LIVER, PERCUTANEOUS ENDOSCOPIC APPROACH, DIAGNOSTIC: ICD-10-PCS | Performed by: SURGERY

## 2023-10-17 PROCEDURE — 25010000002 HYDROMORPHONE 1 MG/ML SOLUTION

## 2023-10-17 PROCEDURE — 25010000002 ONDANSETRON PER 1 MG

## 2023-10-17 PROCEDURE — 43775 LAP SLEEVE GASTRECTOMY: CPT | Performed by: PHYSICIAN ASSISTANT

## 2023-10-17 PROCEDURE — 25010000002 ENOXAPARIN PER 10 MG: Performed by: SURGERY

## 2023-10-17 PROCEDURE — 82948 REAGENT STRIP/BLOOD GLUCOSE: CPT

## 2023-10-17 PROCEDURE — 63710000001 INSULIN LISPRO (HUMAN) PER 5 UNITS

## 2023-10-17 PROCEDURE — C9153 AMISULPRIDE (ANTIEMETIC) 10 MG/4ML SOLUTION: HCPCS | Performed by: NURSE ANESTHETIST, CERTIFIED REGISTERED

## 2023-10-17 PROCEDURE — 25010000002 PROPOFOL 10 MG/ML EMULSION: Performed by: NURSE ANESTHETIST, CERTIFIED REGISTERED

## 2023-10-17 PROCEDURE — 25010000002 FENTANYL CITRATE (PF) 50 MCG/ML SOLUTION

## 2023-10-17 PROCEDURE — 25010000002 HYDROMORPHONE PER 4 MG: Performed by: SURGERY

## 2023-10-17 PROCEDURE — 25810000003 LACTATED RINGERS PER 1000 ML: Performed by: SURGERY

## 2023-10-17 PROCEDURE — 43775 LAP SLEEVE GASTRECTOMY: CPT | Performed by: SURGERY

## 2023-10-17 DEVICE — STAPLER 60 RELOAD WHITE
Type: IMPLANTABLE DEVICE | Site: STOMACH | Status: FUNCTIONAL
Brand: SUREFORM

## 2023-10-17 DEVICE — DEV CONTRL TISS STRATAFIX SPIRAL PDS PLS CT1 2-0 45CM: Type: IMPLANTABLE DEVICE | Site: ABDOMEN | Status: FUNCTIONAL

## 2023-10-17 DEVICE — STAPLER 60 RELOAD BLUE
Type: IMPLANTABLE DEVICE | Site: ABDOMEN | Status: FUNCTIONAL
Brand: SUREFORM

## 2023-10-17 RX ORDER — NICOTINE POLACRILEX 4 MG
15 LOZENGE BUCCAL
Status: DISCONTINUED | OUTPATIENT
Start: 2023-10-17 | End: 2023-10-18 | Stop reason: HOSPADM

## 2023-10-17 RX ORDER — ONDANSETRON 2 MG/ML
4 INJECTION INTRAMUSCULAR; INTRAVENOUS ONCE AS NEEDED
Status: COMPLETED | OUTPATIENT
Start: 2023-10-17 | End: 2023-10-17

## 2023-10-17 RX ORDER — PANTOPRAZOLE SODIUM 40 MG/10ML
40 INJECTION, POWDER, LYOPHILIZED, FOR SOLUTION INTRAVENOUS ONCE
Qty: 10 ML | Refills: 0 | Status: COMPLETED | OUTPATIENT
Start: 2023-10-17 | End: 2023-10-17

## 2023-10-17 RX ORDER — PANTOPRAZOLE SODIUM 40 MG/10ML
40 INJECTION, POWDER, LYOPHILIZED, FOR SOLUTION INTRAVENOUS ONCE
Status: COMPLETED | OUTPATIENT
Start: 2023-10-17 | End: 2023-10-17

## 2023-10-17 RX ORDER — PROMETHAZINE HYDROCHLORIDE 12.5 MG/1
12.5 TABLET ORAL EVERY 6 HOURS PRN
Status: DISCONTINUED | OUTPATIENT
Start: 2023-10-17 | End: 2023-10-18 | Stop reason: HOSPADM

## 2023-10-17 RX ORDER — NEBIVOLOL 5 MG/1
10 TABLET ORAL ONCE
Status: COMPLETED | OUTPATIENT
Start: 2023-10-17 | End: 2023-10-17

## 2023-10-17 RX ORDER — CEFAZOLIN SODIUM IN 0.9 % NACL 3 G/100 ML
3 INTRAVENOUS SOLUTION, PIGGYBACK (ML) INTRAVENOUS ONCE
Status: DISCONTINUED | OUTPATIENT
Start: 2023-10-17 | End: 2023-10-17 | Stop reason: HOSPADM

## 2023-10-17 RX ORDER — MAGNESIUM HYDROXIDE 1200 MG/15ML
LIQUID ORAL AS NEEDED
Status: DISCONTINUED | OUTPATIENT
Start: 2023-10-17 | End: 2023-10-17 | Stop reason: HOSPADM

## 2023-10-17 RX ORDER — CYANOCOBALAMIN 1000 UG/ML
1000 INJECTION, SOLUTION INTRAMUSCULAR; SUBCUTANEOUS ONCE
Status: COMPLETED | OUTPATIENT
Start: 2023-10-18 | End: 2023-10-18

## 2023-10-17 RX ORDER — FENTANYL CITRATE 50 UG/ML
INJECTION, SOLUTION INTRAMUSCULAR; INTRAVENOUS
Status: COMPLETED
Start: 2023-10-17 | End: 2023-10-17

## 2023-10-17 RX ORDER — SODIUM CHLORIDE 9 MG/ML
40 INJECTION, SOLUTION INTRAVENOUS AS NEEDED
Status: DISCONTINUED | OUTPATIENT
Start: 2023-10-17 | End: 2023-10-17 | Stop reason: HOSPADM

## 2023-10-17 RX ORDER — HYDROMORPHONE HYDROCHLORIDE 2 MG/1
2 TABLET ORAL EVERY 4 HOURS PRN
Status: DISCONTINUED | OUTPATIENT
Start: 2023-10-17 | End: 2023-10-18 | Stop reason: HOSPADM

## 2023-10-17 RX ORDER — LIDOCAINE HYDROCHLORIDE 10 MG/ML
INJECTION, SOLUTION EPIDURAL; INFILTRATION; INTRACAUDAL; PERINEURAL AS NEEDED
Status: DISCONTINUED | OUTPATIENT
Start: 2023-10-17 | End: 2023-10-17 | Stop reason: SURG

## 2023-10-17 RX ORDER — SODIUM CHLORIDE 9 MG/ML
150 INJECTION, SOLUTION INTRAVENOUS CONTINUOUS
Status: DISCONTINUED | OUTPATIENT
Start: 2023-10-17 | End: 2023-10-18 | Stop reason: HOSPADM

## 2023-10-17 RX ORDER — INSULIN LISPRO 100 [IU]/ML
INJECTION, SOLUTION INTRAVENOUS; SUBCUTANEOUS
Status: COMPLETED
Start: 2023-10-17 | End: 2023-10-17

## 2023-10-17 RX ORDER — DROPERIDOL 2.5 MG/ML
INJECTION, SOLUTION INTRAMUSCULAR; INTRAVENOUS
Status: DISCONTINUED
Start: 2023-10-17 | End: 2023-10-17 | Stop reason: WASHOUT

## 2023-10-17 RX ORDER — SIMETHICONE 80 MG
80 TABLET,CHEWABLE ORAL 4 TIMES DAILY PRN
Status: DISCONTINUED | OUTPATIENT
Start: 2023-10-17 | End: 2023-10-18 | Stop reason: HOSPADM

## 2023-10-17 RX ORDER — SPIRONOLACTONE 25 MG/1
25 TABLET ORAL DAILY
Status: DISCONTINUED | OUTPATIENT
Start: 2023-10-17 | End: 2023-10-18 | Stop reason: HOSPADM

## 2023-10-17 RX ORDER — TORSEMIDE 20 MG/1
20 TABLET ORAL DAILY
Status: DISCONTINUED | OUTPATIENT
Start: 2023-10-17 | End: 2023-10-18 | Stop reason: HOSPADM

## 2023-10-17 RX ORDER — GLYCOPYRROLATE 0.2 MG/ML
INJECTION INTRAMUSCULAR; INTRAVENOUS AS NEEDED
Status: DISCONTINUED | OUTPATIENT
Start: 2023-10-17 | End: 2023-10-17 | Stop reason: SURG

## 2023-10-17 RX ORDER — GABAPENTIN 100 MG/1
100 CAPSULE ORAL 3 TIMES DAILY
Status: DISCONTINUED | OUTPATIENT
Start: 2023-10-17 | End: 2023-10-18 | Stop reason: HOSPADM

## 2023-10-17 RX ORDER — SODIUM CHLORIDE, SODIUM LACTATE, POTASSIUM CHLORIDE, CALCIUM CHLORIDE 600; 310; 30; 20 MG/100ML; MG/100ML; MG/100ML; MG/100ML
9 INJECTION, SOLUTION INTRAVENOUS CONTINUOUS PRN
Status: CANCELLED | OUTPATIENT
Start: 2023-10-17

## 2023-10-17 RX ORDER — ALPRAZOLAM 0.25 MG/1
0.25 TABLET ORAL 2 TIMES DAILY PRN
Status: DISCONTINUED | OUTPATIENT
Start: 2023-10-17 | End: 2023-10-18 | Stop reason: HOSPADM

## 2023-10-17 RX ORDER — CHLORHEXIDINE GLUCONATE ORAL RINSE 1.2 MG/ML
15 SOLUTION DENTAL
Status: COMPLETED | OUTPATIENT
Start: 2023-10-17 | End: 2023-10-17

## 2023-10-17 RX ORDER — ACETAMINOPHEN 160 MG/5ML
1000 SOLUTION ORAL EVERY 8 HOURS SCHEDULED
Status: DISCONTINUED | OUTPATIENT
Start: 2023-10-17 | End: 2023-10-18 | Stop reason: HOSPADM

## 2023-10-17 RX ORDER — PROCHLORPERAZINE EDISYLATE 5 MG/ML
10 INJECTION INTRAMUSCULAR; INTRAVENOUS EVERY 6 HOURS PRN
Status: DISCONTINUED | OUTPATIENT
Start: 2023-10-17 | End: 2023-10-18 | Stop reason: HOSPADM

## 2023-10-17 RX ORDER — LIDOCAINE HYDROCHLORIDE 10 MG/ML
0.5 INJECTION, SOLUTION EPIDURAL; INFILTRATION; INTRACAUDAL; PERINEURAL ONCE AS NEEDED
Status: COMPLETED | OUTPATIENT
Start: 2023-10-17 | End: 2023-10-17

## 2023-10-17 RX ORDER — AMLODIPINE BESYLATE 10 MG/1
10 TABLET ORAL
Status: DISCONTINUED | OUTPATIENT
Start: 2023-10-17 | End: 2023-10-18 | Stop reason: HOSPADM

## 2023-10-17 RX ORDER — ENOXAPARIN SODIUM 100 MG/ML
INJECTION SUBCUTANEOUS AS NEEDED
Status: DISCONTINUED | OUTPATIENT
Start: 2023-10-17 | End: 2023-10-17 | Stop reason: HOSPADM

## 2023-10-17 RX ORDER — DIPHENHYDRAMINE HYDROCHLORIDE 50 MG/ML
25 INJECTION INTRAMUSCULAR; INTRAVENOUS EVERY 4 HOURS PRN
Status: DISCONTINUED | OUTPATIENT
Start: 2023-10-17 | End: 2023-10-18 | Stop reason: HOSPADM

## 2023-10-17 RX ORDER — BUPIVACAINE HYDROCHLORIDE 2.5 MG/ML
INJECTION, SOLUTION EPIDURAL; INFILTRATION; INTRACAUDAL
Status: COMPLETED | OUTPATIENT
Start: 2023-10-17 | End: 2023-10-17

## 2023-10-17 RX ORDER — NICARDIPINE HYDROCHLORIDE 2.5 MG/ML
INJECTION INTRAVENOUS
Status: DISPENSED
Start: 2023-10-17 | End: 2023-10-18

## 2023-10-17 RX ORDER — IBUPROFEN 600 MG/1
1 TABLET ORAL
Status: DISCONTINUED | OUTPATIENT
Start: 2023-10-17 | End: 2023-10-18 | Stop reason: HOSPADM

## 2023-10-17 RX ORDER — PROPOFOL 10 MG/ML
VIAL (ML) INTRAVENOUS AS NEEDED
Status: DISCONTINUED | OUTPATIENT
Start: 2023-10-17 | End: 2023-10-17 | Stop reason: SURG

## 2023-10-17 RX ORDER — OXYCODONE HYDROCHLORIDE 5 MG/1
5 TABLET ORAL EVERY 6 HOURS PRN
Status: DISCONTINUED | OUTPATIENT
Start: 2023-10-17 | End: 2023-10-18 | Stop reason: HOSPADM

## 2023-10-17 RX ORDER — FENTANYL CITRATE 50 UG/ML
50 INJECTION, SOLUTION INTRAMUSCULAR; INTRAVENOUS
Status: DISCONTINUED | OUTPATIENT
Start: 2023-10-17 | End: 2023-10-17 | Stop reason: HOSPADM

## 2023-10-17 RX ORDER — SODIUM CHLORIDE, SODIUM LACTATE, POTASSIUM CHLORIDE, CALCIUM CHLORIDE 600; 310; 30; 20 MG/100ML; MG/100ML; MG/100ML; MG/100ML
150 INJECTION, SOLUTION INTRAVENOUS CONTINUOUS
Status: ACTIVE | OUTPATIENT
Start: 2023-10-17 | End: 2023-10-18

## 2023-10-17 RX ORDER — FAMOTIDINE 20 MG/1
20 TABLET, FILM COATED ORAL
Status: CANCELLED | OUTPATIENT
Start: 2023-10-17

## 2023-10-17 RX ORDER — GABAPENTIN 300 MG/1
600 CAPSULE ORAL ONCE
Status: COMPLETED | OUTPATIENT
Start: 2023-10-17 | End: 2023-10-17

## 2023-10-17 RX ORDER — SODIUM CHLORIDE 0.9 % (FLUSH) 0.9 %
1-10 SYRINGE (ML) INJECTION AS NEEDED
Status: DISCONTINUED | OUTPATIENT
Start: 2023-10-17 | End: 2023-10-18 | Stop reason: HOSPADM

## 2023-10-17 RX ORDER — SODIUM CHLORIDE 9 MG/ML
40 INJECTION, SOLUTION INTRAVENOUS AS NEEDED
Status: DISCONTINUED | OUTPATIENT
Start: 2023-10-17 | End: 2023-10-18 | Stop reason: HOSPADM

## 2023-10-17 RX ORDER — ONDANSETRON 2 MG/ML
4 INJECTION INTRAMUSCULAR; INTRAVENOUS EVERY 6 HOURS PRN
Status: DISCONTINUED | OUTPATIENT
Start: 2023-10-17 | End: 2023-10-18 | Stop reason: HOSPADM

## 2023-10-17 RX ORDER — SODIUM CHLORIDE AND POTASSIUM CHLORIDE 150; 450 MG/100ML; MG/100ML
100 INJECTION, SOLUTION INTRAVENOUS CONTINUOUS
Status: DISCONTINUED | OUTPATIENT
Start: 2023-10-18 | End: 2023-10-18 | Stop reason: HOSPADM

## 2023-10-17 RX ORDER — SCOLOPAMINE TRANSDERMAL SYSTEM 1 MG/1
1 PATCH, EXTENDED RELEASE TRANSDERMAL ONCE
Status: DISCONTINUED | OUTPATIENT
Start: 2023-10-17 | End: 2023-10-17

## 2023-10-17 RX ORDER — PHENYLEPHRINE HCL IN 0.9% NACL 1 MG/10 ML
SYRINGE (ML) INTRAVENOUS AS NEEDED
Status: DISCONTINUED | OUTPATIENT
Start: 2023-10-17 | End: 2023-10-17 | Stop reason: SURG

## 2023-10-17 RX ORDER — METOCLOPRAMIDE 10 MG/1
10 TABLET ORAL EVERY 6 HOURS PRN
Status: DISCONTINUED | OUTPATIENT
Start: 2023-10-17 | End: 2023-10-18 | Stop reason: HOSPADM

## 2023-10-17 RX ORDER — INSULIN LISPRO 100 [IU]/ML
2-9 INJECTION, SOLUTION INTRAVENOUS; SUBCUTANEOUS
Status: DISCONTINUED | OUTPATIENT
Start: 2023-10-17 | End: 2023-10-18 | Stop reason: HOSPADM

## 2023-10-17 RX ORDER — ONDANSETRON 2 MG/ML
INJECTION INTRAMUSCULAR; INTRAVENOUS
Status: COMPLETED
Start: 2023-10-17 | End: 2023-10-17

## 2023-10-17 RX ORDER — GABAPENTIN 250 MG/5ML
100 SOLUTION ORAL 3 TIMES DAILY
Status: DISCONTINUED | OUTPATIENT
Start: 2023-10-17 | End: 2023-10-18 | Stop reason: HOSPADM

## 2023-10-17 RX ORDER — THIAMINE HYDROCHLORIDE 100 MG/ML
100 INJECTION, SOLUTION INTRAMUSCULAR; INTRAVENOUS ONCE
Status: COMPLETED | OUTPATIENT
Start: 2023-10-17 | End: 2023-10-17

## 2023-10-17 RX ORDER — DEXAMETHASONE SODIUM PHOSPHATE 4 MG/ML
8 INJECTION, SOLUTION INTRA-ARTICULAR; INTRALESIONAL; INTRAMUSCULAR; INTRAVENOUS; SOFT TISSUE ONCE AS NEEDED
Status: DISCONTINUED | OUTPATIENT
Start: 2023-10-17 | End: 2023-10-17 | Stop reason: HOSPADM

## 2023-10-17 RX ORDER — MIRTAZAPINE 15 MG/1
15 TABLET, FILM COATED ORAL NIGHTLY PRN
Status: DISCONTINUED | OUTPATIENT
Start: 2023-10-17 | End: 2023-10-18 | Stop reason: HOSPADM

## 2023-10-17 RX ORDER — SODIUM CHLORIDE 0.9 % (FLUSH) 0.9 %
10 SYRINGE (ML) INJECTION EVERY 12 HOURS SCHEDULED
Status: DISCONTINUED | OUTPATIENT
Start: 2023-10-17 | End: 2023-10-17 | Stop reason: HOSPADM

## 2023-10-17 RX ORDER — PANTOPRAZOLE SODIUM 40 MG/1
40 TABLET, DELAYED RELEASE ORAL DAILY
Status: DISCONTINUED | OUTPATIENT
Start: 2023-10-18 | End: 2023-10-18 | Stop reason: HOSPADM

## 2023-10-17 RX ORDER — NEBIVOLOL 10 MG/1
10 TABLET ORAL DAILY
Status: DISCONTINUED | OUTPATIENT
Start: 2023-10-17 | End: 2023-10-18 | Stop reason: HOSPADM

## 2023-10-17 RX ORDER — ONDANSETRON 4 MG/1
4 TABLET, FILM COATED ORAL EVERY 6 HOURS PRN
Status: DISCONTINUED | OUTPATIENT
Start: 2023-10-21 | End: 2023-10-18 | Stop reason: HOSPADM

## 2023-10-17 RX ORDER — FENTANYL CITRATE 50 UG/ML
INJECTION, SOLUTION INTRAMUSCULAR; INTRAVENOUS
Status: DISCONTINUED
Start: 2023-10-17 | End: 2023-10-17 | Stop reason: WASHOUT

## 2023-10-17 RX ORDER — SODIUM CHLORIDE 9 MG/ML
INJECTION, SOLUTION INTRAVENOUS
Status: DISPENSED
Start: 2023-10-17 | End: 2023-10-18

## 2023-10-17 RX ORDER — HYDROMORPHONE HYDROCHLORIDE 1 MG/ML
0.5 INJECTION, SOLUTION INTRAMUSCULAR; INTRAVENOUS; SUBCUTANEOUS
Status: DISCONTINUED | OUTPATIENT
Start: 2023-10-17 | End: 2023-10-17 | Stop reason: HOSPADM

## 2023-10-17 RX ORDER — CEFAZOLIN SODIUM IN 0.9 % NACL 3 G/100 ML
3000 INTRAVENOUS SOLUTION, PIGGYBACK (ML) INTRAVENOUS EVERY 8 HOURS
Status: COMPLETED | OUTPATIENT
Start: 2023-10-17 | End: 2023-10-18

## 2023-10-17 RX ORDER — FIBRINOGEN HUMAN, HUMAN THROMBIN 10 ML
KIT TOPICAL AS NEEDED
Status: DISCONTINUED | OUTPATIENT
Start: 2023-10-17 | End: 2023-10-17 | Stop reason: HOSPADM

## 2023-10-17 RX ORDER — EPHEDRINE SULFATE 50 MG/ML
INJECTION INTRAVENOUS AS NEEDED
Status: DISCONTINUED | OUTPATIENT
Start: 2023-10-17 | End: 2023-10-17 | Stop reason: SURG

## 2023-10-17 RX ORDER — ENOXAPARIN SODIUM 100 MG/ML
60 INJECTION SUBCUTANEOUS EVERY 12 HOURS SCHEDULED
Status: DISCONTINUED | OUTPATIENT
Start: 2023-10-18 | End: 2023-10-18 | Stop reason: HOSPADM

## 2023-10-17 RX ORDER — DEXAMETHASONE SODIUM PHOSPHATE 10 MG/ML
INJECTION, SOLUTION INTRAMUSCULAR; INTRAVENOUS
Status: COMPLETED | OUTPATIENT
Start: 2023-10-17 | End: 2023-10-17

## 2023-10-17 RX ORDER — PROCHLORPERAZINE MALEATE 10 MG
10 TABLET ORAL EVERY 6 HOURS PRN
Status: DISCONTINUED | OUTPATIENT
Start: 2023-10-17 | End: 2023-10-18 | Stop reason: HOSPADM

## 2023-10-17 RX ORDER — DEXTROSE MONOHYDRATE 25 G/50ML
25 INJECTION, SOLUTION INTRAVENOUS
Status: DISCONTINUED | OUTPATIENT
Start: 2023-10-17 | End: 2023-10-18 | Stop reason: HOSPADM

## 2023-10-17 RX ORDER — METOCLOPRAMIDE HYDROCHLORIDE 5 MG/ML
10 INJECTION INTRAMUSCULAR; INTRAVENOUS EVERY 6 HOURS PRN
Status: DISCONTINUED | OUTPATIENT
Start: 2023-10-17 | End: 2023-10-18 | Stop reason: HOSPADM

## 2023-10-17 RX ORDER — ACETAMINOPHEN 500 MG
1000 TABLET ORAL EVERY 8 HOURS SCHEDULED
Status: DISCONTINUED | OUTPATIENT
Start: 2023-10-17 | End: 2023-10-18 | Stop reason: HOSPADM

## 2023-10-17 RX ORDER — MIDAZOLAM HYDROCHLORIDE 1 MG/ML
1 INJECTION INTRAMUSCULAR; INTRAVENOUS
Status: DISCONTINUED | OUTPATIENT
Start: 2023-10-17 | End: 2023-10-17 | Stop reason: HOSPADM

## 2023-10-17 RX ORDER — SODIUM CHLORIDE 0.9 % (FLUSH) 0.9 %
10 SYRINGE (ML) INJECTION AS NEEDED
Status: DISCONTINUED | OUTPATIENT
Start: 2023-10-17 | End: 2023-10-17 | Stop reason: HOSPADM

## 2023-10-17 RX ORDER — NALOXONE HCL 0.4 MG/ML
0.1 VIAL (ML) INJECTION
Status: DISCONTINUED | OUTPATIENT
Start: 2023-10-17 | End: 2023-10-18 | Stop reason: HOSPADM

## 2023-10-17 RX ORDER — ROCURONIUM BROMIDE 10 MG/ML
INJECTION, SOLUTION INTRAVENOUS AS NEEDED
Status: DISCONTINUED | OUTPATIENT
Start: 2023-10-17 | End: 2023-10-17 | Stop reason: SURG

## 2023-10-17 RX ORDER — SODIUM CHLORIDE 0.9 % (FLUSH) 0.9 %
10 SYRINGE (ML) INJECTION EVERY 12 HOURS SCHEDULED
Status: DISCONTINUED | OUTPATIENT
Start: 2023-10-17 | End: 2023-10-18 | Stop reason: HOSPADM

## 2023-10-17 RX ORDER — HYDROMORPHONE HYDROCHLORIDE 1 MG/ML
0.5 INJECTION, SOLUTION INTRAMUSCULAR; INTRAVENOUS; SUBCUTANEOUS
Status: DISCONTINUED | OUTPATIENT
Start: 2023-10-17 | End: 2023-10-18 | Stop reason: HOSPADM

## 2023-10-17 RX ORDER — ENOXAPARIN SODIUM 100 MG/ML
40 INJECTION SUBCUTANEOUS ONCE
Status: DISCONTINUED | OUTPATIENT
Start: 2023-10-17 | End: 2023-10-17 | Stop reason: HOSPADM

## 2023-10-17 RX ADMIN — TORSEMIDE 20 MG: 20 TABLET ORAL at 15:31

## 2023-10-17 RX ADMIN — AMISULPRIDE 10 MG: 2.5 INJECTION, SOLUTION INTRAVENOUS at 11:59

## 2023-10-17 RX ADMIN — FENTANYL CITRATE 50 MCG: 50 INJECTION, SOLUTION INTRAMUSCULAR; INTRAVENOUS at 12:35

## 2023-10-17 RX ADMIN — ONDANSETRON 4 MG: 2 INJECTION INTRAMUSCULAR; INTRAVENOUS at 12:51

## 2023-10-17 RX ADMIN — GLYCOPYRROLATE 0.2 MG: 0.4 INJECTION INTRAMUSCULAR; INTRAVENOUS at 11:19

## 2023-10-17 RX ADMIN — GABAPENTIN 100 MG: 100 CAPSULE ORAL at 20:06

## 2023-10-17 RX ADMIN — HYDROMORPHONE HYDROCHLORIDE 0.5 MG: 1 INJECTION, SOLUTION INTRAMUSCULAR; INTRAVENOUS; SUBCUTANEOUS at 13:26

## 2023-10-17 RX ADMIN — AMLODIPINE BESYLATE 10 MG: 10 TABLET ORAL at 15:31

## 2023-10-17 RX ADMIN — PANTOPRAZOLE SODIUM 40 MG: 40 INJECTION, POWDER, FOR SOLUTION INTRAVENOUS at 15:30

## 2023-10-17 RX ADMIN — INSULIN LISPRO 4 UNITS: 100 INJECTION, SOLUTION INTRAVENOUS; SUBCUTANEOUS at 14:49

## 2023-10-17 RX ADMIN — EPHEDRINE SULFATE 10 MG: 50 INJECTION INTRAVENOUS at 11:09

## 2023-10-17 RX ADMIN — FENTANYL CITRATE 50 MCG: 50 INJECTION, SOLUTION INTRAMUSCULAR; INTRAVENOUS at 12:48

## 2023-10-17 RX ADMIN — CHLORHEXIDINE GLUCONATE 15 ML: 1.2 SOLUTION ORAL at 08:51

## 2023-10-17 RX ADMIN — SODIUM CHLORIDE, POTASSIUM CHLORIDE, SODIUM LACTATE AND CALCIUM CHLORIDE 150 ML/HR: 600; 310; 30; 20 INJECTION, SOLUTION INTRAVENOUS at 15:10

## 2023-10-17 RX ADMIN — Medication 50 MCG: at 11:17

## 2023-10-17 RX ADMIN — SCOPOLAMINE 1 PATCH: 1.5 PATCH, EXTENDED RELEASE TRANSDERMAL at 08:51

## 2023-10-17 RX ADMIN — BUPIVACAINE HYDROCHLORIDE 60 ML: 2.5 INJECTION, SOLUTION EPIDURAL; INFILTRATION; INTRACAUDAL; PERINEURAL at 10:40

## 2023-10-17 RX ADMIN — DEXAMETHASONE SODIUM PHOSPHATE 4 MG: 10 INJECTION, SOLUTION INTRAMUSCULAR; INTRAVENOUS at 10:40

## 2023-10-17 RX ADMIN — NEBIVOLOL 10 MG: 10 TABLET ORAL at 15:31

## 2023-10-17 RX ADMIN — NEBIVOLOL 10 MG: 5 TABLET ORAL at 08:51

## 2023-10-17 RX ADMIN — GABAPENTIN 600 MG: 300 CAPSULE ORAL at 08:51

## 2023-10-17 RX ADMIN — ONDANSETRON 4 MG: 2 INJECTION INTRAMUSCULAR; INTRAVENOUS at 20:13

## 2023-10-17 RX ADMIN — INSULIN LISPRO 4 UNITS: 100 INJECTION, SOLUTION INTRAVENOUS; SUBCUTANEOUS at 17:03

## 2023-10-17 RX ADMIN — CEFAZOLIN 3000 MG: 10 INJECTION, POWDER, FOR SOLUTION INTRAVENOUS at 17:03

## 2023-10-17 RX ADMIN — THIAMINE HYDROCHLORIDE 100 MG: 100 INJECTION, SOLUTION INTRAMUSCULAR; INTRAVENOUS at 15:31

## 2023-10-17 RX ADMIN — HYDROMORPHONE HYDROCHLORIDE 0.5 MG: 1 INJECTION, SOLUTION INTRAMUSCULAR; INTRAVENOUS; SUBCUTANEOUS at 19:59

## 2023-10-17 RX ADMIN — INSULIN LISPRO 7 UNITS: 100 INJECTION, SOLUTION INTRAVENOUS; SUBCUTANEOUS at 20:21

## 2023-10-17 RX ADMIN — LIDOCAINE HYDROCHLORIDE 0.5 ML: 10 INJECTION, SOLUTION EPIDURAL; INFILTRATION; INTRACAUDAL; PERINEURAL at 08:52

## 2023-10-17 RX ADMIN — EPHEDRINE SULFATE 10 MG: 50 INJECTION INTRAVENOUS at 11:16

## 2023-10-17 RX ADMIN — GLYCOPYRROLATE 0.2 MG: 0.4 INJECTION INTRAMUSCULAR; INTRAVENOUS at 11:15

## 2023-10-17 RX ADMIN — Medication 50 MCG: at 11:20

## 2023-10-17 RX ADMIN — EPHEDRINE SULFATE 10 MG: 50 INJECTION INTRAVENOUS at 11:14

## 2023-10-17 RX ADMIN — PROPOFOL 300 MG: 10 INJECTION, EMULSION INTRAVENOUS at 10:36

## 2023-10-17 RX ADMIN — EPHEDRINE SULFATE 10 MG: 50 INJECTION INTRAVENOUS at 11:11

## 2023-10-17 RX ADMIN — NICARDIPINE HYDROCHLORIDE 5 MG/HR: 25 INJECTION, SOLUTION INTRAVENOUS at 14:22

## 2023-10-17 RX ADMIN — HYDROMORPHONE HYDROCHLORIDE 0.5 MG: 1 INJECTION, SOLUTION INTRAMUSCULAR; INTRAVENOUS; SUBCUTANEOUS at 12:40

## 2023-10-17 RX ADMIN — EPHEDRINE SULFATE 10 MG: 50 INJECTION INTRAVENOUS at 11:05

## 2023-10-17 RX ADMIN — SODIUM CHLORIDE 1000 ML: 9 INJECTION, SOLUTION INTRAVENOUS at 08:51

## 2023-10-17 RX ADMIN — PANTOPRAZOLE SODIUM 40 MG: 40 INJECTION, POWDER, FOR SOLUTION INTRAVENOUS at 08:51

## 2023-10-17 RX ADMIN — LIDOCAINE HYDROCHLORIDE 50 MG: 10 INJECTION, SOLUTION EPIDURAL; INFILTRATION; INTRACAUDAL; PERINEURAL at 10:36

## 2023-10-17 RX ADMIN — GABAPENTIN 100 MG: 100 CAPSULE ORAL at 15:31

## 2023-10-17 RX ADMIN — NICARDIPINE HYDROCHLORIDE 5 MG/HR: 25 INJECTION, SOLUTION INTRAVENOUS at 22:51

## 2023-10-17 RX ADMIN — ACETAMINOPHEN 1000 MG: 500 TABLET ORAL at 15:31

## 2023-10-17 RX ADMIN — CHLORHEXIDINE GLUCONATE 15 ML: 1.2 SOLUTION ORAL at 08:52

## 2023-10-17 RX ADMIN — FENTANYL CITRATE 50 MCG: 50 INJECTION, SOLUTION INTRAMUSCULAR; INTRAVENOUS at 13:52

## 2023-10-17 RX ADMIN — Medication 50 MCG: at 11:23

## 2023-10-17 RX ADMIN — SPIRONOLACTONE 25 MG: 25 TABLET ORAL at 15:31

## 2023-10-17 RX ADMIN — SUGAMMADEX 300 MG: 100 INJECTION, SOLUTION INTRAVENOUS at 11:59

## 2023-10-17 RX ADMIN — Medication 10 ML: at 20:07

## 2023-10-17 RX ADMIN — Medication 100 MCG: at 11:30

## 2023-10-17 RX ADMIN — ROCURONIUM BROMIDE 70 MG: 10 SOLUTION INTRAVENOUS at 10:36

## 2023-10-17 RX ADMIN — Medication 50 MCG: at 11:14

## 2023-10-17 RX ADMIN — Medication 100 MCG: at 11:27

## 2023-10-17 RX ADMIN — SODIUM CHLORIDE 150 ML/HR: 9 INJECTION, SOLUTION INTRAVENOUS at 09:24

## 2023-10-17 RX ADMIN — ROCURONIUM BROMIDE 10 MG: 10 SOLUTION INTRAVENOUS at 11:20

## 2023-10-17 NOTE — OP NOTE
OPERATIVE REPORT    DATE: 10/17/23    PATIENT: Carlos Eduardo Danielle    PREOPERATIVE DIAGNOSIS:    1. Morbid obesity with comorbidities    POSTOPERATIVE DIAGNOSIS:    1. Morbid obesity with multiple comorbidities.  2.  Hepatomegaly    PROCEDURES PERFORMED:  1. Robotic assisted laparoscopic sleeve gastrectomy (85% subtotal vertical gastrectomy)    2.  Esophagogastroduodenoscopy  3.  Laparoscopic Jose-Cut liver biopsy    SURGEON:  Akua Ruelas M.D.    ASSISTANT: DANIEL Ponce was instrumental in the success of the case and provided retraction, suction, camera holding, and skin closure.    ANESTHESIA:  General endotracheal with TAP block    ESTIMATED BLOOD LOSS:   minimal    FLUIDS:  Crystalloids.    SPECIMENS:  Subtotal gastrectomy, Jose-cut liver biopsy    DRAINS:  None.    COUNTS:  Correct.    COMPLICATIONS:  None.    FINDINGS: Hepatomegaly with macrovesicular steatosis, no hiatal hernia.  Thick stomach.    INDICATIONS:   Carlos Eduardo Danielle is a 53 y.o. male with morbid obesity and associated comorbidities who presents for elective laparoscopic sleeve gastrectomy. The patient has undergone our extensive preoperative education, teaching, and consent process. Everything is in order and they wish to proceed.         DESCRIPTION OF PROCEDURE:   The patient was brought to the operating room and placed supine upon the operating room table. SCDs were placed. The patient underwent uneventful general endotracheal anesthesia and bilateral TAP blocks per the anesthesiology staff.  She received subcutaneous Lovenox and was given Ancef. The abdomen was prepped with ChloraPrep and draped in the usual sterile fashion. An Ioban was used as well.  Timeout was performed.     A small transverse incision was made a few centimeters above and to the left of the umbilicus and the peritoneal cavity entered under direct camera visualization using an 8 mm 0° laparoscope and an OptiView trocar.  The abdomen was then insufflated to a pressure  of 16 mmHg with CO2 gas. Exploratory laparoscopy revealed no evidence of injury from the entrance technique.  The liver had had an uneven capsule with macrovesicular steatosis and hepatomegaly.  Two 8 mm ports were placed to the patient's left, lateral of the  first port, and a 12 mm port was placed in the right upper quadrant.  The robot was docked, all safety checks were performed, and I moved to the robot console.     A 2-0 Stratafix suture was placed to make a liver sling with bites of the peritoneum and the right aleksandar of the diaphragm, and the left lobe of the liver was elevated. The stomach was decompressed with an 18 Italian orogastric tube, which was then positioned in the antrum. The greater curvature vessels were taken down with the vessel sealer energy device beginning at the midpoint of the stomach and continued up to the angle of His, including the short gastrics. The left aleksandar was completely exposed and there was no sign of hiatal hernia here or anteriorly. This was photodocumented. The GE junction fat pad, which was quite generous, was elevated to make a clear landing zone for the stapler later. The greater curvature vessels were taken down with the energy device extending distally within 3 cm of the pylorus. Filmy adhesions to the stomach were taken down posteriorly.      A 36 Italian bougie was inserted and positioned along the lesser curvature of the stomach.  The stomach was marked at 1 cm from the angle of His, 3 cm from the incisura, and 5 cm from the pylorus using an ink saturated Kittner.  1 mg of IV glucagon was given to relax gastric smooth muscle.  Using the bougie as a template, a vertical sleeve gastrectomy was fashioned with successive staple loads.  The stomach was very thick, requiring upsizing of loads.  The pattern of loads was as follows: blue, white, blue, blue, white, white, white, blue.   The staple line was examined and was hemostatic. The gastrectomy specimen was removed through  the 12 mm port site. The specimen was later examined, and the staples were well-formed. Palpation of the specimen revealed no masses. The staple line of the sleeve gastrectomy revealed staples that were well-formed. The upper abdomen was flooded with saline, and endoscopy was performed.     The flexible endoscope was passed transorally down to the pylorus easily. The sleeve extended to within 6 cm proximal to the pylorus and was hemostatic. The sleeve was not narrow or twisted. There was no hiatal hernia or distal esophagitis. The rest of the esophagus was normal. The scope was removed. The leak test was normal.        I rescrubbed and suctioned the irrigation fluid from the upper abdomen, making sure it was dry. The staple line on the stomach was hemostatic.  The staple line was treated with 4 ml of aerosolized Tisseel fibrin glue. The liver stitch was removed easily. An 18 gauge Jose-Cut needle was inserted through the subxiphoid incision.  A core needle biopsy was taken from the left lobe of the liver.  Hemostasis was obtained immediately with cautery.  The specimen was examined and was adequate.      The 12 mm port was removed under direct visualization and there was no bleeding. This incision was closed with 0-Vicryl transfascial suture using a suture passer under direct visualization in a horizontal mattress fashion. All other ports were removed and then replaced under direct visualization and all of these were hemostatic. The instruments were removed and the abdomen was desufflated. The ports were removed.  Skin incisions were closed with staples and sterile dressings placed.  The subxiphoid skin nick was closed with glue. 10 mg of IV Barhemsys was given at the end of the case.  The patient was awakened and taken to recovery in good condition, having tolerated the procedure well. All sponge, needle, and instrument counts were correct.

## 2023-10-17 NOTE — PLAN OF CARE
Goal Outcome Evaluation:         -VSS, RA-2LNC  -Recovering from procedure  -Ambulating and voiding  -Pain and nausea controlled at this time

## 2023-10-17 NOTE — ANESTHESIA PREPROCEDURE EVALUATION
Anesthesia Evaluation     Patient summary reviewed and Nursing notes reviewed   no history of anesthetic complications:   NPO Solid Status: > 8 hours  NPO Liquid Status: > 2 hours           Airway   Mallampati: II  TM distance: >3 FB  Neck ROM: full  Possible difficult intubation  Dental - normal exam     Pulmonary     breath sounds clear to auscultation  (+) a smoker Former Abstained day of surgery, asthma,sleep apnea  Cardiovascular   Exercise tolerance: poor (<4 METS)    ECG reviewed  PT is on anticoagulation therapy  Patient on routine beta blocker and Beta blocker given within 24 hours of surgery  Rhythm: regular  Rate: normal    (+) hypertension well controlled less than 2 medications, dysrhythmias, CHF , hyperlipidemia    ROS comment: 6/23: Interpretation Summary       ·  Estimated left ventricular EF = 60%  ·  Left ventricular wall thickness is consistent with mild concentric hypertrophy.  ·  Mild to moderate aortic valve regurgitation is present  ·  Mild mitral valve regurgitation is present         Neuro/Psych  (+) TIA, psychiatric history Anxiety and Depression  GI/Hepatic/Renal/Endo    (+) morbid obesity, GERD well controlled, liver disease fatty liver disease, diabetes mellitus type 2 poorly controlled  (-)  obesity    Musculoskeletal     (+) back pain  Abdominal   (+) obese    Abdomen: soft.   Substance History      OB/GYN          Other   arthritis,                 Anesthesia Plan    ASA 4     general with block     intravenous induction     Anesthetic plan, risks, benefits, and alternatives have been provided, discussed and informed consent has been obtained with: patient.    Plan discussed with CRNA.    CODE STATUS:

## 2023-10-17 NOTE — CONSULTS
[facility] Medicine Services  CONSULT NOTE      Patient Name: Carlos Eduardo Danielle  : 1970  MRN: [id number]    Primary Care Physician: Ian Vann MD  Provider requesting consultation: Akua Ruelas MD    Subjective   Subjective     Reason for Consultation:  Management of hypertension    HPI:  Carlos Eduardo Danielle is a 53 y.o. male with a history of hypertension, hyperlipidemia, AJAY, depression, T2DM, GERD, A-fib, COPD, CHF, chronic back pain, OA, obesity, underwent a gastric sleeve on 10/17/2023 with Dr. Ruelas.  Postop patient is doing well with complaints of some abdominal soreness and mild nausea.  No fevers, shortness of air, chest pain, vomiting, or any other complaints at this time.  RN reports that she restarted his Cardene drip approximately 30 minutes ago.  His SBP now down in the 160s.  Hospitalist are being consulted for management of hypertension.      Review of Systems   Constitutional: Negative.    HENT: Negative.     Eyes: Negative.    Respiratory: Negative.     Cardiovascular: Negative.    Gastrointestinal:  Positive for abdominal pain and nausea. Negative for constipation, diarrhea and vomiting.   Endocrine: Negative.    Genitourinary: Negative.    Musculoskeletal: Negative.    Skin: Negative.    Allergic/Immunologic: Negative.    Neurological: Negative.    Hematological: Negative.    Psychiatric/Behavioral: Negative.         All other systems reviewed and are negative.     Personal History     Past Medical History:   Diagnosis Date    Anxiety     Asthma 2023    Back pain     Back problem     Cellulitis     HX; BILATERAL LEGS    CHF (congestive heart failure) 2023    echo 6/3/2023 LVEF 60%    Colon polyp     Depression     Diabetes mellitus     GERD (gastroesophageal reflux disease)     on Protonix daily. EGD 2020; no hx of h pylori    Hyperlipidemia     Hypertension     Osteoarthritis     Paroxysmal atrial fibrillation 2023     Sleep apnea     noncompliant with BIPAP    Suicide attempt     reports hx of suicide attempts three times in 30 years ago    TIA (transient ischemic attack)     2010 and 2015; related to hypertension.    Type 2 diabetes mellitus     dx in 2019; no insulin; last A1c 8.0    Venous stasis     bilateral lower ext       Past Surgical History:   Procedure Laterality Date    APPENDECTOMY N/A 09/08/2021    Procedure: APPENDECTOMY LAPAROSCOPIC;  Surgeon: Radames Arauz MD;  Location:  [facility];  Service: General;  Laterality: N/A;    COLONOSCOPY  2020    ENDOSCOPY N/A 04/17/2023    Procedure: ESOPHAGOGASTRODUODENOSCOPY;  Surgeon: Akua Ruelas MD;  Location:  [facility];  Service: Bariatric;  Laterality: N/A;    EPIDIDYMAL CYST EXCISION      TONSILLECTOMY  2006       Family History:  family history includes Depression in his mother; Diabetes in his mother; Heart disease in his maternal grandmother; Hypertension in his maternal grandfather, maternal grandmother, and mother; Kidney disease in his maternal uncle; Mental illness in his brother; No Known Problems in his brother and father; Obesity in his mother; Schizophrenia in his mother. Otherwise pertinent FHx was reviewed and unremarkable.     Social History:  reports that he quit smoking about 15 years ago. His smoking use included cigarettes. He has a 15.00 pack-year smoking history. He has been exposed to tobacco smoke. He has never used smokeless tobacco. He reports current alcohol use. He reports that he does not use drugs.    Medications:  amLODIPine, apixaban, empagliflozin, glimepiride, metFORMIN ER, mirtazapine, nebivolol, ondansetron, pantoprazole, spironolactone, torsemide, and venlafaxine    Scheduled Meds:acetaminophen, 1,000 mg, Oral, Q8H   Or  acetaminophen, 1,000 mg, Oral, Q8H  amLODIPine, 10 mg, Oral, Q24H  ceFAZolin, 3,000 mg, Intravenous, Q8H  [START ON 10/18/2023] cyanocobalamin, 1,000 mcg, Intramuscular, Once  [START ON  10/18/2023] enoxaparin, 60 mg, Subcutaneous, Q12H  gabapentin, 100 mg, Oral, TID   Or  gabapentin, 100 mg, Oral, TID  insulin lispro, 2-9 Units, Subcutaneous, 4x Daily AC & at Bedtime  nebivolol, 10 mg, Oral, Daily  niCARdipine, , ,   [START ON 10/18/2023] pantoprazole, 40 mg, Oral, Daily  sodium chloride, 10 mL, Intravenous, Q12H  sodium chloride, , ,   spironolactone, 25 mg, Oral, Daily  [START ON 10/18/2023] thiamine (B-1) 100 mg, folic acid 1 mg in sodium chloride 0.9 % 1,000 mL infusion, 200 mL/hr, Intravenous, Once  torsemide, 20 mg, Oral, Daily      Continuous Infusions:lactated ringers, 150 mL/hr, Last Rate: 150 mL/hr (10/17/23 1510)  niCARdipine, 5-15 mg/hr, Last Rate: 5 mg/hr (10/17/23 1954)  [START ON 10/18/2023] sodium chloride 0.45 % with KCl 20 mEq, 100 mL/hr  sodium chloride, 150 mL/hr, Last Rate: 150 mL/hr (10/17/23 1028)      PRN Meds:.  ALPRAZolam    amisulpride (antiemetic)    dextrose    dextrose    diphenhydrAMINE    [START ON 10/18/2023] ferric gluconate    glucagon (human recombinant)    HYDROmorphone **AND** naloxone    HYDROmorphone    influenza vaccine    metoclopramide    metoclopramide    mirtazapine    niCARdipine    ondansetron **FOLLOWED BY** [START ON 10/21/2023] ondansetron    oxyCODONE    phenol    prochlorperazine    prochlorperazine    promethazine    simethicone    sodium chloride    sodium chloride    sodium chloride    Allergies   Allergen Reactions    Atorvastatin Headache     Weakness.      Topamax [Topiramate] Other (See Comments)     Migraine         Objective   Objective     Vital Signs:   Temp:  [97.4 °F (36.3 °C)-98 °F (36.7 °C)] 97.8 °F (36.6 °C)  Heart Rate:  [55-68] 66  Resp:  [12-18] 18  BP: (137-203)/() 178/97  Flow (L/min):  [2-4] 2    Physical Exam  Constitutional: Awake, alert, resting in bed  Eyes: PERRLA, sclerae anicteric, no conjunctival injection  HENT: NCAT, mucous membranes moist  Neck: Supple, no thyromegaly, no lymphadenopathy, trachea  midline  Respiratory: Clear to auscultation bilaterally, nonlabored respirations   Cardiovascular: RRR, no murmurs, rubs, or gallops, palpable pedal pulses bilaterally  Gastrointestinal: Positive bowel sounds, soft, diffuse tenderness, nondistended  Musculoskeletal: Moderate bilateral ankle edema, no clubbing or cyanosis to extremities  Psychiatric: Appropriate affect, cooperative  Neurologic: Oriented x 3, strength symmetric in all extremities, Cranial Nerves grossly intact to confrontation, speech clear  Skin: No rashes          Result Review:  I have personally reviewed the results from the time of admission and agree with these findings:  []  Laboratory  []  Radiology  []  EKG/Telemetry   []  Pathology  [x]  Old records  []  Other:  Most notable findings include:     LAB RESULTS:      Lab 10/11/23  0950   WBC 5.74   HEMOGLOBIN 15.3   HEMATOCRIT 45.9   PLATELETS 184   MCV 84.8   PROTIME 13.0   APTT 33.2         Lab 10/11/23  0950   POTASSIUM 4.2   HEMOGLOBIN A1C 7.90*             Lab 10/11/23  0950   PROTIME 13.0   INR 0.97                 Brief Urine Lab Results       None          Microbiology Results (last 10 days)       Procedure Component Value - Date/Time    MRSA Screen Culture (Outpatient) - Swab, Nares [[id number]]  (Normal) Collected: 10/11/23 0950    Lab Status: Final result Specimen: Swab from Nares Updated: 10/12/23 1057     MRSA Screen Cx No Methicillin Resistant Staphylococcus aureus isolated    Narrative:      The negative predictive value of this diagnostic test is high and should only be used to consider de-escalating anti-MRSA therapy. A positive result may indicate colonization with MRSA and must be correlated clinically.            Peripheral Block    Result Date: 10/17/2023  Junior IRIS Mason, CRNA     10/17/2023 11:35 AM Peripheral Block Patient reassessed immediately prior to procedure Patient location during procedure: OR Reason for block: at surgeon's request and post-op pain management  "Performed by Anesthesiologist: Valente Chan MD Preanesthetic Checklist Completed: patient identified, IV checked, site marked, risks and benefits discussed, surgical consent, monitors and equipment checked, pre-op evaluation and timeout performed Prep: Pt Position: supine Sterile barriers:cap, gloves, mask and washed/disinfected hands Prep: ChloraPrep Patient monitoring: blood pressure monitoring, continuous pulse oximetry and EKG Procedure Sedation: yes Performed under: general Guidance:ultrasound guided Images:still images obtained, printed/placed on chart Laterality:Bilateral Block Type:TAP Injection Technique:single-shot Needle Type:short-bevel and echogenic Needle Gauge:20 G Resistance on Injection: none Medications Used: dexamethasone sodium phosphate injection - Injection  4 mg - 10/17/2023 10:40:00 AM bupivacaine PF (MARCAINE) 0.25 % injection - Injection  60 mL - 10/17/2023 10:40:00 AM Medications Comment:Block Injection:  LA dose divided between Right and Left block Post Assessment Injection Assessment: negative aspiration for heme, incremental injection and no paresthesia on injection Patient Tolerance:comfortable throughout block Complications:no Additional Notes Subcostal TAPs A high-frequency linear transducer, with sterile cover, was placed sub-xiphoid to identify Linea Alba, right and left Rectus Abdominus Muscles (CHUCKIE). The transducer was moved either right or left subcostally to identify the CHUCKIE and the Transverse Abdominus Muscle (SINGH). The insertion site was prepped in sterile fashion and then localized with 2-5 ml of 1% Lidocaine. Using ultrasound-guidance, a 20-gauge B-Gomes 4\" Ultraplex 360 non-stimulating echogenic needle was advanced in plane, from medial to lateral, until the tip of the needle was in the fascial plane between the CHUCKIE and SINGH. 1-3ml of preservative free normal saline was used to hydro-dissect the fascial planes. After the fascial plane was verified, the local " anesthetic (LA) was injected. The procedure was repeated on the opposite side for bilateral coverage. Aspiration every 5 ml to prevent intravascular injection. Injection was completed with negative aspiration of blood and negative intravascular injection. Injection pressures were normal with minimal resistance. The subcostal approach to the TAP nerve block ideally anesthetizes the intercostal nerves T6-T9.       Results for orders placed during the hospital encounter of 06/01/23    Adult Transthoracic Echo Complete w/ Color, Spectral and Contrast if Necessary Per Protocol    Interpretation Summary    Estimated left ventricular EF = 60%    Left ventricular wall thickness is consistent with mild concentric hypertrophy.    Mild to moderate aortic valve regurgitation is present    Mild mitral valve regurgitation is present      Assessment & Plan   Assessment & Plan     Active Hospital Problems    Diagnosis  POA    **Body mass index (BMI) of 60.0-69.9 in adult [Z68.44]  Not Applicable    Morbid obesity [E66.01]  Yes    Chronic heart failure with preserved ejection fraction (HFpEF) [I50.32]  Yes    Paroxysmal atrial fibrillation [I48.0]  Yes    GERD (gastroesophageal reflux disease) [K21.9]  Yes    Type 2 diabetes mellitus [E11.9]  Yes    Hyperlipidemia [E78.5]  Yes    Essential hypertension [I10]  Yes    Type 2 diabetes mellitus, without long-term current use of insulin [E11.9]  Yes    AJAY (obstructive sleep apnea) -intolerant of BiPAP [G47.33]  Yes      Resolved Hospital Problems   No resolved problems to display.     Carlos Eduardo Danielle is a 53 y.o. male with a history of hypertension, hyperlipidemia, AJAY, depression, T2DM, GERD, A-fib, COPD, CHF, chronic back pain, OA, obesity, underwent a gastric sleeve on 10/17/2023 with Dr. Ruelas.  Hospitalist are being consulted for management of hypertension.     Assessment and Plan:    Morbid obesity  -- s/p gastric sleeve with Dr. Ruelas on 10/17  -- Managed by   Suraj    Hypertension  Hyperlipidemia  -- continue Norvasc, spironolactone, torsemide daily  -- continue cardene drip, wean as tolerated  -- SBP now down to 168    A-fib  -- Hold Eliquis x48 hours preop  -- Lovenox 60 mg every 12 hours starting on 10/18/2023 per Dr. Ruelas    CHF  -- Monitor I's and O's  -- Daily weights  -- Continue spironolactone and torsemide    T2DM  -- FSBG with SSI    AJAY  -- Does not use CPAP    GERD  -- PPI      Thank you for allowing [facility] Medicine Service to provide consultative care for your patient, we will continue to follow while clinically appropriate.    AGUILAR Dang  10/17/23    Electronically signed by AGUILAR Dang, 10/17/23, 8:24 PM EDT.

## 2023-10-17 NOTE — ANESTHESIA PROCEDURE NOTES
Airway  Urgency: elective    Date/Time: 10/17/2023 10:40 AM  Airway not difficult    General Information and Staff    Patient location during procedure: OR  CRNA/CAA: Junior IRIS Mason, CRNA    Indications and Patient Condition  Indications for airway management: airway protection    Preoxygenated: yes  MILS not maintained throughout  Mask difficulty assessment: 1 - vent by mask    Final Airway Details  Final airway type: endotracheal airway      Successful airway: ETT  Cuffed: yes   Successful intubation technique: direct laryngoscopy  Facilitating devices/methods: intubating stylet and anterior pressure/BURP  Endotracheal tube insertion site: oral  Blade: Randy  Blade size: 3  ETT size (mm): 7.5  Cormack-Lehane Classification: grade I - full view of glottis  Placement verified by: chest auscultation and capnometry   Measured from: lips  ETT/EBT  to lips (cm): 22  Number of attempts at approach: 1  Assessment: lips, teeth, and gum same as pre-op and atraumatic intubation    Additional Comments  Negative epigastric sounds, Breath sound equal bilaterally with symmetric chest rise and fall

## 2023-10-17 NOTE — ANESTHESIA PROCEDURE NOTES
"Peripheral Block      Patient reassessed immediately prior to procedure    Patient location during procedure: OR  Reason for block: at surgeon's request and post-op pain management  Performed by  Anesthesiologist: Valente Chan MD  Preanesthetic Checklist  Completed: patient identified, IV checked, site marked, risks and benefits discussed, surgical consent, monitors and equipment checked, pre-op evaluation and timeout performed  Prep:  Pt Position: supine  Sterile barriers:cap, gloves, mask and washed/disinfected hands  Prep: ChloraPrep  Patient monitoring: blood pressure monitoring, continuous pulse oximetry and EKG  Procedure    Sedation: yes  Performed under: general  Guidance:ultrasound guided  Images:still images obtained, printed/placed on chart    Laterality:Bilateral  Block Type:TAP  Injection Technique:single-shot  Needle Type:short-bevel and echogenic  Needle Gauge:20 G  Resistance on Injection: none    Medications Used: dexamethasone sodium phosphate injection - Injection   4 mg - 10/17/2023 10:40:00 AM  bupivacaine PF (MARCAINE) 0.25 % injection - Injection   60 mL - 10/17/2023 10:40:00 AM      Medications  Comment:Block Injection:  LA dose divided between Right and Left block        Post Assessment  Injection Assessment: negative aspiration for heme, incremental injection and no paresthesia on injection  Patient Tolerance:comfortable throughout block  Complications:no  Additional Notes    Subcostal TAPs    A high-frequency linear transducer, with sterile cover, was placed sub-xiphoid to identify Linea Alba, right and left Rectus Abdominus Muscles (CHUCKIE). The transducer was moved either right or left subcostally to identify the CHUCKIE and the Transverse Abdominus Muscle (SINGH). The insertion site was prepped in sterile fashion and then localized with 2-5 ml of 1% Lidocaine. Using ultrasound-guidance, a 20-gauge B-Gomes 4\" Ultraplex 360 non-stimulating echogenic needle was advanced in plane, from " medial to lateral, until the tip of the needle was in the fascial plane between the CHUCKIE and SINGH. 1-3ml of preservative free normal saline was used to hydro-dissect the fascial planes. After the fascial plane was verified, the local anesthetic (LA) was injected. The procedure was repeated on the opposite side for bilateral coverage. Aspiration every 5 ml to prevent intravascular injection. Injection was completed with negative aspiration of blood and negative intravascular injection. Injection pressures were normal with minimal resistance. The subcostal approach to the TAP nerve block ideally anesthetizes the intercostal nerves T6-T9.

## 2023-10-17 NOTE — BRIEF OP NOTE
GASTRIC SLEEVE LAPAROSCOPIC WITH DAVINCI ROBOT AND ESOPHAGOGASTRODUODENOSCOPY  Progress Note    Carlos Eduardo Danielle  10/17/2023    Pre-op Diagnosis:   Body mass index (BMI) of 60.0-69.9 in adult [Z68.44]       Post-Op Diagnosis Codes:     * Body mass index (BMI) of 60.0-69.9 in adult [Z68.44]    Procedure/CPT® Codes:  HI LAPS GSTRC RSTRICTIV PX LONGITUDINAL GASTRECTOMY [71152]  HI ESOPHAGOGASTRODUODENOSCOPY TRANSORAL DIAGNOSTIC [01488]  HI NEEDLE BIOPSY LIVER [81067]      Procedure(s):  GASTRIC SLEEVE LAPAROSCOPIC WITH DAVINCI ROBOT,  LIVER BIOPSY , ESOPHAGOGASTRODUODENOSCOPY              Surgeon(s):  Akua Ruelas MD    Anesthesia: General with Block    Staff:   Circulator: Mary Jane Azevedo RN; Lupe Adler RN; Trang Zuñiga RN  Scrub Person: Summer Cooper; Renée Casas  Nursing Assistant: Carmelita Queen CNA  Assistant: Fan Nunez PA-C  Assistant: Fan Nunez PA-C      Estimated Blood Loss: minimal    Urine Voided: * No values recorded between 10/17/2023 10:28 AM and 10/17/2023 12:09 PM *    Specimens:                Specimens       ID Source Type Tests Collected By Collected At Frozen?    A Stomach Tissue TISSUE PATHOLOGY EXAM   Akua Ruelas MD 10/17/23 1146 No    Description: SUB-TOTAL GASTRECTOMY    B Liver Tissue TISSUE PATHOLOGY EXAM   Akua Ruelas MD 10/17/23 1155 No    Description: LIVER BIOPSY                  Drains:   [REMOVED] NG/OG Tube Orogastric 18 Fr Center mouth (Removed)       Findings: Hepatomegaly with macrovesicular steatosis, no hiatal hernia.  Thick stomach.        Complications: None immediate.    Assistant: Fan Nunez PA-C  was responsible for performing the following activities: Retraction, Suction, Irrigation, Suturing, Closing, Placing Dressing, and Held/Positioned Camera and their skilled assistance was necessary for the success of this case.    Akua Ruelas MD     Date: 10/17/2023  Time: 12:28 EDT

## 2023-10-17 NOTE — ANESTHESIA POSTPROCEDURE EVALUATION
Patient: Carlos Eduardo Danielle    Procedure Summary       Date: 10/17/23 [facility]    Anesthesia Start: 1028 Anesthesia Stop: 1213    Procedure: GASTRIC SLEEVE LAPAROSCOPIC WITH DAVINCI ROBOT, ESOPHAGOGASTRODUODENOSCOPY (Abdomen) Diagnosis:       Body mass index (BMI) of 60.0-69.9 in adult      (Body mass index (BMI) of 60.0-69.9 in adult [Z68.44])    Surgeons: Akua Ruelas MD Provider: Valente Chan MD    Anesthesia Type: general with block ASA Status: 4            Anesthesia Type: general with block    Vitals  No vitals data found for the desired time range.          Post Anesthesia Care and Evaluation    Patient location during evaluation: PACU  Patient participation: complete - patient participated  Level of consciousness: awake and alert  Pain management: adequate    Airway patency: patent  Anesthetic complications: No anesthetic complications  PONV Status: none  Cardiovascular status: hemodynamically stable and acceptable  Respiratory status: nonlabored ventilation, acceptable and nasal cannula  Hydration status: acceptable

## 2023-10-18 ENCOUNTER — APPOINTMENT (OUTPATIENT)
Dept: GENERAL RADIOLOGY | Facility: HOSPITAL | Age: 53
End: 2023-10-18
Payer: COMMERCIAL

## 2023-10-18 ENCOUNTER — READMISSION MANAGEMENT (OUTPATIENT)
Dept: CALL CENTER | Facility: HOSPITAL | Age: 53
End: 2023-10-18
Payer: COMMERCIAL

## 2023-10-18 VITALS
SYSTOLIC BLOOD PRESSURE: 168 MMHG | TEMPERATURE: 98.5 F | WEIGHT: 315 LBS | HEIGHT: 71 IN | HEART RATE: 54 BPM | OXYGEN SATURATION: 93 % | DIASTOLIC BLOOD PRESSURE: 88 MMHG | RESPIRATION RATE: 18 BRPM | BODY MASS INDEX: 44.1 KG/M2

## 2023-10-18 PROBLEM — K21.9 GERD (GASTROESOPHAGEAL REFLUX DISEASE): Status: RESOLVED | Noted: 2023-02-22 | Resolved: 2023-10-18

## 2023-10-18 LAB
ALBUMIN SERPL-MCNC: 4.1 G/DL (ref 3.5–5.2)
ALBUMIN/GLOB SERPL: 1.7 G/DL
ALP SERPL-CCNC: 93 U/L (ref 39–117)
ALT SERPL W P-5'-P-CCNC: 38 U/L (ref 1–41)
ANION GAP SERPL CALCULATED.3IONS-SCNC: 12 MMOL/L (ref 5–15)
AST SERPL-CCNC: 30 U/L (ref 1–40)
BASOPHILS # BLD AUTO: 0.01 10*3/MM3 (ref 0–0.2)
BASOPHILS NFR BLD AUTO: 0.1 % (ref 0–1.5)
BILIRUB SERPL-MCNC: 0.5 MG/DL (ref 0–1.2)
BUN SERPL-MCNC: 18 MG/DL (ref 6–20)
BUN/CREAT SERPL: 18.8 (ref 7–25)
CALCIUM SPEC-SCNC: 8.9 MG/DL (ref 8.6–10.5)
CHLORIDE SERPL-SCNC: 104 MMOL/L (ref 98–107)
CO2 SERPL-SCNC: 24 MMOL/L (ref 22–29)
CREAT SERPL-MCNC: 0.96 MG/DL (ref 0.76–1.27)
CYTO UR: NORMAL
DEPRECATED RDW RBC AUTO: 45.8 FL (ref 37–54)
EGFRCR SERPLBLD CKD-EPI 2021: 95.1 ML/MIN/1.73
EOSINOPHIL # BLD AUTO: 0.02 10*3/MM3 (ref 0–0.4)
EOSINOPHIL NFR BLD AUTO: 0.2 % (ref 0.3–6.2)
ERYTHROCYTE [DISTWIDTH] IN BLOOD BY AUTOMATED COUNT: 14.6 % (ref 12.3–15.4)
GLOBULIN UR ELPH-MCNC: 2.4 GM/DL
GLUCOSE BLDC GLUCOMTR-MCNC: 147 MG/DL (ref 70–130)
GLUCOSE BLDC GLUCOMTR-MCNC: 184 MG/DL (ref 70–130)
GLUCOSE BLDC GLUCOMTR-MCNC: 197 MG/DL (ref 70–130)
GLUCOSE SERPL-MCNC: 197 MG/DL (ref 65–99)
HCT VFR BLD AUTO: 44.8 % (ref 37.5–51)
HGB BLD-MCNC: 14.9 G/DL (ref 13–17.7)
IMM GRANULOCYTES # BLD AUTO: 0.02 10*3/MM3 (ref 0–0.05)
IMM GRANULOCYTES NFR BLD AUTO: 0.2 % (ref 0–0.5)
IRON 24H UR-MRATE: 63 MCG/DL (ref 59–158)
LAB AP CASE REPORT: NORMAL
LAB AP CLINICAL INFORMATION: NORMAL
LAB AP DIAGNOSIS COMMENT: NORMAL
LYMPHOCYTES # BLD AUTO: 1.31 10*3/MM3 (ref 0.7–3.1)
LYMPHOCYTES NFR BLD AUTO: 15.7 % (ref 19.6–45.3)
MCH RBC QN AUTO: 28.6 PG (ref 26.6–33)
MCHC RBC AUTO-ENTMCNC: 33.3 G/DL (ref 31.5–35.7)
MCV RBC AUTO: 86 FL (ref 79–97)
MONOCYTES # BLD AUTO: 0.5 10*3/MM3 (ref 0.1–0.9)
MONOCYTES NFR BLD AUTO: 6 % (ref 5–12)
NEUTROPHILS NFR BLD AUTO: 6.51 10*3/MM3 (ref 1.7–7)
NEUTROPHILS NFR BLD AUTO: 77.8 % (ref 42.7–76)
NRBC BLD AUTO-RTO: 0 /100 WBC (ref 0–0.2)
PATH REPORT.FINAL DX SPEC: NORMAL
PATH REPORT.GROSS SPEC: NORMAL
PLATELET # BLD AUTO: 195 10*3/MM3 (ref 140–450)
PMV BLD AUTO: 11.8 FL (ref 6–12)
POTASSIUM SERPL-SCNC: 4.2 MMOL/L (ref 3.5–5.2)
PROT SERPL-MCNC: 6.5 G/DL (ref 6–8.5)
RBC # BLD AUTO: 5.21 10*6/MM3 (ref 4.14–5.8)
SODIUM SERPL-SCNC: 140 MMOL/L (ref 136–145)
WBC NRBC COR # BLD: 8.37 10*3/MM3 (ref 3.4–10.8)

## 2023-10-18 PROCEDURE — 99024 POSTOP FOLLOW-UP VISIT: CPT | Performed by: SURGERY

## 2023-10-18 PROCEDURE — 25010000002 ENOXAPARIN PER 10 MG: Performed by: SURGERY

## 2023-10-18 PROCEDURE — 25010000002 CEFAZOLIN PER 500 MG: Performed by: SURGERY

## 2023-10-18 PROCEDURE — 25010000002 INFLUENZA VAC SPLIT QUAD 0.5 ML SUSPENSION PREFILLED SYRINGE: Performed by: SURGERY

## 2023-10-18 PROCEDURE — 25010000002 CYANOCOBALAMIN PER 1000 MCG: Performed by: SURGERY

## 2023-10-18 PROCEDURE — 82948 REAGENT STRIP/BLOOD GLUCOSE: CPT

## 2023-10-18 PROCEDURE — 80053 COMPREHEN METABOLIC PANEL: CPT | Performed by: SURGERY

## 2023-10-18 PROCEDURE — 25810000003 SODIUM CHLORIDE 0.9 % SOLUTION 250 ML FLEX CONT: Performed by: SURGERY

## 2023-10-18 PROCEDURE — 90686 IIV4 VACC NO PRSV 0.5 ML IM: CPT | Performed by: SURGERY

## 2023-10-18 PROCEDURE — 97161 PT EVAL LOW COMPLEX 20 MIN: CPT

## 2023-10-18 PROCEDURE — 0 DIATRIZOATE MEGLUMINE & SODIUM PER 1 ML: Performed by: SURGERY

## 2023-10-18 PROCEDURE — G0008 ADMIN INFLUENZA VIRUS VAC: HCPCS | Performed by: SURGERY

## 2023-10-18 PROCEDURE — 63710000001 INSULIN LISPRO (HUMAN) PER 5 UNITS: Performed by: SURGERY

## 2023-10-18 PROCEDURE — 85025 COMPLETE CBC W/AUTO DIFF WBC: CPT | Performed by: SURGERY

## 2023-10-18 PROCEDURE — 83540 ASSAY OF IRON: CPT | Performed by: SURGERY

## 2023-10-18 PROCEDURE — 74240 X-RAY XM UPR GI TRC 1CNTRST: CPT

## 2023-10-18 PROCEDURE — 25010000002 POTASSIUM CHLORIDE PER 2 MEQ: Performed by: SURGERY

## 2023-10-18 PROCEDURE — 25010000002 THIAMINE PER 100 MG: Performed by: SURGERY

## 2023-10-18 PROCEDURE — 25810000003 SODIUM CHLORIDE 0.9 % SOLUTION 1,000 ML FLEX CONT: Performed by: SURGERY

## 2023-10-18 RX ORDER — OXYCODONE HYDROCHLORIDE 5 MG/1
5 TABLET ORAL EVERY 4 HOURS PRN
Qty: 10 TABLET | Refills: 0 | Status: SHIPPED | OUTPATIENT
Start: 2023-10-18

## 2023-10-18 RX ORDER — HYDRALAZINE HYDROCHLORIDE 25 MG/1
25 TABLET, FILM COATED ORAL EVERY 8 HOURS PRN
Status: DISCONTINUED | OUTPATIENT
Start: 2023-10-18 | End: 2023-10-18 | Stop reason: HOSPADM

## 2023-10-18 RX ADMIN — OXYCODONE HYDROCHLORIDE 5 MG: 5 TABLET ORAL at 05:29

## 2023-10-18 RX ADMIN — SPIRONOLACTONE 25 MG: 25 TABLET ORAL at 08:10

## 2023-10-18 RX ADMIN — GABAPENTIN 100 MG: 100 CAPSULE ORAL at 08:11

## 2023-10-18 RX ADMIN — HYDROMORPHONE HYDROCHLORIDE 2 MG: 2 TABLET ORAL at 07:18

## 2023-10-18 RX ADMIN — HYDRALAZINE HYDROCHLORIDE 25 MG: 25 TABLET, FILM COATED ORAL at 15:12

## 2023-10-18 RX ADMIN — TORSEMIDE 20 MG: 20 TABLET ORAL at 08:11

## 2023-10-18 RX ADMIN — PANTOPRAZOLE SODIUM 40 MG: 40 TABLET, DELAYED RELEASE ORAL at 08:11

## 2023-10-18 RX ADMIN — ACETAMINOPHEN 1000 MG: 500 TABLET ORAL at 08:13

## 2023-10-18 RX ADMIN — INSULIN LISPRO 2 UNITS: 100 INJECTION, SOLUTION INTRAVENOUS; SUBCUTANEOUS at 11:27

## 2023-10-18 RX ADMIN — POTASSIUM CHLORIDE AND SODIUM CHLORIDE 100 ML/HR: 450; 150 INJECTION, SOLUTION INTRAVENOUS at 08:11

## 2023-10-18 RX ADMIN — NEBIVOLOL 10 MG: 10 TABLET ORAL at 08:11

## 2023-10-18 RX ADMIN — CEFAZOLIN 3000 MG: 10 INJECTION, POWDER, FOR SOLUTION INTRAVENOUS at 03:49

## 2023-10-18 RX ADMIN — INFLUENZA A VIRUS A/VICTORIA/4897/2022 IVR-238 (H1N1) ANTIGEN (FORMALDEHYDE INACTIVATED), INFLUENZA A VIRUS A/DARWIN/9/2021 SAN-010 (H3N2) ANTIGEN (FORMALDEHYDE INACTIVATED), INFLUENZA B VIRUS B/PHUKET/3073/2013 ANTIGEN (FORMALDEHYDE INACTIVATED), AND INFLUENZA B VIRUS B/MICHIGAN/01/2021 ANTIGEN (FORMALDEHYDE INACTIVATED) 0.5 ML: 15; 15; 15; 15 INJECTION, SUSPENSION INTRAMUSCULAR at 18:33

## 2023-10-18 RX ADMIN — GABAPENTIN 100 MG: 100 CAPSULE ORAL at 15:03

## 2023-10-18 RX ADMIN — AMLODIPINE BESYLATE 10 MG: 10 TABLET ORAL at 08:10

## 2023-10-18 RX ADMIN — ACETAMINOPHEN 1000 MG: 500 TABLET ORAL at 15:03

## 2023-10-18 RX ADMIN — CYANOCOBALAMIN 1000 MCG: 1000 INJECTION, SOLUTION INTRAMUSCULAR; SUBCUTANEOUS at 08:10

## 2023-10-18 RX ADMIN — NICARDIPINE HYDROCHLORIDE 5 MG/HR: 25 INJECTION, SOLUTION INTRAVENOUS at 04:40

## 2023-10-18 RX ADMIN — ACETAMINOPHEN 1000 MG: 500 TABLET ORAL at 00:46

## 2023-10-18 RX ADMIN — INSULIN LISPRO 2 UNITS: 100 INJECTION, SOLUTION INTRAVENOUS; SUBCUTANEOUS at 08:12

## 2023-10-18 RX ADMIN — FOLIC ACID 200 ML/HR: 5 INJECTION, SOLUTION INTRAMUSCULAR; INTRAVENOUS; SUBCUTANEOUS at 04:34

## 2023-10-18 RX ADMIN — ENOXAPARIN SODIUM 60 MG: 60 INJECTION SUBCUTANEOUS at 08:10

## 2023-10-18 RX ADMIN — Medication 10 ML: at 08:11

## 2023-10-18 NOTE — PLAN OF CARE
Goal Outcome Evaluation:              Outcome Evaluation: HR stable, BP maintained with Cardene gtt overnight, and on RA/2LNC at night. Hx of AJAY, noncompliant with CPAP. PRNs given for pain and nausea overnight. Pt encouraged to use IS, pt ambulated, and voided overnight. Tolerating sips and chips. No further concerns at this time.

## 2023-10-18 NOTE — PLAN OF CARE
Goal Outcome Evaluation:      -VSS, RA  -DC home with friend  -Education video played

## 2023-10-18 NOTE — PROGRESS NOTES
[facility] Medicine Services  PROGRESS NOTE    Patient Name: Carlos Eduardo Danielle  : 1970  MRN: [id number]    Date of Admission: 10/17/2023  Primary Care Physician: Ian Vann MD    Subjective   Subjective     CC:  Hypertension    HPI:  Patient is a 53-year-old who was admitted after gastric sleeve.  Hospital medicine was consulted for persistent hypertension requiring a Cardene drip.  This morning Cardene drip has been weaned off.  He feels fine and is tolerating liquid diet.  He has been up out of bed and is currently sitting in a chair.      Objective   Objective     Vital Signs:   Temp:  [97.4 °F (36.3 °C)-98.5 °F (36.9 °C)] 98.5 °F (36.9 °C)  Heart Rate:  [48-68] 48  Resp:  [12-18] 16  BP: (138-183)/() 155/84  Flow (L/min):  [2-4] 2     Physical Exam:  Constitutional: No acute distress, awake, alert  HENT: NCAT, mucous membranes moist  Respiratory: Clear to auscultation bilaterally, respiratory effort normal   Cardiovascular: RRR  Gastrointestinal: Positive bowel sounds, soft, nontender, nondistended  Musculoskeletal: Up sitting in a chair  Psychiatric: Appropriate affect, cooperative  Neurologic: Oriented x 3, strength symmetric in all extremities, Cranial Nerves grossly intact to confrontation, speech clear  Skin: No rashes      Results Reviewed:  LAB RESULTS:      Lab 10/18/23  0650   WBC 8.37   HEMOGLOBIN 14.9   HEMATOCRIT 44.8   PLATELETS 195   NEUTROS ABS 6.51   IMMATURE GRANS (ABS) 0.02   LYMPHS ABS 1.31   MONOS ABS 0.50   EOS ABS 0.02   MCV 86.0         Lab 10/18/23  0650   SODIUM 140   POTASSIUM 4.2   CHLORIDE 104   CO2 24.0   ANION GAP 12.0   BUN 18   CREATININE 0.96   EGFR 95.1   GLUCOSE 197*   CALCIUM 8.9         Lab 10/18/23  0650   TOTAL PROTEIN 6.5   ALBUMIN 4.1   GLOBULIN 2.4   ALT (SGPT) 38   AST (SGOT) 30   BILIRUBIN 0.5   ALK PHOS 93                 Lab 10/18/23  0650   IRON 63         Brief Urine Lab Results       None             Microbiology Results Abnormal       None            Peripheral Block    Result Date: 10/17/2023  Junior IRIS Mason, CRNA     10/17/2023 11:35 AM Peripheral Block Patient reassessed immediately prior to procedure Patient location during procedure: OR Reason for block: at surgeon's request and post-op pain management Performed by Anesthesiologist: Valente Chan MD Preanesthetic Checklist Completed: patient identified, IV checked, site marked, risks and benefits discussed, surgical consent, monitors and equipment checked, pre-op evaluation and timeout performed Prep: Pt Position: supine Sterile barriers:cap, gloves, mask and washed/disinfected hands Prep: ChloraPrep Patient monitoring: blood pressure monitoring, continuous pulse oximetry and EKG Procedure Sedation: yes Performed under: general Guidance:ultrasound guided Images:still images obtained, printed/placed on chart Laterality:Bilateral Block Type:TAP Injection Technique:single-shot Needle Type:short-bevel and echogenic Needle Gauge:20 G Resistance on Injection: none Medications Used: dexamethasone sodium phosphate injection - Injection  4 mg - 10/17/2023 10:40:00 AM bupivacaine PF (MARCAINE) 0.25 % injection - Injection  60 mL - 10/17/2023 10:40:00 AM Medications Comment:Block Injection:  LA dose divided between Right and Left block Post Assessment Injection Assessment: negative aspiration for heme, incremental injection and no paresthesia on injection Patient Tolerance:comfortable throughout block Complications:no Additional Notes Subcostal TAPs A high-frequency linear transducer, with sterile cover, was placed sub-xiphoid to identify [place], right and left Rectus Abdominus Muscles (CHUCKIE). The transducer was moved either right or left subcostally to identify the CHUCKIE and the Transverse Abdominus Muscle (SINGH). The insertion site was prepped in sterile fashion and then localized with 2-5 ml of 1% Lidocaine. Using ultrasound-guidance, a 20-gauge  "B-Gomes 4\" Ultraplex 360 non-stimulating echogenic needle was advanced in plane, from medial to lateral, until the tip of the needle was in the fascial plane between the CHUCKIE and SINGH. 1-3ml of preservative free normal saline was used to hydro-dissect the fascial planes. After the fascial plane was verified, the local anesthetic (LA) was injected. The procedure was repeated on the opposite side for bilateral coverage. Aspiration every 5 ml to prevent intravascular injection. Injection was completed with negative aspiration of blood and negative intravascular injection. Injection pressures were normal with minimal resistance. The subcostal approach to the TAP nerve block ideally anesthetizes the intercostal nerves T6-T9.       Results for orders placed during the hospital encounter of 06/01/23    Adult Transthoracic Echo Complete w/ Color, Spectral and Contrast if Necessary Per Protocol    Interpretation Summary    Estimated left ventricular EF = 60%    Left ventricular wall thickness is consistent with mild concentric hypertrophy.    Mild to moderate aortic valve regurgitation is present    Mild mitral valve regurgitation is present      Current medications:  Scheduled Meds:acetaminophen, 1,000 mg, Oral, Q8H   Or  acetaminophen, 1,000 mg, Oral, Q8H  amLODIPine, 10 mg, Oral, Q24H  enoxaparin, 60 mg, Subcutaneous, Q12H  gabapentin, 100 mg, Oral, TID   Or  gabapentin, 100 mg, Oral, TID  insulin lispro, 2-9 Units, Subcutaneous, 4x Daily AC & at Bedtime  nebivolol, 10 mg, Oral, Daily  pantoprazole, 40 mg, Oral, Daily  sodium chloride, 10 mL, Intravenous, Q12H  spironolactone, 25 mg, Oral, Daily  torsemide, 20 mg, Oral, Daily      Continuous Infusions:lactated ringers, 150 mL/hr, Last Rate: 150 mL/hr (10/18/23 0352)  sodium chloride 0.45 % with KCl 20 mEq, 100 mL/hr, Last Rate: 100 mL/hr (10/18/23 0811)  sodium chloride, 150 mL/hr, Last Rate: 150 mL/hr (10/17/23 1028)      PRN Meds:.  ALPRAZolam    amisulpride " (antiemetic)    dextrose    dextrose    diphenhydrAMINE    ferric gluconate    glucagon (human recombinant)    hydrALAZINE    HYDROmorphone **AND** naloxone    HYDROmorphone    influenza vaccine    metoclopramide    metoclopramide    mirtazapine    ondansetron **FOLLOWED BY** [START ON 10/21/2023] ondansetron    oxyCODONE    phenol    prochlorperazine    prochlorperazine    promethazine    simethicone    sodium chloride    sodium chloride    Assessment & Plan   Assessment & Plan     Active Hospital Problems    Diagnosis  POA    **Body mass index (BMI) of 60.0-69.9 in adult [Z68.44]  Not Applicable    Morbid obesity [E66.01]  Yes    Chronic heart failure with preserved ejection fraction (HFpEF) [I50.32]  Yes    Paroxysmal atrial fibrillation [I48.0]  Yes    GERD (gastroesophageal reflux disease) [K21.9]  Yes    Type 2 diabetes mellitus [E11.9]  Yes    Hyperlipidemia [E78.5]  Yes    Essential hypertension [I10]  Yes    Type 2 diabetes mellitus, without long-term current use of insulin [E11.9]  Yes    AJAY (obstructive sleep apnea) -intolerant of BiPAP [G47.33]  Yes      Resolved Hospital Problems   No resolved problems to display.        Brief Hospital Course to date:  Carlos Eduardo Danielle is a 53 y.o. male with a history of hypertension, hyperlipidemia, AJAY, depression, T2DM, GERD, A-fib, COPD, CHF, chronic back pain, OA, obesity, underwent a gastric sleeve on 10/17/2023 with Dr. Ruelas.  Hospitalist are being consulted for management of hypertension.      Assessment and Plan:     Morbid obesity  -- s/p gastric sleeve with Dr. Ruelas on 10/17  -- Managed by Dr. Ruelas     Hypertension  Hyperlipidemia  -- continue Norvasc, spironolactone, torsemide daily  -- Instantly required Cardene drip, now off  -- Added hydralazine p.o. as needed     A-fib  -- Hold Eliquis x48 hours preop  -- Lovenox 60 mg every 12 hours starting on 10/18/2023 per Dr. Ruelas     CHF  -- Monitor I's and O's  -- Daily weights  -- Continue  spironolactone and torsemide     T2DM  -- FSBG with SSI     AJAY  -- Does not use CPAP     GERD  -- PPI           Expected Discharge Location and Transportation: Home  Expected Discharge  Expected Discharge Date: 10/20/2023; Expected Discharge Time:      DVT prophylaxis:  Medical and mechanical DVT prophylaxis orders are present.     AM-PAC 6 Clicks Score (PT): 20 (10/18/23 4337)    CODE STATUS:   Code Status and Medical Interventions:   Ordered at: 10/17/23 1502     Level Of Support Discussed With:    Patient     Code Status (Patient has no pulse and is not breathing):    CPR (Attempt to Resuscitate)     Medical Interventions (Patient has pulse or is breathing):    Full       Zeinab Zamora MD  10/18/23

## 2023-10-18 NOTE — PROGRESS NOTES
"Cc: POD#1 RSG/herson/HHR  \"feel fine\"    He sitting up in bed alone in the room.  He looks and feels well would like to go home if possible.  Tolerating his diet without nausea.  Ambulating and voiding well.  No pulmonary complaints, spirometer over 3000 observed.  Passing flatus, no bowel movement.    No fever or tachycardia pulse 78 respirations 18 blood pressure 145/77 saturation 96% UO 1750 - 120  I/O +2233 he is in no apparent distress.  Lungs clear.  Abdomen soft, nontender/appropriate, nondistended, bowel sounds present.  Wound dressings dry    CMP normal except for glucose of 197.  Iron is 63.  White count 8.4 with 78 segs 16 lymphs 6 monocytes no bands H&H 15 and 44.8.  Hemoglobin A1c 7.90.  Upper GI report unremarkable images reviewed somewhat faint, gaseous abdomen.  Contrast seen in the duodenum.    Impression: Postoperative #1 robotic sleeve gastrectomy, cholecystectomy and hiatal hernia repair.  He is doing well clinically, would like to go home and does meet discharge criteria.  Volume status seems appropriate with a history of CHF and A-fib.    Plan: Discharge home if okay with the hospitalist service.  Discharge instructions discussed.  Follow-up in the office in 1 to 2 weeks and call in the meantime with any problems questions or concerns.  He is instructed to remove his bandages and may shower tomorrow, resume his Eliquis tomorrow.  Prescription for Roxicodone.  Reiterated avoiding ulcerogenic agents for the next 6 to 8 weeks.  See orders.   "

## 2023-10-18 NOTE — PLAN OF CARE
Goal Outcome Evaluation:  Plan of Care Reviewed With: patient        Progress: no change  Outcome Evaluation: PT initial eval completed. Pt presents near his functional baseline with decreased endurance, increased pain, and impaired activity tolerance. Ambulation of 400' with CGA and UE support was well tolerated. Pt with noted increased WOB but O2 sat >90%. No overt LOB. Further IPPT is warrented for addressing endurance deficits. PT rec d/c home with assist as needed when medically appropriate.      Anticipated Discharge Disposition (PT): home with assist

## 2023-10-18 NOTE — THERAPY EVALUATION
Patient Name: Carlos Eduardo Danielle  : 1970    MRN: [id number]                              Today's Date: 10/18/2023       Admit Date: 10/17/2023    Visit Dx:     ICD-10-CM ICD-9-CM   1. Body mass index (BMI) of 60.0-69.9 in adult  Z68.44 V85.44     Patient Active Problem List   Diagnosis    AJAY (obstructive sleep apnea) -intolerant of BiPAP    Vitamin D insufficiency    Type 2 diabetes mellitus, without long-term current use of insulin    Anxiety    Essential hypertension    Hypogonadism in male    History of non anemic vitamin B12 deficiency    Morbid (severe) obesity due to excess calories    Gastritis without bleeding    Fatigue    Severe recurrent major depression without psychotic features    Generalized anxiety disorder    Suicidal ideation    Acute appendicitis    Acute appendicitis with localized peritonitis, without perforation, abscess, or gangrene    Hyperlipidemia    Bronchitis    GERD (gastroesophageal reflux disease)    Type 2 diabetes mellitus    Hypertensive urgency    Acute exacerbation of CHF (congestive heart failure)    Paroxysmal atrial fibrillation    Chronic heart failure with preserved ejection fraction (HFpEF)    Body mass index (BMI) of 60.0-69.9 in adult    Morbid obesity     Past Medical History:   Diagnosis Date    Anxiety     Asthma 2023    Back pain     Back problem     Cellulitis     HX; BILATERAL LEGS    CHF (congestive heart failure) 2023    echo 6/3/2023 LVEF 60%    Colon polyp     Depression     Diabetes mellitus     GERD (gastroesophageal reflux disease)     on Protonix daily. EGD 2020; no hx of h pylori    Hyperlipidemia     Hypertension     Osteoarthritis     Paroxysmal atrial fibrillation 2023    Sleep apnea     noncompliant with BIPAP    Suicide attempt     reports hx of suicide attempts three times in 30 years ago    TIA (transient ischemic attack)      and ; related to hypertension.    Type 2 diabetes mellitus     dx in 2019; no insulin;  last A1c 8.0    Venous stasis     bilateral lower ext     Past Surgical History:   Procedure Laterality Date    APPENDECTOMY N/A 09/08/2021    Procedure: APPENDECTOMY LAPAROSCOPIC;  Surgeon: Radames Arauz MD;  Location: [facility] OR;  Service: General;  Laterality: N/A;    COLONOSCOPY  2020    ENDOSCOPY N/A 04/17/2023    Procedure: ESOPHAGOGASTRODUODENOSCOPY;  Surgeon: Akua Ruelas MD;  Location: [facility] ENDOSCOPY;  Service: Bariatric;  Laterality: N/A;    EPIDIDYMAL CYST EXCISION      TONSILLECTOMY  2006      General Information       Row Name 10/18/23 [facility]          Physical Therapy Time and Intention    Document Type evaluation  -AB     Mode of Treatment physical therapy  -AB       Row Name 10/18/23 [facility]          General Information    Patient Profile Reviewed yes  -AB     Prior Level of Function independent:;all household mobility;community mobility;gait;bed mobility;ADL's;work;driving  Denies falls or DME use  -AB     Existing Precautions/Restrictions fall;other (see comments)  abdominal incisions  -AB     Barriers to Rehab none identified  -AB       Row Name 10/18/23 [facility]          Living Environment    People in Home alone  -AB       Row Name 10/18/23 [facility]          Home Main Entrance    Number of Stairs, Main Entrance one  -AB     Stair Railings, Main Entrance none  -AB       Row Name 10/18/23 08          Stairs Within Home, Primary    Number of Stairs, Within Home, Primary none  -AB       Row Name 10/18/23 [facility]          Cognition    Orientation Status (Cognition) oriented x 4  -AB       Row Name 10/18/23 08          Safety Issues, Functional Mobility    Safety Issues Affecting Function (Mobility) insight into deficits/self-awareness;safety precaution awareness;safety precautions follow-through/compliance  -AB     Impairments Affecting Function (Mobility) endurance/activity tolerance;pain;strength  -AB               User Key  (r) = Recorded By, (t) = Taken By, (c) = Cosigned By      Initials  Name Provider Type    Erika Mcintosh, PT Physical Therapist                   Mobility       Row Name 10/18/23 0840          Bed Mobility    Bed Mobility supine-sit;scooting/bridging  -AB     Scooting/Bridging [place] (Bed Mobility) standby assist  -AB     Supine-Sit [place] (Bed Mobility) standby assist  -AB     Assistive Device (Bed Mobility) head of bed elevated  -AB     Comment, (Bed Mobility) Increased time/effort. Educated on log rolling technique for comfort.  -AB       Row Name 10/18/23 0840          Transfers    Comment, (Transfers) Cues for hand placement and PLB. O2 sat >90% but increased WOB noted during gait.  -AB       Row Name 10/18/23 0840          Sit-Stand Transfer    Sit-Stand [place] (Transfers) contact guard;1 person assist  -AB       Row Name 10/18/23 0840          Gait/Stairs (Locomotion)    [place] Level (Gait) contact guard;1 person assist;verbal cues  -AB     Assistive Device (Gait) other (see comments)  UE supported on IV pole  -AB     Distance in Feet (Gait) 400  -AB     Deviations/Abnormal Patterns (Gait) bilateral deviations;abhinav decreased;base of support, wide;gait speed decreased;stride length decreased  -AB     Bilateral Gait Deviations heel strike decreased  -AB     [place] Level (Stairs) contact guard;1 person assist;verbal cues  -AB     Handrail Location (Stairs) none  -AB     Number of Steps (Stairs) 1  -AB     Ascending Technique (Stairs) step-to-step  -AB     Descending Technique (Stairs) step-to-step  -AB     Comment, (Gait/Stairs) Pt ambulated in hallway with step through gait pattern and wide EMILY. Cues for increased step length and upright posture. Pt reports mild forward flexed posture is secondary to abdominal pain. No overt LOB. Further activity limited by fatigue and increased WOB.  -AB               User Key  (r) = Recorded By, (t) = Taken By, (c) = Cosigned By      Initials Name Provider Type    Erika Mcintosh, PT Physical Therapist                    Obj/Interventions       Row Name 10/18/23 0843          Range of Motion Comprehensive    General Range of Motion bilateral lower extremity ROM WFL  -AB     Comment, General Range of Motion Limited by body habitus  -AB       Row Name 10/18/23 0843          Strength Comprehensive (MMT)    General Manual Muscle Testing (MMT) Assessment lower extremity strength deficits identified  -AB     Comment, General Manual Muscle Testing (MMT) Assessment BLE grossly 4/5  -AB       Row Name 10/18/23 0843          Balance    Balance Assessment sitting static balance;sitting dynamic balance;standing static balance;standing dynamic balance  -AB     Static Sitting Balance independent  -AB     Dynamic Sitting Balance independent  -AB     Position, Sitting Balance unsupported;sitting edge of bed  -AB     Static Standing Balance 1-person assist;standby assist  -AB     Dynamic Standing Balance contact guard;1-person assist  -AB     Position/Device Used, Standing Balance unsupported  -AB     Balance Interventions sitting;standing;sit to stand;static;dynamic;occupation based/functional task  -AB     Comment, Balance No overt LOB.  -AB       Row Name 10/18/23 0843          Sensory Assessment (Somatosensory)    Sensory Assessment (Somatosensory) LE sensation intact  -AB               User Key  (r) = Recorded By, (t) = Taken By, (c) = Cosigned By      Initials Name Provider Type    AB Erika Zavala, PT Physical Therapist                   Goals/Plan       Row Name 10/18/23 0846          Bed Mobility Goal 1 (PT)    Activity/Assistive Device (Bed Mobility Goal 1, PT) sit to supine;supine to sit  -AB     [place] Level/Cues Needed (Bed Mobility Goal 1, PT) independent  -AB     Time Frame (Bed Mobility Goal 1, PT) long term goal (LTG);10 days  -AB       Row Name 10/18/23 0846          Transfer Goal 1 (PT)    Activity/Assistive Device (Transfer Goal 1, PT) sit-to-stand/stand-to-sit;bed-to-chair/chair-to-bed  -AB      [place] Level/Cues Needed (Transfer Goal 1, PT) independent  -AB     Time Frame (Transfer Goal 1, PT) long term goal (LTG);10 days  -AB       Row Name 10/18/23 0846          Gait Training Goal 1 (PT)    Activity/Assistive Device (Gait Training Goal 1, PT) gait (walking locomotion)  -AB     [place] Level (Gait Training Goal 1, PT) independent  -AB     Distance (Gait Training Goal 1, PT) 400  -AB     Time Frame (Gait Training Goal 1, PT) long term goal (LTG);10 days  -AB       Row Name 10/18/23 0846          Stairs Goal 1 (PT)    Activity/Assistive Device (Stairs Goal 1, PT) ascending stairs;descending stairs  -AB     [place] Level/Cues Needed (Stairs Goal 1, PT) independent  -AB     Number of Stairs (Stairs Goal 1, PT) 1  -AB     Time Frame (Stairs Goal 1, PT) long term goal (LTG);10 days  -AB       Row Name 10/18/23 0846          Therapy Assessment/Plan (PT)    Planned Therapy Interventions (PT) balance training;bed mobility training;gait training;home exercise program;patient/family education;postural re-education;transfer training;strengthening;stretching;stair training;ROM (range of motion)  -AB               User Key  (r) = Recorded By, (t) = Taken By, (c) = Cosigned By      Initials Name Provider Type    AB Erika Zavala, PT Physical Therapist                   Clinical Impression       Row Name 10/18/23 0843          Pain    Pretreatment Pain Rating 4/10  -AB     Posttreatment Pain Rating 4/10  -AB     Pain Location incisional  -AB     Pain Location - abdomen  -AB     Pre/Posttreatment Pain Comment Tolerated.  -AB     Pain Intervention(s) Ambulation/increased activity;Repositioned;Rest  -AB     Additional Documentation Pain Scale: Numbers Pre/Post-Treatment (Group)  -AB       Row Name 10/18/23 0843          Plan of Care Review    Plan of Care Reviewed With patient  -AB     Progress no change  -AB     Outcome Evaluation PT initial eval completed. Pt presents near his functional baseline with  decreased endurance, increased pain, and impaired activity tolerance. Ambulation of 400' with CGA and UE support was well tolerated. Pt with noted increased WOB but O2 sat >90%. No overt LOB. Further IPPT is warrented for addressing endurance deficits. PT rec d/c home with assist as needed when medically appropriate.  -AB       Row Name 10/18/23 0843          Therapy Assessment/Plan (PT)    Rehab Potential (PT) good, to achieve stated therapy goals  -AB     Criteria for Skilled Interventions Met (PT) yes;meets criteria;skilled treatment is necessary  -AB     Therapy Frequency (PT) daily  -AB       Row Name 10/18/23 0843          Vital Signs    Pre Systolic BP Rehab 145  -AB     Pre Treatment Diastolic BP 66  -AB     Pretreatment Heart Rate (beats/min) 61  -AB     Posttreatment Heart Rate (beats/min) 70  -AB     Pre SpO2 (%) 95  -AB     O2 Delivery Pre Treatment nasal cannula  -AB     Intra SpO2 (%) 95  -AB     O2 Delivery Intra Treatment room air  -AB     Post SpO2 (%) 95  -AB     O2 Delivery Post Treatment room air  -AB     Pre Patient Position Supine  -AB     Intra Patient Position Standing  -AB     Post Patient Position Sitting  -AB       Row Name 10/18/23 0843          Positioning and Restraints    Pre-Treatment Position in bed  -AB     Post Treatment Position chair  -AB     In Chair notified nsg;reclined;sitting;call light within reach;encouraged to call for assist;exit alarm on;legs elevated;waffle cushion;compression device;heels elevated  -AB               User Key  (r) = Recorded By, (t) = Taken By, (c) = Cosigned By      Initials Name Provider Type    AB Erika Zavala, PT Physical Therapist                   Outcome Measures       Row Name 10/18/23 0823          How much help from another person do you currently need...    Turning from your back to your side while in flat bed without using bedrails? 4  -AB     Moving from lying on back to sitting on the side of a flat bed without bedrails? 4  -AB      Moving to and from a bed to a chair (including a wheelchair)? 3  -AB     Standing up from a chair using your arms (e.g., wheelchair, bedside chair)? 3  -AB     Climbing 3-5 steps with a railing? 3  -AB     To walk in hospital room? 3  -AB     AM-PAC 6 Clicks Score (PT) 20  -AB     Highest level of mobility 6 --> Walked 10 steps or more  -AB       Row Name 10/18/23 0847          Functional Assessment    Outcome Measure Options AM-PAC 6 Clicks Basic Mobility (PT)  -AB               User Key  (r) = Recorded By, (t) = Taken By, (c) = Cosigned By      Initials Name Provider Type    AB Erika Zavala, PT Physical Therapist                                 Physical Therapy Education       Title: PT OT SLP Therapies (Done)       Topic: Physical Therapy (Done)       Point: Mobility training (Done)       Learning Progress Summary             Patient Acceptance, E,D, VU,DU by AB at 10/18/2023 0847                                         User Key       Initials Effective Dates Name Provider Type Discipline    AB 09/22/22 -  Erika Zavala, PT Physical Therapist PT                  PT Recommendation and Plan  Planned Therapy Interventions (PT): balance training, bed mobility training, gait training, home exercise program, patient/family education, postural re-education, transfer training, strengthening, stretching, stair training, ROM (range of motion)  Plan of Care Reviewed With: patient  Progress: no change  Outcome Evaluation: PT initial eval completed. Pt presents near his functional baseline with decreased endurance, increased pain, and impaired activity tolerance. Ambulation of 400' with CGA and UE support was well tolerated. Pt with noted increased WOB but O2 sat >90%. No overt LOB. Further IPPT is warrented for addressing endurance deficits. PT rec d/c home with assist as needed when medically appropriate.     Time Calculation:   PT Evaluation Complexity  History, PT Evaluation Complexity: 1-2 personal factors and/or  comorbidities  Examination of Body Systems (PT Eval Complexity): total of 3 or more elements  Clinical Presentation (PT Evaluation Complexity): stable  Clinical Decision Making (PT Evaluation Complexity): low complexity  Overall Complexity (PT Evaluation Complexity): low complexity     PT Charges       Row Name 10/18/23 0848             Time Calculation    Start Time 0809  -AB      PT Received On 10/18/23  -AB      PT Goal Re-Cert Due Date 10/28/23  -AB         Untimed Charges    PT Eval/Re-eval Minutes 48  -AB         Total Minutes    Untimed Charges Total Minutes 48  -AB       Total Minutes 48  -AB                User Key  (r) = Recorded By, (t) = Taken By, (c) = Cosigned By      Initials Name Provider Type    AB Erika Zavala, PT Physical Therapist                  Therapy Charges for Today       Code Description Service Date Service Provider Modifiers Qty    [id number]  PT EVAL LOW COMPLEXITY 4 10/18/2023 Erika Zavala, PT GP 1            PT G-Codes  Outcome Measure Options: AM-PAC 6 Clicks Basic Mobility (PT)  AM-PAC 6 Clicks Score (PT): 20  PT Discharge Summary  Anticipated Discharge Disposition (PT): home with assist    Erika Zavala PT  10/18/2023

## 2023-10-19 ENCOUNTER — TRANSITIONAL CARE MANAGEMENT TELEPHONE ENCOUNTER (OUTPATIENT)
Dept: CALL CENTER | Facility: HOSPITAL | Age: 53
End: 2023-10-19
Payer: COMMERCIAL

## 2023-10-19 NOTE — OUTREACH NOTE
Call Center TCM Note      Flowsheet Row Responses   [facility] patient discharged from? [facility]   Does the patient have one of the following disease processes/diagnoses(primary or secondary)? General Surgery   TCM attempt successful? Yes   Call start time 1229   Call end time 1232   Discharge diagnosis Gastric sleeve lap   Meds reviewed with patient/caregiver? Yes   Is the patient having any side effects they believe may be caused by any medication additions or changes? No   Does the patient have all medications related to this admission filled (includes all antibiotics, pain medications, etc.) Yes   Is the patient taking all medications as directed (includes completed medication regime)? Yes   Comments Post Op appt. 10/24/23 @ 9am.   Does the patient have an appointment with their PCP within 7-14 days of discharge? No   Nursing Interventions Patient declined scheduling/rescheduling appointment at this time   Has home health visited the patient within 72 hours of discharge? N/A   Psychosocial issues? No   Did the patient receive a copy of their discharge instructions? Yes   Nursing interventions Reviewed instructions with patient   What is the patient's perception of their health status since discharge? Improving   Nursing interventions Nurse provided patient education   Is the patient /caregiver able to teach back basic post-op care? Take showers only when approved by MD-sponge bathe until then, Drive as instructed by MD in discharge instructions, No tub bath, swimming, or hot tub until instructed by MD, Lifting as instructed by MD in discharge instructions   Is the patient/caregiver able to teach back signs and symptoms of incisional infection? Increased redness, swelling or pain at the incisonal site, Increased drainage or bleeding, Fever   Is the patient/caregiver able to teach back steps to recovery at home? Eat a well-balance diet, Rest and rebuild strength, gradually increase activity   If the patient  is a current smoker, are they able to teach back resources for cessation? Not a smoker   Is the patient/caregiver able to teach back the hierarchy of who to call/visit for symptoms/problems? PCP, Specialist, Home health nurse, Urgent Care, ED, 911 Yes   TCM call completed? Yes   Call end time 1232   Would this patient benefit from a Referral to [facility] Social Work? No   Is the patient interested in additional calls from an ambulatory ? No            Riri Solomon RN    10/19/2023, 12:34 EDT

## 2023-10-19 NOTE — OUTREACH NOTE
Prep Survey      Flowsheet Row Responses   [facility] facility patient discharged from? [place]   Is LACE score < 7 ? No   Eligibility [facility]   Date of Admission 10/17/23   Date of Discharge 10/18/23   Discharge Disposition Home or Self Care   Discharge diagnosis Gastric sleeve lap   Does the patient have one of the following disease processes/diagnoses(primary or secondary)? General Surgery   Does the patient have Home health ordered? No   Is there a DME ordered? No   Prep survey completed? Yes            PAUL JEFFERY - Registered Nurse

## 2023-10-19 NOTE — PAYOR COMM NOTE
"Mary Sanchez RN  Utilization Management  P:[phone]  F:[phone]    Auth# [id number]   DC date 10/18/23  No DC summary available    Joel Danielle (53 y.o. Male)       Date of Birth   1970    Social Security Number       Address   [address] [place], [place] [id number]    Home Phone   [phone]    MRN   [id number]       Faith   Jainism    Marital Status   Single                            Admission Date   10/17/23    Admission Type   Elective    Admitting Provider   Akua Ruelas MD    Attending Provider       Department, Room/Bed   [address], S205/1       Discharge Date   10/18/2023    Discharge Disposition   Home or Self Care    Discharge Destination                                 Attending Provider: (none)   Allergies: Atorvastatin, Topamax [Topiramate]    Isolation: None   Infection: None   Code Status: Prior    Ht: 180.3 cm (71\")   Wt: 150 kg (331 lb)    Admission Cmt: None   Principal Problem: Body mass index (BMI) of 60.0-69.9 in adult [Z68.44]                   Active Insurance as of 10/17/2023       Primary Coverage       Payor Plan Insurance Group Employer/Plan Group    [facility] BY BANEGAS [id number]       Payor Plan Address Payor Plan Phone Number Payor Plan Fax Number Effective Dates    [address] [address]   11/1/2022 - None Entered    [facility] [address]         Subscriber Name Subscriber Birth Date Member ID       JOEL DANIELLE 1970 [id number]                     Emergency Contacts        (Rel.) Home Phone Work Phone Mobile Phone    Sharron Alfredo (Relative) [phone] -- --                 History & Physical        Akua Ruelas MD at 10/17/23 0757            H&P reviewed. The patient was examined and there are no changes to the H&P.          Electronically signed by Akua Ruelas MD at 10/17/23 1804   Source Note          [facility] BARIATRIC SURGERY  [address]  [facility] [address]  [facility] [id number] [phone]      Patient  Name:  Carlos Eduardo Danielle  :  1970      Date of Visit: 2023      Chief Complaint:  weight gain; unable to maintain weight loss    History of Present Illness:  Carlos Eduardo Danielle is a 53 y.o. male who presents today for evaluation, education and consultation regarding weight loss surgery.     Patient has been overweight for many years, with numerous failed dietary/weight loss attempts.  He now has obesity related comorbidities of HTN, HLD, AJAY, depression, DM2, GERD, back pain, OA and as such has decided to pursue weight loss surgery.    From intake:  \"GI: History of GERD.  Takes Protonix and Pepcid daily.  Last EGD in 2020.  Symptoms have worsened.  He denies any history of H. pylori.  He denies any postprandial nausea, abdominal pain, or bloating.  Only abdominal surgery is laparoscopic appendectomy in  that was uneventful.    Other past medical history includes hypertension, hyperlipidemia, obstructive sleep apnea noncompliant with BiPAP, COPD (on albuterol only), depression/anxiety, history of suicide attempts many years ago.    He is a type II diabetic and was diagnosed in 2019.  He has never been on insulin and his last A1c was 8.1.    He is a former smoker and quit in . \"    2023 Update:    Denies change to med hx.  Previously worked at the [employer], now works for [employer], from home, in customer service.    Hx of a fib, on Eliquis. Never required cardioversion.  Per pt, has more a fib at night.    CHF: torsemide.  Last hospitalized 2023 for CHF.  Previously on furosemide, switched to stronger diuretic (Demadex).    COPD on hx, but pt said pulmonologist said he doesn't have.    Chronic back pain, for which he takes PRN NSAIDs (infrequent).    DM2: metformin, glimeperide, A1C 8.1.    He plans to recover in [place], [place] with a friend after surgery.  He understands he must walk every 30 minutes.    Has " AJAY: does not use sleep apnea b/c he tried it and it doesn't help him at all.  He is going to f/u with pulm and try again.    The patient lives in [place].    No personal or family hx of VTE or clotting d/o.  No liver, lung, heart, or renal disease    Review of data:    MER: nothing  CBC: nl  CMP: Cr 1.09  HP neg    EKG: tracing reviewed, NSR     Path-benign    EGD: PQ, no HH  GES: no gastroparesis    Cardiac clearance:no additional testing  Echo: LVEF 60%, mild LVH, mild to mod AR, mild MR  Stress test: LVEF 55%, no ischemic, low risk study    PFTs: no restriction, obstruction, low DLCO which corrects when adjusted for alveolar ventilated volumes.  No air trapping or hyperinflation.  Pulm clearance: mildly increased risk due to untreated AJAY.        Last tobacco: 2008  Last NSAIDs: last had ibuprofen Corin  Last ASA: >1 year  Last steroids: 2 years ago  Last hormones: n/a         Past Medical History:   Diagnosis Date    Anxiety     Asthma 06/01/2023    Back pain     Back problem     Cellulitis     HX; BILATERAL LEGS    CHF (congestive heart failure) 06/01/2023    Colon polyp     Depression     Diabetes mellitus     GERD (gastroesophageal reflux disease)     on Protonix daily. EGD Jan 2020; no hx of h pylori    Hyperlipidemia     Hypertension     Osteoarthritis     Paroxysmal atrial fibrillation 07/03/2023    Sleep apnea     noncompliant with BIPAP    Suicide attempt     reports hx of suicide attempts three times in 30 years ago    TIA (transient ischemic attack)     2010 and 2015; related to hypertension.    Type 2 diabetes mellitus     dx in 2019; no insulin; last A1c 8.0    Venous stasis     bilateral lower ext     Past Surgical History:   Procedure Laterality Date    APPENDECTOMY N/A 09/08/2021    Procedure: APPENDECTOMY LAPAROSCOPIC;  Surgeon: Radames Arauz MD;  [facility];  Service: General;  Laterality: N/A;    COLONOSCOPY  2020    ENDOSCOPY N/A 04/17/2023    Procedure:  ESOPHAGOGASTRODUODENOSCOPY;  Surgeon: Akua Ruelas MD;  Location: [facility] ENDOSCOPY;  Service: Bariatric;  Laterality: N/A;    EPIDIDYMAL CYST EXCISION      TONSILLECTOMY  2006     Allergies   Allergen Reactions    Atorvastatin Headache     Weakness.      Topamax [Topiramate] Other (See Comments)     Migraine       Current Outpatient Medications:     amLODIPine (NORVASC) 10 MG tablet, Take 1 tablet by mouth Daily., Disp: 90 tablet, Rfl: 3    apixaban (ELIQUIS) 5 MG tablet tablet, Take 1 tablet by mouth 2 (Two) Times a Day., Disp: 180 tablet, Rfl: 3    empagliflozin (JARDIANCE) 10 MG tablet tablet, Take 1 tablet by mouth Daily., Disp: 90 tablet, Rfl: 3    glimepiride (AMARYL) 4 MG tablet, Take 1 tablet by mouth 2 (Two) Times a Day., Disp: 60 tablet, Rfl: 3    lisinopril (PRINIVIL,ZESTRIL) 40 MG tablet, Take 1 tablet by mouth Every 12 (Twelve) Hours., Disp: 180 tablet, Rfl: 3    metFORMIN ER (GLUCOPHAGE-XR) 500 MG 24 hr tablet, Take 2 tablets by mouth 2 (Two) Times a Day With Meals., Disp: 60 tablet, Rfl: 3    mirtazapine (Remeron) 15 MG tablet, Take 1 tablet by mouth At Night As Needed for sleep., Disp: 30 tablet, Rfl: 5    nebivolol (Bystolic) 10 MG tablet, Take 1 tablet by mouth Daily., Disp: 30 tablet, Rfl: 3    ondansetron (Zofran) 8 MG tablet, Take 1 tablet by mouth Every 8 (Eight) Hours As Needed for Nausea or Vomiting., Disp: 60 tablet, Rfl: 1    pantoprazole (PROTONIX) 40 MG EC tablet, Take 1 tablet by mouth Daily., Disp: 90 tablet, Rfl: 3    spironolactone (ALDACTONE) 25 MG tablet, Take 1 tablet by mouth Daily., Disp: 90 tablet, Rfl: 3    torsemide (DEMADEX) 20 MG tablet, Take 1 tablet by mouth Daily., Disp: 90 tablet, Rfl: 3    venlafaxine 225 MG tablet sustained-release 24 hour 24 hr tablet, Take 2 tablets by mouth Daily With Breakfast., Disp: 60 each, Rfl: 1    Social History     Socioeconomic History    Marital status: Single   Tobacco Use    Smoking status: Former     Packs/day: 0.75     Years:  20.00     Pack years: 15.00     Types: Cigarettes     Quit date: 8/1/2008     Years since quitting: 15.1     Passive exposure: Past    Smokeless tobacco: Never   Vaping Use    Vaping Use: Never used   Substance and Sexual Activity    Alcohol use: Yes     Comment: occas    Drug use: Never    Sexual activity: Not Currently     Partners: Male     Social History     Social History Narrative    Patient lives in [place], [place].  He is single and works full time as a  at [employer].        Family History   Problem Relation Age of Onset    Diabetes Mother     Hypertension Mother     Schizophrenia Mother     Depression Mother     Obesity Mother     No Known Problems Father     No Known Problems Brother     Mental illness Brother     Kidney disease Maternal Uncle     Heart disease Maternal Grandmother     Hypertension Maternal Grandmother     Hypertension Maternal Grandfather      Review of Systems   Constitutional:  Positive for fatigue and unexpected weight gain. Negative for chills, diaphoresis, fever and unexpected weight loss.   HENT:  Negative for congestion and facial swelling.    Eyes:  Negative for blurred vision, double vision and discharge.   Respiratory:  Negative for chest tightness, shortness of breath and stridor.    Cardiovascular:  Negative for chest pain, palpitations and leg swelling.   Gastrointestinal:  Negative for blood in stool.   Endocrine: Negative for polydipsia.   Genitourinary:  Negative for hematuria.   Musculoskeletal:  Positive for arthralgias.   Skin:  Negative for color change.   Allergic/Immunologic: Negative for immunocompromised state.   Neurological:  Negative for confusion.   Psychiatric/Behavioral:  Negative for self-injury.      Physical Exam:  Vital Signs:  Weight: (!) 200 kg (442 lb)   Body mass index is 61.65 kg/m².  Temp: 97.1 °F (36.2 °C)   Heart Rate: 64   BP: 150/88     Physical Exam  Constitutional:       General: He is not in acute distress.      Appearance: He is well-developed. He is not diaphoretic.   HENT:      Head: Normocephalic and atraumatic.      Mouth/Throat:      Pharynx: No oropharyngeal exudate.   Eyes:      Conjunctiva/sclera: Conjunctivae normal.      Pupils: Pupils are equal, round, and reactive to light.   Cardiovascular:      Rate and Rhythm: Normal rate and regular rhythm.   Pulmonary:      Effort: Pulmonary effort is normal. No respiratory distress.   Abdominal:      General: There is no distension.      Palpations: Abdomen is soft.      Comments: Diastasis recti, no palpable hernias, umbilical lap scar   Musculoskeletal:      Right lower leg: Edema present.      Left lower leg: Edema present.      Comments: Chronic brawny edema, venous stasis changes b/l   Skin:     General: Skin is warm and dry.      Coloration: Skin is not pale.   Neurological:      Mental Status: He is alert and oriented to person, place, and time.      Cranial Nerves: No cranial nerve deficit.   Psychiatric:         Behavior: Behavior normal.         Thought Content: Thought content normal.     Problem List Items Addressed This Visit       Anxiety    Body mass index (BMI) of 60.0-69.9 in adult - Primary    Relevant Orders    Case Request (Completed)    MRSA Screen Culture (Outpatient) - Swab, Nares    Chronic heart failure with preserved ejection fraction (HFpEF)    Essential hypertension    Fatigue    GERD (gastroesophageal reflux disease)    Overview     on Protonix and Pepcid daily. EGD Jan 2020; no hx of h pylori         Hyperlipidemia    AJAY (obstructive sleep apnea) -intolerant of BiPAP    Overview     9/20-Sleep test positive for sleep apnea CPAP plan follow-up with Dr. E[facility]   Reports he has sleep apnea as a child cannot afford the machine and cannot tolerate the machine.         Paroxysmal atrial fibrillation    Severe recurrent major depression without psychotic features    Type 2 diabetes mellitus, without long-term current  use of insulin     Other Visit Diagnoses       Back pain, unspecified back location, unspecified back pain laterality, unspecified chronicity        Osteoarthritis, unspecified osteoarthritis type, unspecified site              Assessment:    Carlos Eduardo Danielle is a 53 y.o. year old male with medically complicated obesity.    Weight loss surgery is deemed medically necessary given the following obesity related comorbidities including HTN, HLD, AJAY, COPD, depression, DM2, GERD with current Weight: (!) 200 kg (442 lb) and Body mass index is 61.65 kg/m²..    Patient is aware that surgery is a tool, and that weight loss is not guaranteed but only seen in the context of appropriate use, follow up and exercise.    The patient was present for an approximately a 2.5 hour discussion of the purpose of weight loss surgery, how WLS is a tool to assist in achieving weight loss goals, the most common complications and how best to avoid them, and the strategies for short and long term weight loss.  Ample opportunity to discuss questions was available both in group and during the time of individual examination.    I reviewed all available preop labs, Xrays, tests, clearances, etc and signed off on these in the record.  All of this in addition to the patient's unique history and exam has been taken into consideration in determining their appropriate candidacy for weight loss surgery.    Complications  of laparoscopic/possible robotic gastric sleeve were discussed. The patient is well aware of the potential complications of surgery that include but not limited to bleeding, infections, deep venous thrombosis, pulmonary embolism, pulmonary complications such as pneumonia, cardiac events, hernias, small bowel obstruction, damage to the spleen or other organs, bowel injury, disfiguring scars, failure to lose weight, need for additional surgery, conversion to an open procedure, and death. Patient is also aware of complications which apply in  "this particular procedure that can include but are not limited to a \"leak\" at the staple line which in some instances may require conversion to gastric bypass.    The patient is aware if a hiatal hernia is encountered, it likely will be repaired.  R/B/A Rx to hiatal hernia repair were discussed as outlined in our long consent form.  Briefly risks in addition to those for LSG include recurrent hernia, RENU, dysphagia, esophageal injury, pneumothorax, injury to the vagus nerves, injury to the thoracic duct, aorta or vena cava.    Greater than 3 minutes was spent with the patient discussing avoiding all tobacco products and second hand smoke at least 2 weeks pre-operatively and 6 weeks post-operatively to minimize the risk of sleeve leak.  This included discussing the importance of avoiding even secondhand smoke as the risk of leak is increased.  Examples discussed:  I made it very clear that the patient understands they should avoid even riding in a car where someone has previously smoked in the last 2 weeks, living in a house where someone smokes (even if it's in a separate room/patio/attached garage, etc.) we discussed that they should not have a conversation with a group of people who are smoking even if it's outside.  They can be around wood burning fires and barbecue.  I told them I do not know if marijuana has a same effects but my overall recommendation is to avoid it for 2 weeks prior in 6 weeks after surgery.  They also are aware that nicotine may also increase the risk of leak and I strongly encouraged him to avoid that as well for 2 weeks prior in 6 weeks after surgery.    Discussed the risks, benefits and alternative therapies at great length as outlined in our extensive consent forms, consent videos, and educational teaching process under the direction of the center's .    A copy of the patient's signed informed consent is on file.    Plan:  Robotic assisted sleeve gastrectomy at " "[facility]    He will be very sensitive to dehydration (CKD, CHF, a fib, DM2) after surgery, pt counseled to keep up with liquids.    R/B/A Rx discussed to postop anticoagulation including but not limited to bleeding, drug reaction, venothromboembolic events, etc. and he  will restart his therapeutic Eliquis asap after surgery.  Hold Eliquis x 48 hours preop.    Higher risk candidate due to untreated AJAY, a fib, therapeutic anticoagulation, CHF with hx of exacerbation.  Pt counseled, that his decision to go 8 hours away for recovery, may not be the best decision given his extensive health history and risk of complications that I would not be able to manage that far away.    MDM high:  Elective procedure with the following risk factors: morbid obesity, AJAY, CHF, a fib, HTN, DM2.  4+ chronic medical problems reviewed.      Thank you Herb Davenport, Ian Mccall MD for allowing me to share in the care of our mutual patient.             Akua Ruelas MD                                             Electronically signed by Akua Ruelas MD at 23 0900                 Akua Ruelas MD at 23 0800          [facility] BARIATRIC SURGERY  [address] [address] [phone]      Patient  Name:  Carlos Eduardo Danielle  :  1970      Date of Visit: 2023      Chief Complaint:  weight gain; unable to maintain weight loss    History of Present Illness:  Carlos Eduardo Danielle is a 53 y.o. male who presents today for evaluation, education and consultation regarding weight loss surgery.     Patient has been overweight for many years, with numerous failed dietary/weight loss attempts.  He now has obesity related comorbidities of HTN, HLD, AJAY, depression, DM2, GERD, back pain, OA and as such has decided to pursue weight loss surgery.    From intake:  \"GI: History of GERD.  Takes Protonix and Pepcid daily.  Last EGD in 2020.  Symptoms have worsened.  He " "denies any history of H. pylori.  He denies any postprandial nausea, abdominal pain, or bloating.  Only abdominal surgery is laparoscopic appendectomy in 2021 that was uneventful.    Other past medical history includes hypertension, hyperlipidemia, obstructive sleep apnea noncompliant with BiPAP, COPD (on albuterol only), depression/anxiety, history of suicide attempts many years ago.    He is a type II diabetic and was diagnosed in 2019.  He has never been on insulin and his last A1c was 8.1.    He is a former smoker and quit in 2008. \"    9/25/2023 Update:    Denies change to med hx.  Previously worked at the [employer], now works for [employer], from home, in customer service.    Hx of a fib, on Eliquis. Never required cardioversion.  Per pt, has more a fib at night.    CHF: torsemide.  Last hospitalized 6/2023 for CHF.  Previously on furosemide, switched to stronger diuretic (Demadex).    COPD on hx, but pt said pulmonologist said he doesn't have.    Chronic back pain, for which he takes PRN NSAIDs (infrequent).    DM2: metformin, glimeperide, A1C 8.1.    He plans to recover in [place], [place] with a friend after surgery.  He understands he must walk every 30 minutes.    Has AJAY: does not use sleep apnea b/c he tried it and it doesn't help him at all.  He is going to f/u with pulm and try again.    The patient lives in [place].    No personal or family hx of VTE or clotting d/o.  No liver, lung, heart, or renal disease    Review of data:    MER: nothing  CBC: nl  CMP: Cr 1.09  HP neg    EKG: tracing reviewed, NSR     Path-benign    EGD: PQ, no HH  GES: no gastroparesis    Cardiac clearance:no additional testing  Echo: LVEF 60%, mild LVH, mild to mod AR, mild MR  Stress test: LVEF 55%, no ischemic, low risk study    PFTs: no restriction, obstruction, low DLCO which corrects when adjusted for alveolar ventilated volumes.  No air trapping or hyperinflation.  Pulm clearance: mildly increased risk due to untreated " AJAY.        Last tobacco: 2008  Last NSAIDs: last had ibuprofen Corin  Last ASA: >1 year  Last steroids: 2 years ago  Last hormones: n/a         Past Medical History:   Diagnosis Date    Anxiety     Asthma 06/01/2023    Back pain     Back problem     Cellulitis     HX; BILATERAL LEGS    CHF (congestive heart failure) 06/01/2023    Colon polyp     Depression     Diabetes mellitus     GERD (gastroesophageal reflux disease)     on Protonix daily. EGD Jan 2020; no hx of h pylori    Hyperlipidemia     Hypertension     Osteoarthritis     Paroxysmal atrial fibrillation 07/03/2023    Sleep apnea     noncompliant with BIPAP    Suicide attempt     reports hx of suicide attempts three times in 30 years ago    TIA (transient ischemic attack)     2010 and 2015; related to hypertension.    Type 2 diabetes mellitus     dx in 2019; no insulin; last A1c 8.0    Venous stasis     bilateral lower ext     Past Surgical History:   Procedure Laterality Date    APPENDECTOMY N/A 09/08/2021    Procedure: APPENDECTOMY LAPAROSCOPIC;  Surgeon: Radames Arauz MD;  Location:  [facility];  Service: General;  Laterality: N/A;    COLONOSCOPY  2020    ENDOSCOPY N/A 04/17/2023    Procedure: ESOPHAGOGASTRODUODENOSCOPY;  Surgeon: Akua Ruelas MD;  Location:  [facility] ENDOSCOPY;  Service: Bariatric;  Laterality: N/A;    EPIDIDYMAL CYST EXCISION      TONSILLECTOMY  2006       Allergies   Allergen Reactions    Atorvastatin Headache     Weakness.      Topamax [Topiramate] Other (See Comments)     Migraine         Current Outpatient Medications:     amLODIPine (NORVASC) 10 MG tablet, Take 1 tablet by mouth Daily., Disp: 90 tablet, Rfl: 3    apixaban (ELIQUIS) 5 MG tablet tablet, Take 1 tablet by mouth 2 (Two) Times a Day., Disp: 180 tablet, Rfl: 3    empagliflozin (JARDIANCE) 10 MG tablet tablet, Take 1 tablet by mouth Daily., Disp: 90 tablet, Rfl: 3    glimepiride (AMARYL) 4 MG tablet, Take 1 tablet by mouth 2 (Two) Times a Day., Disp: 60  tablet, Rfl: 3    lisinopril (PRINIVIL,ZESTRIL) 40 MG tablet, Take 1 tablet by mouth Every 12 (Twelve) Hours., Disp: 180 tablet, Rfl: 3    metFORMIN ER (GLUCOPHAGE-XR) 500 MG 24 hr tablet, Take 2 tablets by mouth 2 (Two) Times a Day With Meals., Disp: 60 tablet, Rfl: 3    mirtazapine (Remeron) 15 MG tablet, Take 1 tablet by mouth At Night As Needed for sleep., Disp: 30 tablet, Rfl: 5    nebivolol (Bystolic) 10 MG tablet, Take 1 tablet by mouth Daily., Disp: 30 tablet, Rfl: 3    ondansetron (Zofran) 8 MG tablet, Take 1 tablet by mouth Every 8 (Eight) Hours As Needed for Nausea or Vomiting., Disp: 60 tablet, Rfl: 1    pantoprazole (PROTONIX) 40 MG EC tablet, Take 1 tablet by mouth Daily., Disp: 90 tablet, Rfl: 3    spironolactone (ALDACTONE) 25 MG tablet, Take 1 tablet by mouth Daily., Disp: 90 tablet, Rfl: 3    torsemide (DEMADEX) 20 MG tablet, Take 1 tablet by mouth Daily., Disp: 90 tablet, Rfl: 3    venlafaxine 225 MG tablet sustained-release 24 hour 24 hr tablet, Take 2 tablets by mouth Daily With Breakfast., Disp: 60 each, Rfl: 1    Social History     Socioeconomic History    Marital status: Single   Tobacco Use    Smoking status: Former     Packs/day: 0.75     Years: 20.00     Pack years: 15.00     Types: Cigarettes     Quit date: 8/1/2008     Years since quitting: 15.1     Passive exposure: Past    Smokeless tobacco: Never   Vaping Use    Vaping Use: Never used   Substance and Sexual Activity    Alcohol use: Yes     Comment: occas    Drug use: Never    Sexual activity: Not Currently     Partners: Male     Social History     Social History Narrative    Patient lives in [place], [place].  He is single and works full time as a  at [employer].        Family History   Problem Relation Age of Onset    Diabetes Mother     Hypertension Mother     Schizophrenia Mother     Depression Mother     Obesity Mother     No Known Problems Father     No Known Problems Brother     Mental illness Brother      Kidney disease Maternal Uncle     Heart disease Maternal Grandmother     Hypertension Maternal Grandmother     Hypertension Maternal Grandfather        Review of Systems   Constitutional:  Positive for fatigue and unexpected weight gain. Negative for chills, diaphoresis, fever and unexpected weight loss.   HENT:  Negative for congestion and facial swelling.    Eyes:  Negative for blurred vision, double vision and discharge.   Respiratory:  Negative for chest tightness, shortness of breath and stridor.    Cardiovascular:  Negative for chest pain, palpitations and leg swelling.   Gastrointestinal:  Negative for blood in stool.   Endocrine: Negative for polydipsia.   Genitourinary:  Negative for hematuria.   Musculoskeletal:  Positive for arthralgias.   Skin:  Negative for color change.   Allergic/Immunologic: Negative for immunocompromised state.   Neurological:  Negative for confusion.   Psychiatric/Behavioral:  Negative for self-injury.      Physical Exam:  Vital Signs:  Weight: (!) 200 kg (442 lb)   Body mass index is 61.65 kg/m².  Temp: 97.1 °F (36.2 °C)   Heart Rate: 64   BP: 150/88     Physical Exam  Constitutional:       General: He is not in acute distress.     Appearance: He is well-developed. He is not diaphoretic.   HENT:      Head: Normocephalic and atraumatic.      Mouth/Throat:      Pharynx: No oropharyngeal exudate.   Eyes:      Conjunctiva/sclera: Conjunctivae normal.      Pupils: Pupils are equal, round, and reactive to light.   Cardiovascular:      Rate and Rhythm: Normal rate and regular rhythm.   Pulmonary:      Effort: Pulmonary effort is normal. No respiratory distress.   Abdominal:      General: There is no distension.      Palpations: Abdomen is soft.      Comments: Diastasis recti, no palpable hernias, umbilical lap scar   Musculoskeletal:      Right lower leg: Edema present.      Left lower leg: Edema present.      Comments: Chronic brawny edema, venous stasis changes b/l   Skin:     General:  Skin is warm and dry.      Coloration: Skin is not pale.   Neurological:      Mental Status: He is alert and oriented to person, place, and time.      Cranial Nerves: No cranial nerve deficit.   Psychiatric:         Behavior: Behavior normal.         Thought Content: Thought content normal.     Problem List Items Addressed This Visit       Anxiety    Body mass index (BMI) of 60.0-69.9 in adult - Primary    Relevant Orders    Case Request (Completed)    MRSA Screen Culture (Outpatient) - Swab, Nares    Chronic heart failure with preserved ejection fraction (HFpEF)    Essential hypertension    Fatigue    GERD (gastroesophageal reflux disease)    Overview     on Protonix and Pepcid daily. EGD Jan 2020; no hx of h pylori         Hyperlipidemia    AJAY (obstructive sleep apnea) -intolerant of BiPAP    Overview     9/20-Sleep test positive for sleep apnea CPAP plan follow-up with Dr. Efra Florian internal medicine   Reports he has sleep apnea as a child cannot afford the machine and cannot tolerate the machine.         Paroxysmal atrial fibrillation    Severe recurrent major depression without psychotic features    Type 2 diabetes mellitus, without long-term current use of insulin     Other Visit Diagnoses       Back pain, unspecified back location, unspecified back pain laterality, unspecified chronicity        Osteoarthritis, unspecified osteoarthritis type, unspecified site                Assessment:    Carlos Eduardo Danielle is a 53 y.o. year old male with medically complicated obesity.    Weight loss surgery is deemed medically necessary given the following obesity related comorbidities including HTN, HLD, AJAY, COPD, depression, DM2, GERD with current Weight: (!) 200 kg (442 lb) and Body mass index is 61.65 kg/m²..    Patient is aware that surgery is a tool, and that weight loss is not guaranteed but only seen in the context of appropriate use, follow up and exercise.    The patient was present for an approximately a  "2.5 hour discussion of the purpose of weight loss surgery, how WLS is a tool to assist in achieving weight loss goals, the most common complications and how best to avoid them, and the strategies for short and long term weight loss.  Ample opportunity to discuss questions was available both in group and during the time of individual examination.    I reviewed all available preop labs, Xrays, tests, clearances, etc and signed off on these in the record.  All of this in addition to the patient's unique history and exam has been taken into consideration in determining their appropriate candidacy for weight loss surgery.    Complications  of laparoscopic/possible robotic gastric sleeve were discussed. The patient is well aware of the potential complications of surgery that include but not limited to bleeding, infections, deep venous thrombosis, pulmonary embolism, pulmonary complications such as pneumonia, cardiac events, hernias, small bowel obstruction, damage to the spleen or other organs, bowel injury, disfiguring scars, failure to lose weight, need for additional surgery, conversion to an open procedure, and death. Patient is also aware of complications which apply in this particular procedure that can include but are not limited to a \"leak\" at the staple line which in some instances may require conversion to gastric bypass.    The patient is aware if a hiatal hernia is encountered, it likely will be repaired.  R/B/A Rx to hiatal hernia repair were discussed as outlined in our long consent form.  Briefly risks in addition to those for LSG include recurrent hernia, RENU, dysphagia, esophageal injury, pneumothorax, injury to the vagus nerves, injury to the thoracic duct, aorta or vena cava.    Greater than 3 minutes was spent with the patient discussing avoiding all tobacco products and second hand smoke at least 2 weeks pre-operatively and 6 weeks post-operatively to minimize the risk of sleeve leak.  This included " discussing the importance of avoiding even secondhand smoke as the risk of leak is increased.  Examples discussed:  I made it very clear that the patient understands they should avoid even riding in a car where someone has previously smoked in the last 2 weeks, living in a house where someone smokes (even if it's in a separate room/patio/attached garage, etc.) we discussed that they should not have a conversation with a group of people who are smoking even if it's outside.  They can be around wood burning fires and barbecue.  I told them I do not know if marijuana has a same effects but my overall recommendation is to avoid it for 2 weeks prior in 6 weeks after surgery.  They also are aware that nicotine may also increase the risk of leak and I strongly encouraged him to avoid that as well for 2 weeks prior in 6 weeks after surgery.    Discussed the risks, benefits and alternative therapies at great length as outlined in our extensive consent forms, consent videos, and educational teaching process under the direction of the center's .    A copy of the patient's signed informed consent is on file.    Plan:  Robotic assisted sleeve gastrectomy at [facility]    He will be very sensitive to dehydration (CKD, CHF, a fib, DM2) after surgery, pt counseled to keep up with liquids.    R/B/A Rx discussed to postop anticoagulation including but not limited to bleeding, drug reaction, venothromboembolic events, etc. and he  will restart his therapeutic Eliquis asap after surgery.  Hold Eliquis x 48 hours preop.    Higher risk candidate due to untreated AJAY, a fib, therapeutic anticoagulation, CHF with hx of exacerbation.  Pt counseled, that his decision to go 8 hours away for recovery, may not be the best decision given his extensive health history and risk of complications that I would not be able to manage that far away.    MDM high:  Elective procedure with the following risk factors: morbid obesity, AJAY, CHF, a  fib, HTN, DM2.  4+ chronic medical problems reviewed.      Thank you Ian Vann MD for allowing me to share in the care of our mutual patient.             Akua Ruelas MD                                             Electronically signed by Akua Ruelas MD at 09/25/23 0900       No current facility-administered medications for this encounter.     Current Outpatient Medications   Medication Sig Dispense Refill    amLODIPine (NORVASC) 10 MG tablet Take 1 tablet by mouth Daily. 90 tablet 3    apixaban (ELIQUIS) 5 MG tablet tablet Take 1 tablet by mouth 2 (Two) Times a Day. (Patient taking differently: Take 1 tablet by mouth 2 (Two) Times a Day. Patient instructed to hold Eliquis 48 hours prior to surgery.) 180 tablet 3    empagliflozin (JARDIANCE) 10 MG tablet tablet Take 1 tablet by mouth Daily. 90 tablet 3    glimepiride (AMARYL) 4 MG tablet Take 1 tablet by mouth 2 (Two) Times a Day. 60 tablet 3    metFORMIN ER (GLUCOPHAGE-XR) 500 MG 24 hr tablet Take 2 tablets by mouth 2 (Two) Times a Day With Meals. 60 tablet 3    mirtazapine (Remeron) 15 MG tablet Take 1 tablet by mouth At Night As Needed for sleep. 30 tablet 5    nebivolol (Bystolic) 10 MG tablet Take 1 tablet by mouth Daily. 30 tablet 3    ondansetron (Zofran) 8 MG tablet Take 1 tablet by mouth Every 8 (Eight) Hours As Needed for Nausea or Vomiting. 60 tablet 1    pantoprazole (PROTONIX) 40 MG EC tablet Take 1 tablet by mouth Daily. 90 tablet 3    spironolactone (ALDACTONE) 25 MG tablet Take 1 tablet by mouth Daily. 90 tablet 3    torsemide (DEMADEX) 20 MG tablet Take 1 tablet by mouth Daily. 90 tablet 3    venlafaxine 225 MG tablet sustained-release 24 hour 24 hr tablet Take 2 tablets by mouth Daily With Breakfast. 60 each 1    oxyCODONE (Roxicodone) 5 MG immediate release tablet Take 1 tablet by mouth Every 4 (Four) Hours As Needed for Moderate Pain. 10 tablet 0     Lab Results (all)       Procedure Component Value Units  Date/Time    POC Glucose Once [[id number]]  (Abnormal) Collected: 10/18/23 1612    Specimen: Blood Updated: 10/18/23 1619     Glucose 147 mg/dL     Tissue Pathology Exam [[id number]] Collected: 10/17/23 1146    Specimen: Tissue from Stomach, Tissue from Liver Updated: 10/18/23 1619     Case Report --     Surgical Pathology Report                         Case: [id number]                                  Authorizing Provider:  Akua Ruelas MD  Collected:           10/17/2023 11:46 AM          Ordering Location:     [facility]   Received:            10/17/2023 12:54 PM                                 OR                                                                           Pathologist:           Jose M Mayo MD                                                       Specimens:   1) - Stomach, SUB-TOTAL GASTRECTOMY                                                                 2) - Liver, LIVER BIOPSY                                                                    Clinical Information --     Body mass index (BMI) of 60.0-69.9 in adult       Final Diagnosis --     1.  STOMACH, SUBTOTAL GASTRECTOMY:  Benign gastric mucosa with mild chronic inactive gastritis  Negative for dysplasia or carcinoma    2.  LIVER, BIOPSY:  Mild (~30%) macrovesicular steatosis  No significant fibrosis seen on trichrome stain  No increased hepatocellular iron deposition seen on iron stain  No evidence of malignancy         Comment --     This case was reviewed with Dr. Jaun Mae, who agrees with the above diagnosis.       Gross Description --     1. Stomach.  Received in formalin labeled subtotal gastrectomy is a 20 x 5.4 x 3.5 cm intact, unopened portion of stomach with a staple line running the length of the specimen.  The serosa is tan-pink with minimal attached fat.  No significant surgical disruption is present.  The lumen contains a moderate amount of dark red viscous blood and the mucosa is red-pink  "and congested with normal, prominent rugal folds and minimal mucosal sloughing.  No polyps, ulcers or areas of mucosal thickening are present.  Representative sections are submitted in blocks 1A-1C.    2. Liver.  Received in formalin labeled \"liver biopsy\" is a 1.3 cm long by 0.1 cm in diameter yellow-tan liver core biopsy submitted entirely in block 2A. HDM         Microscopic Description --     The slides are reviewed and demonstrate histopathologic features supporting the above rendered diagnosis.        POC Glucose Once [[id number]]  (Abnormal) Collected: 10/18/23 1109    Specimen: Blood Updated: 10/18/23 1110     Glucose 197 mg/dL     Iron [[id number]]  (Normal) Collected: 10/18/23 0650    Specimen: Blood Updated: 10/18/23 1056     Iron 63 mcg/dL     Comprehensive Metabolic Panel [[id number]]  (Abnormal) Collected: 10/18/23 0650    Specimen: Blood Updated: 10/18/23 0756     Glucose 197 mg/dL      BUN 18 mg/dL      Creatinine 0.96 mg/dL      Sodium 140 mmol/L      Potassium 4.2 mmol/L      Chloride 104 mmol/L      CO2 24.0 mmol/L      Calcium 8.9 mg/dL      Total Protein 6.5 g/dL      Albumin 4.1 g/dL      ALT (SGPT) 38 U/L      AST (SGOT) 30 U/L      Alkaline Phosphatase 93 U/L      Total Bilirubin 0.5 mg/dL      Globulin 2.4 gm/dL      Comment: Calculated Result        A/G Ratio 1.7 g/dL      BUN/Creatinine Ratio 18.8     Anion Gap 12.0 mmol/L      eGFR 95.1 mL/min/1.73     Narrative:      GFR Normal >60  Chronic Kidney Disease <60  Kidney Failure <15      POC Glucose Once [[id number]]  (Abnormal) Collected: 10/18/23 0715    Specimen: Blood Updated: 10/18/23 0717     Glucose 184 mg/dL     CBC & Differential [[id number]]  (Abnormal) Collected: 10/18/23 0650    Specimen: Blood Updated: 10/18/23 0710    Narrative:      The following orders were created for panel order CBC & Differential.  Procedure                               Abnormality         Status                     ---------                               " -----------         ------                     CBC Auto Differential[[id number]]        Abnormal            Final result                 Please view results for these tests on the individual orders.    CBC Auto Differential [[id number]]  (Abnormal) Collected: 10/18/23 0650    Specimen: Blood Updated: 10/18/23 0710     WBC 8.37 10*3/mm3      RBC 5.21 10*6/mm3      Hemoglobin 14.9 g/dL      Hematocrit 44.8 %      MCV 86.0 fL      MCH 28.6 pg      MCHC 33.3 g/dL      RDW 14.6 %      RDW-SD 45.8 fl      MPV 11.8 fL      Platelets 195 10*3/mm3      Neutrophil % 77.8 %      Lymphocyte % 15.7 %      Monocyte % 6.0 %      Eosinophil % 0.2 %      Basophil % 0.1 %      Immature Grans % 0.2 %      Neutrophils, Absolute 6.51 10*3/mm3      Lymphocytes, Absolute 1.31 10*3/mm3      Monocytes, Absolute 0.50 10*3/mm3      Eosinophils, Absolute 0.02 10*3/mm3      Basophils, Absolute 0.01 10*3/mm3      Immature Grans, Absolute 0.02 10*3/mm3      nRBC 0.0 /100 WBC     POC Glucose Once [[id number]]  (Abnormal) Collected: 10/17/23 2001    Specimen: Blood Updated: 10/17/23 2005     Glucose 302 mg/dL     POC Glucose Once [[id number]]  (Abnormal) Collected: 10/17/23 1610    Specimen: Blood Updated: 10/17/23 1611     Glucose 231 mg/dL     POC Glucose Once [[id number]]  (Abnormal) Collected: 10/17/23 1409    Specimen: Blood Updated: 10/17/23 1411     Glucose 234 mg/dL     POC Glucose Once [[id number]]  (Abnormal) Collected: 10/17/23 0841    Specimen: Blood Updated: 10/17/23 0843     Glucose 218 mg/dL           Imaging Results (All)       Procedure Component Value Units Date/Time    FL Upper GI Water Soluble [[id number]] Collected: 10/18/23 1015     Updated: 10/18/23 1612    Narrative:      FL UPPER GI WATER SOLUBLE    Date of Exam: 10/18/2023 9:13 AM EDT    Indication: post sleeve, rule out leak    Comparison: None available.    Technique:   radiograph of the abdomen was obtained. A single contrast radiographic exam was performed imaging  through the upper gastrointestinal tract.    Fluoroscopic Time: 36 seconds    Number of Images: 8 associated fluoroscopic loops were saved    Findings:   imaging reveals a gaseous abdomen. There is a suture line visible in the left upper quadrant. Under fluoroscopic observation, the patient ingested water-soluble contrast. The oral phase of deglutition appeared normal. The esophageal mucosa appeared   grossly normal. There was no evidence of a focal esophageal stricture. Examination of the stomach demonstrated postoperative changes that are consistent with a vertical sleeve gastrectomy. No extravasation of contrast was seen. There was no delay in   gastric emptying.      Impression:      Impression:    Status post vertical sleeve gastrectomy. There was no evidence of extraluminal contrast. There was no delay in gastric emptying.        Electronically Signed: Hood Radford MD    10/18/2023 4:08 PM EDT    Workstation ID: [id number]             Operative/Procedure Notes (all)        Akua Ruelas MD at 10/17/23 1059  Version 1 of 1         GASTRIC SLEEVE LAPAROSCOPIC WITH DAVINCI ROBOT AND ESOPHAGOGASTRODUODENOSCOPY  Progress Note    Carlos Eduardo Danielle  10/17/2023    Pre-op Diagnosis:   Body mass index (BMI) of 60.0-69.9 in adult [Z68.44]       Post-Op Diagnosis Codes:     * Body mass index (BMI) of 60.0-69.9 in adult [Z68.44]    Procedure/CPT® Codes:  MA LAPS GSTRC RSTRICTIV PX LONGITUDINAL GASTRECTOMY [39156]  MA ESOPHAGOGASTRODUODENOSCOPY TRANSORAL DIAGNOSTIC [04400]  MA NEEDLE BIOPSY LIVER [46276]      Procedure(s):  GASTRIC SLEEVE LAPAROSCOPIC WITH DAVINCI ROBOT,  LIVER BIOPSY , ESOPHAGOGASTRODUODENOSCOPY              Surgeon(s):  Akua Ruelas MD    Anesthesia: General with Block    Staff:   Circulator: Mary Jane Azevedo RN; Lupe Adler RN; Trang Zuñiga RN  Scrub Person: Summer Cooper; Renée Casas  Nursing Assistant: Carmelita Queen CNA  Assistant: Fan Nunez  ASHA  Assistant: Fan Nunez PA-C      Estimated Blood Loss: minimal    Urine Voided: * No values recorded between 10/17/2023 10:28 AM and 10/17/2023 12:09 PM *    Specimens:                Specimens       ID Source Type Tests Collected By Collected At Frozen?    A Stomach Tissue TISSUE PATHOLOGY EXAM   Akua Ruelas MD 10/17/23 1146 No    Description: SUB-TOTAL GASTRECTOMY    B Liver Tissue TISSUE PATHOLOGY EXAM   Akua Ruelas MD 10/17/23 1155 No    Description: LIVER BIOPSY                  Drains:   [REMOVED] NG/OG Tube Orogastric 18 Fr Center mouth (Removed)       Findings: Hepatomegaly with macrovesicular steatosis, no hiatal hernia.  Thick stomach.        Complications: None immediate.    Assistant: Fan Nunez PA-C  was responsible for performing the following activities: Retraction, Suction, Irrigation, Suturing, Closing, Placing Dressing, and Held/Positioned Camera and their skilled assistance was necessary for the success of this case.    Akua Ruelas MD     Date: 10/17/2023  Time: 12:28 EDT          Electronically signed by Akua Ruelas MD at 10/17/23 1229       Akua Ruelas MD at 10/17/23 1059  Version 2 of 2         OPERATIVE REPORT    DATE: 10/17/23    PATIENT: Carlos Eduardo Danielle    PREOPERATIVE DIAGNOSIS:    1. Morbid obesity with comorbidities    POSTOPERATIVE DIAGNOSIS:    1. Morbid obesity with multiple comorbidities.  2.  Hepatomegaly    PROCEDURES PERFORMED:  1. Robotic assisted laparoscopic sleeve gastrectomy (85% subtotal vertical gastrectomy)    2.  Esophagogastroduodenoscopy  3.  Laparoscopic Jose-Cut liver biopsy    SURGEON:  Akua Ruelas M.D.    ASSISTANT: DANIEL Ponce was instrumental in the success of the case and provided retraction, suction, camera holding, and skin closure.    ANESTHESIA:  General endotracheal with TAP block    ESTIMATED BLOOD LOSS:   minimal    FLUIDS:  Crystalloids.    SPECIMENS:  Subtotal gastrectomy,  Jose-cut liver biopsy    DRAINS:  None.    COUNTS:  Correct.    COMPLICATIONS:  None.    FINDINGS: Hepatomegaly with macrovesicular steatosis, no hiatal hernia.  Thick stomach.    INDICATIONS:   Carlos Eduardo Danielle is a 53 y.o. male with morbid obesity and associated comorbidities who presents for elective laparoscopic sleeve gastrectomy. The patient has undergone our extensive preoperative education, teaching, and consent process. Everything is in order and they wish to proceed.         DESCRIPTION OF PROCEDURE:   The patient was brought to the operating room and placed supine upon the operating room table. SCDs were placed. The patient underwent uneventful general endotracheal anesthesia and bilateral TAP blocks per the anesthesiology staff.  She received subcutaneous Lovenox and was given Ancef. The abdomen was prepped with ChloraPrep and draped in the usual sterile fashion. An Ioban was used as well.  Timeout was performed.     A small transverse incision was made a few centimeters above and to the left of the umbilicus and the peritoneal cavity entered under direct camera visualization using an 8 mm 0° laparoscope and an OptiView trocar.  The abdomen was then insufflated to a pressure of 16 mmHg with CO2 gas. Exploratory laparoscopy revealed no evidence of injury from the entrance technique.  The liver had had an uneven capsule with macrovesicular steatosis and hepatomegaly.  Two 8 mm ports were placed to the patient's left, lateral of the  first port, and a 12 mm port was placed in the right upper quadrant.  The robot was docked, all safety checks were performed, and I moved to the robot console.     A 2-0 Stratafix suture was placed to make a liver sling with bites of the peritoneum and the right aleksandar of the diaphragm, and the left lobe of the liver was elevated. The stomach was decompressed with an 18 Spanish orogastric tube, which was then positioned in the antrum. The greater curvature vessels were taken down  with the vessel sealer energy device beginning at the midpoint of the stomach and continued up to the angle of His, including the short gastrics. The left aleksandar was completely exposed and there was no sign of hiatal hernia here or anteriorly. This was photodocumented. The GE junction fat pad, which was quite generous, was elevated to make a clear landing zone for the stapler later. The greater curvature vessels were taken down with the energy device extending distally within 3 cm of the pylorus. Filmy adhesions to the stomach were taken down posteriorly.      A 36 Sen[place]e bougie was inserted and positioned along the lesser curvature of the stomach.  The stomach was marked at 1 cm from the angle of His, 3 cm from the incisura, and 5 cm from the pylorus using an ink saturated Kittner.  1 mg of IV glucagon was given to relax gastric smooth muscle.  Using the bougie as a template, a vertical sleeve gastrectomy was fashioned with successive staple loads.  The stomach was very thick, requiring upsizing of loads.  The pattern of loads was as follows: blue, white, blue, blue, white, white, white, blue.   The staple line was examined and was hemostatic. The gastrectomy specimen was removed through the 12 mm port site. The specimen was later examined, and the staples were well-formed. Palpation of the specimen revealed no masses. The staple line of the sleeve gastrectomy revealed staples that were well-formed. The upper abdomen was flooded with saline, and endoscopy was performed.     The flexible endoscope was passed transorally down to the pylorus easily. The sleeve extended to within 6 cm proximal to the pylorus and was hemostatic. The sleeve was not narrow or twisted. There was no hiatal hernia or distal esophagitis. The rest of the esophagus was normal. The scope was removed. The leak test was normal.        I rescrubbed and suctioned the irrigation fluid from the upper abdomen, making sure it was dry. The staple line on  the stomach was hemostatic.  The staple line was treated with 4 ml of aerosolized Tisseel fibrin glue. The liver stitch was removed easily. An 18 gauge Jose-Cut needle was inserted through the subxiphoid incision.  A core needle biopsy was taken from the left lobe of the liver.  Hemostasis was obtained immediately with cautery.  The specimen was examined and was adequate.      The 12 mm port was removed under direct visualization and there was no bleeding. This incision was closed with 0-Vicryl transfascial suture using a suture passer under direct visualization in a horizontal mattress fashion. All other ports were removed and then replaced under direct visualization and all of these were hemostatic. The instruments were removed and the abdomen was desufflated. The ports were removed.  Skin incisions were closed with staples and sterile dressings placed.  The subxiphoid skin nick was closed with glue. 10 mg of IV Barhemsys was given at the end of the case.  The patient was awakened and taken to recovery in good condition, having tolerated the procedure well. All sponge, needle, and instrument counts were correct.          Electronically signed by Akua Ruelas MD at 10/17/23 1535       Akua Ruelas MD at 10/17/23 1059  Version 1 of 2         OPERATIVE REPORT    DATE: 10/17/23    PATIENT: Carlos Eduardo Danielle    PREOPERATIVE DIAGNOSIS:    1. Morbid obesity with comorbidities    POSTOPERATIVE DIAGNOSIS:    1. Morbid obesity with multiple comorbidities.    PROCEDURES PERFORMED:  1. Robotic assisted laparoscopic sleeve gastrectomy (85% subtotal vertical gastrectomy)    2.  Esophagogastroduodenoscopy    SURGEON:  Akua Ruelas M.D.    ASSISTANT: DANIEL Ponce was instrumental in the success of the case and provided retraction, suction, camera holding, and skin closure.    ANESTHESIA:  General endotracheal with TAP block    ESTIMATED BLOOD LOSS:   minimal    FLUIDS:  Crystalloids.    SPECIMENS:   Subtotal gastrectomy.    DRAINS:  None.    COUNTS:  Correct.    COMPLICATIONS:  None.    FINDINGS: Hepatomegaly with macrovesicular steatosis, no hiatal hernia.  Thick stomach.    INDICATIONS:   Carlos Eduardo Danielle is a 53 y.o. male with morbid obesity and associated comorbidities who presents for elective laparoscopic sleeve gastrectomy. The patient has undergone our extensive preoperative education, teaching, and consent process. Everything is in order and they wish to proceed.         DESCRIPTION OF PROCEDURE:   The patient was brought to the operating room and placed supine upon the operating room table. SCDs were placed. The patient underwent uneventful general endotracheal anesthesia and bilateral TAP blocks per the anesthesiology staff.  She received subcutaneous Lovenox and was given Ancef. The abdomen was prepped with ChloraPrep and draped in the usual sterile fashion. An Ioban was used as well.  Timeout was performed.     A small transverse incision was made a few centimeters above and to the left of the umbilicus and the peritoneal cavity entered under direct camera visualization using an 8 mm 0° laparoscope and an OptiView trocar.  The abdomen was then insufflated to a pressure of 16 mmHg with CO2 gas. Exploratory laparoscopy revealed no evidence of injury from the entrance technique.  The liver had had an uneven capsule with macrovesicular steatosis and hepatomegaly.  Two 8 mm ports were placed to the patient's left, lateral of the  first port, and a 12 mm port was placed in the right upper quadrant.  The robot was docked, all safety checks were performed, and I moved to the robot console.     A 2-0 Stratafix suture was placed to make a liver sling with bites of the peritoneum and the right aleksandar of the diaphragm, and the left lobe of the liver was elevated. The stomach was decompressed with an 18 Eritrean orogastric tube, which was then positioned in the antrum. The greater curvature vessels were taken down  with the vessel sealer energy device beginning at the midpoint of the stomach and continued up to the angle of His, including the short gastrics. The left aleksandar was completely exposed and there was no sign of hiatal hernia here or anteriorly. This was photodocumented. The GE junction fat pad, which was quite generous, was elevated to make a clear landing zone for the stapler later. The greater curvature vessels were taken down with the energy device extending distally within 3 cm of the pylorus. Filmy adhesions to the stomach were taken down posteriorly.      A 36 Slovenian bougie was inserted and positioned along the lesser curvature of the stomach.  The stomach was marked at 1 cm from the angle of His, 3 cm from the incisura, and 5 cm from the pylorus using an ink saturated Kittner.  1 mg of IV glucagon was given to relax gastric smooth muscle.  Using the bougie as a template, a vertical sleeve gastrectomy was fashioned with successive staple loads.  The stomach was very thick, requiring upsizing of loads.  The pattern of loads was as follows: blue, white, blue, blue, white, white, white, blue.   The staple line was examined and was hemostatic. The gastrectomy specimen was removed through the 12 mm port site. The specimen was later examined, and the staples were well-formed. Palpation of the specimen revealed no masses. The staple line of the sleeve gastrectomy revealed staples that were well-formed. The upper abdomen was flooded with saline, and endoscopy was performed.     The flexible endoscope was passed transorally down to the pylorus easily. The sleeve extended to within 6 cm proximal to the pylorus and was hemostatic. The sleeve was not narrow or twisted. There was no hiatal hernia or distal esophagitis. The rest of the esophagus was normal. The scope was removed. The leak test was normal.        I rescrubbed and suctioned the irrigation fluid from the upper abdomen, making sure it was dry. The staple line on  "the stomach was hemostatic.  The staple line was treated with 4 ml of aerosolized Tisseel fibrin glue. The liver stitch was removed easily. An 18 gauge Jose-Cut needle was inserted through the subxiphoid incision.  A core needle biopsy was taken from the left lobe of the liver.  Hemostasis was obtained immediately with cautery.  The specimen was examined and was adequate.      The 12 mm port was removed under direct visualization and there was no bleeding. This incision was closed with 0-Vicryl transfascial suture using a suture passer under direct visualization in a horizontal mattress fashion. All other ports were removed and then replaced under direct visualization and all of these were hemostatic. The instruments were removed and the abdomen was desufflated. The ports were removed.  Skin incisions were closed with staples and sterile dressings placed.  The subxiphoid skin nick was closed with glue. 10 mg of IV Barhemsys was given at the end of the case.  The patient was awakened and taken to recovery in good condition, having tolerated the procedure well. All sponge, needle, and instrument counts were correct.          Electronically signed by Akua Ruelas MD at 10/17/23 1231          Physician Progress Notes (all)        Eugene Charles MD at 10/18/23 1650          Cc: POD#1 RSG/herson/HHR  \"feel fine\"    He sitting up in bed alone in the room.  He looks and feels well would like to go home if possible.  Tolerating his diet without nausea.  Ambulating and voiding well.  No pulmonary complaints, spirometer over 3000 observed.  Passing flatus, no bowel movement.    No fever or tachycardia pulse 78 respirations 18 blood pressure 145/77 saturation 96% UO 1750 - 120  I/O +2233 he is in no apparent distress.  Lungs clear.  Abdomen soft, nontender/appropriate, nondistended, bowel sounds present.  Wound dressings dry    CMP normal except for glucose of 197.  Iron is 63.  White count 8.4 with 78 segs 16 " lymphs 6 monocytes no bands H&H 15 and 44.8.  Hemoglobin A1c 7.90.  Upper GI report unremarkable images reviewed somewhat faint, gaseous abdomen.  Contrast seen in the duodenum.    Impression: Postoperative #1 robotic sleeve gastrectomy, cholecystectomy and hiatal hernia repair.  He is doing well clinically, would like to go home and does meet discharge criteria.  Volume status seems appropriate with a history of CHF and A-fib.    Plan: Discharge home if okay with the hospitalist service.  Discharge instructions discussed.  Follow-up in the office in 1 to 2 weeks and call in the meantime with any problems questions or concerns.  He is instructed to remove his bandages and may shower tomorrow, resume his Eliquis tomorrow.  Prescription for Roxicodone.  Reiterated avoiding ulcerogenic agents for the next 6 to 8 weeks.  See orders.     Electronically signed by Eugene Charles MD at 10/18/23 1711       Zeinab Zamora MD at 10/18/23 1227              [facility] Medicine Services  PROGRESS NOTE    Patient Name: Carlos Eduardo Danielle  : 1970  MRN: [id number]    Date of Admission: 10/17/2023  Primary Care Physician: Ian Vann MD    Subjective   Subjective     CC:  Hypertension    HPI:  Patient is a 53-year-old who was admitted after gastric sleeve.  Hospital medicine was consulted for persistent hypertension requiring a Cardene drip.  This morning Cardene drip has been weaned off.  He feels fine and is tolerating liquid diet.  He has been up out of bed and is currently sitting in a chair.      Objective   Objective     Vital Signs:   Temp:  [97.4 °F (36.3 °C)-98.5 °F (36.9 °C)] 98.5 °F (36.9 °C)  Heart Rate:  [48-68] 48  Resp:  [12-18] 16  BP: (138-183)/() 155/84  Flow (L/min):  [2-4] 2     Physical Exam:  Constitutional: No acute distress, awake, alert  HENT: NCAT, mucous membranes moist  Respiratory: Clear to auscultation bilaterally, respiratory effort normal    Cardiovascular: RRR  Gastrointestinal: Positive bowel sounds, soft, nontender, nondistended  Musculoskeletal: Up sitting in a chair  Psychiatric: Appropriate affect, cooperative  Neurologic: Oriented x 3, strength symmetric in all extremities, Cranial Nerves grossly intact to confrontation, speech clear  Skin: No rashes      Results Reviewed:  LAB RESULTS:      Lab 10/18/23  0650   WBC 8.37   HEMOGLOBIN 14.9   HEMATOCRIT 44.8   PLATELETS 195   NEUTROS ABS 6.51   IMMATURE GRANS (ABS) 0.02   LYMPHS ABS 1.31   MONOS ABS 0.50   EOS ABS 0.02   MCV 86.0         Lab 10/18/23  0650   SODIUM 140   POTASSIUM 4.2   CHLORIDE 104   CO2 24.0   ANION GAP 12.0   BUN 18   CREATININE 0.96   EGFR 95.1   GLUCOSE 197*   CALCIUM 8.9         Lab 10/18/23  0650   TOTAL PROTEIN 6.5   ALBUMIN 4.1   GLOBULIN 2.4   ALT (SGPT) 38   AST (SGOT) 30   BILIRUBIN 0.5   ALK PHOS 93                 Lab 10/18/23  0650   IRON 63         Brief Urine Lab Results       None            Microbiology Results Abnormal       None            Peripheral Block    Result Date: 10/17/2023  Junior IRIS Mason, CRNA     10/17/2023 11:35 AM Peripheral Block Patient reassessed immediately prior to procedure Patient location during procedure: OR Reason for block: at surgeon's request and post-op pain management Performed by Anesthesiologist: Valente Chan MD Preanesthetic Checklist Completed: patient identified, IV checked, site marked, risks and benefits discussed, surgical consent, monitors and equipment checked, pre-op evaluation and timeout performed Prep: Pt Position: supine Sterile barriers:cap, gloves, mask and washed/disinfected hands Prep: ChloraPrep Patient monitoring: blood pressure monitoring, continuous pulse oximetry and EKG Procedure Sedation: yes Performed under: general Guidance:ultrasound guided Images:still images obtained, printed/placed on chart Laterality:Bilateral Block Type:TAP Injection Technique:single-shot Needle Type:short-bevel and  "echogenic Needle Gauge:20 G Resistance on Injection: none Medications Used: dexamethasone sodium phosphate injection - Injection  4 mg - 10/17/2023 10:40:00 AM bupivacaine PF (MARCAINE) 0.25 % injection - Injection  60 mL - 10/17/2023 10:40:00 AM Medications Comment:Block Injection:  LA dose divided between Right and Left block Post Assessment Injection Assessment: negative aspiration for heme, incremental injection and no paresthesia on injection Patient Tolerance:comfortable throughout block Complications:no Additional Notes Subcostal TAPs A high-frequency linear transducer, with sterile cover, was placed sub-xiphoid to identify Linea Alba, right and left Rectus Abdominus Muscles (CHUCKIE). The transducer was moved either right or left subcostally to identify the CHUCKIE and the Transverse Abdominus Muscle (SINGH). The insertion site was prepped in sterile fashion and then localized with 2-5 ml of 1% Lidocaine. Using ultrasound-guidance, a 20-gauge B-Gomes 4\" Ultraplex 360 non-stimulating echogenic needle was advanced in plane, from medial to lateral, until the tip of the needle was in the fascial plane between the CHUCKIE and SINGH. 1-3ml of preservative free normal saline was used to hydro-dissect the fascial planes. After the fascial plane was verified, the local anesthetic (LA) was injected. The procedure was repeated on the opposite side for bilateral coverage. Aspiration every 5 ml to prevent intravascular injection. Injection was completed with negative aspiration of blood and negative intravascular injection. Injection pressures were normal with minimal resistance. The subcostal approach to the TAP nerve block ideally anesthetizes the intercostal nerves T6-T9.       Results for orders placed during the hospital encounter of 06/01/23    Adult Transthoracic Echo Complete w/ Color, Spectral and Contrast if Necessary Per Protocol    Interpretation Summary    Estimated left ventricular EF = 60%    Left ventricular wall " thickness is consistent with mild concentric hypertrophy.    Mild to moderate aortic valve regurgitation is present    Mild mitral valve regurgitation is present      Current medications:  Scheduled Meds:acetaminophen, 1,000 mg, Oral, Q8H   Or  acetaminophen, 1,000 mg, Oral, Q8H  amLODIPine, 10 mg, Oral, Q24H  enoxaparin, 60 mg, Subcutaneous, Q12H  gabapentin, 100 mg, Oral, TID   Or  gabapentin, 100 mg, Oral, TID  insulin lispro, 2-9 Units, Subcutaneous, 4x Daily AC & at Bedtime  nebivolol, 10 mg, Oral, Daily  pantoprazole, 40 mg, Oral, Daily  sodium chloride, 10 mL, Intravenous, Q12H  spironolactone, 25 mg, Oral, Daily  torsemide, 20 mg, Oral, Daily      Continuous Infusions:lactated ringers, 150 mL/hr, Last Rate: 150 mL/hr (10/18/23 0352)  sodium chloride 0.45 % with KCl 20 mEq, 100 mL/hr, Last Rate: 100 mL/hr (10/18/23 0811)  sodium chloride, 150 mL/hr, Last Rate: 150 mL/hr (10/17/23 1028)      PRN Meds:.  ALPRAZolam    amisulpride (antiemetic)    dextrose    dextrose    diphenhydrAMINE    ferric gluconate    glucagon (human recombinant)    hydrALAZINE    HYDROmorphone **AND** naloxone    HYDROmorphone    influenza vaccine    metoclopramide    metoclopramide    mirtazapine    ondansetron **FOLLOWED BY** [START ON 10/21/2023] ondansetron    oxyCODONE    phenol    prochlorperazine    prochlorperazine    promethazine    simethicone    sodium chloride    sodium chloride    Assessment & Plan   Assessment & Plan     Active Hospital Problems    Diagnosis  POA    **Body mass index (BMI) of 60.0-69.9 in adult [Z68.44]  Not Applicable    Morbid obesity [E66.01]  Yes    Chronic heart failure with preserved ejection fraction (HFpEF) [I50.32]  Yes    Paroxysmal atrial fibrillation [I48.0]  Yes    GERD (gastroesophageal reflux disease) [K21.9]  Yes    Type 2 diabetes mellitus [E11.9]  Yes    Hyperlipidemia [E78.5]  Yes    Essential hypertension [I10]  Yes    Type 2 diabetes mellitus, without long-term current use of insulin  [E11.9]  Yes    AJAY (obstructive sleep apnea) -intolerant of BiPAP [G47.33]  Yes      Resolved Hospital Problems   No resolved problems to display.        Brief Hospital Course to date:  Carlos Eduardo Danielle is a 53 y.o. male with a history of hypertension, hyperlipidemia, AJAY, depression, T2DM, GERD, A-fib, COPD, CHF, chronic back pain, OA, obesity, underwent a gastric sleeve on 10/17/2023 with Dr. Ruelas.  Hospitalist are being consulted for management of hypertension.      Assessment and Plan:     Morbid obesity  -- s/p gastric sleeve with Dr. Ruelas on 10/17  -- Managed by Dr. Ruelas     Hypertension  Hyperlipidemia  -- continue Norvasc, spironolactone, torsemide daily  -- Instantly required Cardene drip, now off  -- Added hydralazine p.o. as needed     A-fib  -- Hold Eliquis x48 hours preop  -- Lovenox 60 mg every 12 hours starting on 10/18/2023 per Dr. Ruelas     CHF  -- Monitor I's and O's  -- Daily weights  -- Continue spironolactone and torsemide     T2DM  -- FSBG with SSI     AJAY  -- Does not use CPAP     GERD  -- PPI           Expected Discharge Location and Transportation: Home  Expected Discharge  Expected Discharge Date: 10/20/2023; Expected Discharge Time:      DVT prophylaxis:  Medical and mechanical DVT prophylaxis orders are present.     AM-PAC 6 Clicks Score (PT): 20 (10/18/23 8148)    CODE STATUS:   Code Status and Medical Interventions:   Ordered at: 10/17/23 1502     Level Of Support Discussed With:    Patient     Code Status (Patient has no pulse and is not breathing):    CPR (Attempt to Resuscitate)     Medical Interventions (Patient has pulse or is breathing):    Full       Zeinab Zamora MD  10/18/23        Electronically signed by Zeinab Zamora MD at 10/18/23 1236       Discharge Summary    No notes of this type exist for this encounter.

## 2023-10-24 ENCOUNTER — OFFICE VISIT (OUTPATIENT)
Dept: BARIATRICS/WEIGHT MGMT | Facility: CLINIC | Age: 53
End: 2023-10-24
Payer: COMMERCIAL

## 2023-10-24 VITALS
TEMPERATURE: 96.3 F | OXYGEN SATURATION: 96 % | WEIGHT: 315 LBS | SYSTOLIC BLOOD PRESSURE: 130 MMHG | HEIGHT: 71 IN | BODY MASS INDEX: 44.1 KG/M2 | DIASTOLIC BLOOD PRESSURE: 70 MMHG | HEART RATE: 58 BPM

## 2023-10-24 DIAGNOSIS — Z98.84 S/P BARIATRIC SURGERY: Primary | ICD-10-CM

## 2023-10-24 DIAGNOSIS — E11.65 TYPE 2 DIABETES MELLITUS WITH HYPERGLYCEMIA, WITHOUT LONG-TERM CURRENT USE OF INSULIN: ICD-10-CM

## 2023-10-24 PROCEDURE — 99024 POSTOP FOLLOW-UP VISIT: CPT | Performed by: PHYSICIAN ASSISTANT

## 2023-10-24 PROCEDURE — 3078F DIAST BP <80 MM HG: CPT | Performed by: PHYSICIAN ASSISTANT

## 2023-10-24 PROCEDURE — 1160F RVW MEDS BY RX/DR IN RCRD: CPT | Performed by: PHYSICIAN ASSISTANT

## 2023-10-24 PROCEDURE — 1159F MED LIST DOCD IN RCRD: CPT | Performed by: PHYSICIAN ASSISTANT

## 2023-10-24 PROCEDURE — 3075F SYST BP GE 130 - 139MM HG: CPT | Performed by: PHYSICIAN ASSISTANT

## 2023-10-24 RX ORDER — GLIMEPIRIDE 4 MG/1
4 TABLET ORAL 2 TIMES DAILY
Qty: 60 TABLET | Refills: 3 | Status: SHIPPED | OUTPATIENT
Start: 2023-10-24

## 2023-10-24 RX ORDER — NEBIVOLOL 10 MG/1
10 TABLET ORAL DAILY
Qty: 30 TABLET | Refills: 3 | OUTPATIENT
Start: 2023-10-24

## 2023-10-24 RX ORDER — NEBIVOLOL 10 MG/1
10 TABLET ORAL DAILY
Qty: 30 TABLET | Refills: 3 | Status: SHIPPED | OUTPATIENT
Start: 2023-10-24

## 2023-10-24 NOTE — PROGRESS NOTES
"[facility] Bariatric Surgery  [address]  [address]  [facility] [id number] [phone]      Patient Name:  Carlos Eduardo Danielle  :  1970      Date of Visit: 10/24/2023      Reason for Visit:  POD #7    HPI:  Carlos Eduardo Danielle is a 53 y.o. male s/p robotic LSG/liver bx (steatosis) 10/17/23 w/ Dr. Ruelas    Discharged on POD#1.  Doing well.  No issues/concerns.  Denies dysphagia, reflux, nausea, vomiting, abdominal pain, pulmonary issues, and fevers.  Tolerating diet progression - on stage 1.  Getting 90g prot/day.  Drinking 64 fluid oz/day.  Voiding well - urine \"yellow.\"  Not yet taking vitamins.  On Pantoprazole and Eliquis  (chronic anticoagulation).  Holding NSAIDs .  Ambulating frequently.     Presurgery weight: 442 pounds.  Today's weight is (!) 193 kg (425 lb) pounds, today's  Body mass index is 59.28 kg/m²., and weight loss since surgery is 17 pounds.       Final Diagnosis   1.  STOMACH, SUBTOTAL GASTRECTOMY:  Benign gastric mucosa with mild chronic inactive gastritis  Negative for dysplasia or carcinoma     2.  LIVER, BIOPSY:  Mild (~30%) macrovesicular steatosis  No significant fibrosis seen on trichrome stain  No increased hepatocellular iron deposition seen on iron stain  No evidence of malignancy          Past Medical History:   Diagnosis Date    Anxiety     Asthma 2023    Back pain     Back problem     Cellulitis     HX; BILATERAL LEGS    CHF (congestive heart failure) 2023    echo 6/3/2023 LVEF 60%    Colon polyp     Depression     Diabetes mellitus     GERD (gastroesophageal reflux disease)     on Protonix daily. EGD 2020; no hx of h pylori    Hyperlipidemia     Hypertension     Osteoarthritis     Paroxysmal atrial fibrillation 2023    Sleep apnea     noncompliant with BIPAP    Suicide attempt     reports hx of suicide attempts three times in 30 years ago    TIA (transient ischemic attack)      and ; related to hypertension.    Type 2 " diabetes mellitus     dx in 2019; no insulin; last A1c 8.0    Venous stasis     bilateral lower ext     Past Surgical History:   Procedure Laterality Date    APPENDECTOMY N/A 09/08/2021    Procedure: APPENDECTOMY LAPAROSCOPIC;  Surgeon: Radames Arauz MD;  Location:  [facility];  Service: General;  Laterality: N/A;    COLONOSCOPY  2020    ENDOSCOPY N/A 04/17/2023    Procedure: ESOPHAGOGASTRODUODENOSCOPY;  Surgeon: Akua Ruelas MD;  Location:  [facility];  Service: Bariatric;  Laterality: N/A;    EPIDIDYMAL CYST EXCISION      GASTRIC SLEEVE LAPAROSCOPIC N/A 10/17/2023    Procedure: GASTRIC SLEEVE LAPAROSCOPIC WITH DAVINCI ROBOT,  LIVER BIOPSY , ESOPHAGOGASTRODUODENOSCOPY;  Surgeon: Akua Ruelas MD;  Location:  [facility];  Service: Robotics - DaVinci;  Laterality: N/A;    TONSILLECTOMY  2006     Outpatient Medications Marked as Taking for the 10/24/23 encounter (Office Visit) with Gracy Poole PA   Medication Sig Dispense Refill    amLODIPine (NORVASC) 10 MG tablet Take 1 tablet by mouth Daily. 90 tablet 3    apixaban (ELIQUIS) 5 MG tablet tablet Take 1 tablet by mouth 2 (Two) Times a Day. (Patient taking differently: Take 1 tablet by mouth 2 (Two) Times a Day. Patient instructed to hold Eliquis 48 hours prior to surgery.) 180 tablet 3    empagliflozin (JARDIANCE) 10 MG tablet tablet Take 1 tablet by mouth Daily. 90 tablet 3    glimepiride (AMARYL) 4 MG tablet Take 1 tablet by mouth 2 (Two) Times a Day. 60 tablet 3    metFORMIN ER (GLUCOPHAGE-XR) 500 MG 24 hr tablet Take 2 tablets by mouth 2 (Two) Times a Day With Meals. 60 tablet 3    mirtazapine (Remeron) 15 MG tablet Take 1 tablet by mouth At Night As Needed for sleep. 30 tablet 5    nebivolol (Bystolic) 10 MG tablet Take 1 tablet by mouth Daily. 30 tablet 3    ondansetron (Zofran) 8 MG tablet Take 1 tablet by mouth Every 8 (Eight) Hours As Needed for Nausea or Vomiting. 60 tablet 1    oxyCODONE (Roxicodone) 5 MG immediate  "release tablet Take 1 tablet by mouth Every 4 (Four) Hours As Needed for Moderate Pain. 10 tablet 0    pantoprazole (PROTONIX) 40 MG EC tablet Take 1 tablet by mouth Daily. 90 tablet 3    spironolactone (ALDACTONE) 25 MG tablet Take 1 tablet by mouth Daily. 90 tablet 3    torsemide (DEMADEX) 20 MG tablet Take 1 tablet by mouth Daily. 90 tablet 3    venlafaxine 225 MG tablet sustained-release 24 hour 24 hr tablet Take 2 tablets by mouth Daily With Breakfast. 60 each 1     Allergies   Allergen Reactions    Atorvastatin Headache     Weakness.      Topamax [Topiramate] Other (See Comments)     Migraine         Social History     Socioeconomic History    Marital status: Single   Tobacco Use    Smoking status: Former     Packs/day: 0.75     Years: 20.00     Additional pack years: 0.00     Total pack years: 15.00     Types: Cigarettes     Quit date: 8/1/2008     Years since quitting: 15.2     Passive exposure: Past    Smokeless tobacco: Never   Vaping Use    Vaping Use: Never used   Substance and Sexual Activity    Alcohol use: Yes     Comment: occas    Drug use: Never    Sexual activity: Defer     Partners: Male     Social History     Social History Narrative    Patient lives in [place], [place].  He is single and works full time as a  at [employer].       /70 (BP Location: Left arm, Patient Position: Sitting)   Pulse 58   Temp 96.3 °F (35.7 °C)   Ht 180.3 cm (71\")   Wt (!) 193 kg (425 lb)   SpO2 96%   BMI 59.28 kg/m²   Physical Exam  Constitutional:       Appearance: He is well-developed.   Cardiovascular:      Rate and Rhythm: Regular rhythm. Bradycardia present.   Pulmonary:      Effort: Pulmonary effort is normal.   Abdominal:      Palpations: Abdomen is soft.      Tenderness: There is no abdominal tenderness.      Hernia: No hernia is present.      Comments: incisions healing well - staples removed - steri strips applied   Musculoskeletal:         General: Normal range of motion. "   Skin:     General: Skin is warm and dry.   Neurological:      Mental Status: He is alert.           Assessment:   POD #7 s/p robotic LSG/liver bx (steatosis) 10/17/23 w/ Dr. Ruelas           Plan:  Doing well.  Continue to advance diet per manual.  Continue protein 100g/day.  Increase exercise/activity as tolerated.  Reviewed lifting restrictions, nothing >25 lbs x 2 more weeks.  Start vitamins as discussed.  Continue PPI.  Continue to avoid ASA/NSAIDs/tobacco x 6 weeks postop, steroids x 8 weeks postop.  Call w/ problems/concerns.    The patient was instructed to follow up in 3 weeks, sooner if needed.

## 2023-11-09 ENCOUNTER — TELEPHONE (OUTPATIENT)
Dept: BARIATRICS/WEIGHT MGMT | Facility: CLINIC | Age: 53
End: 2023-11-09
Payer: COMMERCIAL

## 2023-11-09 NOTE — TELEPHONE ENCOUNTER
Pt called stating that he has had persistent nausea with diarrhea for the past couple of days. He wanted to know if this was normal for someone 3 weeks post op LSG? Please advise, thanks!

## 2023-11-10 NOTE — TELEPHONE ENCOUNTER
Called pt, advised that this was not necessarily normal and that he should schedule OV or VV for further eval. Pt understood with no further questions or concerns.

## 2023-11-21 RX ORDER — VENLAFAXINE HYDROCHLORIDE 225 MG/1
450 TABLET, EXTENDED RELEASE ORAL
Qty: 60 EACH | Refills: 1 | Status: SHIPPED | OUTPATIENT
Start: 2023-11-21

## 2023-11-29 ENCOUNTER — OFFICE VISIT (OUTPATIENT)
Dept: BARIATRICS/WEIGHT MGMT | Facility: CLINIC | Age: 53
End: 2023-11-29
Payer: COMMERCIAL

## 2023-11-29 VITALS
RESPIRATION RATE: 18 BRPM | TEMPERATURE: 96.6 F | WEIGHT: 315 LBS | BODY MASS INDEX: 44.1 KG/M2 | SYSTOLIC BLOOD PRESSURE: 144 MMHG | HEIGHT: 71 IN | HEART RATE: 72 BPM | DIASTOLIC BLOOD PRESSURE: 88 MMHG | OXYGEN SATURATION: 97 %

## 2023-11-29 DIAGNOSIS — R53.83 FATIGUE, UNSPECIFIED TYPE: ICD-10-CM

## 2023-11-29 DIAGNOSIS — E66.01 MORBID OBESITY WITH BMI OF 50.0-59.9, ADULT: Primary | ICD-10-CM

## 2023-11-29 DIAGNOSIS — Z90.3 POSTGASTRECTOMY MALABSORPTION: ICD-10-CM

## 2023-11-29 DIAGNOSIS — Z13.0 SCREENING, IRON DEFICIENCY ANEMIA: ICD-10-CM

## 2023-11-29 DIAGNOSIS — E55.9 HYPOVITAMINOSIS D: ICD-10-CM

## 2023-11-29 DIAGNOSIS — Z13.21 MALNUTRITION SCREEN: ICD-10-CM

## 2023-11-29 DIAGNOSIS — Z98.84 STATUS POST BARIATRIC SURGERY: ICD-10-CM

## 2023-11-29 DIAGNOSIS — K91.2 POSTGASTRECTOMY MALABSORPTION: ICD-10-CM

## 2023-11-29 PROCEDURE — 1159F MED LIST DOCD IN RCRD: CPT | Performed by: PHYSICIAN ASSISTANT

## 2023-11-29 PROCEDURE — 3077F SYST BP >= 140 MM HG: CPT | Performed by: PHYSICIAN ASSISTANT

## 2023-11-29 PROCEDURE — 99024 POSTOP FOLLOW-UP VISIT: CPT | Performed by: PHYSICIAN ASSISTANT

## 2023-11-29 PROCEDURE — 1160F RVW MEDS BY RX/DR IN RCRD: CPT | Performed by: PHYSICIAN ASSISTANT

## 2023-11-29 PROCEDURE — 3079F DIAST BP 80-89 MM HG: CPT | Performed by: PHYSICIAN ASSISTANT

## 2023-11-29 RX ORDER — URSODIOL 300 MG/1
300 CAPSULE ORAL 2 TIMES DAILY
Qty: 60 CAPSULE | Refills: 5 | Status: SHIPPED | OUTPATIENT
Start: 2023-11-29 | End: 2024-05-27

## 2023-11-29 NOTE — PROGRESS NOTES
[facility] Bariatric Surgery  [address]  [address]  Formerly [facility] [id number] [phone]      Patient Name:  Carlos Eduardo Danielle.  :  1970      Reason for Visit:  1 month postop    HPI:  Carlos Eduardo Danielle is a 53 y.o. male s/p robotic LSG/liver bx (steatosis) 10/17/23 w/ Dr. Ruelas     Doing well.  Pleased with progress, noticing more energy, feeling good. No issues/concerns. Denies dysphagia, reflux, nausea, vomiting, abdominal pain, pulmonary issues, and fevers.  BM normal.  Has GI appt in January. Tolerating diet progression - on stage 6.  Getting 80-90g prot/day.  Drinking 64+ fluid oz/day.  Taking MVI, B12, B1, Calcium, Vit D, iron, and Vit C.  On Pantoprazole. Chronic eliquis.  Did not get actigall Rx. Still holding ASA , NSAIDs , Tramadol, Hormones, Diuretics , Steroids, and Immunologics.  Active- delivers pizza, walks dog.       Presurgery weight:  442 pounds. Today's weight is (!) 181 kg (399 lb) pounds, today's Body mass index is 55.65 kg/m²., andHIS@ weight loss since surgery is 43 pounds.       Past Medical History:   Diagnosis Date    Anxiety     Asthma 2023    Back pain     Back problem     Cellulitis     HX; BILATERAL LEGS    CHF (congestive heart failure) 2023    echo 6/3/2023 LVEF 60%    Colon polyp     Depression     Diabetes mellitus     GERD (gastroesophageal reflux disease)     on Protonix daily. EGD 2020; no hx of h pylori    Hyperlipidemia     Hypertension     Osteoarthritis     Paroxysmal atrial fibrillation 2023    Sleep apnea     noncompliant with BIPAP    Suicide attempt     reports hx of suicide attempts three times in 30 years ago    TIA (transient ischemic attack)      and ; related to hypertension.    Type 2 diabetes mellitus     dx in ; no insulin; last A1c 8.0    Venous stasis     bilateral lower ext     Past Surgical History:   Procedure Laterality Date    APPENDECTOMY N/A 2021    Procedure: APPENDECTOMY  LAPAROSCOPIC;  Surgeon: Radames Arauz MD;  Location:  [facility];  Service: General;  Laterality: N/A;    COLONOSCOPY  2020    ENDOSCOPY N/A 04/17/2023    Procedure: ESOPHAGOGASTRODUODENOSCOPY;  Surgeon: Akua Ruelas MD;  Location:  [facility];  Service: Bariatric;  Laterality: N/A;    EPIDIDYMAL CYST EXCISION      GASTRIC SLEEVE LAPAROSCOPIC N/A 10/17/2023    Procedure: GASTRIC SLEEVE LAPAROSCOPIC WITH DAVINCI ROBOT,  LIVER BIOPSY , ESOPHAGOGASTRODUODENOSCOPY;  Surgeon: Akua Ruelas MD;  Location:  [facility];  Service: Robotics - DaVinci;  Laterality: N/A;    TONSILLECTOMY  2006     Outpatient Medications Marked as Taking for the 11/29/23 encounter (Office Visit) with Lida Colin PA-C   Medication Sig Dispense Refill    amLODIPine (NORVASC) 10 MG tablet Take 1 tablet by mouth Daily. 90 tablet 3    apixaban (ELIQUIS) 5 MG tablet tablet Take 1 tablet by mouth 2 (Two) Times a Day. (Patient taking differently: Take 1 tablet by mouth 2 (Two) Times a Day. Patient instructed to hold Eliquis 48 hours prior to surgery.) 180 tablet 3    empagliflozin (JARDIANCE) 10 MG tablet tablet Take 1 tablet by mouth Daily. 90 tablet 3    glimepiride (AMARYL) 4 MG tablet Take 1 tablet by mouth 2 (Two) Times a Day. 60 tablet 3    metFORMIN ER (GLUCOPHAGE-XR) 500 MG 24 hr tablet Take 2 tablets by mouth 2 (Two) Times a Day With Meals. 60 tablet 3    mirtazapine (Remeron) 15 MG tablet Take 1 tablet by mouth At Night As Needed for sleep. 30 tablet 5    nebivolol (Bystolic) 10 MG tablet Take 1 tablet by mouth Daily. 30 tablet 3    ondansetron (Zofran) 8 MG tablet Take 1 tablet by mouth Every 8 (Eight) Hours As Needed for Nausea or Vomiting. 60 tablet 1    pantoprazole (PROTONIX) 40 MG EC tablet Take 1 tablet by mouth Daily. 90 tablet 3    spironolactone (ALDACTONE) 25 MG tablet Take 1 tablet by mouth Daily. 90 tablet 3    torsemide (DEMADEX) 20 MG tablet Take 1 tablet by mouth Daily. 90 tablet 3     "venlafaxine 225 MG tablet sustained-release 24 hour 24 hr tablet Take 2 tablets by mouth Daily With Breakfast. 60 each 1     Allergies   Allergen Reactions    Atorvastatin Headache     Weakness.      Topamax [Topiramate] Other (See Comments)     Migraine         Social History     Socioeconomic History    Marital status: Single   Tobacco Use    Smoking status: Former     Packs/day: 0.75     Years: 20.00     Additional pack years: 0.00     Total pack years: 15.00     Types: Cigarettes     Quit date: 8/1/2008     Years since quitting: 15.3     Passive exposure: Past    Smokeless tobacco: Never   Vaping Use    Vaping Use: Never used   Substance and Sexual Activity    Alcohol use: Yes     Comment: occas    Drug use: Never    Sexual activity: Defer     Partners: Male       /88 (BP Location: Right arm, Patient Position: Sitting)   Pulse 72   Temp 96.6 °F (35.9 °C)   Resp 18   Ht 180.3 cm (71\")   Wt (!) 181 kg (399 lb)   SpO2 97%   BMI 55.65 kg/m²     Physical Exam  Constitutional:       Appearance: He is well-developed. He is obese.   HENT:      Head: Normocephalic and atraumatic.   Cardiovascular:      Rate and Rhythm: Normal rate and regular rhythm.   Pulmonary:      Effort: Pulmonary effort is normal.      Breath sounds: Normal breath sounds.   Abdominal:      General: Bowel sounds are normal.      Palpations: Abdomen is soft.      Comments: Incisions healing well   Skin:     General: Skin is warm and dry.   Neurological:      Mental Status: He is alert.   Psychiatric:         Mood and Affect: Mood normal.         Behavior: Behavior normal.         Thought Content: Thought content normal.         Judgment: Judgment normal.           Assessment:  1 month s/p robotic LSG/liver bx (steatosis) 10/17/23 w/ Dr. Ruelas     ICD-10-CM ICD-9-CM   1. Morbid obesity with BMI of 50.0-59.9, adult  E66.01 278.01    Z68.43 V85.43   2. Screening, iron deficiency anemia  Z13.0 V78.0   3. Malnutrition screen  Z13.21 V77.2 "   4. Hypovitaminosis D  E55.9 268.9   5. Postgastrectomy malabsorption  K91.2 579.3    Z90.3    6. Fatigue, unspecified type  R53.83 780.79   7. Status post bariatric surgery  Z98.84 V45.86       Plan:  Doing well.  Continue to advance diet per manual.  Continue protein 70-100g/day.  Encouraged good food choices - high protein, low carb.  Continue fluids 64oz per day. Continue routine exercise, lifting restrictions lifted.  Continue PPI.start actigall.  Continue to avoid NSAIDS, ASA, tramadol, tobacco x 6 weeks postop, steroids x 8 weeks postop.  Routine bariatric labs ordered.  Continue vitamins w/ adjustments pending lab results.  Call w/ problems/concerns.             The patient was instructed to follow up in 2 months, sooner if needed.

## 2023-12-04 LAB
25(OH)D3+25(OH)D2 SERPL-MCNC: 17.7 NG/ML (ref 30–100)
ALBUMIN SERPL-MCNC: 4.1 G/DL (ref 3.8–4.9)
ALBUMIN/GLOB SERPL: 1.7 {RATIO} (ref 1.2–2.2)
ALP SERPL-CCNC: 95 IU/L (ref 44–121)
ALT SERPL-CCNC: 24 IU/L (ref 0–44)
AST SERPL-CCNC: 27 IU/L (ref 0–40)
BASOPHILS # BLD AUTO: 0.1 X10E3/UL (ref 0–0.2)
BASOPHILS NFR BLD AUTO: 1 %
BILIRUB SERPL-MCNC: 0.7 MG/DL (ref 0–1.2)
BUN SERPL-MCNC: 15 MG/DL (ref 6–24)
BUN/CREAT SERPL: 13 (ref 9–20)
CALCIUM SERPL-MCNC: 9.2 MG/DL (ref 8.7–10.2)
CHLORIDE SERPL-SCNC: 99 MMOL/L (ref 96–106)
CO2 SERPL-SCNC: 22 MMOL/L (ref 20–29)
CREAT SERPL-MCNC: 1.14 MG/DL (ref 0.76–1.27)
EGFRCR SERPLBLD CKD-EPI 2021: 77 ML/MIN/1.73
EOSINOPHIL # BLD AUTO: 0.2 X10E3/UL (ref 0–0.4)
EOSINOPHIL NFR BLD AUTO: 3 %
ERYTHROCYTE [DISTWIDTH] IN BLOOD BY AUTOMATED COUNT: 14.1 % (ref 11.6–15.4)
FERRITIN SERPL-MCNC: 206 NG/ML (ref 30–400)
FOLATE SERPL-MCNC: 7.6 NG/ML
GLOBULIN SER CALC-MCNC: 2.4 G/DL (ref 1.5–4.5)
GLUCOSE SERPL-MCNC: 173 MG/DL (ref 70–99)
HCT VFR BLD AUTO: 51.6 % (ref 37.5–51)
HGB BLD-MCNC: 17.4 G/DL (ref 13–17.7)
IMM GRANULOCYTES # BLD AUTO: 0 X10E3/UL (ref 0–0.1)
IMM GRANULOCYTES NFR BLD AUTO: 0 %
IRON SERPL-MCNC: 109 UG/DL (ref 38–169)
LYMPHOCYTES # BLD AUTO: 1.8 X10E3/UL (ref 0.7–3.1)
LYMPHOCYTES NFR BLD AUTO: 26 %
MCH RBC QN AUTO: 28.5 PG (ref 26.6–33)
MCHC RBC AUTO-ENTMCNC: 33.7 G/DL (ref 31.5–35.7)
MCV RBC AUTO: 85 FL (ref 79–97)
METHYLMALONATE SERPL-SCNC: 214 NMOL/L (ref 0–378)
MONOCYTES # BLD AUTO: 0.4 X10E3/UL (ref 0.1–0.9)
MONOCYTES NFR BLD AUTO: 5 %
NEUTROPHILS # BLD AUTO: 4.4 X10E3/UL (ref 1.4–7)
NEUTROPHILS NFR BLD AUTO: 65 %
PLATELET # BLD AUTO: 227 X10E3/UL (ref 150–450)
POTASSIUM SERPL-SCNC: 3.9 MMOL/L (ref 3.5–5.2)
PREALB SERPL-MCNC: 26 MG/DL (ref 10–36)
PROT SERPL-MCNC: 6.5 G/DL (ref 6–8.5)
RBC # BLD AUTO: 6.11 X10E6/UL (ref 4.14–5.8)
SODIUM SERPL-SCNC: 142 MMOL/L (ref 134–144)
VIT B1 BLD-SCNC: 156.8 NMOL/L (ref 66.5–200)
WBC # BLD AUTO: 6.8 X10E3/UL (ref 3.4–10.8)

## 2024-01-01 ENCOUNTER — APPOINTMENT (OUTPATIENT)
Dept: CT IMAGING | Facility: HOSPITAL | Age: 54
End: 2024-01-01
Payer: COMMERCIAL

## 2024-01-01 ENCOUNTER — HOSPITAL ENCOUNTER (OUTPATIENT)
Facility: HOSPITAL | Age: 54
Setting detail: OBSERVATION
Discharge: HOME | End: 2024-01-03
Attending: STUDENT IN AN ORGANIZED HEALTH CARE EDUCATION/TRAINING PROGRAM | Admitting: INTERNAL MEDICINE
Payer: COMMERCIAL

## 2024-01-01 DIAGNOSIS — R55 SYNCOPE AND COLLAPSE: Primary | ICD-10-CM

## 2024-01-01 PROBLEM — J40 BRONCHITIS: Status: RESOLVED | Noted: 2022-04-19 | Resolved: 2024-01-01

## 2024-01-01 PROBLEM — K35.30 ACUTE APPENDICITIS WITH LOCALIZED PERITONITIS, WITHOUT PERFORATION, ABSCESS, OR GANGRENE: Status: RESOLVED | Noted: 2021-09-08 | Resolved: 2024-01-01

## 2024-01-01 PROBLEM — I50.9 ACUTE EXACERBATION OF CHF (CONGESTIVE HEART FAILURE): Status: RESOLVED | Noted: 2023-06-01 | Resolved: 2024-01-01

## 2024-01-01 PROBLEM — E87.6 HYPOKALEMIA: Status: ACTIVE | Noted: 2024-01-01

## 2024-01-01 PROBLEM — E66.01 MORBID (SEVERE) OBESITY DUE TO EXCESS CALORIES: Status: RESOLVED | Noted: 2021-02-23 | Resolved: 2024-01-01

## 2024-01-01 PROBLEM — M48.02 CERVICAL STENOSIS OF SPINAL CANAL: Status: ACTIVE | Noted: 2024-01-01

## 2024-01-01 PROBLEM — E66.01 MORBID OBESITY: Status: RESOLVED | Noted: 2023-09-25 | Resolved: 2024-01-01

## 2024-01-01 PROBLEM — K35.80 ACUTE APPENDICITIS: Status: RESOLVED | Noted: 2021-09-08 | Resolved: 2024-01-01

## 2024-01-01 LAB
ALBUMIN SERPL-MCNC: 3.8 G/DL (ref 3.5–5.2)
ALBUMIN/GLOB SERPL: 1.4 G/DL
ALP SERPL-CCNC: 99 U/L (ref 39–117)
ALT SERPL W P-5'-P-CCNC: 32 U/L (ref 1–41)
ANION GAP SERPL CALCULATED.3IONS-SCNC: 14 MMOL/L (ref 5–15)
AST SERPL-CCNC: 33 U/L (ref 1–40)
BASOPHILS # BLD AUTO: 0.04 10*3/MM3 (ref 0–0.2)
BASOPHILS NFR BLD AUTO: 0.5 % (ref 0–1.5)
BILIRUB SERPL-MCNC: 0.8 MG/DL (ref 0–1.2)
BUN SERPL-MCNC: 10 MG/DL (ref 6–20)
BUN/CREAT SERPL: 9.4 (ref 7–25)
CALCIUM SPEC-SCNC: 8.5 MG/DL (ref 8.6–10.5)
CHLORIDE SERPL-SCNC: 101 MMOL/L (ref 98–107)
CO2 SERPL-SCNC: 25 MMOL/L (ref 22–29)
CREAT SERPL-MCNC: 1.06 MG/DL (ref 0.76–1.27)
D DIMER PPP FEU-MCNC: 0.45 MCGFEU/ML (ref 0–0.52)
DEPRECATED RDW RBC AUTO: 44.5 FL (ref 37–54)
EGFRCR SERPLBLD CKD-EPI 2021: 84.4 ML/MIN/1.73
EOSINOPHIL # BLD AUTO: 0.11 10*3/MM3 (ref 0–0.4)
EOSINOPHIL NFR BLD AUTO: 1.3 % (ref 0.3–6.2)
ERYTHROCYTE [DISTWIDTH] IN BLOOD BY AUTOMATED COUNT: 14.5 % (ref 12.3–15.4)
FLUAV RNA RESP QL NAA+PROBE: NOT DETECTED
FLUBV RNA RESP QL NAA+PROBE: NOT DETECTED
GLOBULIN UR ELPH-MCNC: 2.7 GM/DL
GLUCOSE SERPL-MCNC: 149 MG/DL (ref 65–99)
HCT VFR BLD AUTO: 51.2 % (ref 37.5–51)
HGB BLD-MCNC: 17.3 G/DL (ref 13–17.7)
HOLD SPECIMEN: NORMAL
IMM GRANULOCYTES # BLD AUTO: 0.02 10*3/MM3 (ref 0–0.05)
IMM GRANULOCYTES NFR BLD AUTO: 0.2 % (ref 0–0.5)
LYMPHOCYTES # BLD AUTO: 1.64 10*3/MM3 (ref 0.7–3.1)
LYMPHOCYTES NFR BLD AUTO: 19.2 % (ref 19.6–45.3)
MAGNESIUM SERPL-MCNC: 1.5 MG/DL (ref 1.6–2.6)
MCH RBC QN AUTO: 28.8 PG (ref 26.6–33)
MCHC RBC AUTO-ENTMCNC: 33.8 G/DL (ref 31.5–35.7)
MCV RBC AUTO: 85.3 FL (ref 79–97)
MONOCYTES # BLD AUTO: 0.54 10*3/MM3 (ref 0.1–0.9)
MONOCYTES NFR BLD AUTO: 6.3 % (ref 5–12)
NEUTROPHILS NFR BLD AUTO: 6.2 10*3/MM3 (ref 1.7–7)
NEUTROPHILS NFR BLD AUTO: 72.5 % (ref 42.7–76)
NRBC BLD AUTO-RTO: 0 /100 WBC (ref 0–0.2)
PLATELET # BLD AUTO: 183 10*3/MM3 (ref 140–450)
PMV BLD AUTO: 11.5 FL (ref 6–12)
POTASSIUM SERPL-SCNC: 3.2 MMOL/L (ref 3.5–5.2)
PROT SERPL-MCNC: 6.5 G/DL (ref 6–8.5)
RBC # BLD AUTO: 6 10*6/MM3 (ref 4.14–5.8)
SARS-COV-2 RNA RESP QL NAA+PROBE: NOT DETECTED
SODIUM SERPL-SCNC: 140 MMOL/L (ref 136–145)
TROPONIN T SERPL HS-MCNC: 10 NG/L
TROPONIN T SERPL HS-MCNC: 13 NG/L
TSH SERPL DL<=0.05 MIU/L-ACNC: 1.91 UIU/ML (ref 0.27–4.2)
WBC NRBC COR # BLD AUTO: 8.55 10*3/MM3 (ref 3.4–10.8)
WHOLE BLOOD HOLD COAG: NORMAL
WHOLE BLOOD HOLD SPECIMEN: NORMAL

## 2024-01-01 PROCEDURE — 96366 THER/PROPH/DIAG IV INF ADDON: CPT

## 2024-01-01 PROCEDURE — G0378 HOSPITAL OBSERVATION PER HR: HCPCS

## 2024-01-01 PROCEDURE — 25010000002 LABETALOL 5 MG/ML SOLUTION: Performed by: INTERNAL MEDICINE

## 2024-01-01 PROCEDURE — 96365 THER/PROPH/DIAG IV INF INIT: CPT

## 2024-01-01 PROCEDURE — 87636 SARSCOV2 & INF A&B AMP PRB: CPT | Performed by: INTERNAL MEDICINE

## 2024-01-01 PROCEDURE — 85379 FIBRIN DEGRADATION QUANT: CPT | Performed by: PHYSICIAN ASSISTANT

## 2024-01-01 PROCEDURE — 96375 TX/PRO/DX INJ NEW DRUG ADDON: CPT

## 2024-01-01 PROCEDURE — 84443 ASSAY THYROID STIM HORMONE: CPT | Performed by: INTERNAL MEDICINE

## 2024-01-01 PROCEDURE — 72125 CT NECK SPINE W/O DYE: CPT

## 2024-01-01 PROCEDURE — 85025 COMPLETE CBC W/AUTO DIFF WBC: CPT | Performed by: STUDENT IN AN ORGANIZED HEALTH CARE EDUCATION/TRAINING PROGRAM

## 2024-01-01 PROCEDURE — 25010000002 ONDANSETRON PER 1 MG: Performed by: STUDENT IN AN ORGANIZED HEALTH CARE EDUCATION/TRAINING PROGRAM

## 2024-01-01 PROCEDURE — 25010000002 MAGNESIUM SULFATE 2 GM/50ML SOLUTION: Performed by: STUDENT IN AN ORGANIZED HEALTH CARE EDUCATION/TRAINING PROGRAM

## 2024-01-01 PROCEDURE — 93005 ELECTROCARDIOGRAM TRACING: CPT | Performed by: STUDENT IN AN ORGANIZED HEALTH CARE EDUCATION/TRAINING PROGRAM

## 2024-01-01 PROCEDURE — 84484 ASSAY OF TROPONIN QUANT: CPT | Performed by: PHYSICIAN ASSISTANT

## 2024-01-01 PROCEDURE — 80053 COMPREHEN METABOLIC PANEL: CPT | Performed by: STUDENT IN AN ORGANIZED HEALTH CARE EDUCATION/TRAINING PROGRAM

## 2024-01-01 PROCEDURE — 84132 ASSAY OF SERUM POTASSIUM: CPT | Performed by: STUDENT IN AN ORGANIZED HEALTH CARE EDUCATION/TRAINING PROGRAM

## 2024-01-01 PROCEDURE — 96361 HYDRATE IV INFUSION ADD-ON: CPT

## 2024-01-01 PROCEDURE — 99284 EMERGENCY DEPT VISIT MOD MDM: CPT

## 2024-01-01 PROCEDURE — 25810000003 SODIUM CHLORIDE 0.9 % SOLUTION: Performed by: PHYSICIAN ASSISTANT

## 2024-01-01 PROCEDURE — 70450 CT HEAD/BRAIN W/O DYE: CPT

## 2024-01-01 PROCEDURE — 84484 ASSAY OF TROPONIN QUANT: CPT | Performed by: STUDENT IN AN ORGANIZED HEALTH CARE EDUCATION/TRAINING PROGRAM

## 2024-01-01 PROCEDURE — 36415 COLL VENOUS BLD VENIPUNCTURE: CPT

## 2024-01-01 PROCEDURE — 83735 ASSAY OF MAGNESIUM: CPT | Performed by: STUDENT IN AN ORGANIZED HEALTH CARE EDUCATION/TRAINING PROGRAM

## 2024-01-01 RX ORDER — SODIUM CHLORIDE 0.9 % (FLUSH) 0.9 %
10 SYRINGE (ML) INJECTION AS NEEDED
Status: DISCONTINUED | OUTPATIENT
Start: 2024-01-01 | End: 2024-01-03 | Stop reason: HOSPADM

## 2024-01-01 RX ORDER — POTASSIUM CHLORIDE 20 MEQ/1
40 TABLET, EXTENDED RELEASE ORAL EVERY 4 HOURS
Status: COMPLETED | OUTPATIENT
Start: 2024-01-01 | End: 2024-01-01

## 2024-01-01 RX ORDER — NEBIVOLOL 10 MG/1
10 TABLET ORAL DAILY
Status: DISCONTINUED | OUTPATIENT
Start: 2024-01-02 | End: 2024-01-03

## 2024-01-01 RX ORDER — MAGNESIUM SULFATE HEPTAHYDRATE 40 MG/ML
2 INJECTION, SOLUTION INTRAVENOUS
Status: COMPLETED | OUTPATIENT
Start: 2024-01-01 | End: 2024-01-01

## 2024-01-01 RX ORDER — AMLODIPINE BESYLATE 10 MG/1
10 TABLET ORAL
Status: DISCONTINUED | OUTPATIENT
Start: 2024-01-02 | End: 2024-01-03 | Stop reason: HOSPADM

## 2024-01-01 RX ORDER — LABETALOL HYDROCHLORIDE 5 MG/ML
10 INJECTION, SOLUTION INTRAVENOUS ONCE
Status: COMPLETED | OUTPATIENT
Start: 2024-01-01 | End: 2024-01-01

## 2024-01-01 RX ORDER — LABETALOL HYDROCHLORIDE 5 MG/ML
10 INJECTION, SOLUTION INTRAVENOUS EVERY 4 HOURS PRN
Status: DISCONTINUED | OUTPATIENT
Start: 2024-01-01 | End: 2024-01-03 | Stop reason: HOSPADM

## 2024-01-01 RX ORDER — HYDRALAZINE HYDROCHLORIDE 25 MG/1
25 TABLET, FILM COATED ORAL EVERY 8 HOURS SCHEDULED
Status: DISCONTINUED | OUTPATIENT
Start: 2024-01-01 | End: 2024-01-03 | Stop reason: HOSPADM

## 2024-01-01 RX ORDER — NICOTINE POLACRILEX 4 MG
15 LOZENGE BUCCAL
Status: DISCONTINUED | OUTPATIENT
Start: 2024-01-01 | End: 2024-01-03 | Stop reason: HOSPADM

## 2024-01-01 RX ORDER — VENLAFAXINE 75 MG/1
150 TABLET ORAL 2 TIMES DAILY WITH MEALS
Status: DISCONTINUED | OUTPATIENT
Start: 2024-01-01 | End: 2024-01-02

## 2024-01-01 RX ORDER — ONDANSETRON 2 MG/ML
4 INJECTION INTRAMUSCULAR; INTRAVENOUS ONCE
Status: COMPLETED | OUTPATIENT
Start: 2024-01-01 | End: 2024-01-01

## 2024-01-01 RX ORDER — INSULIN LISPRO 100 [IU]/ML
2-7 INJECTION, SOLUTION INTRAVENOUS; SUBCUTANEOUS
Status: DISCONTINUED | OUTPATIENT
Start: 2024-01-02 | End: 2024-01-03 | Stop reason: HOSPADM

## 2024-01-01 RX ORDER — IBUPROFEN 600 MG/1
1 TABLET ORAL
Status: DISCONTINUED | OUTPATIENT
Start: 2024-01-01 | End: 2024-01-03 | Stop reason: HOSPADM

## 2024-01-01 RX ORDER — DEXTROSE MONOHYDRATE 25 G/50ML
25 INJECTION, SOLUTION INTRAVENOUS
Status: DISCONTINUED | OUTPATIENT
Start: 2024-01-01 | End: 2024-01-03 | Stop reason: HOSPADM

## 2024-01-01 RX ADMIN — LABETALOL HYDROCHLORIDE 10 MG: 5 INJECTION, SOLUTION INTRAVENOUS at 18:15

## 2024-01-01 RX ADMIN — SODIUM CHLORIDE 1000 ML: 9 INJECTION, SOLUTION INTRAVENOUS at 14:10

## 2024-01-01 RX ADMIN — VENLAFAXINE HYDROCHLORIDE 150 MG: 75 TABLET ORAL at 20:56

## 2024-01-01 RX ADMIN — POTASSIUM CHLORIDE 40 MEQ: 1500 TABLET, EXTENDED RELEASE ORAL at 15:41

## 2024-01-01 RX ADMIN — POTASSIUM CHLORIDE 40 MEQ: 1500 TABLET, EXTENDED RELEASE ORAL at 20:55

## 2024-01-01 RX ADMIN — MAGNESIUM SULFATE HEPTAHYDRATE 2 G: 2 INJECTION, SOLUTION INTRAVENOUS at 20:56

## 2024-01-01 RX ADMIN — ONDANSETRON 4 MG: 2 INJECTION INTRAMUSCULAR; INTRAVENOUS at 15:12

## 2024-01-01 RX ADMIN — HYDRALAZINE HYDROCHLORIDE 25 MG: 25 TABLET, FILM COATED ORAL at 20:55

## 2024-01-01 RX ADMIN — MAGNESIUM SULFATE HEPTAHYDRATE 2 G: 2 INJECTION, SOLUTION INTRAVENOUS at 15:41

## 2024-01-01 RX ADMIN — MAGNESIUM SULFATE HEPTAHYDRATE 2 G: 2 INJECTION, SOLUTION INTRAVENOUS at 18:15

## 2024-01-01 NOTE — ED NOTES
Carlos Eduardo Danielle    Nursing Report ED to Floor:  Mental status: A&O x 4  Ambulatory status: Assist/observation recommended. At baseline ambulates without assistive devices  Oxygen Therapy:  RA  Cardiac Rhythm: NSR  Admitted from: Home  Safety Concerns:  Fall risk  Social Issues: N/A  ED Room #:  14    ED Nurse Phone Extension - [phone] or may call [phone].      HPI:   Chief Complaint   Patient presents with    Syncope       Past Medical History:  Past Medical History:   Diagnosis Date    Anxiety     Asthma 06/01/2023    Back pain     Back problem     Cellulitis     HX; BILATERAL LEGS    CHF (congestive heart failure) 06/01/2023    echo 6/3/2023 LVEF 60%    Colon polyp     Depression     Diabetes mellitus     GERD (gastroesophageal reflux disease)     on Protonix daily. EGD Jan 2020; no hx of h pylori    Hyperlipidemia     Hypertension     Osteoarthritis     Paroxysmal atrial fibrillation 07/03/2023    Sleep apnea     noncompliant with BIPAP    Suicide attempt     reports hx of suicide attempts three times in 30 years ago    TIA (transient ischemic attack)     2010 and 2015; related to hypertension.    Type 2 diabetes mellitus     dx in 2019; no insulin; last A1c 8.0    Venous stasis     bilateral lower ext        Past Surgical History:  Past Surgical History:   Procedure Laterality Date    APPENDECTOMY N/A 09/08/2021    Procedure: APPENDECTOMY LAPAROSCOPIC;  Surgeon: Radames Arauz MD;  Location:  [facility];  Service: General;  Laterality: N/A;    COLONOSCOPY  2020    ENDOSCOPY N/A 04/17/2023    Procedure: ESOPHAGOGASTRODUODENOSCOPY;  Surgeon: Akua Ruelas MD;  Location:  [facility];  Service: Bariatric;  Laterality: N/A;    EPIDIDYMAL CYST EXCISION      GASTRIC SLEEVE LAPAROSCOPIC N/A 10/17/2023    Procedure: GASTRIC SLEEVE LAPAROSCOPIC WITH DAVINCI ROBOT,  LIVER BIOPSY , ESOPHAGOGASTRODUODENOSCOPY;  Surgeon: Akua Ruelas MD;  Location:  [facility];  Service: Robotics - DaVinci;   Laterality: N/A;    TONSILLECTOMY  2006        Admitting Doctor:   Shameka Wallis MD    Consulting Provider(s):  Consults       No orders found from 12/3/2023 to 1/2/2024.             Admitting Diagnosis:   The encounter diagnosis was Syncope and collapse.    Most Recent Vitals:   Vitals:    01/01/24 1502 01/01/24 1538 01/01/24 1541 01/01/24 1603   BP: 167/89  165/94 161/94   BP Location:       Patient Position:       Pulse:  62  60   Resp:       Temp:       TempSrc:       SpO2:  95%  94%   Weight:       Height:           Active LDAs/IV Access:   Lines, Drains & Airways       Active LDAs       Name Placement date Placement time Site Days    Peripheral IV 01/01/24 1324 Right Antecubital 01/01/24  1324  Antecubital  less than 1                    Labs (abnormal labs have a star):   Labs Reviewed   COMPREHENSIVE METABOLIC PANEL - Abnormal; Notable for the following components:       Result Value    Glucose 149 (*)     Potassium 3.2 (*)     Calcium 8.5 (*)     All other components within normal limits    Narrative:     GFR Normal >60  Chronic Kidney Disease <60  Kidney Failure <15     MAGNESIUM - Abnormal; Notable for the following components:    Magnesium 1.5 (*)     All other components within normal limits   CBC WITH AUTO DIFFERENTIAL - Abnormal; Notable for the following components:    RBC 6.00 (*)     Hematocrit 51.2 (*)     Lymphocyte % 19.2 (*)     All other components within normal limits   SINGLE HSTROPONIN T - Normal    Narrative:     High Sensitive Troponin T Reference Range:  <14.0 ng/L- Negative Female for AMI  <22.0 ng/L- Negative Male for AMI  >=14 - Abnormal Female indicating possible myocardial injury.  >=22 - Abnormal Male indicating possible myocardial injury.   Clinicians would have to utilize clinical acumen, EKG, Troponin, and serial changes to determine if it is an Acute Myocardial Infarction or myocardial injury due to an underlying chronic condition.        D-DIMER, QUANTITATIVE - Normal     "Narrative:     According to the assay 's published package insert, a normal (<0.50 MCGFEU/mL) D-dimer result in conjunction with a non-high clinical probability assessment, excludes deep vein thrombosis (DVT) and pulmonary embolism (PE) with high sensitivity.    D-dimer values increase with age and this can make VTE exclusion of an older population difficult. To address this, the [employer], based on best available evidence and recent guidelines, recommends that clinicians use age-adjusted D-dimer thresholds in patients greater than 50 years of age with: a) a low probability of PE who do not meet all Pulmonary Embolism Rule Out Criteria, or b) in those with intermediate probability of PE.   The formula for an age-adjusted D-dimer cut-off is \"age/100\".  For example, a 60 year old patient would have an age-adjusted cut-off of 0.60 MCGFEU/mL and an 80 year old 0.80 MCGFEU/mL.   SINGLE HSTROPONIN T - Normal    Narrative:     High Sensitive Troponin T Reference Range:  <14.0 ng/L- Negative Female for AMI  <22.0 ng/L- Negative Male for AMI  >=14 - Abnormal Female indicating possible myocardial injury.  >=22 - Abnormal Male indicating possible myocardial injury.   Clinicians would have to utilize clinical acumen, EKG, Troponin, and serial changes to determine if it is an Acute Myocardial Infarction or myocardial injury due to an underlying chronic condition.        RAINBOW DRAW    Narrative:     The following orders were created for panel order [place] Draw.  Procedure                               Abnormality         Status                     ---------                               -----------         ------                     Green Top (Gel)[[id number]]                                  Final result               Lavender Top[[id number]]                                     Final result               Gold Top - SST[[id number]]                                   Final result               S[place]os " Top[[id number]]                                         In process                 Light Blue Top[[id number]]                                   Final result                 Please view results for these tests on the individual orders.   MAGNESIUM   POTASSIUM   CBC AND DIFFERENTIAL    Narrative:     The following orders were created for panel order CBC & Differential.  Procedure                               Abnormality         Status                     ---------                               -----------         ------                     CBC Auto Differential[[id number]]        Abnormal            Final result                 Please view results for these tests on the individual orders.   GREEN TOP   LAVENDER TOP   GOLD TOP - SST   LIGHT BLUE TOP   GRAY TOP       Meds Given in ED:   Medications   sodium chloride 0.9 % flush 10 mL (has no administration in time range)   Potassium Replacement - Follow Nurse / BPA Driven Protocol (has no administration in time range)   Magnesium Standard Dose Replacement - Follow Nurse / BPA Driven Protocol (has no administration in time range)   magnesium sulfate 2g/50 mL (PREMIX) infusion (2 g Intravenous New Bag 1/1/24 1541)   potassium chloride (K-DUR,KLOR-CON) CR tablet 40 mEq (40 mEq Oral Given 1/1/24 1541)   sodium chloride 0.9 % bolus 1,000 mL (0 mL Intravenous Stopped 1/1/24 1636)   ondansetron (ZOFRAN) injection 4 mg (4 mg Intravenous Given 1/1/24 1512)

## 2024-01-01 NOTE — ED PROVIDER NOTES
Subjective  History of Present Illness:    Chief Complaint: Syncopal episode  History of Present Illness: 51-year-old male had a witnessed syncopal episode just prior to arrival, he arrived via EMS, significant past medical history for diabetes, hypertension, hyperlipidemia, he is on Eliquis for A-fib.  He states he hit the back of his head in the fall, he reports that a witness saw him fall, and he is unsure how long he was down but it was briefly.  He states prior to the fall he was removing animals from his jeep he felt faint and had a syncopal episode.  He has never had this in the past.  He states he has had diarrhea for 1 day.  No fever no chills no chest pain no blurred vision.  Right now he is complaining of pain in the back of his head but no other injury.  Onset: Sudden onset  Duration: Just prior to arrival  Exacerbating / Alleviating factors: Diarrhea for 1 day no other history of syncopal episode  Associated symptoms: Pain in the back of his head      Nurses Notes reviewed and agree, including vitals, allergies, social history and prior medical history.     REVIEW OF SYSTEMS: All systems reviewed and not pertinent unless noted.    Review of Systems   HENT:          Pain in the back of his head from fall   Neurological:  Positive for syncope and headaches.   All other systems reviewed and are negative.      Past Medical History:   Diagnosis Date    Anxiety     Asthma 06/01/2023    Back pain     Back problem     Cellulitis     HX; BILATERAL LEGS    CHF (congestive heart failure) 06/01/2023    echo 6/3/2023 LVEF 60%    Colon polyp     Depression     Diabetes mellitus     GERD (gastroesophageal reflux disease)     on Protonix daily. EGD Jan 2020; no hx of h pylori    Hyperlipidemia     Hypertension     Osteoarthritis     Paroxysmal atrial fibrillation 07/03/2023    Sleep apnea     noncompliant with BIPAP    Suicide attempt     reports hx of suicide attempts three times in 30 years ago    TIA (transient  ischemic attack)     2010 and 2015; related to hypertension.    Type 2 diabetes mellitus     dx in 2019; no insulin; last A1c 8.0    Venous stasis     bilateral lower ext       Allergies:    Atorvastatin and Topamax [topiramate]      Past Surgical History:   Procedure Laterality Date    APPENDECTOMY N/A 09/08/2021    Procedure: APPENDECTOMY LAPAROSCOPIC;  Surgeon: Radames Arauz MD;  Location:  [facility];  Service: General;  Laterality: N/A;    COLONOSCOPY  2020    ENDOSCOPY N/A 04/17/2023    Procedure: ESOPHAGOGASTRODUODENOSCOPY;  Surgeon: Akua Ruelas MD;  Location:  [facility];  Service: Bariatric;  Laterality: N/A;    EPIDIDYMAL CYST EXCISION      GASTRIC SLEEVE LAPAROSCOPIC N/A 10/17/2023    Procedure: GASTRIC SLEEVE LAPAROSCOPIC WITH DAVINCI ROBOT,  LIVER BIOPSY , ESOPHAGOGASTRODUODENOSCOPY;  Surgeon: Akua Ruelas MD;  Location:  [facility];  Service: Robotics - DaVinci;  Laterality: N/A;    TONSILLECTOMY  2006         Social History     Socioeconomic History    Marital status: Single   Tobacco Use    Smoking status: Former     Packs/day: 0.75     Years: 20.00     Additional pack years: 0.00     Total pack years: 15.00     Types: Cigarettes     Quit date: 8/1/2008     Years since quitting: 15.4     Passive exposure: Past    Smokeless tobacco: Never   Vaping Use    Vaping Use: Never used   Substance and Sexual Activity    Alcohol use: Not Currently     Comment: occas    Drug use: Never    Sexual activity: Defer     Partners: Male         Family History   Problem Relation Age of Onset    Diabetes Mother     Hypertension Mother     Schizophrenia Mother     Depression Mother     Obesity Mother     No Known Problems Father     No Known Problems Brother     Mental illness Brother     Kidney disease Maternal Uncle     Heart disease Maternal Grandmother     Hypertension Maternal Grandmother     Hypertension Maternal Grandfather        Objective  Physical Exam:  BP (!) 198/97   Pulse 60   " Temp 97.9 °F (36.6 °C) (Oral)   Resp 16   Ht 180.3 cm (71\")   Wt (!) 172 kg (380 lb)   SpO2 99%   BMI 53.00 kg/m²      Physical Exam  Vitals and nursing note reviewed.   Constitutional:       Appearance: He is well-developed.   HENT:      Head: Normocephalic and atraumatic.      Mouth/Throat:      Mouth: Mucous membranes are moist.   Eyes:      Extraocular Movements: Extraocular movements intact.   Cardiovascular:      Rate and Rhythm: Normal rate and regular rhythm.   Pulmonary:      Effort: Pulmonary effort is normal.      Breath sounds: Normal breath sounds.   Abdominal:      Palpations: Abdomen is soft.   Musculoskeletal:         General: Normal range of motion.      Cervical back: Normal range of motion.   Skin:     General: Skin is warm and dry.   Neurological:      Mental Status: He is alert and oriented to person, place, and time.   Psychiatric:         Behavior: Behavior normal.         Thought Content: Thought content normal.         Judgment: Judgment normal.           Procedures    ED Course:    ED Course as of 01/01/24 1831   Mon Jan 01, 2024   1348 CT scan interpreted by me :no acute signs of bleed or hemorrhage [CS]   1610 Review of previous  non ED visits, prior labs, prior imaging, available notes from prior evaluations or visits with specialists, medication list, allergies, past medical history, past surgical history     [CS]   1647 The patient on the results, he still felt symptomatic, discussed with my supervising physician, [place] syncope score was calculated at 4, discussed with the on-call hospitalist Dr. Wallis who accepted the patient for admission [CS]      ED Course User Index  [CS] Roger Marquis Jr., PAHUSEYIN       Lab Results (last 24 hours)       Procedure Component Value Units Date/Time    CBC & Differential [[id number]]  (Abnormal) Collected: 01/01/24 1323    Specimen: Blood Updated: 01/01/24 1330    Narrative:      The following orders were created for panel order CBC & " Differential.  Procedure                               Abnormality         Status                     ---------                               -----------         ------                     CBC Auto Differential[[id number]]        Abnormal            Final result                 Please view results for these tests on the individual orders.    Comprehensive Metabolic Panel [[id number]]  (Abnormal) Collected: 01/01/24 1323    Specimen: Blood Updated: 01/01/24 1401     Glucose 149 mg/dL      BUN 10 mg/dL      Creatinine 1.06 mg/dL      Sodium 140 mmol/L      Potassium 3.2 mmol/L      Comment: Slight hemolysis detected by analyzer. Result may be falsely elevated.        Chloride 101 mmol/L      CO2 25.0 mmol/L      Calcium 8.5 mg/dL      Total Protein 6.5 g/dL      Albumin 3.8 g/dL      ALT (SGPT) 32 U/L      AST (SGOT) 33 U/L      Alkaline Phosphatase 99 U/L      Total Bilirubin 0.8 mg/dL      Globulin 2.7 gm/dL      Comment: Calculated Result        A/G Ratio 1.4 g/dL      BUN/Creatinine Ratio 9.4     Anion Gap 14.0 mmol/L      eGFR 84.4 mL/min/1.73     Narrative:      GFR Normal >60  Chronic Kidney Disease <60  Kidney Failure <15      Magnesium [[id number]]  (Abnormal) Collected: 01/01/24 1323    Specimen: Blood Updated: 01/01/24 1356     Magnesium 1.5 mg/dL     Single High Sensitivity Troponin T [[id number]]  (Normal) Collected: 01/01/24 1323    Specimen: Blood Updated: 01/01/24 1347     HS Troponin T 13 ng/L     Narrative:      High Sensitive Troponin T Reference Range:  <14.0 ng/L- Negative Female for AMI  <22.0 ng/L- Negative Male for AMI  >=14 - Abnormal Female indicating possible myocardial injury.  >=22 - Abnormal Male indicating possible myocardial injury.   Clinicians would have to utilize clinical acumen, EKG, Troponin, and serial changes to determine if it is an Acute Myocardial Infarction or myocardial injury due to an underlying chronic condition.         CBC Auto Differential [[id number]]  (Abnormal)  "Collected: 01/01/24 1323    Specimen: Blood Updated: 01/01/24 1330     WBC 8.55 10*3/mm3      RBC 6.00 10*6/mm3      Hemoglobin 17.3 g/dL      Hematocrit 51.2 %      MCV 85.3 fL      MCH 28.8 pg      MCHC 33.8 g/dL      RDW 14.5 %      RDW-SD 44.5 fl      MPV 11.5 fL      Platelets 183 10*3/mm3      Neutrophil % 72.5 %      Lymphocyte % 19.2 %      Monocyte % 6.3 %      Eosinophil % 1.3 %      Basophil % 0.5 %      Immature Grans % 0.2 %      Neutrophils, Absolute 6.20 10*3/mm3      Lymphocytes, Absolute 1.64 10*3/mm3      Monocytes, Absolute 0.54 10*3/mm3      Eosinophils, Absolute 0.11 10*3/mm3      Basophils, Absolute 0.04 10*3/mm3      Immature Grans, Absolute 0.02 10*3/mm3      nRBC 0.0 /100 WBC     D-dimer, Quantitative [[id number]]  (Normal) Collected: 01/01/24 1323    Specimen: Blood Updated: 01/01/24 1353     D-Dimer, Quantitative 0.45 MCGFEU/mL     Narrative:      According to the assay 's published package insert, a normal (<0.50 MCGFEU/mL) D-dimer result in conjunction with a non-high clinical probability assessment, excludes deep vein thrombosis (DVT) and pulmonary embolism (PE) with high sensitivity.    D-dimer values increase with age and this can make VTE exclusion of an older population difficult. To address this, the [employer], based on best available evidence and recent guidelines, recommends that clinicians use age-adjusted D-dimer thresholds in patients greater than 50 years of age with: a) a low probability of PE who do not meet all Pulmonary Embolism Rule Out Criteria, or b) in those with intermediate probability of PE.   The formula for an age-adjusted D-dimer cut-off is \"age/100\".  For example, a 60 year old patient would have an age-adjusted cut-off of 0.60 MCGFEU/mL and an 80 year old 0.80 MCGFEU/mL.    Single High Sensitivity Troponin T [[id number]]  (Normal) Collected: 01/01/24 1539    Specimen: Blood Updated: 01/01/24 1608     HS Troponin T 10 ng/L     " Narrative:      High Sensitive Troponin T Reference Range:  <14.0 ng/L- Negative Female for AMI  <22.0 ng/L- Negative Male for AMI  >=14 - Abnormal Female indicating possible myocardial injury.  >=22 - Abnormal Male indicating possible myocardial injury.   Clinicians would have to utilize clinical acumen, EKG, Troponin, and serial changes to determine if it is an Acute Myocardial Infarction or myocardial injury due to an underlying chronic condition.                  CT Head Without Contrast    Result Date: 1/1/2024  CT HEAD WO CONTRAST, CT CERVICAL SPINE WO CONTRAST Date of Exam: 1/1/2024 1:34 PM EST Indication: fall head occipital head injury on eloquis. Comparison: None available. Technique: Axial CT images were obtained of the head and cervical without contrast administration.  Automated exposure control and iterative construction methods were used. Findings: Head: No intracranial hemorrhage. Negative for mass effect or midline shift. Ventricles and cortical sulci normally configured. Gray-white matter differentiation maintained. Posterior fossa without acute abnormality. There is a small scalp contusion overlying the right posterior occipital region. No underlying fracture. No intraventricular hemorrhage. Globes symmetric. No retro-orbital abnormality. The mastoid air cells are well-aerated. Retention cyst at the base of the right maxillary sinus. Negative for sinus fluid level or hemorrhage. Calvarium intact. Cervical spine: Straightening of the cervical lordosis. Negative for acute fracture. Posterior spinous processes intact. No locked or perched facet. Occipital condyles are intact. The dens is intact. Craniocervical junction within normal limits. Visualized portions of the mandible are intact. At the C5-6 level there is a posterior central disc osteophyte complex likely contributing to moderate central canal stenosis. Bilateral uncovertebral spurring at C5-6 contribute to moderate to severe bilateral  foraminal stenosis. The remaining cervical levels are without high-grade foraminal or canal stenosis. Soft tissues of the neck are without acute abnormality. The lung apices are clear. No apical pneumothorax. Small subcentimeter calcified right thyroid lobe nodules. Streak artifact from dental amalgam partially limits oral pharyngeal assessment.     Impression: Impression: HEAD: 1. No intracranial hemorrhage or acute intracranial abnormality. 2. Small right posterior occipital scalp contusion. No underlying fracture. CERVICAL SPINE: 1. Negative for cervical spine fracture. 2. Posterior disc osteophyte complex at C5-6 contributing to moderate central canal stenosis and moderate to severe bilateral foraminal stenosis. Electronically Signed: Cesar Pruitt MD  1/1/2024 2:10 PM EST  Workstation ID: [id number]    CT Cervical Spine Without Contrast    Result Date: 1/1/2024  CT HEAD WO CONTRAST, CT CERVICAL SPINE WO CONTRAST Date of Exam: 1/1/2024 1:34 PM EST Indication: fall head occipital head injury on eloquis. Comparison: None available. Technique: Axial CT images were obtained of the head and cervical without contrast administration.  Automated exposure control and iterative construction methods were used. Findings: Head: No intracranial hemorrhage. Negative for mass effect or midline shift. Ventricles and cortical sulci normally configured. Gray-white matter differentiation maintained. Posterior fossa without acute abnormality. There is a small scalp contusion overlying the right posterior occipital region. No underlying fracture. No intraventricular hemorrhage. Globes symmetric. No retro-orbital abnormality. The mastoid air cells are well-aerated. Retention cyst at the base of the right maxillary sinus. Negative for sinus fluid level or hemorrhage. Calvarium intact. Cervical spine: Straightening of the cervical lordosis. Negative for acute fracture. Posterior spinous processes intact. No locked or perched facet.  Occipital condyles are intact. The dens is intact. Craniocervical junction within normal limits. Visualized portions of the mandible are intact. At the C5-6 level there is a posterior central disc osteophyte complex likely contributing to moderate central canal stenosis. Bilateral uncovertebral spurring at C5-6 contribute to moderate to severe bilateral foraminal stenosis. The remaining cervical levels are without high-grade foraminal or canal stenosis. Soft tissues of the neck are without acute abnormality. The lung apices are clear. No apical pneumothorax. Small subcentimeter calcified right thyroid lobe nodules. Streak artifact from dental amalgam partially limits oral pharyngeal assessment.     Impression: Impression: HEAD: 1. No intracranial hemorrhage or acute intracranial abnormality. 2. Small right posterior occipital scalp contusion. No underlying fracture. CERVICAL SPINE: 1. Negative for cervical spine fracture. 2. Posterior disc osteophyte complex at C5-6 contributing to moderate central canal stenosis and moderate to severe bilateral foraminal stenosis. Electronically Signed: eCsar Pruitt MD  1/1/2024 2:10 PM EST  Workstation ID: [id number]        Medical Decision Making  Patient Presentation initial presentation included syncopal and collapse witnessed by family and friend, vital signs stable no acute distress    DDX vertigo, dizziness, vasovagal, cardiogenic, dehydration, electrolyte abnormality    Data Review/ Non ED Records /Analysis/Ordering unique tests. Review of previous  non ED visits, prior labs, prior imaging, available notes from prior evaluations or visits with specialists, medication list, allergies, past medical history, past surgical history        Independent Review Studies. I Personally reviewed all laboratory studies performed in the emergency department     Intervention/Re-evaluation intervention included IV fluids, reevaluation symptoms remained    Independent Clinician discussed with  my supervising physician Dr. Rahman, discussed with the on-call hospitalist Dr. Wallis who accepted for admission    Risk Stratification tools/clinical decision rules patient presented with syncope and collapse, [place] score for high risk therefore patient was admitted, he remains symptomatic, despite CT scan head and neck negative and labs unremarkable, significant history for coronary disease, arrhythmia, diabetes, hypertension, hyperlipidemia.    Shared Decision Making discussed this plan with patient and family they agree    Disposition patient stable for admission    Problems Addressed:  Syncope and collapse: complicated acute illness or injury    Amount and/or Complexity of Data Reviewed  External Data Reviewed: labs, radiology and notes.  Labs: ordered.     Details: I Personally reviewed all laboratory studies performed in the emergency department   Radiology: ordered and independent interpretation performed.     Details: Independently interpreted the CT scan head and neck, no acute bleed, mass effect, no acute fracture or dislocation seen.  ECG/medicine tests: ordered. Decision-making details documented in ED Course.    Risk  OTC drugs.  Prescription drug management.  Decision regarding hospitalization.  Diagnosis or treatment significantly limited by social determinants of health.          Final diagnoses:   Syncope and collapse          Roger Marquis Jr., PA-C  01/01/24 1831       Roger Marquis Jr., PA-C  01/01/24 2127

## 2024-01-01 NOTE — Clinical Note
Level of Care: Telemetry [5]   Diagnosis: Syncope [206001]   Admitting Physician: LEEANNE GUTIERREZ [[id number]]   Attending Physician: LEEANNE GUTIERREZ [[id number]]

## 2024-01-01 NOTE — H&P
[facility] Medicine Services  HISTORY AND PHYSICAL    Patient Name: Carlos Eduardo Danielle  : 1970  MRN: [id number]  Primary Care Physician: Ian Vann MD    Subjective   Subjective     Chief Complaint:syncope    HPI:  Carlos Eduardo Danielle is a 53 y.o. male who  has a past medical history of Anxiety, Asthma (2023), Back pain, CHF (congestive heart failure) (2023), Colon polyp, Depression, Diabetes mellitus, GERD (gastroesophageal reflux disease), Hyperlipidemia, Hypertension, Osteoarthritis, Paroxysmal atrial fibrillation (2023), Sleep apnea( NOT being treated but has an upcoming appointment) Suicide attempt, TIA (transient ischemic attack), Type 2 diabetes mellitus, and Venous stasis who is s/p gastric sleeve in 10/23 who presented to the ED after a syncopal event. Patient says he has not felt well all day, had diarrhea, has been fuzzy headed. He was getting something out of the car and then found himself on the driveway. He was able to get up, but still didn't feel right so called EMS who found him to be very hypertensive and brought him to the ED. In the ED he has been hypertensive, initial evaluation has been negative, but patient still feels poorly.     He denies chest pain, but does report feeling that the room is spinning. He has not had any double vision.    Review of Systems   Patient denies headaches, changes in vision, fever, chills, sore throat, shortness of breath, cough, nausea or vomiting, diarrhea, abdominal pain or distension, change in urine output or habits, joint pain, rash, itching, numbness/tingling, weakness or bleeding.  He has lost 65 pounds since surgery.  Otherwise complete ROS is negative except as mentioned in the HPI.    Personal History     PMH: He  has a past medical history of Anxiety, Asthma (2023), Back pain, Back problem, Cellulitis, CHF (congestive heart failure) (2023), Colon polyp, Depression, Diabetes  mellitus, GERD (gastroesophageal reflux disease), Hyperlipidemia, Hypertension, Osteoarthritis, Paroxysmal atrial fibrillation (07/03/2023), Sleep apnea, Suicide attempt, TIA (transient ischemic attack), Type 2 diabetes mellitus, and Venous stasis.   PSxH: He  has a past surgical history that includes Epididymal cyst excision; Tonsillectomy (2006); Appendectomy (N/A, 09/08/2021); Colonoscopy (2020); Esophagogastroduodenoscopy (N/A, 04/17/2023); and Gastric Sleeve (N/A, 10/17/2023).         FH: His family history includes Depression in his mother; Diabetes in his mother; Heart disease in his maternal grandmother; Hypertension in his maternal grandfather, maternal grandmother, and mother; Kidney disease in his maternal uncle; Mental illness in his brother; No Known Problems in his brother and father; Obesity in his mother; Schizophrenia in his mother.   SH: He  reports that he quit smoking about 15 years ago. His smoking use included cigarettes. He has a 15.00 pack-year smoking history. He has been exposed to tobacco smoke. He has never used smokeless tobacco. He reports current alcohol use. He reports that he does not use drugs.     Medications:  Cholecalciferol, amLODIPine, apixaban, empagliflozin, glimepiride, metFORMIN ER, mirtazapine, nebivolol, ondansetron, pantoprazole, spironolactone, torsemide, ursodiol, and venlafaxine    Allergies   Allergen Reactions    Atorvastatin Headache     Weakness.      Topamax [Topiramate] Other (See Comments)     Migraine         Objective   Objective     Vital Signs:   Temp:  [97.9 °F (36.6 °C)] 97.9 °F (36.6 °C)  Heart Rate:  [60-80] 60  Resp:  [15-16] 16  BP: (152-170)/() 161/94    Constitutional: Awake, alert, interactive and pleasant  Eyes: clear sclerae, no conjunctival injection  HENT: NCAT, mucous membranes moist  Neck: no masses or lymphadenopathy, trachea midline  Respiratory: good breath sounds bilaterally, respirations unlabored  Cardiovascular: RRR, no murmurs  appreciated, palpable peripheral pulses  Abdomen:  soft, no HSM or masses palpable, not tender or distended  Musculoskeletal: No peripheral edema, clubbing or cyanosis  Neurologic: Oriented x 3,                       Strength symmetric in all extremities                     Cranial Nerves grossly intact, speech clear  Skin: No rashes or jaundice  Psychiatric: Appropriate mood, insight      Result Review:  I have personally reviewed the results from the time of this admission   to 1/1/2024 17:07 EST and agree with these findings:  [x]  Laboratory  [x]  Microbiology  [x]  Radiology  []  EKG/Telemetry   []  Cardiology/Vascular   []  Pathology  []  Old records  []  Other:  Most notable findings include: elevated hgb, low k, mag, normal troponin, EKG and CTs      LAB RESULTS:      Lab 01/01/24  1323   WBC 8.55   HEMOGLOBIN 17.3   HEMATOCRIT 51.2*   PLATELETS 183   NEUTROS ABS 6.20   IMMATURE GRANS (ABS) 0.02   LYMPHS ABS 1.64   MONOS ABS 0.54   EOS ABS 0.11   MCV 85.3   D DIMER QUANT 0.45         Lab 01/01/24  1323   SODIUM 140   POTASSIUM 3.2*   CHLORIDE 101   CO2 25.0   ANION GAP 14.0   BUN 10   CREATININE 1.06   EGFR 84.4   GLUCOSE 149*   CALCIUM 8.5*   MAGNESIUM 1.5*         Lab 01/01/24  1323   TOTAL PROTEIN 6.5   ALBUMIN 3.8   GLOBULIN 2.7   ALT (SGPT) 32   AST (SGOT) 33   BILIRUBIN 0.8   ALK PHOS 99         Lab 01/01/24  1539 01/01/24  1323   HSTROP T 10 13                 Brief Urine Lab Results       None          COVID19   Date Value Ref Range Status   06/01/2023 Not Detected Not Detected - Ref. Range Final       CT Head Without Contrast    Result Date: 1/1/2024  CT HEAD WO CONTRAST, CT CERVICAL SPINE WO CONTRAST Date of Exam: 1/1/2024 1:34 PM EST Indication: fall head occipital head injury on eloquis. Comparison: None available. Technique: Axial CT images were obtained of the head and cervical without contrast administration.  Automated exposure control and iterative construction methods were used. Findings:  Head: No intracranial hemorrhage. Negative for mass effect or midline shift. Ventricles and cortical sulci normally configured. Gray-white matter differentiation maintained. Posterior fossa without acute abnormality. There is a small scalp contusion overlying the right posterior occipital region. No underlying fracture. No intraventricular hemorrhage. Globes symmetric. No retro-orbital abnormality. The mastoid air cells are well-aerated. Retention cyst at the base of the right maxillary sinus. Negative for sinus fluid level or hemorrhage. Calvarium intact. Cervical spine: Straightening of the cervical lordosis. Negative for acute fracture. Posterior spinous processes intact. No locked or perched facet. Occipital condyles are intact. The dens is intact. Craniocervical junction within normal limits. Visualized portions of the mandible are intact. At the C5-6 level there is a posterior central disc osteophyte complex likely contributing to moderate central canal stenosis. Bilateral uncovertebral spurring at C5-6 contribute to moderate to severe bilateral foraminal stenosis. The remaining cervical levels are without high-grade foraminal or canal stenosis. Soft tissues of the neck are without acute abnormality. The lung apices are clear. No apical pneumothorax. Small subcentimeter calcified right thyroid lobe nodules. Streak artifact from dental amalgam partially limits oral pharyngeal assessment.     Impression: Impression: HEAD: 1. No intracranial hemorrhage or acute intracranial abnormality. 2. Small right posterior occipital scalp contusion. No underlying fracture. CERVICAL SPINE: 1. Negative for cervical spine fracture. 2. Posterior disc osteophyte complex at C5-6 contributing to moderate central canal stenosis and moderate to severe bilateral foraminal stenosis. Electronically Signed: Cesar Pruitt MD  1/1/2024 2:10 PM EST  Workstation ID: [id number]    CT Cervical Spine Without Contrast    Result Date: 1/1/2024  CT  HEAD WO CONTRAST, CT CERVICAL SPINE WO CONTRAST Date of Exam: 1/1/2024 1:34 PM EST Indication: fall head occipital head injury on eloquis. Comparison: None available. Technique: Axial CT images were obtained of the head and cervical without contrast administration.  Automated exposure control and iterative construction methods were used. Findings: Head: No intracranial hemorrhage. Negative for mass effect or midline shift. Ventricles and cortical sulci normally configured. Gray-white matter differentiation maintained. Posterior fossa without acute abnormality. There is a small scalp contusion overlying the right posterior occipital region. No underlying fracture. No intraventricular hemorrhage. Globes symmetric. No retro-orbital abnormality. The mastoid air cells are well-aerated. Retention cyst at the base of the right maxillary sinus. Negative for sinus fluid level or hemorrhage. Calvarium intact. Cervical spine: Straightening of the cervical lordosis. Negative for acute fracture. Posterior spinous processes intact. No locked or perched facet. Occipital condyles are intact. The dens is intact. Craniocervical junction within normal limits. Visualized portions of the mandible are intact. At the C5-6 level there is a posterior central disc osteophyte complex likely contributing to moderate central canal stenosis. Bilateral uncovertebral spurring at C5-6 contribute to moderate to severe bilateral foraminal stenosis. The remaining cervical levels are without high-grade foraminal or canal stenosis. Soft tissues of the neck are without acute abnormality. The lung apices are clear. No apical pneumothorax. Small subcentimeter calcified right thyroid lobe nodules. Streak artifact from dental amalgam partially limits oral pharyngeal assessment.     Impression: Impression: HEAD: 1. No intracranial hemorrhage or acute intracranial abnormality. 2. Small right posterior occipital scalp contusion. No underlying fracture. CERVICAL  SPINE: 1. Negative for cervical spine fracture. 2. Posterior disc osteophyte complex at C5-6 contributing to moderate central canal stenosis and moderate to severe bilateral foraminal stenosis. Electronically Signed: Cesar Pruitt MD  1/1/2024 2:10 PM EST  Workstation ID: [id number]     Results for orders placed during the hospital encounter of 06/01/23    Adult Transthoracic Echo Complete w/ Color, Spectral and Contrast if Necessary Per Protocol    Interpretation Summary    Estimated left ventricular EF = 60%    Left ventricular wall thickness is consistent with mild concentric hypertrophy.    Mild to moderate aortic valve regurgitation is present    Mild mitral valve regurgitation is present      The patient has started, but not completed, their COVID-19 vaccination series.    Assessment & Plan   Assessment / Plan       Syncope    GARRETT (obstructive sleep apnea) -intolerant of BiPAP    Vitamin D insufficiency    Type 2 diabetes mellitus, without long-term current use of insulin    Anxiety    Essential hypertension    Hyperlipidemia    Hypertensive urgency    Paroxysmal atrial fibrillation    Chronic heart failure with preserved ejection fraction (HFpEF)    BMI 50.0-59.9, adult    Cervical stenosis of spinal canal      Assessment & Plan:  52 yo with obesity, htn, diabetes, garrett, HFPEF, PAfib who had syncopal event followed by hypertensive ugency    Syncope  Hypertensive urgency  -give home meds  -he has been adjusting at home and reports feeling like his BP has been low  --hold diuretics  -check othostatics    GARRETT  -probably contributing significantly and hemoconcentration vs polycythima would support  -has outpatient sleep eval coming soon    Cervical Stenosis  -- no deficit, but wonder if positioning may have contributed    ANGEL  -on effexor-- would benefit from another agent that may not increase his BP- decreased dose for now  -held remeron as it can cause serotonin syndrome    T2DM  -insulin for now  -check  A1C  -hold jardiance and metformin    Obesity BMI down to 53  -cont vitamin supplements    Hypokalemia and hypomag  -repleting    DVT prophylaxis:lovenox      CODE STATUS:FULL CODE     Expected Discharge  Expected Discharge Date: 1/2/2024; Expected Discharge Time:     Electronically signed by Shameka Wallis MD 01/01/24 17:07 EST

## 2024-01-02 LAB
ANION GAP SERPL CALCULATED.3IONS-SCNC: 10 MMOL/L (ref 5–15)
BASOPHILS # BLD AUTO: 0.04 10*3/MM3 (ref 0–0.2)
BASOPHILS NFR BLD AUTO: 0.8 % (ref 0–1.5)
BUN SERPL-MCNC: 9 MG/DL (ref 6–20)
BUN/CREAT SERPL: 10.8 (ref 7–25)
CALCIUM SPEC-SCNC: 8.2 MG/DL (ref 8.6–10.5)
CHLORIDE SERPL-SCNC: 104 MMOL/L (ref 98–107)
CO2 SERPL-SCNC: 26 MMOL/L (ref 22–29)
CREAT SERPL-MCNC: 0.83 MG/DL (ref 0.76–1.27)
DEPRECATED RDW RBC AUTO: 45 FL (ref 37–54)
EGFRCR SERPLBLD CKD-EPI 2021: 105.3 ML/MIN/1.73
EOSINOPHIL # BLD AUTO: 0.08 10*3/MM3 (ref 0–0.4)
EOSINOPHIL NFR BLD AUTO: 1.5 % (ref 0.3–6.2)
ERYTHROCYTE [DISTWIDTH] IN BLOOD BY AUTOMATED COUNT: 14.5 % (ref 12.3–15.4)
GLUCOSE BLDC GLUCOMTR-MCNC: 104 MG/DL (ref 70–130)
GLUCOSE BLDC GLUCOMTR-MCNC: 137 MG/DL (ref 70–130)
GLUCOSE BLDC GLUCOMTR-MCNC: 93 MG/DL (ref 70–130)
GLUCOSE SERPL-MCNC: 120 MG/DL (ref 65–99)
HCT VFR BLD AUTO: 45.2 % (ref 37.5–51)
HGB BLD-MCNC: 15.1 G/DL (ref 13–17.7)
IMM GRANULOCYTES # BLD AUTO: 0.02 10*3/MM3 (ref 0–0.05)
IMM GRANULOCYTES NFR BLD AUTO: 0.4 % (ref 0–0.5)
LYMPHOCYTES # BLD AUTO: 1.31 10*3/MM3 (ref 0.7–3.1)
LYMPHOCYTES NFR BLD AUTO: 25.2 % (ref 19.6–45.3)
MAGNESIUM SERPL-MCNC: 2.5 MG/DL (ref 1.6–2.6)
MCH RBC QN AUTO: 28.5 PG (ref 26.6–33)
MCHC RBC AUTO-ENTMCNC: 33.4 G/DL (ref 31.5–35.7)
MCV RBC AUTO: 85.3 FL (ref 79–97)
MONOCYTES # BLD AUTO: 0.5 10*3/MM3 (ref 0.1–0.9)
MONOCYTES NFR BLD AUTO: 9.6 % (ref 5–12)
NEUTROPHILS NFR BLD AUTO: 3.24 10*3/MM3 (ref 1.7–7)
NEUTROPHILS NFR BLD AUTO: 62.5 % (ref 42.7–76)
NRBC BLD AUTO-RTO: 0 /100 WBC (ref 0–0.2)
PLATELET # BLD AUTO: 155 10*3/MM3 (ref 140–450)
PMV BLD AUTO: 11.9 FL (ref 6–12)
POTASSIUM SERPL-SCNC: 3.3 MMOL/L (ref 3.5–5.2)
POTASSIUM SERPL-SCNC: 3.5 MMOL/L (ref 3.5–5.2)
POTASSIUM SERPL-SCNC: 3.9 MMOL/L (ref 3.5–5.2)
RBC # BLD AUTO: 5.3 10*6/MM3 (ref 4.14–5.8)
SODIUM SERPL-SCNC: 140 MMOL/L (ref 136–145)
WBC NRBC COR # BLD AUTO: 5.19 10*3/MM3 (ref 3.4–10.8)

## 2024-01-02 PROCEDURE — G0378 HOSPITAL OBSERVATION PER HR: HCPCS

## 2024-01-02 PROCEDURE — 82948 REAGENT STRIP/BLOOD GLUCOSE: CPT

## 2024-01-02 PROCEDURE — 80048 BASIC METABOLIC PNL TOTAL CA: CPT | Performed by: INTERNAL MEDICINE

## 2024-01-02 PROCEDURE — 85025 COMPLETE CBC W/AUTO DIFF WBC: CPT | Performed by: INTERNAL MEDICINE

## 2024-01-02 PROCEDURE — 84132 ASSAY OF SERUM POTASSIUM: CPT | Performed by: INTERNAL MEDICINE

## 2024-01-02 RX ORDER — VENLAFAXINE HYDROCHLORIDE 75 MG/1
150 CAPSULE, EXTENDED RELEASE ORAL
Status: DISCONTINUED | OUTPATIENT
Start: 2024-01-03 | End: 2024-01-03 | Stop reason: HOSPADM

## 2024-01-02 RX ORDER — POTASSIUM CHLORIDE 20 MEQ/1
40 TABLET, EXTENDED RELEASE ORAL EVERY 4 HOURS
Status: COMPLETED | OUTPATIENT
Start: 2024-01-02 | End: 2024-01-02

## 2024-01-02 RX ADMIN — POTASSIUM CHLORIDE 40 MEQ: 1500 TABLET, EXTENDED RELEASE ORAL at 06:05

## 2024-01-02 RX ADMIN — HYDRALAZINE HYDROCHLORIDE 25 MG: 25 TABLET, FILM COATED ORAL at 06:05

## 2024-01-02 RX ADMIN — AMLODIPINE BESYLATE 10 MG: 10 TABLET ORAL at 09:15

## 2024-01-02 RX ADMIN — POTASSIUM CHLORIDE 40 MEQ: 1500 TABLET, EXTENDED RELEASE ORAL at 09:14

## 2024-01-02 RX ADMIN — VENLAFAXINE HYDROCHLORIDE 150 MG: 75 TABLET ORAL at 09:15

## 2024-01-02 RX ADMIN — HYDRALAZINE HYDROCHLORIDE 25 MG: 25 TABLET, FILM COATED ORAL at 15:26

## 2024-01-02 RX ADMIN — NEBIVOLOL 10 MG: 10 TABLET ORAL at 09:15

## 2024-01-02 RX ADMIN — HYDRALAZINE HYDROCHLORIDE 25 MG: 25 TABLET, FILM COATED ORAL at 21:05

## 2024-01-02 NOTE — PLAN OF CARE
Goal Outcome Evaluation:  Plan of Care Reviewed With: patient      VSS on RA. Elevated blood pressure. Orthostatics taken per order. No complaints of pain/nausea. Pt has not voided all shift. Patient did not sleep much. No further complaints, will continue plan of care.

## 2024-01-02 NOTE — PLAN OF CARE
Goal Outcome Evaluation:         VSS, RA, SR. No c/o's pain or dizziness noted. Will continue current POC

## 2024-01-02 NOTE — NURSING NOTE
RA, SR, labetalol given for 's. No c/o's discomfort or dizziness at this time. K+ and Mg+ replacements given. Will continue current POC

## 2024-01-02 NOTE — CASE MANAGEMENT/SOCIAL WORK
Discharge Planning Assessment  [facility]     Patient Name: Carlos Eduardo Danielle  MRN: [id number]  Today's Date: 1/2/2024    Admit Date: 1/1/2024    Plan: Home   Discharge Needs Assessment       Row Name 01/02/24 0920       Living Environment    People in Home alone    Current Living Arrangements home    Potentially Unsafe Housing Conditions none    Primary Care Provided by self    Provides Primary Care For no one    Family Caregiver if Needed other relative(s)    Family Caregiver Names Sharron Alfredo (relative) [phone]    Able to Return to Prior Arrangements yes       Resource/Environmental Concerns    Resource/Environmental Concerns none    Transportation Concerns none       Transition Planning    Patient/Family Anticipates Transition to home    Patient/Family Anticipated Services at Transition none    Transportation Anticipated family or friend will provide       Discharge Needs Assessment    Readmission Within the Last 30 Days no previous admission in last 30 days    Equipment Currently Used at Home none    Concerns to be Addressed denies needs/concerns at this time    Anticipated Changes Related to Illness none    Equipment Needed After Discharge none                   Discharge Plan       Row Name 01/02/24 0921       Plan    Plan Home    Patient/Family in Agreement with Plan yes    Plan Comments Spoke with patient at bedside. Lives alone in [place]. Contact is Sharron Alfredo (relative) [phone]. Is independent with ADL's. No problems with Passport or medications. Uses no DME at home. Has noadvanced directives. PCP is Cal Bridges MD. Plan is home. CM will continue to follow.    Final Discharge Disposition Code 01 - home or self-care                  Continued Care and Services - Admitted Since 1/1/2024    Coordination has not been started for this encounter.       Expected Discharge Date and Time       Expected Discharge Date Expected Discharge Time    Jan 2, 2024            Demographic Summary        Row Name 01/02/24 0920       General Information    Admission Type observation    Arrived From emergency department    Referral Source admission list    Reason for Consult discharge planning    Preferred Language English       Contact Information    Permission Granted to Share Info With     Contact Information Obtained for                    Functional Status       Row Name 01/02/24 0920       Functional Status    Usual Activity Tolerance good    Current Activity Tolerance good       Functional Status, IADL    Medications independent    Meal Preparation independent    Housekeeping independent    Laundry independent    Shopping independent       Mental Status    General Appearance WDL WDL       Mental Status Summary    Recent Changes in Mental Status/Cognitive Functioning no changes       Employment/    Employment Status disabled                   Psychosocial    No documentation.                  Abuse/Neglect    No documentation.                  Legal    No documentation.                  Substance Abuse    No documentation.                  Patient Forms    No documentation.                     Radames Mcclelland RN

## 2024-01-03 ENCOUNTER — READMISSION MANAGEMENT (OUTPATIENT)
Dept: CALL CENTER | Facility: HOSPITAL | Age: 54
End: 2024-01-03
Payer: COMMERCIAL

## 2024-01-03 VITALS
BODY MASS INDEX: 44.1 KG/M2 | WEIGHT: 315 LBS | SYSTOLIC BLOOD PRESSURE: 140 MMHG | DIASTOLIC BLOOD PRESSURE: 65 MMHG | TEMPERATURE: 98 F | HEART RATE: 53 BPM | RESPIRATION RATE: 18 BRPM | OXYGEN SATURATION: 98 % | HEIGHT: 71 IN

## 2024-01-03 LAB
GLUCOSE BLDC GLUCOMTR-MCNC: 109 MG/DL (ref 70–130)
GLUCOSE BLDC GLUCOMTR-MCNC: 112 MG/DL (ref 70–130)
QT INTERVAL: 458 MS
QTC INTERVAL: 515 MS

## 2024-01-03 PROCEDURE — 97161 PT EVAL LOW COMPLEX 20 MIN: CPT

## 2024-01-03 PROCEDURE — 82948 REAGENT STRIP/BLOOD GLUCOSE: CPT

## 2024-01-03 PROCEDURE — G0378 HOSPITAL OBSERVATION PER HR: HCPCS

## 2024-01-03 RX ORDER — SPIRONOLACTONE 25 MG/1
25 TABLET ORAL DAILY
Qty: 90 TABLET | Refills: 3 | Status: CANCELLED | OUTPATIENT
Start: 2024-01-03

## 2024-01-03 RX ORDER — AMLODIPINE BESYLATE 10 MG/1
10 TABLET ORAL
Start: 2024-01-03

## 2024-01-03 RX ORDER — VENLAFAXINE HYDROCHLORIDE 150 MG/1
150 CAPSULE, EXTENDED RELEASE ORAL
Qty: 30 CAPSULE | Refills: 5 | Status: CANCELLED | OUTPATIENT
Start: 2024-01-03

## 2024-01-03 RX ORDER — NEBIVOLOL 5 MG/1
5 TABLET ORAL DAILY
Status: DISCONTINUED | OUTPATIENT
Start: 2024-01-03 | End: 2024-01-03 | Stop reason: HOSPADM

## 2024-01-03 RX ORDER — NEBIVOLOL 10 MG/1
5 TABLET ORAL DAILY
Status: CANCELLED
Start: 2024-01-03

## 2024-01-03 RX ADMIN — VENLAFAXINE HYDROCHLORIDE 150 MG: 75 CAPSULE, EXTENDED RELEASE ORAL at 08:23

## 2024-01-03 RX ADMIN — NEBIVOLOL 5 MG: 5 TABLET ORAL at 08:24

## 2024-01-03 RX ADMIN — AMLODIPINE BESYLATE 10 MG: 10 TABLET ORAL at 08:23

## 2024-01-03 RX ADMIN — HYDRALAZINE HYDROCHLORIDE 25 MG: 25 TABLET, FILM COATED ORAL at 05:51

## 2024-01-03 NOTE — THERAPY DISCHARGE NOTE
Patient Name: Carlos Eduardo Danielle  : 1970    MRN: [id number]                              Today's Date: 1/3/2024       Admit Date: 2024    Visit Dx:     ICD-10-CM ICD-9-CM   1. Syncope and collapse  R55 780.2     Patient Active Problem List   Diagnosis    AJAY (obstructive sleep apnea) -intolerant of BiPAP    Vitamin D insufficiency    Type 2 diabetes mellitus, without long-term current use of insulin    Anxiety    Essential hypertension    Hypogonadism in male    History of non anemic vitamin B12 deficiency    Gastritis without bleeding    Fatigue    Severe recurrent major depression without psychotic features    Generalized anxiety disorder    Suicidal ideation    Hyperlipidemia    Type 2 diabetes mellitus    Hypertensive urgency    Paroxysmal atrial fibrillation    Chronic heart failure with preserved ejection fraction (HFpEF)    BMI 50.0-59.9, adult    Syncope    Cervical stenosis of spinal canal    Hypokalemia     Past Medical History:   Diagnosis Date    Acute exacerbation of CHF (congestive heart failure)     Anxiety     Asthma 2023    Back pain     Back problem     Cellulitis     HX; BILATERAL LEGS    CHF (congestive heart failure) 2023    echo 6/3/2023 LVEF 60%    Colon polyp     Depression     Diabetes mellitus     GERD (gastroesophageal reflux disease)     on Protonix daily. EGD 2020; no hx of h pylori    Hyperlipidemia     Hypertension     Osteoarthritis     Paroxysmal atrial fibrillation 2023    Sleep apnea     noncompliant with BIPAP    Suicide attempt     reports hx of suicide attempts three times in 30 years ago    TIA (transient ischemic attack)      and ; related to hypertension.    Type 2 diabetes mellitus     dx in 2019; no insulin; last A1c 8.0    Venous stasis     bilateral lower ext     Past Surgical History:   Procedure Laterality Date    APPENDECTOMY N/A 2021    Procedure: APPENDECTOMY LAPAROSCOPIC;  Surgeon: Radames Arauz MD;  Location:   [facility];  Service: General;  Laterality: N/A;    COLONOSCOPY  2020    ENDOSCOPY N/A 04/17/2023    Procedure: ESOPHAGOGASTRODUODENOSCOPY;  Surgeon: Akua Ruelas MD;  Location:  [facility];  Service: Bariatric;  Laterality: N/A;    EPIDIDYMAL CYST EXCISION      GASTRIC SLEEVE LAPAROSCOPIC N/A 10/17/2023    Procedure: GASTRIC SLEEVE LAPAROSCOPIC WITH DAVINCI ROBOT,  LIVER BIOPSY , ESOPHAGOGASTRODUODENOSCOPY;  Surgeon: Akua Ruelas MD;  Location:  [facility];  Service: Robotics - DaVinci;  Laterality: N/A;    TONSILLECTOMY  2006      General Information       Row Name 01/03/24 0817          Physical Therapy Time and Intention    Document Type discharge evaluation/summary  -     Mode of Treatment physical therapy;individual therapy  -       Row Name 01/03/24 0817          General Information    Patient Profile Reviewed yes  -     Prior Level of Function independent:;all household mobility;community mobility;gait;transfer;bed mobility  -     Existing Precautions/Restrictions no known precautions/restrictions  -     Barriers to Rehab none identified  -       Row Name 01/03/24 0817          Living Environment    People in Home alone  -       Row Name 01/03/24 0817          Home Main Entrance    Number of Stairs, Main Entrance none  -       Row Name 01/03/24 0817          Stairs Within Home, Primary    Number of Stairs, Within Home, Primary none  -       Row Name 01/03/24 0817          Cognition    Orientation Status (Cognition) oriented x 4  -       Row Name 01/03/24 0817          Safety Issues, Functional Mobility    Safety Issues Affecting Function (Mobility) --  no gross safety deficits noted  -               User Key  (r) = Recorded By, (t) = Taken By, (c) = Cosigned By      Initials Name Provider Type     Carlos Eduardo Jara, PT Physical Therapist                   Mobility       Row Name 01/03/24 0817          Bed Mobility    Bed Mobility supine-sit;sit-supine  -      Supine-Sit [place] (Bed Mobility) independent  -     Sit-Supine [place] (Bed Mobility) independent  -Critical access hospital Name 01/03/24 0817          Transfers    Comment, (Transfers) pt performed STS from EOB with no assist. Good technique and safety noted throughout. Pt denied dizziness. No s/sx of orthostatic hypotension.  -Critical access hospital Name 01/03/24 0817          Sit-Stand Transfer    Sit-Stand [place] (Transfers) independent  -Critical access hospital Name 01/03/24 0817          Gait/Stairs (Locomotion)    [place] Level (Gait) independent  -     Patient was able to Ambulate yes  -     Distance in Feet (Gait) 200  -     [place] Level (Stairs) not tested  -     Comment, (Gait/Stairs) Pt demonstrated step through gait pattern with mild decrease in gait speed however otherwise good gait mechanics and safety noted throughout. No reports of dizziness and no gross fatigue reported throughout.  -               User Key  (r) = Recorded By, (t) = Taken By, (c) = Cosigned By      Initials Name Provider Type     Carlos Eduardo Jara, PT Physical Therapist                   Obj/Interventions       [facility] Name 01/03/24 0819          Range of Motion Comprehensive    General Range of Motion bilateral lower extremity ROM WFL  -Critical access hospital Name 01/03/24 0819          Strength Comprehensive (MMT)    General Manual Muscle Testing (MMT) Assessment no strength deficits identified  -Critical access hospital Name 01/03/24 0819          Motor Skills    Motor Skills coordination  -     Coordination WNL;bilateral;lower extremity  -Critical access hospital Name 01/03/24 0819          Balance    Balance Assessment sitting static balance;sitting dynamic balance;standing static balance;standing dynamic balance  -     Static Sitting Balance independent  -     Dynamic Sitting Balance independent  -     Position, Sitting Balance unsupported  -     Static Standing Balance independent  -     Dynamic Standing Balance independent  -      Position/Device Used, Standing Balance unsupported  -     Comment, Balance Pt with good balance noted throughout OOB mobility, no LOB or instability observed throughout.  -               User Key  (r) = Recorded By, (t) = Taken By, (c) = Cosigned By      Initials Name Provider Type     Carlos Eduardo Jara, PT Physical Therapist                   Goals/Plan    No documentation.                  Clinical Impression       [facility] Name 01/03/24 0820          Pain    Pretreatment Pain Rating 0/10 - no pain  -     Posttreatment Pain Rating 0/10 - no pain  -[facility] Name 01/03/24 0820          Plan of Care Review    Plan of Care Reviewed With patient  -     Progress no change  -     Outcome Evaluation PT initial evaluation complete. Pt presenting at/near his baseline level of function. Pt performed all functional mobility tasks independently including ambulation of 200ft with no AD. Pt with no dizziness throughout. No evidence of orthostatic hypotension throughout. Pt with no problems requiring further skilled acute PT services at this time. Pt safe to return home when medically appropriate.  -[facility] Name 01/03/24 0820          Therapy Assessment/Plan (PT)    Patient/Family Therapy Goals Statement (PT) Return home.  -     Criteria for Skilled Interventions Met (PT) no;no problems identified which require skilled intervention  -     Therapy Frequency (PT) evaluation only  -[facility] Name 01/03/24 0820          Vital Signs    Pre Systolic BP Rehab 152  supine  -     Pre Treatment Diastolic BP 68  -LH     Intra Systolic BP Rehab 161  sitting  -LH     Intra Treatment Diastolic BP 93  -LH     Post Systolic BP Rehab 158  standing  -LH     Post Treatment Diastolic BP 95  -     Pretreatment Heart Rate (beats/min) 63  -     O2 Delivery Pre Treatment room air  -     O2 Delivery Intra Treatment room air  -     O2 Delivery Post Treatment room air  -     Pre Patient Position Supine  -LH     Intra  Patient Position Standing  -     Post Patient Position Supine  -       Row Name 01/03/24 0820          Positioning and Restraints    Pre-Treatment Position in bed  -     Post Treatment Position bed  -LH     In Bed supine;call light within reach;encouraged to call for assist  -               User Key  (r) = Recorded By, (t) = Taken By, (c) = Cosigned By      Initials Name Provider Type     Carlos Eduardo Jara, PT Physical Therapist                   Outcome Measures       Row Name 01/03/24 0822          How much help from another person do you currently need...    Turning from your back to your side while in flat bed without using bedrails? 4  -LH     Moving from lying on back to sitting on the side of a flat bed without bedrails? 4  -LH     Moving to and from a bed to a chair (including a wheelchair)? 4  -LH     Standing up from a chair using your arms (e.g., wheelchair, bedside chair)? 4  -LH     Climbing 3-5 steps with a railing? 4  -LH     To walk in hospital room? 4  -     AM-PAC 6 Clicks Score (PT) 24  -     Highest Level of Mobility Goal 8 --> Walked 250 feet or more  -       Row Name 01/03/24 0822          Functional Assessment    Outcome Measure Options AM-PAC 6 Clicks Basic Mobility (PT)  -               User Key  (r) = Recorded By, (t) = Taken By, (c) = Cosigned By      Initials Name Provider Type     Carlos Eduardo Jara, PT Physical Therapist                  Physical Therapy Education       Title: PT OT SLP Therapies (Done)       Topic: Physical Therapy (Done)       Point: Mobility training (Done)       Learning Progress Summary             Patient Acceptance, E, VU by  at 1/3/2024 0823                         Point: Home exercise program (Done)       Learning Progress Summary             Patient Acceptance, E, VU by  at 1/3/2024 0823                         Point: Body mechanics (Done)       Learning Progress Summary             Patient Acceptance, E, VU by  at 1/3/2024 0823                          Point: Precautions (Done)       Learning Progress Summary             Patient Acceptance, E, VU by  at 1/3/2024 0823                                         User Key       Initials Effective Dates Name Provider Type Discipline     09/21/21 -  Carlos Eduardo Jara, PT Physical Therapist PT                  PT Recommendation and Plan     Plan of Care Reviewed With: patient  Progress: no change  Outcome Evaluation: PT initial evaluation complete. Pt presenting at/near his baseline level of function. Pt performed all functional mobility tasks independently including ambulation of 200ft with no AD. Pt with no dizziness throughout. No evidence of orthostatic hypotension throughout. Pt with no problems requiring further skilled acute PT services at this time. Pt safe to return home when medically appropriate.     Time Calculation:   PT Evaluation Complexity  History, PT Evaluation Complexity: 1-2 personal factors and/or comorbidities  Examination of Body Systems (PT Eval Complexity): 1-2 elements  Clinical Presentation (PT Evaluation Complexity): stable  Clinical Decision Making (PT Evaluation Complexity): low complexity  Overall Complexity (PT Evaluation Complexity): low complexity     PT Charges       Row Name 01/03/24 0823             Time Calculation    Start Time 0758  -      PT Received On 01/03/24  -         Untimed Charges    PT Eval/Re-eval Minutes 38  -         Total Minutes    Untimed Charges Total Minutes 38  -       Total Minutes 38  -                User Key  (r) = Recorded By, (t) = Taken By, (c) = Cosigned By      Initials Name Provider Type     Carlos Eduardo Jara, PT Physical Therapist                  Therapy Charges for Today       Code Description Service Date Service Provider Modifiers Qty    [id number] HC PT EVAL LOW COMPLEXITY 3 1/3/2024 Carlos Eduardo Jara, PT GP 1            PT G-Codes  Outcome Measure Options: AM-PAC 6 Clicks Basic Mobility (PT)  AM-PAC 6 Clicks Score (PT):  24    PT Discharge Summary  Anticipated Discharge Disposition (PT): home    Carlos Eduardo Jara, PT  1/3/2024

## 2024-01-03 NOTE — CASE MANAGEMENT/SOCIAL WORK
Case Management Discharge Note      Final Note: Patient is being discharged home. He remains independent of ADLs and has no new DME needs. No dc needs identified - [place] [phone]         Selected Continued Care - Admitted Since 1/1/2024       Destination    No services have been selected for the patient.                Durable Medical Equipment    No services have been selected for the patient.                Dialysis/Infusion    No services have been selected for the patient.                Home Medical Care    No services have been selected for the patient.                Therapy    No services have been selected for the patient.                Community Resources    No services have been selected for the patient.                Community & DME    No services have been selected for the patient.                         Final Discharge Disposition Code: 01 - home or self-care

## 2024-01-03 NOTE — DISCHARGE SUMMARY
[facility] Medicine Services  DISCHARGE SUMMARY    Patient Name: Carlos Eduardo Danielle  : 1970  MRN: [id number]    Date of Admission: 2024  1:11 PM  Date of Discharge:  1/3/2024  Primary Care Physician: Ian Vann MD    Consults       No orders found from 12/3/2023 to 2024.            Hospital Course     Presenting Problem: syncope    Active Hospital Problems    Diagnosis  POA    **Syncope [R55]  Yes    Cervical stenosis of spinal canal [M48.02]  Yes    Hypokalemia [E87.6]  Yes    BMI 50.0-59.9, adult [Z68.43]  Not Applicable    Chronic heart failure with preserved ejection fraction (HFpEF) [I50.32]  Yes    Paroxysmal atrial fibrillation [I48.0]  Yes    Hypertensive urgency [I16.0]  Yes    Hyperlipidemia [E78.5]  Yes    Anxiety [F41.9]  Yes    Essential hypertension [I10]  Yes    Type 2 diabetes mellitus, without long-term current use of insulin [E11.9]  Yes    Vitamin D insufficiency [E55.9]  Yes    AJAY (obstructive sleep apnea) -intolerant of BiPAP [G47.33]  Yes      Resolved Hospital Problems   No resolved problems to display.          Hospital Course:  Carlos Eduardo Danielle is a 53 y.o. male with history of HTN and diabetes.  He underwent bariatric surgery in 2023 (Dr Ruelas) and has lost 65 lbs.  He has cut down on his HTN meds at home due to feeling lightheaded at times.  Recently he had a couple days of diarrhea, and in that setting he felt lightheadeded and had syncope in his driveway while bending and moving to clean out his car.  A neighbor called EMS.    Syncope, likely orthostatic or vagal:  Workup here was benign.  He was not orthostatic upon arrival.  BP remained mildly elevated in the systolic 140-160 range.  His diarrhea has nearly stopped.  He is eating well.  He has walked in the room with mild occasional lightheadedness and feels ready to go home.     Bradycardia:  Despite having taken himself off his beta blocker, his HR was routinely in  the 50s  (sinus) and I watched it dip to the high 30s transiently as he lay in bed talking to me.  This is likely due to vagal disruption by his gastric surgery.      Recommendations:   - Avoid beta blockers  - if syncope recurs, refer for ambulatory telemetry monitoring device     He has also taken himself off sulfonylurea, which I agree with.  He remains on two diuretics (torsemide, Jardiance) and these may need to be reduced or stopped as time goes by.        Discharge Follow Up Recommendations for outpatient labs/diagnostics:   - FU with PCP as scheduled     - FU with Dr Ruelas as scheduled.     Day of Discharge     HPI:   Feeling near baseline, has been walking in his room without difficulty     Review of Systems  No dizziness.  Minimal diarrhea     Vital Signs:          Physical Exam:  Gen:  WD/WN  Neuro: alert and oriented, clear speech, follows commands, grossly nonfocal  HEENT:  NC/AT PERRL, OP benign  Neck:  Supple, no LAD  Heart RRR no murmur, rub, or gallop  Abd:  Soft, nontender, no rebound or guarding, pos BS  Extrem:  No c/c/e      Pertinent  and/or Most Recent Results     LAB RESULTS:      Lab 01/02/24  0459 01/01/24  1323   WBC 5.19 8.55   HEMOGLOBIN 15.1 17.3   HEMATOCRIT 45.2 51.2*   PLATELETS 155 183   NEUTROS ABS 3.24 6.20   IMMATURE GRANS (ABS) 0.02 0.02   LYMPHS ABS 1.31 1.64   MONOS ABS 0.50 0.54   EOS ABS 0.08 0.11   MCV 85.3 85.3   D DIMER QUANT  --  0.45         Lab 01/02/24  1453 01/02/24  0459 01/01/24  2358 01/01/24  1539 01/01/24  1323   SODIUM  --  140  --   --  140   POTASSIUM 3.9 3.3* 3.5  --  3.2*   CHLORIDE  --  104  --   --  101   CO2  --  26.0  --   --  25.0   ANION GAP  --  10.0  --   --  14.0   BUN  --  9  --   --  10   CREATININE  --  0.83  --   --  1.06   EGFR  --  105.3  --   --  84.4   GLUCOSE  --  120*  --   --  149*   CALCIUM  --  8.2*  --   --  8.5*   MAGNESIUM  --   --  2.5  --  1.5*   TSH  --   --   --  1.910  --          Lab 01/01/24  1323   TOTAL PROTEIN 6.5    ALBUMIN 3.8   GLOBULIN 2.7   ALT (SGPT) 32   AST (SGOT) 33   BILIRUBIN 0.8   ALK PHOS 99         Lab 01/01/24  1539 01/01/24  1323   HSTROP T 10 13                 Brief Urine Lab Results       None          Microbiology Results (last 10 days)       Procedure Component Value - Date/Time    COVID PRE-OP / PRE-PROCEDURE SCREENING ORDER (NO ISOLATION) - Swab, Nasopharynx [[id number]]  (Normal) Collected: 01/01/24 2144    Lab Status: Final result Specimen: Swab from Nasopharynx Updated: 01/01/24 2206    Narrative:      The following orders were created for panel order COVID PRE-OP / PRE-PROCEDURE SCREENING ORDER (NO ISOLATION) - Swab, Nasopharynx.  Procedure                               Abnormality         Status                     ---------                               -----------         ------                     COVID-19 and FLU A/B PCR...[[id number]]  Normal              Final result                 Please view results for these tests on the individual orders.    COVID-19 and FLU A/B PCR, 1 HR TAT - Swab, Nasopharynx [[id number]]  (Normal) Collected: 01/01/24 2144    Lab Status: Final result Specimen: Swab from Nasopharynx Updated: 01/01/24 2206     COVID19 Not Detected     Influenza A PCR Not Detected     Influenza B PCR Not Detected    Narrative:      Fact sheet for providers: https://www.fda.gov/media/298643/download    Fact sheet for patients: https://www.fda.gov/media/744001/download    Test performed by PCR.            CT Head Without Contrast    Result Date: 1/1/2024  CT HEAD WO CONTRAST, CT CERVICAL SPINE WO CONTRAST Date of Exam: 1/1/2024 1:34 PM EST Indication: fall head occipital head injury on eloquis. Comparison: None available. Technique: Axial CT images were obtained of the head and cervical without contrast administration.  Automated exposure control and iterative construction methods were used. Findings: Head: No intracranial hemorrhage. Negative for mass effect or midline shift. Ventricles and  cortical sulci normally configured. Gray-white matter differentiation maintained. Posterior fossa without acute abnormality. There is a small scalp contusion overlying the right posterior occipital region. No underlying fracture. No intraventricular hemorrhage. Globes symmetric. No retro-orbital abnormality. The mastoid air cells are well-aerated. Retention cyst at the base of the right maxillary sinus. Negative for sinus fluid level or hemorrhage. Calvarium intact. Cervical spine: Straightening of the cervical lordosis. Negative for acute fracture. Posterior spinous processes intact. No locked or perched facet. Occipital condyles are intact. The dens is intact. Craniocervical junction within normal limits. Visualized portions of the mandible are intact. At the C5-6 level there is a posterior central disc osteophyte complex likely contributing to moderate central canal stenosis. Bilateral uncovertebral spurring at C5-6 contribute to moderate to severe bilateral foraminal stenosis. The remaining cervical levels are without high-grade foraminal or canal stenosis. Soft tissues of the neck are without acute abnormality. The lung apices are clear. No apical pneumothorax. Small subcentimeter calcified right thyroid lobe nodules. Streak artifact from dental amalgam partially limits oral pharyngeal assessment.     Impression: HEAD: 1. No intracranial hemorrhage or acute intracranial abnormality. 2. Small right posterior occipital scalp contusion. No underlying fracture. CERVICAL SPINE: 1. Negative for cervical spine fracture. 2. Posterior disc osteophyte complex at C5-6 contributing to moderate central canal stenosis and moderate to severe bilateral foraminal stenosis. Electronically Signed: Cesar Pruitt MD  1/1/2024 2:10 PM EST  Workstation ID: [id number]    CT Cervical Spine Without Contrast    Result Date: 1/1/2024  CT HEAD WO CONTRAST, CT CERVICAL SPINE WO CONTRAST Date of Exam: 1/1/2024 1:34 PM EST Indication: fall head  occipital head injury on eloquis. Comparison: None available. Technique: Axial CT images were obtained of the head and cervical without contrast administration.  Automated exposure control and iterative construction methods were used. Findings: Head: No intracranial hemorrhage. Negative for mass effect or midline shift. Ventricles and cortical sulci normally configured. Gray-white matter differentiation maintained. Posterior fossa without acute abnormality. There is a small scalp contusion overlying the right posterior occipital region. No underlying fracture. No intraventricular hemorrhage. Globes symmetric. No retro-orbital abnormality. The mastoid air cells are well-aerated. Retention cyst at the base of the right maxillary sinus. Negative for sinus fluid level or hemorrhage. Calvarium intact. Cervical spine: Straightening of the cervical lordosis. Negative for acute fracture. Posterior spinous processes intact. No locked or perched facet. Occipital condyles are intact. The dens is intact. Craniocervical junction within normal limits. Visualized portions of the mandible are intact. At the C5-6 level there is a posterior central disc osteophyte complex likely contributing to moderate central canal stenosis. Bilateral uncovertebral spurring at C5-6 contribute to moderate to severe bilateral foraminal stenosis. The remaining cervical levels are without high-grade foraminal or canal stenosis. Soft tissues of the neck are without acute abnormality. The lung apices are clear. No apical pneumothorax. Small subcentimeter calcified right thyroid lobe nodules. Streak artifact from dental amalgam partially limits oral pharyngeal assessment.     Impression: HEAD: 1. No intracranial hemorrhage or acute intracranial abnormality. 2. Small right posterior occipital scalp contusion. No underlying fracture. CERVICAL SPINE: 1. Negative for cervical spine fracture. 2. Posterior disc osteophyte complex at C5-6 contributing to  moderate central canal stenosis and moderate to severe bilateral foraminal stenosis. Electronically Signed: Cesar Pruitt MD  1/1/2024 2:10 PM EST  Workstation ID: [id number]             Results for orders placed during the hospital encounter of 06/01/23    Adult Transthoracic Echo Complete w/ Color, Spectral and Contrast if Necessary Per Protocol    Interpretation Summary    Estimated left ventricular EF = 60%    Left ventricular wall thickness is consistent with mild concentric hypertrophy.    Mild to moderate aortic valve regurgitation is present    Mild mitral valve regurgitation is present      Discharge Details        Discharge Medications        Continue These Medications        Instructions Start Date   amLODIPine 10 MG tablet  Commonly known as: NORVASC   10 mg, Oral, Every 24 Hours Scheduled      apixaban 5 MG tablet tablet  Commonly known as: ELIQUIS   5 mg, Oral, 2 Times Daily, Patient instructed to hold Eliquis 48 hours prior to surgery.      Cholecalciferol 125 MCG (5000 UT) tablet   125 mcg, Oral, Every 7 Days      Jardiance 10 MG tablet tablet  Generic drug: empagliflozin   10 mg, Oral, Daily      metFORMIN  MG 24 hr tablet  Commonly known as: GLUCOPHAGE-XR   1,000 mg, Oral, 2 Times Daily With Meals      mirtazapine 15 MG tablet  Commonly known as: Remeron   Take 1 tablet by mouth At Night As Needed for sleep.      ondansetron 8 MG tablet  Commonly known as: Zofran   8 mg, Oral, Every 8 Hours PRN      pantoprazole 40 MG EC tablet  Commonly known as: PROTONIX   40 mg, Oral, Daily      torsemide 20 MG tablet  Commonly known as: DEMADEX   20 mg, Oral, Daily      ursodiol 300 MG capsule  Commonly known as: ACTIGALL   300 mg, Oral, 2 Times Daily      venlafaxine 225 MG tablet sustained-release 24 hour 24 hr tablet   450 mg, Oral, Daily With Breakfast             Stop These Medications      glimepiride 4 MG tablet  Commonly known as: AMARYL     nebivolol 10 MG tablet  Commonly known as: Bystolic      spironolactone 25 MG tablet  Commonly known as: ALDACTONE              Allergies   Allergen Reactions    Atorvastatin Headache     Weakness.      Topamax [Topiramate] Other (See Comments)     Migraine           Discharge Disposition:  Home or Self Care    Diet:  Hospital:  No active diet order      Diet Instructions    Diabetic diet              Activity:  routine.  Rise slowly frm sitting   Activity Instructions    As tolerated           CODE STATUS:    There are no questions and answers to display.       Future Appointments   Date Time Provider Department Center   1/17/2024  9:30 AM Ian Vann MD [facility]   1/24/2024  3:15 PM Gil Edgar MD [facility]   1/25/2024  9:15 AM Ruslan Villavicencio MD [facility]   1/29/2024  8:30 AM Kenneth Glover APRN [facility]   1/29/2024 11:30 AM Gracy Poole PA [facility] None   1/30/2024  8:00 AM Nayeli Guajardo MD [facility]   4/10/2024 10:30 AM Ian Vann MD [facility]   8/26/2024  1:30 PM Ruslan Villavicencio MD [facility]                 Nicolette Kaufman MD  01/04/24      Time Spent on Discharge:  I spent  40  minutes on this discharge activity which included: face-to-face encounter with the patient, reviewing the data in the system, coordination of the care with the nursing staff as well as consultants, documentation, and entering orders.

## 2024-01-03 NOTE — PLAN OF CARE
Goal Outcome Evaluation:  Plan of Care Reviewed With: patient        Progress: no change  Outcome Evaluation: PT initial evaluation complete. Pt presenting at/near his baseline level of function. Pt performed all functional mobility tasks independently including ambulation of 200ft with no AD. Pt with no dizziness throughout. No evidence of orthostatic hypotension throughout. Pt with no problems requiring further skilled acute PT services at this time. Pt safe to return home when medically appropriate.      Anticipated Discharge Disposition (PT): home

## 2024-01-03 NOTE — PLAN OF CARE
Goal Outcome Evaluation:  Plan of Care Reviewed With: patient         VSS on RA. Pt slept throughout night. No complaints of pain/nausea. No further complaints, will continue plan of care.

## 2024-01-03 NOTE — OUTREACH NOTE
Prep Survey      Flowsheet Row Responses   [facility] patient discharged from? [place]   Is LACE score < 7 ? No   Eligibility [facility]   Date of Admission 01/01/24   Date of Discharge 01/03/24   Discharge Disposition Home or Self Care   Discharge diagnosis Syncope    AJAY (obstructive sleep apnea) -intolerant of BiPAP   Does the patient have one of the following disease processes/diagnoses(primary or secondary)? Other   Does the patient have Home health ordered? No   Is there a DME ordered? No   Prep survey completed? Yes            CHERI WALDROP - Registered Nurse              CHERI WALDROP - Registered Nurse

## 2024-01-04 ENCOUNTER — TRANSITIONAL CARE MANAGEMENT TELEPHONE ENCOUNTER (OUTPATIENT)
Dept: CALL CENTER | Facility: HOSPITAL | Age: 54
End: 2024-01-04
Payer: COMMERCIAL

## 2024-01-04 NOTE — OUTREACH NOTE
Call Center TCM Note      Flowsheet Row Responses   [facility] patient discharged from? [place]   Does the patient have one of the following disease processes/diagnoses(primary or secondary)? Other   TCM attempt successful? Yes   Call start time 1104   Call end time 1106   Discharge diagnosis Syncope    AJAY (obstructive sleep apnea) -intolerant of BiPAP   Person spoke with today (if not patient) and relationship patient   Meds reviewed with patient/caregiver? Yes   Is the patient having any side effects they believe may be caused by any medication additions or changes? No   Does the patient have all medications ordered at discharge? Yes   Is the patient taking all medications as directed (includes completed medication regime)? Yes   Comments Patient has a hospital followup scheduled on 1/17/2024 with PCP, and GI on 1/24, cardiology on 1/25   Does the patient have an appointment with their PCP within 7-14 days of discharge? Yes   Psychosocial issues? No   Did the patient receive a copy of their discharge instructions? Yes   Nursing interventions Reviewed instructions with patient   What is the patient's perception of their health status since discharge? Improving   Is the patient/caregiver able to teach back signs and symptoms related to disease process for when to call PCP? Yes   Is the patient/caregiver able to teach back signs and symptoms related to disease process for when to call 911? Yes   Is the patient/caregiver able to teach back the hierarchy of who to call/visit for symptoms/problems? PCP, Specialist, Home health nurse, Urgent Care, ED, 911 Yes   If the patient is a current smoker, are they able to teach back resources for cessation? Not a smoker   TCM call completed? Yes   Wrap up additional comments Patient reports he is doing well. No needs or concerns.   Call end time 1106   Would this patient benefit from a Referral to [facility] Social Work? No   Is the patient interested in additional calls from  an ambulatory ? No            Ralph Addison RN    1/4/2024, 11:07 EST

## 2024-01-04 NOTE — OUTREACH NOTE
Call Center TCM Note      Flowsheet Row Responses   [facility] patient discharged from? [place]   Does the patient have one of the following disease processes/diagnoses(primary or secondary)? Other   TCM attempt successful? No  [outdated release]   Unsuccessful attempts Attempt 1            Ralph Addison RN    1/4/2024, 10:43 EST

## 2024-01-17 ENCOUNTER — TELEMEDICINE (OUTPATIENT)
Dept: FAMILY MEDICINE CLINIC | Facility: CLINIC | Age: 54
End: 2024-01-17
Payer: COMMERCIAL

## 2024-01-17 DIAGNOSIS — R55 SYNCOPE, UNSPECIFIED SYNCOPE TYPE: ICD-10-CM

## 2024-01-17 DIAGNOSIS — Z90.3 S/P GASTRIC SLEEVE PROCEDURE: ICD-10-CM

## 2024-01-17 DIAGNOSIS — E66.01 MORBID (SEVERE) OBESITY DUE TO EXCESS CALORIES: ICD-10-CM

## 2024-01-17 DIAGNOSIS — I95.1 ORTHOSTATIC HYPOTENSION: Primary | ICD-10-CM

## 2024-01-17 PROCEDURE — 1160F RVW MEDS BY RX/DR IN RCRD: CPT | Performed by: FAMILY MEDICINE

## 2024-01-17 PROCEDURE — 99213 OFFICE O/P EST LOW 20 MIN: CPT | Performed by: FAMILY MEDICINE

## 2024-01-17 PROCEDURE — 1159F MED LIST DOCD IN RCRD: CPT | Performed by: FAMILY MEDICINE

## 2024-01-17 NOTE — PROGRESS NOTES
Subjective   Carlos Eduardo Danielle is a 53 y.o. male  Patient requesting consents to telemedicine visit using audiovisual aid.  Since last week patient has moved to [place] but wanted to follow-up on his hospitalization.  I am in my office.    Chief Complaint    Hypotension    History of Present Illness  Patient presents today for telemedicine visit as a hospital follow-up.  He was hospitalized on New Year's Day and went home 2 days later.  He had a syncopal or near syncopal episode after leaning over and getting some things out of his vehicle.  His neighbor saw him fall in the driveway and called EMS.  At the hospital he was felt to be moderately dehydrated but even after this was corrected he continued having orthostatic symptoms.  His bisoprolol and spironolactone were discontinued and he improved.    Patient tells me that he did indeed improve but he is continuing to have orthostatic symptoms.  We reviewed all of his medications together.  I would like for him to taper down the amlodipine initially to see if this will correct the orthostasis.  Patient had a relatively recent bariatric surgical procedure and has now lost 73 pounds.  He tells me his most recent weight was 372.    I advised the patient to find a primary care physician as soon as possible as this will not be easy to manage if he is not living locally or within a reasonable distance to follow-up.  Apparently he has friends in [place] and they are going to try to help expedite connecting him with a new primary care physician.    The following portions of the patient's history were reviewed and updated as appropriate: allergies, current medications, past social history and problem list    Review of Systems   Constitutional:  Negative for fatigue and unexpected weight change.   Respiratory:  Negative for cough, chest tightness and shortness of breath.    Cardiovascular:  Negative for chest pain, palpitations and leg swelling.   Gastrointestinal:  Negative  for nausea.   Skin:  Negative for color change and rash.   Neurological:  Positive for dizziness and syncope. Negative for weakness and headaches.       Objective     There were no vitals filed for this visit.    Physical Exam  Constitutional:       Appearance: Normal appearance.   HENT:      Head: Normocephalic and atraumatic.   Pulmonary:      Effort: Pulmonary effort is normal. No respiratory distress.   Neurological:      Mental Status: He is alert and oriented to person, place, and time.   Psychiatric:         Mood and Affect: Mood normal.         Behavior: Behavior normal.         Assessment & Plan   Problems Addressed this Visit          Symptoms and Signs    Syncope     Other Visit Diagnoses       Orthostatic hypotension    -  Primary    S/P gastric sleeve procedure        Morbid (severe) obesity due to excess calories              Diagnoses         Codes Comments    Orthostatic hypotension    -  Primary ICD-10-CM: I95.1  ICD-9-CM: 458.0     Syncope, unspecified syncope type     ICD-10-CM: R55  ICD-9-CM: 780.2     S/P gastric sleeve procedure     ICD-10-CM: Z90.3  ICD-9-CM: V45.75     Morbid (severe) obesity due to excess calories     ICD-10-CM: E66.01  ICD-9-CM: 278.01           Plan    I have asked him to cut his amlodipine from 10 mg to 5 mg daily if he continues having orthostasis on the 5 mg we will discontinue the amlodipine completely.    Stressed the importance of finding new physicians since he is moved to [place] from [facility].    Continue with his other medications as outlined on his discharge summary from the hospital.

## 2024-01-19 ENCOUNTER — READMISSION MANAGEMENT (OUTPATIENT)
Dept: CALL CENTER | Facility: HOSPITAL | Age: 54
End: 2024-01-19
Payer: COMMERCIAL

## 2024-01-19 NOTE — OUTREACH NOTE
Medical Week 3 Survey      Flowsheet Row Responses   [facility] patient discharged from? [place]   Does the patient have one of the following disease processes/diagnoses(primary or secondary)? Other   Week 3 attempt successful? No   Unsuccessful attempts Attempt 1            Inna Gutierrez Registered Nurse

## 2024-01-21 RX ORDER — VENLAFAXINE HYDROCHLORIDE 225 MG/1
450 TABLET, EXTENDED RELEASE ORAL
Qty: 60 EACH | Refills: 1 | Status: CANCELLED | OUTPATIENT
Start: 2024-01-21

## 2024-01-23 ENCOUNTER — READMISSION MANAGEMENT (OUTPATIENT)
Dept: CALL CENTER | Facility: HOSPITAL | Age: 54
End: 2024-01-23
Payer: COMMERCIAL

## 2024-01-23 NOTE — OUTREACH NOTE
Medical Week 3 Survey      Flowsheet Row Responses   [facility] patient discharged from? [place]   Does the patient have one of the following disease processes/diagnoses(primary or secondary)? Other   Week 3 attempt successful? No   Unsuccessful attempts Attempt 2            Danna PADILLA - Licensed Nurse

## 2024-03-21 RX ORDER — PANTOPRAZOLE SODIUM 40 MG/1
40 TABLET, DELAYED RELEASE ORAL DAILY
Qty: 90 TABLET | Refills: 0 | Status: SHIPPED | OUTPATIENT
Start: 2024-03-21

## (undated) DEVICE — VESSEL SEALER EXTEND: Brand: ENDOWRIST

## (undated) DEVICE — BLANKT WARM UPPR/BDY ARM/OUT 57X196CM

## (undated) DEVICE — LUBE JELLY FOIL PACK 1.4 OZ: Brand: MEDLINE INDUSTRIES, INC.

## (undated) DEVICE — SAFELINER SUCTION CANISTER 1000CC: Brand: DEROYAL

## (undated) DEVICE — LAPAROVUE VISIBILITY SYSTEM LAPAROSCOPIC SOLUTIONS: Brand: LAPAROVUE

## (undated) DEVICE — ENDOPATH XCEL UNIVERSAL TROCAR STABLILITY SLEEVES: Brand: ENDOPATH XCEL

## (undated) DEVICE — SOL IRR H2O BTL 1000ML STRL

## (undated) DEVICE — SUT VIC 0 UR6 27IN VCP603H

## (undated) DEVICE — SEALANT WND FIBRIN TISSEEL PREFIL/SYR/PRIMAFZ 10ML

## (undated) DEVICE — SHT AIR TRANSFR COMFRT GLIDE LAT 40X80IN

## (undated) DEVICE — 100% SILICONE FOLEY TRAY,16 FR/CH (5.3 MM), 5 ML CATHETER PRE-CONNECTED TO 2000 ML DRAINAGE BAG WITH LUER-LOCK SAMPLING: Brand: DOVER

## (undated) DEVICE — APL DUPLOSPRAYER MIS 40CM

## (undated) DEVICE — DRSNG TELFA PAD NONADH STR 1S 3X8IN

## (undated) DEVICE — ST TBG CONN PNEUMOCLEAR EVAC SMOKE HEAT/HUMID

## (undated) DEVICE — CANNULA SEAL

## (undated) DEVICE — ENDOPATH XCEL BLADELESS TROCARS WITH STABILITY SLEEVES: Brand: ENDOPATH XCEL

## (undated) DEVICE — Device

## (undated) DEVICE — Device: Brand: DEFENDO AIR/WATER/SUCTION AND BIOPSY VALVE

## (undated) DEVICE — TUBING, SUCTION, 1/4" X 10', STRAIGHT: Brand: MEDLINE

## (undated) DEVICE — THE BITE BLOCK MAXI, LATEX FREE STRAP IS USED TO PROTECT THE ENDOSCOPE INSERTION TUBE FROM BEING BITTEN BY THE PATIENT.

## (undated) DEVICE — INTENDED FOR TISSUE SEPARATION, AND OTHER PROCEDURES THAT REQUIRE A SHARP SURGICAL BLADE TO PUNCTURE OR CUT.: Brand: BARD-PARKER ® STAINLESS STEEL BLADES

## (undated) DEVICE — REDUCER: Brand: ENDOWRIST

## (undated) DEVICE — APPL CHLORAPREP TINTED 26ML TEAL

## (undated) DEVICE — ADAPT CLN LUM OLYMP AIR/H20

## (undated) DEVICE — HYBRID CO2 TUBING/CAP SET FOR OLYMPUS® SCOPES & CO2 SOURCE: Brand: ERBE

## (undated) DEVICE — ENDOPATH XCEL BLUNT TIP TROCARS WITH SMOOTH SLEEVES: Brand: ENDOPATH XCEL

## (undated) DEVICE — KT ORCA ORCAPOD DISP STRL

## (undated) DEVICE — MAX-CORE® DISPOSABLE CORE BIOPSY INSTRUMENT, 18G X 25CM: Brand: MAX-CORE

## (undated) DEVICE — FIRST STEP BEDSIDE ADD WATER KIT - RESEALABLE STAND-UP POUCH, ENDOSCOPIC CLEANING PAD - 1 POUCH: Brand: FIRST STEP BEDSIDE ADD WATER KIT - RESEALABLE STAND-UP POUCH, ENDOSCOPIC CLEANIN

## (undated) DEVICE — MINI ENDOCUT SCISSOR TIP, DISPOSABLE: Brand: RENEW

## (undated) DEVICE — [HIGH FLOW INSUFFLATOR,  DO NOT USE IF PACKAGE IS DAMAGED,  KEEP DRY,  KEEP AWAY FROM SUNLIGHT,  PROTECT FROM HEAT AND RADIOACTIVE SOURCES.]: Brand: PNEUMOSURE

## (undated) DEVICE — GLV SURG DERMASSURE GRN LF PF 7.0

## (undated) DEVICE — MARYLAND JAW LAPAROSCOPIC SEALER/DIVIDER COATED: Brand: LIGASURE

## (undated) DEVICE — COLUMN DRAPE

## (undated) DEVICE — SYR LUERLOK 50ML

## (undated) DEVICE — SINGLE-USE BIOPSY FORCEPS: Brand: RADIAL JAW 4

## (undated) DEVICE — PK LAP LASR CHOLE 10

## (undated) DEVICE — INTRO ACCSR BLNT TP

## (undated) DEVICE — BLADELESS OBTURATOR, LONG: Brand: WECK VISTA

## (undated) DEVICE — SUT MNCRYL PLS ANTIB UD 4/0 PS2 18IN

## (undated) DEVICE — UNDRPD COMFRT GLD DRYPAD 36X57IN

## (undated) DEVICE — SEAL

## (undated) DEVICE — GLV SURG PREMIERPRO MIC LTX PF SZ8 BRN

## (undated) DEVICE — SCOPE WARMER THAT PRE-WARMS MULTIPLE LAPAROSCOPES.: Brand: JOSNOE MEDICAL INC

## (undated) DEVICE — GOWN,NON-REINFORCED,SIRUS,SET IN SLV,XL: Brand: MEDLINE

## (undated) DEVICE — ENDOPOUCH RETRIEVER SPECIMEN RETRIEVAL BAGS: Brand: ENDOPOUCH RETRIEVER

## (undated) DEVICE — STAPLER 60: Brand: SUREFORM

## (undated) DEVICE — PK BARIATRIC 10

## (undated) DEVICE — LAP PORT CLOSURE GUIDES 5MM AND 10/12MM: Brand: LAP PORT CLOSURE GUIDES 5MM AND 10/12MM

## (undated) DEVICE — TROCARS: Brand: KII® OPTICAL ACCESS SYSTEM

## (undated) DEVICE — 30977 SEE SHARP - ENHANCED INTRAOPERATIVE LAPAROSCOPE CLEANING & DEFOGGING: Brand: 30977 SEE SHARP - ENHANCED INTRAOPERATIVE LAPAROSCOPE CLEANING & DEFOGGING

## (undated) DEVICE — CLTH CLENS READYCLEANSE PERI CARE PK/5

## (undated) DEVICE — ARM DRAPE

## (undated) DEVICE — GLV SURG SENSICARE PI MIC PF SZ6.5 LF STRL

## (undated) DEVICE — SOLIDIFIER LIQ PREMISORB 1500CC

## (undated) DEVICE — GLV SURG PREMIERPRO MIC LTX PF SZ7.5 BRN

## (undated) DEVICE — THE ECHELON FLEX POWERED PLUS ARTICULATING ENDOSCOPIC LINEAR CUTTERS ARE STERILE, SINGLE PATIENT USE INSTRUMENTS THAT SIMULTANEOUSLYCUT AND STAPLE TISSUE. THERE ARE SIX STAGGERED ROWS OF STAPLES, THREE ON EITHER SIDE OF THE CUT LINE. THE ECHELON FLEX 45 POWERED PLUSINSTRUMENTS HAVE A STAPLE LINE THAT IS APPROXIMATELY 45 MM LONG AND A CUT LINE THAT IS APPROXIMATELY 42 MM LONG. THE SHAFT CAN ROTATE FREELYIN BOTH DIRECTIONS AND AN ARTICULATION MECHANISM ENABLES THE DISTAL PORTION OF THE SHAFT TO PIVOT TO FACILITATE LATERAL ACCESS TO THE OPERATIVESITE.THE INSTRUMENTS ARE PACKAGED WITH A PRIMARY LITHIUM BATTERY PACK THAT MUST BE INSTALLED PRIOR TO USE. THERE ARE SPECIFIC REQUIREMENTS FORDISPOSING OF THE BATTERY PACK. REFER TO THE BATTERY PACK DISPOSAL SECTION.THE INSTRUMENTS ARE PACKAGED WITHOUT A RELOAD AND MUST BE LOADED PRIOR TO USE. A STAPLE RETAINING CAP ON THE RELOAD PROTECTS THE STAPLE LEGPOINTS DURING SHIPPING AND TRANSPORTATION. THE INSTRUMENTS’ LOCK-OUT FEATURE IS DESIGNED TO PREVENT A USED OR IMPROPERLY INSTALLED RELOADFROM BEING REFIRED OR AN INSTRUMENT FROM BEING FIRED WITHOUT A RELOAD.: Brand: ECHELON FLEX